# Patient Record
Sex: MALE | Race: BLACK OR AFRICAN AMERICAN | NOT HISPANIC OR LATINO | Employment: FULL TIME | URBAN - METROPOLITAN AREA
[De-identification: names, ages, dates, MRNs, and addresses within clinical notes are randomized per-mention and may not be internally consistent; named-entity substitution may affect disease eponyms.]

---

## 2017-07-03 ENCOUNTER — APPOINTMENT (OUTPATIENT)
Dept: CT IMAGING | Facility: HOSPITAL | Age: 57
End: 2017-07-03

## 2017-07-03 ENCOUNTER — APPOINTMENT (OUTPATIENT)
Dept: ULTRASOUND IMAGING | Facility: HOSPITAL | Age: 57
End: 2017-07-03

## 2017-07-03 ENCOUNTER — APPOINTMENT (OUTPATIENT)
Dept: GENERAL RADIOLOGY | Facility: HOSPITAL | Age: 57
End: 2017-07-03

## 2017-07-03 ENCOUNTER — HOSPITAL ENCOUNTER (INPATIENT)
Facility: HOSPITAL | Age: 57
LOS: 7 days | Discharge: HOME | End: 2017-07-10
Attending: EMERGENCY MEDICINE | Admitting: INTERNAL MEDICINE

## 2017-07-03 DIAGNOSIS — D64.9 ANEMIA, UNSPECIFIED TYPE: ICD-10-CM

## 2017-07-03 DIAGNOSIS — D69.6 THROMBOCYTOPENIA (HCC): ICD-10-CM

## 2017-07-03 DIAGNOSIS — R77.8 ELEVATED TROPONIN: ICD-10-CM

## 2017-07-03 DIAGNOSIS — N17.9 ACUTE RENAL FAILURE, UNSPECIFIED ACUTE RENAL FAILURE TYPE (HCC): ICD-10-CM

## 2017-07-03 DIAGNOSIS — E83.41 HYPERMAGNESEMIA: ICD-10-CM

## 2017-07-03 DIAGNOSIS — R93.89 ABNORMAL FINDING ON CHEST XRAY: ICD-10-CM

## 2017-07-03 DIAGNOSIS — E87.6 HYPOKALEMIA: ICD-10-CM

## 2017-07-03 DIAGNOSIS — I16.9 HYPERTENSIVE CRISIS: Primary | ICD-10-CM

## 2017-07-03 LAB
ALBUMIN SERPL-MCNC: 4 G/DL (ref 3.5–5.2)
ALBUMIN/GLOB SERPL: 1.2 G/DL
ALP SERPL-CCNC: 58 U/L (ref 39–117)
ALT SERPL W P-5'-P-CCNC: 23 U/L (ref 1–41)
AMPHET+METHAMPHET UR QL: NEGATIVE
ANION GAP SERPL CALCULATED.3IONS-SCNC: 19.9 MMOL/L
APTT PPP: 36.7 SECONDS (ref 22.7–35.4)
AST SERPL-CCNC: 41 U/L (ref 1–40)
BACTERIA UR QL AUTO: ABNORMAL /HPF
BARBITURATES UR QL SCN: NEGATIVE
BASOPHILS # BLD AUTO: 0.02 10*3/MM3 (ref 0–0.2)
BASOPHILS NFR BLD AUTO: 0.3 % (ref 0–1.5)
BENZODIAZ UR QL SCN: NEGATIVE
BH CV VAS MEAS BASILIC ANTECUBITAL FOSSA LEFT: 0.2 CM
BH CV VAS MEAS BASILIC ANTECUBITAL FOSSA RIGHT: 0.2 CM
BH CV VAS MEAS BASILIC FOREARM LEFT - DIST: 0.07 CM
BH CV VAS MEAS BASILIC FOREARM LEFT - MID: 0.14 CM
BH CV VAS MEAS BASILIC FOREARM LEFT - PROX: 0.15 CM
BH CV VAS MEAS BASILIC FOREARM RIGHT - DIST: 0.11 CM
BH CV VAS MEAS BASILIC FOREARM RIGHT - MID: 0.1 CM
BH CV VAS MEAS BASILIC FOREARM RIGHT - PROX: 0.25 CM
BH CV VAS MEAS BASILIC UPPER ARM LEFT - DIST: 0.25 CM
BH CV VAS MEAS BASILIC UPPER ARM LEFT - MID: 0.29 CM
BH CV VAS MEAS BASILIC UPPER ARM RIGHT - DIST: 0.28 CM
BH CV VAS MEAS BASILIC UPPER ARM RIGHT - MID: 0.34 CM
BH CV VAS MEAS BASILIC UPPER ARM RIGHT - PROX: 0.34 CM
BH CV VAS MEAS CEPHALIC ANTECUBITAL FOSSA LEFT: 0.22 CM
BH CV VAS MEAS CEPHALIC ANTECUBITAL FOSSA RIGHT: 0.26 CM
BH CV VAS MEAS CEPHALIC FOREARM LEFT - DIST: 0.15 CM
BH CV VAS MEAS CEPHALIC FOREARM LEFT - MID: 0.13 CM
BH CV VAS MEAS CEPHALIC FOREARM LEFT - PROX: 0.15 CM
BH CV VAS MEAS CEPHALIC FOREARM RIGHT - DIST: 0.07 CM
BH CV VAS MEAS CEPHALIC FOREARM RIGHT - MID: 0.11 CM
BH CV VAS MEAS CEPHALIC FOREARM RIGHT - PROX: 0.09 CM
BH CV VAS MEAS CEPHALIC UPPER ARM LEFT - DIST: 0.19 CM
BH CV VAS MEAS CEPHALIC UPPER ARM LEFT - MID: 0.23 CM
BH CV VAS MEAS CEPHALIC UPPER ARM LEFT - PROX: 0.21 CM
BH CV VAS MEAS CEPHALIC UPPER ARM RIGHT - DIST: 0.1 CM
BH CV VAS MEAS CEPHALIC UPPER ARM RIGHT - MID: 0.18 CM
BH CV VAS MEAS CEPHALIC UPPER ARM RIGHT - PROX: 0.14 CM
BH CV VAS MEAS RADIAL UPPER ARM LEFT - DIST: 0.23 CM
BH CV VAS MEAS RADIAL UPPER ARM LEFT - MID: 0.27 CM
BH CV VAS MEAS RADIAL UPPER ARM LEFT - PROX: 0.29 CM
BH CV VAS MEAS RADIAL UPPER ARM RIGHT - DIST: 0.2 CM
BH CV VAS MEAS RADIAL UPPER ARM RIGHT - MID: 0.27 CM
BH CV VAS MEAS RADIAL UPPER ARM RIGHT - PROX: 0.28 CM
BILIRUB CONJ SERPL-MCNC: 0.3 MG/DL (ref 0–0.3)
BILIRUB SERPL-MCNC: 1.9 MG/DL (ref 0.1–1.2)
BILIRUB UR QL STRIP: NEGATIVE
BUN BLD-MCNC: 74 MG/DL (ref 6–20)
BUN/CREAT SERPL: 7.4 (ref 7–25)
CALCIUM SPEC-SCNC: 9.1 MG/DL (ref 8.6–10.5)
CANNABINOIDS SERPL QL: POSITIVE
CHLORIDE SERPL-SCNC: 89 MMOL/L (ref 98–107)
CHLORIDE UR-SCNC: 57 MMOL/L
CLARITY UR: CLEAR
CO2 SERPL-SCNC: 25.1 MMOL/L (ref 22–29)
COCAINE UR QL: NEGATIVE
COLOR UR: YELLOW
CREAT BLD-MCNC: 10.02 MG/DL (ref 0.76–1.27)
CREAT UR-MCNC: 66.3 MG/DL
CRP SERPL-MCNC: 3.34 MG/DL (ref 0–0.5)
DEPRECATED RDW RBC AUTO: 50.3 FL (ref 37–54)
EOSINOPHIL # BLD AUTO: 0.06 10*3/MM3 (ref 0–0.7)
EOSINOPHIL NFR BLD AUTO: 0.8 % (ref 0.3–6.2)
EOSINOPHIL SPEC QL MICRO: 0 % EOS/100 CELLS (ref 0–0)
ERYTHROCYTE [DISTWIDTH] IN BLOOD BY AUTOMATED COUNT: 15 % (ref 11.5–14.5)
ERYTHROCYTE [SEDIMENTATION RATE] IN BLOOD: 85 MM/HR (ref 0–20)
FERRITIN SERPL-MCNC: 529.7 NG/ML (ref 30–400)
FOLATE SERPL-MCNC: 19.46 NG/ML (ref 4.78–24.2)
GFR SERPL CREATININE-BSD FRML MDRD: 7 ML/MIN/1.73
GLOBULIN UR ELPH-MCNC: 3.3 GM/DL
GLUCOSE BLD-MCNC: 165 MG/DL (ref 65–99)
GLUCOSE BLDC GLUCOMTR-MCNC: 157 MG/DL (ref 70–130)
GLUCOSE BLDC GLUCOMTR-MCNC: 195 MG/DL (ref 70–130)
GLUCOSE BLDC GLUCOMTR-MCNC: 202 MG/DL (ref 70–130)
GLUCOSE UR STRIP-MCNC: ABNORMAL MG/DL
HCT VFR BLD AUTO: 28 % (ref 40.4–52.2)
HGB BLD-MCNC: 10.3 G/DL (ref 13.7–17.6)
HGB RETIC QN: 38.3 PG (ref 32.7–38.6)
HGB UR QL STRIP.AUTO: ABNORMAL
HOLD SPECIMEN: NORMAL
HOLD SPECIMEN: NORMAL
HYALINE CASTS UR QL AUTO: ABNORMAL /LPF
IMM GRANULOCYTES # BLD: 0.02 10*3/MM3 (ref 0–0.03)
IMM GRANULOCYTES NFR BLD: 0.3 % (ref 0–0.5)
IMM RETICS NFR: 13.4 % (ref 0.7–13.7)
INR PPP: 1.22 (ref 0.9–1.1)
IRON 24H UR-MRATE: 72 MCG/DL (ref 59–158)
IRON SATN MFR SERPL: 22 % (ref 20–50)
KETONES UR QL STRIP: NEGATIVE
LDH SERPL-CCNC: 1488 U/L (ref 135–225)
LEUKOCYTE ESTERASE UR QL STRIP.AUTO: NEGATIVE
LIPASE SERPL-CCNC: 118 U/L (ref 13–60)
LYMPHOCYTES # BLD AUTO: 0.73 10*3/MM3 (ref 0.9–4.8)
LYMPHOCYTES NFR BLD AUTO: 10.1 % (ref 19.6–45.3)
MAGNESIUM SERPL-MCNC: 3 MG/DL (ref 1.6–2.6)
MCH RBC QN AUTO: 33.8 PG (ref 27–32.7)
MCHC RBC AUTO-ENTMCNC: 36.8 G/DL (ref 32.6–36.4)
MCV RBC AUTO: 91.8 FL (ref 79.8–96.2)
METHADONE UR QL SCN: NEGATIVE
MONOCYTES # BLD AUTO: 0.45 10*3/MM3 (ref 0.2–1.2)
MONOCYTES NFR BLD AUTO: 6.2 % (ref 5–12)
NEUTROPHILS # BLD AUTO: 5.95 10*3/MM3 (ref 1.9–8.1)
NEUTROPHILS NFR BLD AUTO: 82.3 % (ref 42.7–76)
NITRITE UR QL STRIP: NEGATIVE
OPIATES UR QL: NEGATIVE
OXYCODONE UR QL SCN: NEGATIVE
PH UR STRIP.AUTO: 7 [PH] (ref 5–8)
PLAT MORPH BLD: NORMAL
PLATELET # BLD AUTO: 63 10*3/MM3 (ref 140–500)
PLATELET # BLD AUTO: 70 10*3/MM3 (ref 140–500)
PLATELETS.RETICULATED NFR BLD AUTO: 6.5 % (ref 0.9–6.5)
POTASSIUM BLD-SCNC: 2.6 MMOL/L (ref 3.5–5.2)
POTASSIUM BLD-SCNC: 2.9 MMOL/L (ref 3.5–5.2)
PROT SERPL-MCNC: 7.3 G/DL (ref 6–8.5)
PROT UR QL STRIP: ABNORMAL
PROT UR-MCNC: 455 MG/DL
PROT/CREAT UR: 6862.7 MG/G CREA
PROTHROMBIN TIME: 14.9 SECONDS (ref 11.7–14.2)
RBC # BLD AUTO: 3.05 10*6/MM3 (ref 4.6–6)
RBC # UR: ABNORMAL /HPF
REF LAB TEST METHOD: ABNORMAL
RETICS/RBC NFR AUTO: 5.02 % (ref 0.5–1.5)
SCHISTOCYTES BLD QL SMEAR: NORMAL
SODIUM BLD-SCNC: 134 MMOL/L (ref 136–145)
SODIUM UR-SCNC: 78 MMOL/L
SP GR UR STRIP: 1.01 (ref 1–1.03)
SQUAMOUS #/AREA URNS HPF: ABNORMAL /HPF
T3FREE SERPL-MCNC: 2.18 PG/ML (ref 2–4.4)
T4 FREE SERPL-MCNC: 1.26 NG/DL (ref 0.93–1.7)
TIBC SERPL-MCNC: 332 MCG/DL (ref 298–536)
TRANSFERRIN SERPL-MCNC: 223 MG/DL (ref 200–360)
TROPONIN T SERPL-MCNC: 0.22 NG/ML (ref 0–0.03)
TSH SERPL DL<=0.05 MIU/L-ACNC: 1.2 MIU/ML (ref 0.27–4.2)
TSH SERPL DL<=0.05 MIU/L-ACNC: 1.4 MIU/ML (ref 0.27–4.2)
UROBILINOGEN UR QL STRIP: ABNORMAL
VIT B12 BLD-MCNC: 635 PG/ML (ref 211–946)
WBC MORPH BLD: NORMAL
WBC NRBC COR # BLD: 7.23 10*3/MM3 (ref 4.5–10.7)
WBC UR QL AUTO: ABNORMAL /HPF
WHOLE BLOOD HOLD SPECIMEN: NORMAL
WHOLE BLOOD HOLD SPECIMEN: NORMAL

## 2017-07-03 PROCEDURE — 85046 RETICYTE/HGB CONCENTRATE: CPT | Performed by: INTERNAL MEDICINE

## 2017-07-03 PROCEDURE — 83690 ASSAY OF LIPASE: CPT | Performed by: EMERGENCY MEDICINE

## 2017-07-03 PROCEDURE — 76775 US EXAM ABDO BACK WALL LIM: CPT

## 2017-07-03 PROCEDURE — 80307 DRUG TEST PRSMV CHEM ANLYZR: CPT | Performed by: EMERGENCY MEDICINE

## 2017-07-03 PROCEDURE — 83540 ASSAY OF IRON: CPT | Performed by: INTERNAL MEDICINE

## 2017-07-03 PROCEDURE — 86140 C-REACTIVE PROTEIN: CPT | Performed by: INTERNAL MEDICINE

## 2017-07-03 PROCEDURE — 87205 SMEAR GRAM STAIN: CPT | Performed by: INTERNAL MEDICINE

## 2017-07-03 PROCEDURE — 85025 COMPLETE CBC W/AUTO DIFF WBC: CPT | Performed by: EMERGENCY MEDICINE

## 2017-07-03 PROCEDURE — 99284 EMERGENCY DEPT VISIT MOD MDM: CPT

## 2017-07-03 PROCEDURE — 83883 ASSAY NEPHELOMETRY NOT SPEC: CPT | Performed by: INTERNAL MEDICINE

## 2017-07-03 PROCEDURE — 84132 ASSAY OF SERUM POTASSIUM: CPT | Performed by: INTERNAL MEDICINE

## 2017-07-03 PROCEDURE — 85007 BL SMEAR W/DIFF WBC COUNT: CPT | Performed by: EMERGENCY MEDICINE

## 2017-07-03 PROCEDURE — 82436 ASSAY OF URINE CHLORIDE: CPT | Performed by: INTERNAL MEDICINE

## 2017-07-03 PROCEDURE — 85055 RETICULATED PLATELET ASSAY: CPT | Performed by: INTERNAL MEDICINE

## 2017-07-03 PROCEDURE — 84443 ASSAY THYROID STIM HORMONE: CPT | Performed by: EMERGENCY MEDICINE

## 2017-07-03 PROCEDURE — 84439 ASSAY OF FREE THYROXINE: CPT | Performed by: INTERNAL MEDICINE

## 2017-07-03 PROCEDURE — 25010000002 HEPARIN (PORCINE) PER 1000 UNITS: Performed by: INTERNAL MEDICINE

## 2017-07-03 PROCEDURE — 86335 IMMUNFIX E-PHORSIS/URINE/CSF: CPT | Performed by: INTERNAL MEDICINE

## 2017-07-03 PROCEDURE — 84155 ASSAY OF PROTEIN SERUM: CPT | Performed by: INTERNAL MEDICINE

## 2017-07-03 PROCEDURE — 82962 GLUCOSE BLOOD TEST: CPT

## 2017-07-03 PROCEDURE — 93005 ELECTROCARDIOGRAM TRACING: CPT | Performed by: EMERGENCY MEDICINE

## 2017-07-03 PROCEDURE — 84165 PROTEIN E-PHORESIS SERUM: CPT | Performed by: INTERNAL MEDICINE

## 2017-07-03 PROCEDURE — 84481 FREE ASSAY (FT-3): CPT | Performed by: INTERNAL MEDICINE

## 2017-07-03 PROCEDURE — 99254 IP/OBS CNSLTJ NEW/EST MOD 60: CPT | Performed by: INTERNAL MEDICINE

## 2017-07-03 PROCEDURE — 84484 ASSAY OF TROPONIN QUANT: CPT | Performed by: EMERGENCY MEDICINE

## 2017-07-03 PROCEDURE — 83615 LACTATE (LD) (LDH) ENZYME: CPT | Performed by: INTERNAL MEDICINE

## 2017-07-03 PROCEDURE — 84156 ASSAY OF PROTEIN URINE: CPT | Performed by: INTERNAL MEDICINE

## 2017-07-03 PROCEDURE — 93010 ELECTROCARDIOGRAM REPORT: CPT | Performed by: INTERNAL MEDICINE

## 2017-07-03 PROCEDURE — 71020 HC CHEST PA AND LATERAL: CPT

## 2017-07-03 PROCEDURE — 84300 ASSAY OF URINE SODIUM: CPT | Performed by: INTERNAL MEDICINE

## 2017-07-03 PROCEDURE — 81001 URINALYSIS AUTO W/SCOPE: CPT | Performed by: EMERGENCY MEDICINE

## 2017-07-03 PROCEDURE — 86334 IMMUNOFIX E-PHORESIS SERUM: CPT | Performed by: INTERNAL MEDICINE

## 2017-07-03 PROCEDURE — 80053 COMPREHEN METABOLIC PANEL: CPT | Performed by: EMERGENCY MEDICINE

## 2017-07-03 PROCEDURE — 25010000002 ONDANSETRON PER 1 MG: Performed by: INTERNAL MEDICINE

## 2017-07-03 PROCEDURE — 82607 VITAMIN B-12: CPT | Performed by: INTERNAL MEDICINE

## 2017-07-03 PROCEDURE — 82746 ASSAY OF FOLIC ACID SERUM: CPT | Performed by: INTERNAL MEDICINE

## 2017-07-03 PROCEDURE — 84466 ASSAY OF TRANSFERRIN: CPT | Performed by: INTERNAL MEDICINE

## 2017-07-03 PROCEDURE — 82668 ASSAY OF ERYTHROPOIETIN: CPT | Performed by: INTERNAL MEDICINE

## 2017-07-03 PROCEDURE — 83735 ASSAY OF MAGNESIUM: CPT | Performed by: EMERGENCY MEDICINE

## 2017-07-03 PROCEDURE — 85652 RBC SED RATE AUTOMATED: CPT | Performed by: INTERNAL MEDICINE

## 2017-07-03 PROCEDURE — 84443 ASSAY THYROID STIM HORMONE: CPT | Performed by: INTERNAL MEDICINE

## 2017-07-03 PROCEDURE — 82570 ASSAY OF URINE CREATININE: CPT | Performed by: INTERNAL MEDICINE

## 2017-07-03 PROCEDURE — 85610 PROTHROMBIN TIME: CPT | Performed by: EMERGENCY MEDICINE

## 2017-07-03 PROCEDURE — 85730 THROMBOPLASTIN TIME PARTIAL: CPT | Performed by: EMERGENCY MEDICINE

## 2017-07-03 PROCEDURE — 82728 ASSAY OF FERRITIN: CPT | Performed by: INTERNAL MEDICINE

## 2017-07-03 PROCEDURE — 82248 BILIRUBIN DIRECT: CPT | Performed by: INTERNAL MEDICINE

## 2017-07-03 PROCEDURE — 71250 CT THORAX DX C-: CPT

## 2017-07-03 RX ORDER — NEBIVOLOL 10 MG/1
10 TABLET ORAL
Status: DISCONTINUED | OUTPATIENT
Start: 2017-07-04 | End: 2017-07-04

## 2017-07-03 RX ORDER — HYDROCHLOROTHIAZIDE 25 MG/1
25 TABLET ORAL DAILY
COMMUNITY
End: 2017-07-10 | Stop reason: HOSPADM

## 2017-07-03 RX ORDER — TEMAZEPAM 15 MG/1
15 CAPSULE ORAL NIGHTLY PRN
Status: DISCONTINUED | OUTPATIENT
Start: 2017-07-03 | End: 2017-07-10 | Stop reason: HOSPADM

## 2017-07-03 RX ORDER — SODIUM CHLORIDE 0.9 % (FLUSH) 0.9 %
10 SYRINGE (ML) INJECTION AS NEEDED
Status: DISCONTINUED | OUTPATIENT
Start: 2017-07-03 | End: 2017-07-10 | Stop reason: HOSPADM

## 2017-07-03 RX ORDER — PANTOPRAZOLE SODIUM 40 MG/10ML
40 INJECTION, POWDER, LYOPHILIZED, FOR SOLUTION INTRAVENOUS ONCE
Status: COMPLETED | OUTPATIENT
Start: 2017-07-03 | End: 2017-07-03

## 2017-07-03 RX ORDER — HEPARIN SODIUM 5000 [USP'U]/ML
5000 INJECTION, SOLUTION INTRAVENOUS; SUBCUTANEOUS EVERY 8 HOURS SCHEDULED
Status: DISCONTINUED | OUTPATIENT
Start: 2017-07-03 | End: 2017-07-10 | Stop reason: HOSPADM

## 2017-07-03 RX ORDER — ONDANSETRON 2 MG/ML
4 INJECTION INTRAMUSCULAR; INTRAVENOUS ONCE
Status: COMPLETED | OUTPATIENT
Start: 2017-07-03 | End: 2017-07-03

## 2017-07-03 RX ORDER — NEBIVOLOL 10 MG/1
10 TABLET ORAL DAILY
COMMUNITY
End: 2017-07-10 | Stop reason: HOSPADM

## 2017-07-03 RX ORDER — LABETALOL HYDROCHLORIDE 5 MG/ML
10 INJECTION, SOLUTION INTRAVENOUS ONCE
Status: COMPLETED | OUTPATIENT
Start: 2017-07-03 | End: 2017-07-03

## 2017-07-03 RX ORDER — POTASSIUM CHLORIDE 750 MG/1
40 CAPSULE, EXTENDED RELEASE ORAL ONCE
Status: COMPLETED | OUTPATIENT
Start: 2017-07-03 | End: 2017-07-03

## 2017-07-03 RX ORDER — POTASSIUM CHLORIDE 750 MG/1
60 CAPSULE, EXTENDED RELEASE ORAL ONCE
Status: COMPLETED | OUTPATIENT
Start: 2017-07-03 | End: 2017-07-03

## 2017-07-03 RX ORDER — SODIUM CHLORIDE 9 MG/ML
125 INJECTION, SOLUTION INTRAVENOUS CONTINUOUS
Status: DISCONTINUED | OUTPATIENT
Start: 2017-07-03 | End: 2017-07-09

## 2017-07-03 RX ORDER — NEBIVOLOL 10 MG/1
10 TABLET ORAL ONCE
Status: COMPLETED | OUTPATIENT
Start: 2017-07-03 | End: 2017-07-03

## 2017-07-03 RX ADMIN — HEPARIN SODIUM 5000 UNITS: 5000 INJECTION, SOLUTION INTRAVENOUS; SUBCUTANEOUS at 15:34

## 2017-07-03 RX ADMIN — HEPARIN SODIUM 5000 UNITS: 5000 INJECTION, SOLUTION INTRAVENOUS; SUBCUTANEOUS at 22:08

## 2017-07-03 RX ADMIN — POTASSIUM CHLORIDE 60 MEQ: 750 CAPSULE, EXTENDED RELEASE ORAL at 22:09

## 2017-07-03 RX ADMIN — NICARDIPINE HYDROCHLORIDE 5 MG/HR: 25 INJECTION INTRAVENOUS at 19:51

## 2017-07-03 RX ADMIN — NICARDIPINE HYDROCHLORIDE 5 MG/HR: 25 INJECTION INTRAVENOUS at 09:39

## 2017-07-03 RX ADMIN — SODIUM CHLORIDE 125 ML/HR: 9 INJECTION, SOLUTION INTRAVENOUS at 19:47

## 2017-07-03 RX ADMIN — NICARDIPINE HYDROCHLORIDE 5 MG/HR: 25 INJECTION INTRAVENOUS at 12:11

## 2017-07-03 RX ADMIN — LABETALOL HYDROCHLORIDE 10 MG: 5 INJECTION, SOLUTION INTRAVENOUS at 10:19

## 2017-07-03 RX ADMIN — SODIUM CHLORIDE 500 ML: 9 INJECTION, SOLUTION INTRAVENOUS at 08:14

## 2017-07-03 RX ADMIN — ONDANSETRON 4 MG: 2 INJECTION INTRAMUSCULAR; INTRAVENOUS at 12:11

## 2017-07-03 RX ADMIN — SODIUM CHLORIDE 125 ML/HR: 9 INJECTION, SOLUTION INTRAVENOUS at 08:15

## 2017-07-03 RX ADMIN — NICARDIPINE HYDROCHLORIDE 5 MG/HR: 25 INJECTION INTRAVENOUS at 23:17

## 2017-07-03 RX ADMIN — PANTOPRAZOLE SODIUM 40 MG: 40 INJECTION, POWDER, FOR SOLUTION INTRAVENOUS at 08:14

## 2017-07-03 RX ADMIN — POTASSIUM CHLORIDE 40 MEQ: 750 CAPSULE, EXTENDED RELEASE ORAL at 15:34

## 2017-07-03 RX ADMIN — NEBIVOLOL HYDROCHLORIDE 10 MG: 10 TABLET ORAL at 08:39

## 2017-07-03 RX ADMIN — TEMAZEPAM 15 MG: 15 CAPSULE ORAL at 23:04

## 2017-07-03 NOTE — CONSULTS
Referring Provider: Stephen Arauz MD  Reason for Consultation: Evaluation of patient with the severe renal failure    Subjective     Chief complaint   Chief Complaint   Patient presents with   • Nausea     Patient states nauseated, vomited on Friday, dry mouth x 2 weeks   • Vomiting   • Constipation     last normal bowel movement last wednesday       History of present illness:    This is a very pleasant 56-year-old -American gentleman who presented with recurrent nausea and occasional vomiting for the past 5 or 6 days with significant decrease in appetite and weight loss.  He was found to have severe hypertension blood pressure in the 250/130 range.  Also was found to have increased creatinine up to 10.1 he had not had any labs since 2014 and also he stopped taking his antihypertensive agents over the past year.  He denies using nonsteroidal anti-inflammatory drugs.  He had left total knee replacement about 3 years ago and he was taking the Advil and Aleve at that time he was told to stop it because of the impact of his hypertension.  He denies being diabetic, no history of heart or lung diseases, no CVA TIAs or seizure disorder, no peptic ulcer disease, no liver disease.  Denies nephrolithiasis or UTIs, denies any prostate problem.  There is no associated symptoms of dysuria, gross hematuria or other outlet symptoms.  No melena or hematemesis, no easy bruising has been reported by the patient.    Past Medical History:   Diagnosis Date   • Hypertension      History reviewed. No pertinent surgical history.  History reviewed. No pertinent family history.  Very strong family history for hypertension but negative for kidney disease.  Social History   Substance Use Topics   • Smoking status: Never Smoker   • Smokeless tobacco: None   • Alcohol use Yes      Comment: occassionally       (Not in a hospital admission)  Allergies:  Review of patient's allergies indicates no known allergies.    Review of  "Systems  Denies any fever or chills, he has poor appetite, and weight loss.  No acute visual or hearing problems, no epistaxis.  Denies any chest pain or shortness of air, no orthopnea or PND, no hemoptysis or cough.  He has nausea and occasional vomiting for the past few days and very poor appetite, no melena, hematochezia or hematemesis.  Denies any dysuria, gross hematuria or bladder outlet symptoms.  He had the occasional arthralgias of the left knee related to his knee replacement, no lower extremity edema, no headache or syncope or seizure activity.  He is not diabetic, no thyroid disease, denies depression or anxiety or panic attack, has a sleep pattern disturbance where he cannot sleep at night.  Remainder full systems review were essentially negative    Objective     Vital Signs  Temp:  [97.1 °F (36.2 °C)] 97.1 °F (36.2 °C)  Heart Rate:  [96-98] 96  Resp:  [18] 18  BP: (240-254)/(160-178) 240/160    Flowsheet Rows         First Filed Value    Admission Height  70\" (177.8 cm) Documented at 07/03/2017 0734    Admission Weight  150 lb (68 kg) Documented at 07/03/2017 0734                 No intake or output data in the 24 hours ending 07/03/17 1001    Physical Exam:  General Appearance: alert, oriented x 3, no acute distress,   Skin: warm and dry  HEENT: pupils round and reactive to light, oral mucosa dry,   Neck: supple, no JVD, trachea midline  Lungs: CTA, unlabored breathing effort  Heart: RRR, normal S1 and S2, no S3, positive S4, no rub  Abdomen: soft, non-tender, no palpable bladder, present bowel sounds to auscultation  Extremities: no edema, cyanosis or clubbing  Joints: No significant deformities noted, no crepitation over the knees or the ankles.  Lymphatics: No cervical or supraclavicular lymphadenopathy.  Neuro: normal speech and mental status, no asterixis    Results Review:  Results for orders placed or performed during the hospital encounter of 07/03/17   Urinalysis With / Culture If Indicated "   Result Value Ref Range    Color, UA Yellow Yellow, Straw    Appearance, UA Clear Clear    pH, UA 7.0 5.0 - 8.0    Specific Gravity, UA 1.011 1.005 - 1.030    Glucose,  mg/dL (Trace) (A) Negative    Ketones, UA Negative Negative    Bilirubin, UA Negative Negative    Blood, UA Large (3+) (A) Negative    Protein, UA >=300 mg/dL (3+) (A) Negative    Leuk Esterase, UA Negative Negative    Nitrite, UA Negative Negative    Urobilinogen, UA 0.2 E.U./dL 0.2 - 1.0 E.U./dL   Urine Drug Screen   Result Value Ref Range    Amphet/Methamphet, Screen Negative Negative    Barbiturates Screen, Urine Negative Negative    Benzodiazepine Screen, Urine Negative Negative    Cocaine Screen, Urine Negative Negative    Opiate Screen Negative Negative    THC, Screen, Urine Positive (A) Negative    Methadone Screen, Urine Negative Negative    Oxycodone Screen, Urine Negative Negative   Comprehensive Metabolic Panel   Result Value Ref Range    Glucose 165 (H) 65 - 99 mg/dL    BUN 74 (H) 6 - 20 mg/dL    Creatinine 10.02 (H) 0.76 - 1.27 mg/dL    Sodium 134 (L) 136 - 145 mmol/L    Potassium 2.6 (L) 3.5 - 5.2 mmol/L    Chloride 89 (L) 98 - 107 mmol/L    CO2 25.1 22.0 - 29.0 mmol/L    Calcium 9.1 8.6 - 10.5 mg/dL    Total Protein 7.3 6.0 - 8.5 g/dL    Albumin 4.00 3.50 - 5.20 g/dL    ALT (SGPT) 23 1 - 41 U/L    AST (SGOT) 41 (H) 1 - 40 U/L    Alkaline Phosphatase 58 39 - 117 U/L    Total Bilirubin 1.9 (H) 0.1 - 1.2 mg/dL    eGFR  African Amer 7 (L) >60 mL/min/1.73    Globulin 3.3 gm/dL    A/G Ratio 1.2 g/dL    BUN/Creatinine Ratio 7.4 7.0 - 25.0    Anion Gap 19.9 mmol/L   Lipase   Result Value Ref Range    Lipase 118 (H) 13 - 60 U/L   Magnesium   Result Value Ref Range    Magnesium 3.0 (H) 1.6 - 2.6 mg/dL   Troponin   Result Value Ref Range    Troponin T 0.219 (C) 0.000 - 0.030 ng/mL   TSH   Result Value Ref Range    TSH 1.400 0.270 - 4.200 mIU/mL   CBC Auto Differential   Result Value Ref Range    WBC 7.23 4.50 - 10.70 10*3/mm3    RBC 3.05  (L) 4.60 - 6.00 10*6/mm3    Hemoglobin 10.3 (L) 13.7 - 17.6 g/dL    Hematocrit 28.0 (L) 40.4 - 52.2 %    MCV 91.8 79.8 - 96.2 fL    MCH 33.8 (H) 27.0 - 32.7 pg    MCHC 36.8 (H) 32.6 - 36.4 g/dL    RDW 15.0 (H) 11.5 - 14.5 %    RDW-SD 50.3 37.0 - 54.0 fl    Platelets 70 (L) 140 - 500 10*3/mm3    Neutrophil % 82.3 (H) 42.7 - 76.0 %    Lymphocyte % 10.1 (L) 19.6 - 45.3 %    Monocyte % 6.2 5.0 - 12.0 %    Eosinophil % 0.8 0.3 - 6.2 %    Basophil % 0.3 0.0 - 1.5 %    Immature Grans % 0.3 0.0 - 0.5 %    Neutrophils, Absolute 5.95 1.90 - 8.10 10*3/mm3    Lymphocytes, Absolute 0.73 (L) 0.90 - 4.80 10*3/mm3    Monocytes, Absolute 0.45 0.20 - 1.20 10*3/mm3    Eosinophils, Absolute 0.06 0.00 - 0.70 10*3/mm3    Basophils, Absolute 0.02 0.00 - 0.20 10*3/mm3    Immature Grans, Absolute 0.02 0.00 - 0.03 10*3/mm3   Scan Slide   Result Value Ref Range    Schistocytes Slight/1+ None Seen    WBC Morphology Normal Normal    Platelet Morphology Normal Normal   Urinalysis, Microscopic Only   Result Value Ref Range    RBC, UA 21-30 (A) None Seen, 0-2 /HPF    WBC, UA 0-2 None Seen, 0-2 /HPF    Bacteria, UA None Seen None Seen /HPF    Squamous Epithelial Cells, UA 0-2 None Seen, 0-2 /HPF    Hyaline Casts, UA 0-2 None Seen /LPF    Methodology Automated Microscopy      Imaging Results (last 72 hours)     Procedure Component Value Units Date/Time    XR Chest 2 View [[id number]] Collected:  07/03/17 0922     Updated:  07/03/17 0945    Narrative:       2-VIEW CHEST     HISTORY: Weakness and nausea and vomiting.     COMPARISON: Chest CT with IV contrast 08/11/2014.     FINDINGS: There is an abnormal masslike opacity in the right upper lobe  measuring approximately 3.5 x 2.3 cm. This is new compared to previous  CT August 2014. This may represent infiltrate though a lung mass could  also have this appearance. This needs either short interval follow-up  exam or further evaluation with chest CT. Left lung is clear.  Cardiomediastinal silhouette is  within normal limits.       Impression:       Abnormal 3.5 cm masslike opacity right upper lobe. This  could represent a focal pneumonia though a lung mass could also have  this appearance. Findings could be correlated with clinical data and  recommend either short interval follow-up x-ray or further evaluation  with chest CT.     This report was finalized on 7/3/2017 9:42 AM by Dr. Riki Booth MD.                 labetalol 10 mg Intravenous Once       niCARdipine 5-15 mg/hr Last Rate: 7.5 mg/hr (07/03/17 0948)   sodium chloride 125 mL/hr Last Rate: 125 mL/hr (07/03/17 0815)       Assessment/Plan   1.  Severe renal failure could be the end stage renal disease related to severe uncontrolled hypertension  2.  Hypertensive the crisis.  3.  History of hypertension with the noncompliance with medication and has not taken any medication for over a year.  4.  Degenerative joint disease with prior left total knee replacement  5.  Anemia could be associated with chronic kidney disease but other etiologies need to be ruled out.  6.  Thrombocytopenia the etiology is not clear.  5.  Elevated Anemia associated probably with poor oral intake.      Plan:  1.  Agree with the plan for Cardene drip  2.  We'll check urine studies, sodium, chloride and protein to creatinine ratio also check eosinophils  3.  We'll check the iron stores and the immunofixation  4.  Patient will need hematology evaluation  5.  Will check renal ultrasound  6.  The patient probably would need dialysis in the next 24 hours if his renal function is not improved and there is evidence of worsening azotemia.  7. Will give 1 dose of potassium  8.  Surveillance labs    Thank you Dr. Arauz for asking me to see you once the patient consultation    I discussed the patients findings and my recommendations with the patient    David Dejesus MD  07/03/17  10:01 AM

## 2017-07-03 NOTE — PLAN OF CARE
Problem: Patient Care Overview (Adult)  Goal: Plan of Care Review    07/03/17 4875   Outcome Evaluation   Outcome Summary/Follow up Plan Rec'd to CCU from ER today. Cardene gtt @5. -170sys. No c/o pain voiced. Cont to void per urinal. Will monitor labs.

## 2017-07-03 NOTE — ED TRIAGE NOTES
Chief Complaint   Patient presents with   • Nausea     Patient states nauseated, vomited on Friday, dry mouth x 2 weeks   • Vomiting   • Constipation     last normal bowel movement last wednesday

## 2017-07-03 NOTE — ED PROVIDER NOTES
EMERGENCY DEPARTMENT ENCOUNTER    CHIEF COMPLAINT  Chief Complaint: Nausea  History given by: Patient  History limited by: Nothing  Room Number: 09/09  PMD: No Known Provider      HPI:  Pt is a 56 y.o. male who presents complaining of nausea onset 5 days ago. The pt states the nausea is worse after eating food. The pt states he had 3-4 episodes of vomiting 4 days ago that has resolved since. Pt reports decreased appetite, constipation, fatigue, and mild HA - no HA at time of exam. The pt's last normal BM was 5 days ago. The pt had a small BM yesterday after using magnesium citrate. Pt denies CP, SOA, fever, abd pain, dysuria, focal weakness/numbness, problems with speech/vision, and bloody stool. The pt states he has not been taking his BP medications for the past year.    Duration: The pt has had nausea for 5 days  Onset: Gradual  Timing: Constant  Radiation: None  Quality: Nausea  Intensity/Severity: Moderate  Progression: Unchanged  Associated Symptoms: Decreased appetite, constipation, mild HA, fatigue, and vomiting 4 days ago that has resolved  Aggravating Factors: After eating  Alleviating Factors: Nothing  Previous Episodes: None  Treatment before arrival: The pt states he took magnesium citrate yesterday and had a small BM.    PAST MEDICAL HISTORY  Active Ambulatory Problems     Diagnosis Date Noted   • No Active Ambulatory Problems     Resolved Ambulatory Problems     Diagnosis Date Noted   • No Resolved Ambulatory Problems     Past Medical History:   Diagnosis Date   • Hypertension        PAST SURGICAL HISTORY  History reviewed. No pertinent surgical history.    FAMILY HISTORY  History reviewed. No pertinent family history.    SOCIAL HISTORY  Social History     Social History   • Marital status:      Spouse name: N/A   • Number of children: N/A   • Years of education: N/A     Occupational History   • Not on file.     Social History Main Topics   • Smoking status: Never Smoker   • Smokeless tobacco:  Not on file   • Alcohol use Yes      Comment: occassionally   • Drug use: Defer   • Sexual activity: Defer     Other Topics Concern   • Not on file     Social History Narrative   • No narrative on file       ALLERGIES  Review of patient's allergies indicates no known allergies.    REVIEW OF SYSTEMS  Review of Systems   Constitutional: Positive for appetite change (Decreased) and fatigue. Negative for chills and fever.   HENT: Negative for sore throat and trouble swallowing.    Eyes: Negative for visual disturbance.   Respiratory: Negative for cough and shortness of breath.    Cardiovascular: Negative for chest pain and leg swelling.   Gastrointestinal: Positive for constipation, nausea and vomiting (The pt vomited 3-4 times 4 days ago and has not had an episode since.). Negative for abdominal pain and diarrhea.   Endocrine: Negative.    Genitourinary: Negative for decreased urine volume and frequency.   Musculoskeletal: Negative for neck pain.   Skin: Negative for rash.   Allergic/Immunologic: Negative.    Neurological: Positive for headaches (Mild). Negative for weakness and numbness.   Hematological: Negative.    Psychiatric/Behavioral: Negative.    All other systems reviewed and are negative.      PHYSICAL EXAM  ED Triage Vitals   Temp Heart Rate Resp BP SpO2   07/03/17 0736 07/03/17 0734 07/03/17 0734 -- 07/03/17 0734   97.1 °F (36.2 °C) 98 18  100 %      Temp src Heart Rate Source Patient Position BP Location FiO2 (%)   07/03/17 0736 07/03/17 0734 -- -- --   Tympanic Monitor          Physical Exam   Constitutional: He is oriented to person, place, and time. He appears distressed.   HENT:   Head: Normocephalic and atraumatic.   Mouth/Throat: Mucous membranes are dry.   Eyes: EOM are normal.   Neck: Normal range of motion.   Cardiovascular: Normal rate, regular rhythm and normal heart sounds.    No murmur heard.  Pulses:       Posterior tibial pulses are 2+ on the right side, and 2+ on the left side.    Pulmonary/Chest: Effort normal and breath sounds normal. No respiratory distress. He has no wheezes.   Abdominal: Soft. Bowel sounds are normal. There is tenderness in the epigastric area. There is no rebound and no guarding.   Musculoskeletal: Normal range of motion. He exhibits no edema.   Neurological: He is alert and oriented to person, place, and time.   Skin: Skin is warm and dry.   Psychiatric: Affect normal.   Nursing note and vitals reviewed.      LAB RESULTS  Lab Results (last 24 hours)     Procedure Component Value Units Date/Time    CBC & Differential [[id number]] Collected:  07/03/17 0804    Specimen:  Blood Updated:  07/03/17 0916    Narrative:       The following orders were created for panel order CBC & Differential.  Procedure                               Abnormality         Status                     ---------                               -----------         ------                     Scan Slide[[id number]]                                       Final result               CBC Auto Differential[[id number]]        Abnormal            Final result                 Please view results for these tests on the individual orders.    Comprehensive Metabolic Panel [[id number]]  (Abnormal) Collected:  07/03/17 0804    Specimen:  Blood Updated:  07/03/17 0840     Glucose 165 (H) mg/dL      BUN 74 (H) mg/dL      Creatinine 10.02 (H) mg/dL      Sodium 134 (L) mmol/L      Potassium 2.6 (L) mmol/L      Chloride 89 (L) mmol/L      CO2 25.1 mmol/L      Calcium 9.1 mg/dL      Total Protein 7.3 g/dL      Albumin 4.00 g/dL      ALT (SGPT) 23 U/L      AST (SGOT) 41 (H) U/L      Alkaline Phosphatase 58 U/L      Total Bilirubin 1.9 (H) mg/dL      eGFR   Amer 7 (L) mL/min/1.73      Globulin 3.3 gm/dL      A/G Ratio 1.2 g/dL      BUN/Creatinine Ratio 7.4     Anion Gap 19.9 mmol/L     Lipase [[id number]]  (Abnormal) Collected:  07/03/17 0804    Specimen:  Blood Updated:  07/03/17 0840     Lipase 118 (H) U/L     Magnesium  [[id number]]  (Abnormal) Collected:  07/03/17 0804    Specimen:  Blood Updated:  07/03/17 0840     Magnesium 3.0 (H) mg/dL     Troponin [[id number]]  (Abnormal) Collected:  07/03/17 0804    Specimen:  Blood Updated:  07/03/17 0850     Troponin T 0.219 (C) ng/mL     Narrative:       Troponin T Reference Ranges:  Less than 0.03 ng/mL:    Negative for AMI  0.03 to 0.09 ng/mL:      Indeterminant for AMI  Greater than 0.09 ng/mL: Positive for AMI    TSH [[id number]]  (Normal) Collected:  07/03/17 0804    Specimen:  Blood Updated:  07/03/17 0846     TSH 1.400 mIU/mL     CBC Auto Differential [[id number]]  (Abnormal) Collected:  07/03/17 0804    Specimen:  Blood Updated:  07/03/17 0916     WBC 7.23 10*3/mm3      RBC 3.05 (L) 10*6/mm3      Hemoglobin 10.3 (L) g/dL      Hematocrit 28.0 (L) %      MCV 91.8 fL      MCH 33.8 (H) pg      MCHC 36.8 (H) g/dL      RDW 15.0 (H) %      RDW-SD 50.3 fl      Platelets 70 (L) 10*3/mm3      Neutrophil % 82.3 (H) %      Lymphocyte % 10.1 (L) %      Monocyte % 6.2 %      Eosinophil % 0.8 %      Basophil % 0.3 %      Immature Grans % 0.3 %      Neutrophils, Absolute 5.95 10*3/mm3      Lymphocytes, Absolute 0.73 (L) 10*3/mm3      Monocytes, Absolute 0.45 10*3/mm3      Eosinophils, Absolute 0.06 10*3/mm3      Basophils, Absolute 0.02 10*3/mm3      Immature Grans, Absolute 0.02 10*3/mm3     Scan Slide [[id number]] Collected:  07/03/17 0804    Specimen:  Blood Updated:  07/03/17 0916     Schistocytes Slight/1+     WBC Morphology Normal     Platelet Morphology Normal    Urinalysis With / Culture If Indicated [[id number]]  (Abnormal) Collected:  07/03/17 0923    Specimen:  Urine from Urine, Catheter Updated:  07/03/17 0931     Color, UA Yellow     Appearance, UA Clear     pH, UA 7.0     Specific Gravity, UA 1.011     Glucose,  mg/dL (Trace) (A)     Ketones, UA Negative     Bilirubin, UA Negative     Blood, UA Large (3+) (A)     Protein, UA >=300 mg/dL (3+) (A)     Leuk Esterase, UA Negative      Nitrite, UA Negative     Urobilinogen, UA 0.2 E.U./dL    Urine Drug Screen [[id number]]  (Abnormal) Collected:  07/03/17 0923    Specimen:  Urine from Urine, Clean Catch Updated:  07/03/17 0949     Amphet/Methamphet, Screen Negative     Barbiturates Screen, Urine Negative     Benzodiazepine Screen, Urine Negative     Cocaine Screen, Urine Negative     Opiate Screen Negative     THC, Screen, Urine Positive (A)     Methadone Screen, Urine Negative     Oxycodone Screen, Urine Negative    Narrative:       Negative Thresholds For Drugs Screened:     Amphetamines               500 ng/ml   Barbiturates               200 ng/ml   Benzodiazepines            100 ng/ml   Cocaine                    300 ng/ml   Methadone                  300 ng/ml   Opiates                    300 ng/ml   Oxycodone                  100 ng/ml   THC                        50 ng/ml    The Normal Value for all drugs tested is negative. This report includes final unconfirmed screening results to be used for medical treatment purposes only. Unconfirmed results must not be used for non-medical purposes such as employment or legal testing. Clinical consideration should be applied to any drug of abuse test, particulary when unconfirmed results are used.    Urinalysis, Microscopic Only [[id number]]  (Abnormal) Collected:  07/03/17 0923    Specimen:  Urine from Urine, Catheter Updated:  07/03/17 0933     RBC, UA 21-30 (A) /HPF      WBC, UA 0-2 /HPF      Bacteria, UA None Seen /HPF      Squamous Epithelial Cells, UA 0-2 /HPF      Hyaline Casts, UA 0-2 /LPF      Methodology Automated Microscopy          I ordered the above labs and reviewed the results    RADIOLOGY  XR Chest 2 View   Final Result   Abnormal 3.5 cm masslike opacity right upper lobe. This   could represent a focal pneumonia though a lung mass could also have   this appearance. Findings could be correlated with clinical data and   recommend either short interval follow-up x-ray or further  evaluation   with chest CT.       This report was finalized on 7/3/2017 9:42 AM by Dr. Riki Booth MD.               I ordered the above noted radiological studies. Interpreted by radiologist. Reviewed by me in PACS.       PROCEDURES  Critical Care  Performed by: YASH LAGUNAS  Authorized by: YASH LAGUNAS   Total critical care time: 45 minutes  Critical care time was exclusive of separately billable procedures and treating other patients.  Critical care was necessary to treat or prevent imminent or life-threatening deterioration of the following conditions: Hypertensive crisis.  Critical care was time spent personally by me on the following activities: blood draw for specimens, development of treatment plan with patient or surrogate, discussions with consultants, interpretation of cardiac output measurements, evaluation of patient's response to treatment, examination of patient, ordering and review of laboratory studies, ordering and performing treatments and interventions, obtaining history from patient or surrogate, ordering and review of radiographic studies, pulse oximetry, re-evaluation of patient's condition and review of old charts.          EKG           EKG time: 0815  Rhythm/Rate: 90's Normal Sinus  P waves and DC: Biatrial enlargement  QRS, axis: LVH and borderline prolonged QT intervals  ST and T waves: Abnormal ST and T wave findings diffusely with flipped T waves in the lateral leads.      Interpreted Contemporaneously by me, independently viewed  ST and T waves are more pronounced and QT interval is new compared to prior 7-24-14      PROGRESS AND CONSULTS  ED Course     0806  EKG, CXR, and labs ordered for further evaluation. Protonix and Bystolic ordered.    0927  Consult placed to Nephrology and Pulmonology. Cardene ordered.    0939  Received a call from Dr. Dejesus and discussed pt's case. Dr. Dejesus agreed with plan to consult on the pt. He would like a bladder scan ordered for the pt for  further evaluation.    0946  Labetalol ordered.  cardene ordered    0948  Dr. Dejesus is in the ED at the pt's bedside.    0949  Received a call from Dr. Arauz and discussed pt's case. Dr. Arauz agreed with plan to admit the pt.    1000  Received a call from Dr. Dejesus and discussed pt's case. Dr. Dejesus would like the pt's protime-INR and APTT ordered.    1012  The pt had a bladder scan while in the ED that showed 84 cc 7-10 minutes after urination.      MEDICAL DECISION MAKING  Results were reviewed/discussed with the patient and they were also made aware of online access. Pt also made aware that some labs, such as cultures, will not be resulted during ER visit and follow up with PMD is necessary.     MDM  Number of Diagnoses or Management Options  Acute renal failure, unspecified acute renal failure type:   Anemia, unspecified type:   Elevated troponin:   Hypermagnesemia:   Hypertensive crisis:   Hypokalemia:      Amount and/or Complexity of Data Reviewed  Clinical lab tests: ordered and reviewed (BUN - 74  Creatinine - 10.02  Sodium - 134  Potassium - 2.6  Magnesium - 3.0  Troponin - 0.219)  Tests in the radiology section of CPT®: reviewed and ordered (CXR - Abnormal 3.5 cm masslike opacity right upper lobe. This could represent a focal pneumonia though a lung mass could also have this appearance. Recommend either short interval follow-up x-ray or further evaluation with chest CT.)  Tests in the medicine section of CPT®: ordered and reviewed (Refer to the procedure section of the note for EKG results  )  Discussion of test results with the performing providers: yes (Dr. Oleg Arauz)  Decide to obtain previous medical records or to obtain history from someone other than the patient: yes  Discuss the patient with other providers: yes    Critical Care  Total time providing critical care: 30-74 minutes    Patient Progress  Patient progress: stable         DIAGNOSIS  Final diagnoses:   Hypertensive crisis   Acute  renal failure, unspecified acute renal failure type   Hypokalemia   Elevated troponin   Hypermagnesemia   Anemia, unspecified type   Thrombocytopenia   Abnormal finding on chest xray       DISPOSITION  ADMISSION    Discussed treatment plan and reason for admission with pt/family and admitting physician.  Pt/family voiced understanding of the plan for admission for further testing/treatment as needed.         Latest Documented Vital Signs:  As of 10:02 AM  BP- 160/92 HR- 96 Temp- 97.1 °F (36.2 °C) (Tympanic) O2 sat- 100%    --  Documentation assistance provided by kevin Springer for Dr. Cannon.  Information recorded by the scribe was done at my direction and has been verified and validated by me.       Morgan Springer  07/03/17 1014       Marbella Cannon MD  07/05/17 [id number]

## 2017-07-03 NOTE — CONSULTS
Subjective     REASON FOR CONSULTATION:  Anemia in the background of renal failure creatinine of 10  Provide an opinion on any further workup or treatment                             REQUESTING PHYSICIAN:  Dr LARISA DAVIS MD    RECORDS OBTAINED:  Records of the patients history including those obtained from the referring provider were reviewed and summarized in detail.    HISTORY OF PRESENT ILLNESS:  The patient is a 56 y.o. year old male who is here for an opinion about the above issue.    History of Present Illness I have been consulted in this patient was admitted through the emergency room with at least 3 weeks history of persistent nausea and vomiting unable to read anything no have any hematemesis or melena.  He has had some epigastric pain as well.  He was found to have a hypertensive emergency and besides these he was found to have a creatinine of 10.1.  She has had mild degree of anemia no surprising under the circumstances of also mild degree of thrombocytopenia.  The patient has no clinical bleeding.  He has not become diffuse he has not had any fever he has not had any alteration in bowel function including no abdominal pain or diarrhea. He has continue having normal urinary output.  DespiteCreatinine of 10.1 with no fluid accumulation in his lower extremities abdominal chest cavity .  He denies any cough or sputum production or shortness of breath today.  The patient has not been taking any of his blood pressure medication at least for A YEAR.  Past Medical History:   Diagnosis Date   • Hypertension         History reviewed. No pertinent surgical history. LEFT KNEE REPLACEMENT    No current facility-administered medications on file prior to encounter.      No current outpatient prescriptions on file prior to encounter.        ALLERGIES:  No Known Allergies     Social History     Social History   • Marital status:      Spouse name: N/A   • Number of children: N/A   • Years of education: N/A      Social History Main Topics   • Smoking status: Never Smoker   • Smokeless tobacco: None   • Alcohol use Yes      Comment: occassionally   • Drug use: Defer   • Sexual activity: Defer     Other Topics Concern   • None     Social History Narrative   • None        History reviewed. No pertinent family history.     Review of Systems   General: no fever, chills, PROFOUND fatigue, DECREASED weight changes, or lack of appetite.  Eyes: no epiphora, xerophthalmia,conjunctivitis, pain, glaucoma, blurred vision, blindness, secretion, photophobia, proptosis, diplopia.  Ears: no otorrhea, tinnitus, otorrhagia, deafness, pain, vertigo.  Nose: no rhinorrhea, epistaxis, alteration in perception of odors, sinuses pressure.  Mouth: no alteration in gums or teeth,  ulcers, no difficulty with mastication or deglut ion, no odynophagia.  Neck: no masses or pain, no thyroid alterations, no pain in muscles or arteries, no carotid odynia, no crepitation.  Respiratory: no cough, sputum production, dyspnea, trepopnea, pleuritic pain, hemoptysis.  Heart: no syncope, irregularity, palpitations, angina, orthopnea, paroxysmal nocturnal dyspnea.  Vascular Venous: no tenderness,edema, palpable cords, postphlebitic syndrome, skin changes or ulcerations.  Vascular Arterial: no distal ischemia, claudication, gangrene, neuropathic ischemic pain, skin ulcers, paleness or cyanosis.  GI: no dysphagia, odynophagia, no regurgitation, no heartburn,no indigestion,STATED nausea,AND vomiting,no hematemesis ,no melena,no jaundice,no distention, no obstipation,no enterorrhagia,no proctalgia,no anal  lesions, nochanges in bowel habits.  : no frequency, hesitancy, hematuria, discharge, pain.  Musculoskeletal: no muscle or tendon pain or inflammation, joint pain, edema, functional limitation, fasciculations, mass.  Neurologic: no headache, seizures, alterations on Craneal nerves, no motor or senssory deficit, normal coordination, no alteration in memory,  orientation, calculation,writting, verbal or written language.  Skin: no rashes, pruritus or localized lesions.  Psychiatric: SIGNIFICANT anxiety, NO depression, agitation, delusions, proper insight.    Objective     Vitals:    07/03/17 1147 07/03/17 1156 07/03/17 1157 07/03/17 1202   BP: 164/95  176/92    BP Location:       Patient Position:       Pulse: 89 86  84   Resp:       Temp:       TempSrc:       SpO2: 96% 96%  98%   Weight:       Height:         No flowsheet data found.    Physical Exam    GENERAL:  Well-developed, well-nourished  Patient  in no acute distress.   SKIN:  Warm, dry without rashes, purpura or petechiae.  HEENT:  Pupils were equal and reactive to light and accomodation, conjunctivas non injected, no pterigion, normal extraocular movements, normal visual acuity. Bilateral exopthalmus  Mouth mucosa was moist, no exudates in oropharynx, normal gum line, normal roof of the mouth and pillars, normal papillations of the tongue.Ear canals were normal, as well tympanic membranes, normal hearing acuity.No pain in mastoid area or erythema.  NECK:  Supple with good range of motion; no thyromegaly or masses, no JVD or bruits, no cervical adenopathies.No carotid arteries pain, no carotid abnormal pulsation or arterial dance.  LYMPHATICS:  No cervical, supraclavicular, axillary, epitrochlear or inguinal adenopathy.  CHEST:  Normal excursion of both amrit thoraces, normal voice fremitus, no subcutaneous emphysema, normal axillas, no rashes or acanthosis nigricans. Lungs clear to percussion and auscultation, normal breath sounds bilaterally, no wheezing, crackles or ronchi, no stridor, no rubs.  CARDIAC AND VASCULAR: PMI not displaced,no thrills, normal rate and regular rhythm, without murmurs, rubs or S3 or S4 right or left sided gallops. Normal femoral, popliteal, pedis, brachial and carotid pulses.  ABDOMEN:  Soft, nontender with no organomegaly or masses, no ascites, no collateral circulation,no  distention,no Summit sign, no abdominal pain, no inguinal hernias,no umbilical hernias, no abdominal bruits. Normal bowel sounds.  GENITAL: Not  Performed.  EXTREMITIES  AND SPINE:  No clubbing, cyanosis or edema, no deformities or pain surgical scar left kne old,.No kyphosis, scoliosis, deformities or pain in spine, ribs or pelvic bone.  NEUROLOGICAL:  Patient was awake, alert, oriented to time, person and place.    RECENT LABS:  Hematology WBC   Date Value Ref Range Status   07/03/2017 7.23 4.50 - 10.70 10*3/mm3 Final     RBC   Date Value Ref Range Status   07/03/2017 3.05 (L) 4.60 - 6.00 10*6/mm3 Final     Hemoglobin   Date Value Ref Range Status   07/03/2017 10.3 (L) 13.7 - 17.6 g/dL Final     Hematocrit   Date Value Ref Range Status   07/03/2017 28.0 (L) 40.4 - 52.2 % Final     Platelets   Date Value Ref Range Status   07/03/2017 70 (L) 140 - 500 10*3/mm3 Final          Assessment/Plan this patient has been admitted with him for hypertensive emergency found to have a creatinine of 10.1 pound to have anemia with a hemoglobin of 10.3 pounds, platelet count of 70,000.  Differential white count is normal the white count is normal.  Years ago on reviewing an appendectomy, the patient had a normal creatinine and normal blood count today with a normal hemoglobin and normal platelet count.  She has been found to have the chest x-ray something that looks like a lung mass in the right upper lobe.    Review of his peripheral blood examination shows normocytic normochromic red blood cells, no evidence of fragmentation red blood cells in circulation no increase in reticulocyte count normal white blood cell morphology and decreased platelets with no platelet clumps.  This peripheral blood examination per se he rules out the possibility of TTP.    Recommendations    I would proceed with anemia workup that will include a reticulocyte count reticulated hemoglobin FERRITIN TIBC 312 folic acid levels of serum protein  electrophoresis as well EPO LEVEL.  Regarding the low platelets I will report to investigate these with an immature platelet fraction.    Such a severe  hypertension and is not uncommon to see low platelets due to consumption and peripheral destruction of them.  Again I find no evidence of TTP.      Thank you very much for allowing me to participate in the care of this patient.  I will follow him UP WITH YOU.    RHIANNON PATTEN MD

## 2017-07-03 NOTE — H&P
[facility]  Phone: [phone]  Stephen Arauz MD      Subjective   LOS: 0 days     56-year-old male who presents to the ED with nausea and vomiting since past Thursday.  On arrival in the ED patient was found to be severely hypotensive.  He quit taking his antihypertensives one year ago.  He has a history of hypertension for the past 7-10 years.  He denies recent NSAIDs.  However he took some Tylenol Sinus about one week ago.  Unclear if he took any pseudoephedrine containing medications for his nasal congestion which resolved.  He denies any other significant medical history.  He denies any heart disease, lung disease, diabetes, strokes.  He denies the recent smoking history and quit smoking 7-8 years ago.  Used to smoke about a pack every 2 weeks.  He drinks infrequently.  He denies use of illegal drugs.  He currently denies any chest pain.  He had left knee replacement.  He has not taken any pain medicines for this recently.     I have reviewed and edited the Past Medical History, Past Surgical History, Home Medications, Social History and Family History as of 2:13 PM on 07/03/17.    Prescriptions Prior to Admission   Medication Sig Dispense Refill Last Dose   • hydrochlorothiazide (HYDRODIURIL) 25 MG tablet Take 25 mg by mouth Daily.      • Multiple Vitamins-Minerals (MULTIVITAMIN ADULT PO) Take 1 tablet by mouth Daily.      • nebivolol (BYSTOLIC) 10 MG tablet Take 10 mg by mouth Daily.        No Known Allergies    Review of Systems   Constitutional: Positive for appetite change. Negative for chills and fever.   HENT: Positive for congestion (recent; took tylenol sinus 1 week ago). Negative for sore throat.    Respiratory: Negative for cough and shortness of breath.    Cardiovascular: Negative for chest pain and leg swelling.   Gastrointestinal: Positive for nausea and vomiting.   Genitourinary: Negative for difficulty urinating and hematuria.   Musculoskeletal: Negative for arthralgias and  myalgias.   Skin: Negative for pallor and rash.   Neurological: Negative for dizziness and light-headedness.   Psychiatric/Behavioral: Negative for confusion. The patient is not nervous/anxious.        Vital Signs past 24hrs  BP range: BP: (156-254)/() 170/101  Pulse range: Heart Rate:  [] 89  Resp rate range: Resp:  [15-19] 19  Temp range: Temp (24hrs), Av.7 °F (36.5 °C), Min:97.1 °F (36.2 °C), Max:98.2 °F (36.8 °C)    Oxygen range: SpO2:  [94 %-100 %] 96 %;  ;   O2 Device: room air  143 lb 1.3 oz (64.9 kg); Body mass index is 20.53 kg/(m^2).       Adult male no acute distress.  Pupils equal reactive to light.  Oropharynx moist.  Class II Mallampati airway.  No thrush. Nasopharynx without discharge.  Septum midline.  JVP not engorged.  Trachea midline thyroid not enlarged.  Lungs reveal bilateral air entry clear to auscultation.  Percussion note is resonant bilaterally.  Chest expansion equal with no chest wall deformities or tenderness.  Heart examination S1-S2 present rhythm regular.  No murmurs noted.  No edema lower extremities.  Abdomen is soft nontender bowel sounds present no liver spleen enlargement.  No peripheral cyanosis clubbing.  Patient moves all 4 extremities.  Sensory motor intact.  No cervical, axillary, inguinal adenopathy.  No skin rash or pallor.    Results Review:    I have reviewed the laboratory and imaging data from current admission. My annotations are as noted in assessment and plan.    Medication Review:  I have reviewed the current MAR. My annotations are as noted in assessment and plan.    Plan   PCCM Problems  Hypertensive emergency  Acute renal failure  Elevated troponin  RUL mass lesion, ? pneumonia  Anemia  Acute hypokalemia  Positive THC    Plan of Treatment  Currently on a Cardene drip.  He was started back up on Bystolic.  We will continue tomorrow. TSH is normal.    Acute renal failure.  Appreciate renal input.  Patient is nonoliguric.  Potassium was  replaced.    Elevated troponin likely not of significance. No need cardiology input.    Right upper lobe lesion seen on chest x-ray.  Unclear etiology.  We'll obtain a CT chest.    Appreciate hematology help for anemia workup.    Positive THC, patient did not report.    Stephen Arauz MD  07/03/17  2:13 PM    Part of this note may be an electronic transcription/translation of spoken language to printed text using the Dragon Dictation System.

## 2017-07-03 NOTE — PLAN OF CARE
Problem: Renal Failure/Kidney Injury, Acute (Adult)  Goal: Signs and Symptoms of Listed Potential Problems Will be Absent or Manageable (Renal Failure/Kidney Injury, Acute)  Outcome: Ongoing (interventions implemented as appropriate)    Problem: Patient Care Overview (Adult)  Goal: Plan of Care Review  Outcome: Ongoing (interventions implemented as appropriate)  Goal: Adult Individualization and Mutuality  Outcome: Ongoing (interventions implemented as appropriate)  Goal: Discharge Needs Assessment  Outcome: Ongoing (interventions implemented as appropriate)    Problem: Hypertensive Disease/Crisis (Arterial) (Adult)  Goal: Signs and Symptoms of Listed Potential Problems Will be Absent or Manageable (Hypertensive Disease/Crisis)  Outcome: Ongoing (interventions implemented as appropriate)    Problem: Fall Risk (Adult)  Goal: Identify Related Risk Factors and Signs and Symptoms  Outcome: Ongoing (interventions implemented as appropriate)  Goal: Absence of Falls  Outcome: Ongoing (interventions implemented as appropriate)

## 2017-07-04 LAB
ALBUMIN SERPL-MCNC: 2.9 G/DL (ref 3.5–5.2)
ANION GAP SERPL CALCULATED.3IONS-SCNC: 19.6 MMOL/L
BASOPHILS # BLD AUTO: 0 10*3/MM3 (ref 0–0.2)
BASOPHILS NFR BLD AUTO: 0 % (ref 0–1.5)
BUN BLD-MCNC: 70 MG/DL (ref 6–20)
BUN/CREAT SERPL: 7.2 (ref 7–25)
CALCIUM SPEC-SCNC: 7.6 MG/DL (ref 8.6–10.5)
CHLORIDE SERPL-SCNC: 94 MMOL/L (ref 98–107)
CO2 SERPL-SCNC: 18.4 MMOL/L (ref 22–29)
CREAT BLD-MCNC: 9.7 MG/DL (ref 0.76–1.27)
DEPRECATED RDW RBC AUTO: 52.1 FL (ref 37–54)
EOSINOPHIL # BLD AUTO: 0.09 10*3/MM3 (ref 0–0.7)
EOSINOPHIL NFR BLD AUTO: 1.6 % (ref 0.3–6.2)
ERYTHROCYTE [DISTWIDTH] IN BLOOD BY AUTOMATED COUNT: 14.9 % (ref 11.5–14.5)
GFR SERPL CREATININE-BSD FRML MDRD: 7 ML/MIN/1.73
GLUCOSE BLD-MCNC: 133 MG/DL (ref 65–99)
GLUCOSE BLDC GLUCOMTR-MCNC: 152 MG/DL (ref 70–130)
HCT VFR BLD AUTO: 23.1 % (ref 40.4–52.2)
HGB BLD-MCNC: 8.2 G/DL (ref 13.7–17.6)
IMM GRANULOCYTES # BLD: 0.02 10*3/MM3 (ref 0–0.03)
IMM GRANULOCYTES NFR BLD: 0.4 % (ref 0–0.5)
LDH SERPL-CCNC: 875 U/L (ref 135–225)
LYMPHOCYTES # BLD AUTO: 1.1 10*3/MM3 (ref 0.9–4.8)
LYMPHOCYTES NFR BLD AUTO: 19.5 % (ref 19.6–45.3)
MAGNESIUM SERPL-MCNC: 2.6 MG/DL (ref 1.6–2.6)
MCH RBC QN AUTO: 34 PG (ref 27–32.7)
MCHC RBC AUTO-ENTMCNC: 35.5 G/DL (ref 32.6–36.4)
MCV RBC AUTO: 95.9 FL (ref 79.8–96.2)
MONOCYTES # BLD AUTO: 0.27 10*3/MM3 (ref 0.2–1.2)
MONOCYTES NFR BLD AUTO: 4.8 % (ref 5–12)
NEUTROPHILS # BLD AUTO: 4.15 10*3/MM3 (ref 1.9–8.1)
NEUTROPHILS NFR BLD AUTO: 73.7 % (ref 42.7–76)
PHOSPHATE SERPL-MCNC: 4.6 MG/DL (ref 2.5–4.5)
PLATELET # BLD AUTO: 98 10*3/MM3 (ref 140–500)
PMV BLD AUTO: 13 FL (ref 6–12)
POTASSIUM BLD-SCNC: 3.3 MMOL/L (ref 3.5–5.2)
PROCALCITONIN SERPL-MCNC: 2.32 NG/ML (ref 0.1–0.25)
RBC # BLD AUTO: 2.41 10*6/MM3 (ref 4.6–6)
S PNEUM AG SPEC QL LA: NEGATIVE
SODIUM BLD-SCNC: 132 MMOL/L (ref 136–145)
URATE SERPL-MCNC: 7.9 MG/DL (ref 3.4–7)
WBC NRBC COR # BLD: 5.63 10*3/MM3 (ref 4.5–10.7)

## 2017-07-04 PROCEDURE — 87205 SMEAR GRAM STAIN: CPT | Performed by: INTERNAL MEDICINE

## 2017-07-04 PROCEDURE — 87070 CULTURE OTHR SPECIMN AEROBIC: CPT | Performed by: INTERNAL MEDICINE

## 2017-07-04 PROCEDURE — 85025 COMPLETE CBC W/AUTO DIFF WBC: CPT | Performed by: INTERNAL MEDICINE

## 2017-07-04 PROCEDURE — 83735 ASSAY OF MAGNESIUM: CPT | Performed by: INTERNAL MEDICINE

## 2017-07-04 PROCEDURE — 25010000003 CEFTRIAXONE PER 250 MG: Performed by: INTERNAL MEDICINE

## 2017-07-04 PROCEDURE — 83615 LACTATE (LD) (LDH) ENZYME: CPT | Performed by: INTERNAL MEDICINE

## 2017-07-04 PROCEDURE — 99232 SBSQ HOSP IP/OBS MODERATE 35: CPT | Performed by: INTERNAL MEDICINE

## 2017-07-04 PROCEDURE — 82962 GLUCOSE BLOOD TEST: CPT

## 2017-07-04 PROCEDURE — 84145 PROCALCITONIN (PCT): CPT | Performed by: INTERNAL MEDICINE

## 2017-07-04 PROCEDURE — 84550 ASSAY OF BLOOD/URIC ACID: CPT | Performed by: INTERNAL MEDICINE

## 2017-07-04 PROCEDURE — 87040 BLOOD CULTURE FOR BACTERIA: CPT | Performed by: INTERNAL MEDICINE

## 2017-07-04 PROCEDURE — 80069 RENAL FUNCTION PANEL: CPT | Performed by: INTERNAL MEDICINE

## 2017-07-04 PROCEDURE — 87899 AGENT NOS ASSAY W/OPTIC: CPT | Performed by: INTERNAL MEDICINE

## 2017-07-04 PROCEDURE — 25010000002 HEPARIN (PORCINE) PER 1000 UNITS: Performed by: INTERNAL MEDICINE

## 2017-07-04 PROCEDURE — 25010000002 HYDRALAZINE PER 20 MG: Performed by: INTERNAL MEDICINE

## 2017-07-04 RX ORDER — LABETALOL HYDROCHLORIDE 5 MG/ML
20 INJECTION, SOLUTION INTRAVENOUS EVERY 6 HOURS PRN
Status: DISCONTINUED | OUTPATIENT
Start: 2017-07-04 | End: 2017-07-10 | Stop reason: HOSPADM

## 2017-07-04 RX ORDER — METOPROLOL SUCCINATE 100 MG/1
100 TABLET, EXTENDED RELEASE ORAL
Status: DISCONTINUED | OUTPATIENT
Start: 2017-07-04 | End: 2017-07-10 | Stop reason: HOSPADM

## 2017-07-04 RX ORDER — CEFTRIAXONE SODIUM 1 G/50ML
1 INJECTION, SOLUTION INTRAVENOUS EVERY 24 HOURS
Status: DISCONTINUED | OUTPATIENT
Start: 2017-07-04 | End: 2017-07-08

## 2017-07-04 RX ORDER — HYDRALAZINE HYDROCHLORIDE 20 MG/ML
20 INJECTION INTRAMUSCULAR; INTRAVENOUS EVERY 8 HOURS PRN
Status: DISCONTINUED | OUTPATIENT
Start: 2017-07-04 | End: 2017-07-10 | Stop reason: HOSPADM

## 2017-07-04 RX ORDER — POTASSIUM CHLORIDE 1.5 G/1.77G
40 POWDER, FOR SOLUTION ORAL ONCE
Status: COMPLETED | OUTPATIENT
Start: 2017-07-04 | End: 2017-07-04

## 2017-07-04 RX ORDER — MINOXIDIL 2.5 MG/1
5 TABLET ORAL EVERY 12 HOURS SCHEDULED
Status: DISCONTINUED | OUTPATIENT
Start: 2017-07-04 | End: 2017-07-10 | Stop reason: HOSPADM

## 2017-07-04 RX ORDER — AZITHROMYCIN 250 MG/1
500 TABLET, FILM COATED ORAL
Status: COMPLETED | OUTPATIENT
Start: 2017-07-04 | End: 2017-07-08

## 2017-07-04 RX ORDER — AMLODIPINE BESYLATE 10 MG/1
10 TABLET ORAL
Status: DISCONTINUED | OUTPATIENT
Start: 2017-07-05 | End: 2017-07-05

## 2017-07-04 RX ADMIN — MINOXIDIL 5 MG: 2.5 TABLET ORAL at 12:19

## 2017-07-04 RX ADMIN — NEBIVOLOL HYDROCHLORIDE 10 MG: 10 TABLET ORAL at 08:53

## 2017-07-04 RX ADMIN — NICARDIPINE HYDROCHLORIDE 4 MG/HR: 25 INJECTION INTRAVENOUS at 04:57

## 2017-07-04 RX ADMIN — CEFTRIAXONE SODIUM 1 G: 1 INJECTION, SOLUTION INTRAVENOUS at 10:24

## 2017-07-04 RX ADMIN — HYDRALAZINE HYDROCHLORIDE 20 MG: 20 INJECTION INTRAMUSCULAR; INTRAVENOUS at 22:13

## 2017-07-04 RX ADMIN — SODIUM CHLORIDE 125 ML/HR: 9 INJECTION, SOLUTION INTRAVENOUS at 11:40

## 2017-07-04 RX ADMIN — SODIUM CHLORIDE 125 ML/HR: 9 INJECTION, SOLUTION INTRAVENOUS at 02:44

## 2017-07-04 RX ADMIN — MINOXIDIL 5 MG: 2.5 TABLET ORAL at 20:26

## 2017-07-04 RX ADMIN — HEPARIN SODIUM 5000 UNITS: 5000 INJECTION, SOLUTION INTRAVENOUS; SUBCUTANEOUS at 16:13

## 2017-07-04 RX ADMIN — AZITHROMYCIN 500 MG: 250 TABLET, FILM COATED ORAL at 10:24

## 2017-07-04 RX ADMIN — HEPARIN SODIUM 5000 UNITS: 5000 INJECTION, SOLUTION INTRAVENOUS; SUBCUTANEOUS at 06:12

## 2017-07-04 RX ADMIN — METOPROLOL SUCCINATE 100 MG: 100 TABLET, FILM COATED, EXTENDED RELEASE ORAL at 12:19

## 2017-07-04 RX ADMIN — NICARDIPINE HYDROCHLORIDE 4 MG/HR: 25 INJECTION INTRAVENOUS at 11:58

## 2017-07-04 RX ADMIN — HEPARIN SODIUM 5000 UNITS: 5000 INJECTION, SOLUTION INTRAVENOUS; SUBCUTANEOUS at 21:17

## 2017-07-04 RX ADMIN — TEMAZEPAM 15 MG: 15 CAPSULE ORAL at 21:18

## 2017-07-04 RX ADMIN — POTASSIUM CHLORIDE 40 MEQ: 1.5 POWDER, FOR SOLUTION ORAL at 12:19

## 2017-07-04 NOTE — PROGRESS NOTES
"   LOS: 1 day    Patient Care Team:  No Known Provider as PCP - General    Chief Complaint:    Chief Complaint   Patient presents with   • Nausea     Patient states nauseated, vomited on Friday, dry mouth x 2 weeks   • Vomiting   • Constipation     last normal bowel movement last wednesday       Subjective follow-up advanced chronic kidney disease    Interval History:   Since admission the patient was placed on Cardene drip with improvement in his blood pressure, his nausea completely resolved, denies any chest pain or shortness of air, has recurrent cough, no hemoptysis.  Denies any dysuria or gross hematuria.  Was noted to have pneumonia on his CT scan.  His renal ultrasound revealed the evidence of the medical the kidney disease with increased echogenicity of the cortex, had hematology evaluation, as was no evidence of TTP or HUS    Review of Systems:   As noted above    Objective     Vital Signs  Temp:  [97.5 °F (36.4 °C)-98.4 °F (36.9 °C)] 98 °F (36.7 °C)  Heart Rate:  [78-95] 86  Resp:  [15-25] 21  BP: (129-180)/() 153/103    Flowsheet Rows         First Filed Value    Admission Height  70\" (177.8 cm) Documented at 07/03/2017 0734    Admission Weight  150 lb (68 kg) Documented at 07/03/2017 0734             I/O last 3 completed shifts:  In: 4630 [P.O.:840; I.V.:3790]  Out: 400 [Urine:400]    Intake/Output Summary (Last 24 hours) at 07/04/17 1022  Last data filed at 07/04/17 0501   Gross per 24 hour   Intake             4630 ml   Output              400 ml   Net             4230 ml       Physical Exam:  General Appearance: alert, oriented x 3, no acute distress,   Skin: warm and dry  HEENT: Oral mucosa is normal, he has poor dentition   Neck: supple, no JVD, trachea midline  Lungs: Scattered rhonchi, unlabored breathing effort  Heart: RRR, normal S1 and S2, no S3, positive S4, no rub  Abdomen: soft, non-tender, no palpable bladder, present bowel sounds to auscultation  Extremities: no edema, cyanosis or " clubbing  Neuro: normal speech and mental status, no asterixis      Results Review:      Results from last 7 days  Lab Units 07/04/17  0451 07/03/17  1837 07/03/17  0804   SODIUM mmol/L 132*  --  134*   POTASSIUM mmol/L 3.3* 2.9* 2.6*   CHLORIDE mmol/L 94*  --  89*   CO2 mmol/L 18.4*  --  25.1   BUN mg/dL 70*  --  74*   CREATININE mg/dL 9.70*  --  10.02*   CALCIUM mg/dL 7.6*  --  9.1   BILIRUBIN mg/dL  --   --  1.9*   ALK PHOS U/L  --   --  58   ALT (SGPT) U/L  --   --  23   AST (SGOT) U/L  --   --  41*   GLUCOSE mg/dL 133*  --  165*       Estimated Creatinine Clearance: 8.2 mL/min (by C-G formula based on Cr of 9.7).      Results from last 7 days  Lab Units 07/04/17  0451 07/03/17  0804   MAGNESIUM mg/dL 2.6 3.0*   PHOSPHORUS mg/dL 4.6*  --          Results from last 7 days  Lab Units 07/04/17  0451   URIC ACID mg/dL 7.9*         Results from last 7 days  Lab Units 07/04/17  0451 07/03/17  1203 07/03/17  0804   WBC 10*3/mm3 5.63  --  7.23   HEMOGLOBIN g/dL 8.2*  --  10.3*   PLATELETS 10*3/mm3 98* 63* 70*         Results from last 7 days  Lab Units 07/03/17  0804   INR  1.22*         Imaging Results (last 24 hours)     Procedure Component Value Units Date/Time    US Renal Bilateral [[id number]] Collected:  07/03/17 1330     Updated:  07/03/17 1739    Narrative:       EXAMINATION: BILATERAL RENAL SONOGRAPHY     HISTORY: 56-year-old male with a history of impaired renal function     FINDINGS: Sonographic evaluation of the kidneys was performed. No prior  renal sonogram is available for comparison. The right kidney measures  10.1 x 4.8 x 4.9 cm and the left kidney measures 9.7 x 6.0 x 5.0 cm.  There are bilateral renal cysts. Largest cyst in the right kidney  measures on the order of 6.3 cm in greatest dimension and largest cyst  in left kidney measures on the order of 2.2 cm in greatest dimension.  There is mild increased echogenicity  seen throughout the bilateral  renal cortices. No evidence for hydronephrosis or  proximal hydroureter  is appreciated. Neither ureteral jet is seen within the urinary bladder.       Impression:       Bilateral renal cysts with sonographic features of the  kidneys consistent with bilateral medical renal disease.     This report was finalized on 7/3/2017 5:36 PM by Dr. Eric Marcano MD.       CT Chest Without Contrast [[id number]] Collected:  07/03/17 1839     Updated:  07/03/17 1850    Narrative:       CT SCAN OF THE CHEST WITHOUT CONTRAST     CLINICAL HISTORY: Follow-up abnormal chest x-ray showing infiltrate  versus mass in the right upper lobe.     TECHNIQUE: Spiral CT images were obtained through the chest with no IV  contrast and were reconstructed in 3 mm thick slices.     COMPARISON: Chest x-ray dated 07/03/2017 and a previous CT scan of the  chest dated 1114.     FINDINGS: There are patchy areas of airspace infiltrate in the right  upper and lower lobes that are consistent with pneumonia. The infiltrate  is most prominent in the right lung apex where it accounts for the  ill-defined nodular opacity noted on the patient's chest x-ray. There is  a 5 mm diameter noncalcified pulmonary nodule in the lateral aspect of  the right lower lobe that is also most likely inflammatory in etiology.  However, this is new since the preceding CT scan of the chest in 2014.  Minimal CT scan of the chest in 2 -- 3 months after the patient's  pneumonia is treated. There is a small posteriorly layering right  pleural effusion at the right lung base. There are a few mildly enlarged  lymph nodes within the mediastinum that are most likely benign reactive  lymph nodes. These all measure 12 mm or less in short axis. Images  through the upper abdomen partially show a right renal cyst, and are  otherwise unremarkable.       Impression:       Patchy airspace infiltrate in the right lung consistent with  pneumonia. There is a solitary tiny, approximately 5 mm diameter  noncalcified pulmonary nodule in the lateral  aspect of the right lower  lobe that is also most likely inflammatory in etiology, but is new since  the preceding CT chest dated 2014. Small right pleural effusion. Mild  mediastinal lymphadenopathy that is likely reactive. Recommend a  follow-up CT scan of the chest in 2 -- 3 months after appropriate  medical treatment for pneumonia.     This report was finalized on 7/3/2017 6:47 PM by Dr. Cody Rodriguez MD.             azithromycin 500 mg Oral Q24H   ceftriaxone 1 g Intravenous Q24H   heparin (porcine) 5,000 Units Subcutaneous Q8H   nebivolol 10 mg Oral Q24H       niCARdipine 5-15 mg/hr Last Rate: 4 mg/hr (07/04/17 0457)   sodium chloride 125 mL/hr Last Rate: 125 mL/hr (07/04/17 0244)       Medication Review:   Current Facility-Administered Medications   Medication Dose Route Frequency Provider Last Rate Last Dose   • azithromycin (ZITHROMAX) tablet 500 mg  500 mg Oral Q24H Stephen Arauz MD       • cefTRIAXone (ROCEPHIN) IVPB 1 g  1 g Intravenous Q24H Stephen Arauz MD       • heparin (porcine) 5000 UNIT/ML injection 5,000 Units  5,000 Units Subcutaneous Q8H Stephen Arauz MD   5,000 Units at 07/04/17 0612   • nebivolol (BYSTOLIC) tablet 10 mg  10 mg Oral Q24H Stephen Arauz MD   10 mg at 07/04/17 0853   • niCARdipine (CARDENE) 25 mg/250 mL (0.1 mg/mL) 0.9% NS VTB infusion  5-15 mg/hr Intravenous Titrated Marbella Cannon MD 40 mL/hr at 07/04/17 0457 4 mg/hr at 07/04/17 0457   • sodium chloride 0.9 % flush 10 mL  10 mL Intravenous PRN Marbella Cannon MD       • sodium chloride 0.9 % flush 10 mL  10 mL Intravenous PRN Marbella Cannon MD       • sodium chloride 0.9 % infusion  125 mL/hr Intravenous Continuous Marbella Cannon  mL/hr at 07/04/17 0244 125 mL/hr at 07/04/17 0244   • temazepam (RESTORIL) capsule 15 mg  15 mg Oral Nightly PRN Orquidea Sue MD   15 mg at 07/03/17 2304       Assessment/Plan   1.  Severe renal failure could be the end stage renal disease related to severe uncontrolled hypertension, today there is no ene  uremic symptoms.  His creatinine is 9.7.  Hopefully his creatinine may be slightly improved with the better control of blood pressure but the not very hopeful for that based on his findings.  The patient may need to be started on dialysis the next 24-48 hours.  2.  Hypertensive the crisis, with end organ damage acute malignant hypertension.  Currently on Cardene blood pressure is improved we need to not allow the blood pressure to drop below 140/90 for the next 72 hours, until the altered regulation is restored  3.  History of hypertension with the noncompliance with medication and has not taken any medication for over a year.  4.  Degenerative joint disease with prior left total knee replacement  5.  Anemia could be associated with chronic kidney disease   6.  Thrombocytopenia, no evidence of HUS/TTP based on the peripheral smear.  Probably associated with malignant hypertension  7.  Hyponatremia sodium 132 probably related to increased water intake through the drips.  Hypokalemia associated with poor oral intake        Plan:  1.  I will restart minoxidil 5 mg twice a day and start weaning off the Cardene, also will start the patient on metoprolol.  2.  We'll replace potassium  3.  Surveillance labs  4.  We'll decide in the next 24-48 hours about dialysis        David Dejesus MD  07/04/17  10:22 AM

## 2017-07-04 NOTE — PLAN OF CARE
Problem: Fall Risk (Adult)  Goal: Identify Related Risk Factors and Signs and Symptoms  Outcome: Outcome(s) achieved Date Met:  07/03/17

## 2017-07-04 NOTE — PROGRESS NOTES
[facility]  Phone: [phone]  Stephen Arauz MD    Subjective:  LOS: 1    Patient states no more congestion. No fever or chest pain. Did not receive abx recently. Admits to smoking THC.    Objective   Vital Signs past 24hrs  BP range: BP: (129-180)/() 153/103  Pulse range: Heart Rate:  [81-95] 86  Resp rate range: Resp:  [15-25] 21  Temp range: Temp (24hrs), Av.1 °F (36.7 °C), Min:97.5 °F (36.4 °C), Max:98.4 °F (36.9 °C)    O2 Device: room airFlow (L/min):  [2] 2  Oxygen range:SpO2:  [90 %-99 %] 97 %   150 lb 9.2 oz (68.3 kg); Body mass index is 21.61 kg/(m^2).    Intake/Output Summary (Last 24 hours) at 17 1029  Last data filed at 17 0501   Gross per 24 hour   Intake             4630 ml   Output              400 ml   Net             4230 ml       Physical Exam   Cardiovascular: Normal rate and regular rhythm.    No murmur heard.  Pulmonary/Chest: He has no wheezes. He has no rales.   Abdominal: Soft. Bowel sounds are normal. There is no tenderness.   Musculoskeletal: He exhibits no edema.   Neurological: He is alert.   Nursing note and vitals reviewed.    Results Review:    I have reviewed the laboratory and imaging data since the last note by [facility] physician.  My annotations are noted in assessment and plan.    Medication Review:  I have reviewed the current MAR.  My annotations are noted in assessment and plan.      niCARdipine 5-15 mg/hr Last Rate: 4 mg/hr (17 6931)   sodium chloride 125 mL/hr Last Rate: 125 mL/hr (17 0244)     Plan   PCCM Problems  Hypertensive emergency  Acute renal failure  Elevated troponin  Pneumonia, infiltrates  Anemia  Acute hypokalemia  Positive THC    Plan of Treatment  Currently on nicardipine.  Renal will add oral antihypertensives.  No objection to going floors.    Will likely require dialysis per renal.  Continue to watch for now is no urgent indication.    Elevated troponin due to hypertension.  No indications of acute coronary  injury.    CT chest reviewed and shows evidence pulmonary infiltrates which could be consistent with pneumonia.  Cultures were sent and patient started on antibiotics.  He will require follow-up imaging in 8 weeks.    Anemia likely due to renal disease.  Appreciate help of hematology.    Possibly to floors later.    Stephen Arauz MD  07/04/17  10:29 AM    Part of this note may be an electronic transcription/translation of spoken language to printed text using the Dragon Dictation System.

## 2017-07-04 NOTE — CONSULTS
Patient Name: Harry Ortega Account #: [id number]    MRN: [id number] Admission Date: 7/3/2017    History and Physical Exam  Service: Vascular Surgery Date of Evaluation: July 4, 2017        CHIEF COMPLAINT: Acute renal failure   HPI: Harry Ortega is a 56 y.o. male malignant hypertension who stopped taking his antihypertensives about a year ago.  He was admitted with hypertensive emergency and found to be in acute renal failure with thrombocytopenia.  We have been asked to evaluate him for dialysis access.  Currently he is alert, without complaints in the intensive care unit, tolerating a diet well.    PAST MEDICAL HISTORY:   Past Medical History:   Diagnosis Date   • Hypertension       PAST SURGICAL HISTORY:   Past Surgical History:   Procedure Laterality Date   • KNEE SURGERY Left       FAMILY HISTORY:   Family History   Problem Relation Age of Onset   • Hypertension Mother    • Hypertension Father       SOCIAL HISTORY:   Social History   Substance Use Topics   • Smoking status: Never Smoker   • Smokeless tobacco: None   • Alcohol use Yes      Comment: occassionally      MEDICATIONS:   No current facility-administered medications on file prior to encounter.      No current outpatient prescriptions on file prior to encounter.             ALLERGIES: Review of patient's allergies indicates no known allergies.     COMPLETE REVIEW OF SYSTEMS:     Review of Systems   Constitutional: Negative for activity change, chills, fatigue and fever.   HENT: Negative for ear pain, sneezing and sore throat.    Respiratory: Negative for chest tightness and shortness of breath.    Cardiovascular: Negative for chest pain and leg swelling.   Gastrointestinal: Negative for abdominal pain, blood in stool, diarrhea, nausea and vomiting.   Genitourinary: Negative for flank pain and hematuria.   Musculoskeletal: Negative for joint swelling and myalgias.   Skin: Negative for color change, pallor and wound.   Neurological: Negative for  dizziness and seizures.   Hematological: Negative for adenopathy. Does not bruise/bleed easily.   Psychiatric/Behavioral: Negative for confusion and hallucinations.         PHYSICAL EXAM:   Patient Vitals for the past 24 hrs:   BP Temp Temp src Pulse Resp SpO2 Height Weight   07/04/17 1250 152/97 - - 84 - 93 % - -   07/04/17 1235 (!) 166/112 - - 84 - 92 % - -   07/04/17 1215 (!) 163/106 - - 83 - 93 % - -   07/04/17 1205 (!) 163/108 - - 88 - 93 % - -   07/04/17 1150 (!) 164/107 - - 87 - 93 % - -   07/04/17 1135 (!) 160/105 - - 85 - 94 % - -   07/04/17 1122 - 97.9 °F (36.6 °C) - - - - - -   07/04/17 1120 (!) 163/111 - - 87 - 93 % - -   07/04/17 1105 (!) 158/127 - - 84 - 91 % - -   07/04/17 1050 159/100 - - 85 - 94 % - -   07/04/17 1035 (!) 163/107 - - 91 - 95 % - -   07/04/17 1021 166/100 - - 88 - 91 % - -   07/04/17 0932 (!) 153/103 - - 86 - - - -   07/04/17 0917 (!) 156/107 - - 86 - - - -   07/04/17 0903 (!) 160/104 - - 87 - - - -   07/04/17 0848 (!) 162/108 - - 91 - - - -   07/04/17 0832 (!) 146/103 - - 84 - 97 % - -   07/04/17 0818 159/100 - - 88 - 95 % - -   07/04/17 0803 149/97 - - 86 - 93 % - -   07/04/17 0748 (!) 151/101 - - 86 - 93 % - -   07/04/17 0735 - 98 °F (36.7 °C) - - - - - -   07/04/17 0718 (!) 152/104 - - 85 - 94 % - -   07/04/17 0703 152/99 - - 87 - 96 % - -   07/04/17 0647 (!) 156/106 - - 87 - 96 % - -   07/04/17 0631 (!) 157/104 - - 87 - 95 % - -   07/04/17 0618 152/99 - - 87 - 96 % - -   07/04/17 0603 149/99 - - 89 - 93 % - -   07/04/17 0548 138/95 - - 85 - 91 % - -   07/04/17 0533 141/95 - - 85 - 92 % - -   07/04/17 0518 150/98 - - 86 - 91 % - -   07/04/17 0501 139/91 - - 83 - 91 % - 150 lb 9.2 oz (68.3 kg)   07/04/17 0458 148/88 - - 84 21 94 % - -   07/04/17 0432 145/100 - - 86 - 94 % - -   07/04/17 0418 (!) 149/115 - - 86 - 96 % - -   07/04/17 0403 146/96 - - 85 - 95 % - -   07/04/17 0348 149/100 - - 95 - 94 % - -   07/04/17 0332 (!) 152/102 - - 90 - 94 % - -   07/04/17 0317 156/99 - - 87 - 95  "% - -   07/04/17 0302 140/91 - - 86 - 94 % - -   07/04/17 0300 - 98.3 °F (36.8 °C) Oral - - - - -   07/04/17 0247 143/96 - - 87 - 95 % - -   07/04/17 0233 140/94 - - 86 - 95 % - -   07/04/17 0218 140/87 - - 85 - 95 % - -   07/04/17 0203 152/96 - - 86 - 93 % - -   07/04/17 0147 129/79 - - 84 - 93 % - -   07/04/17 0133 133/82 - - 82 - 95 % - -   07/04/17 0117 129/81 - - 85 - 94 % - -   07/04/17 0101 140/90 - - 85 - 95 % - -   07/04/17 0048 147/95 - - 89 - 96 % - -   07/04/17 0032 136/88 - - 87 - 94 % - -   07/04/17 0018 139/85 - - 88 - 95 % - -   07/04/17 0003 142/90 - - 89 - 95 % - -   07/03/17 2348 136/88 - - 87 - 95 % - -   07/03/17 2332 134/85 - - 87 - 96 % - -   07/03/17 2317 147/94 - - 89 - 90 % - -   07/03/17 2302 149/96 - - 92 25 92 % - -   07/03/17 2300 - 98.4 °F (36.9 °C) Oral - - - - -   07/03/17 2248 171/92 - - 90 - 94 % - -   07/03/17 2233 139/98 - - 86 - 91 % - -   07/03/17 2218 140/92 - - 88 - 99 % - -   07/03/17 2147 149/95 - - 87 - 94 % - -   07/03/17 2131 152/95 - - 87 - 94 % - -   07/03/17 2117 149/98 - - 86 - 98 % - -   07/03/17 2103 (!) 145/102 - - 86 - 96 % - -   07/03/17 2048 (!) 154/104 - - 90 - 97 % - -   07/03/17 2033 147/98 - - 87 - 97 % - -   07/03/17 2018 143/95 - - 86 - 96 % - -   07/03/17 2003 143/90 - - 88 24 98 % - -   07/03/17 1948 155/93 - - 90 - 96 % - -   07/03/17 1932 152/98 - - 88 - 99 % - -   07/03/17 1917 162/97 - - 91 - 95 % - -   07/03/17 1903 151/95 - - 88 - 94 % - -   07/03/17 1900 - 97.5 °F (36.4 °C) Oral - - - - -   07/03/17 1533 155/93 - - 91 - 95 % - -   07/03/17 1519 151/90 - - 89 - 94 % - -   07/03/17 1503 153/91 - - 89 - 93 % - -   07/03/17 1447 163/92 - - 89 - 95 % - -   07/03/17 1432 166/97 - - 90 - 95 % - -   07/03/17 1431 - - - - - - 70\" (177.8 cm) 143 lb 1.3 oz (64.9 kg)   07/03/17 1417 (!) 171/103 - - 86 - 96 % - -   07/03/17 1402 (!) 180/118 - - 90 - 97 % - -   07/03/17 1347 (!) 170/112 - - 88 - 96 % - -   07/03/17 1342 (!) 170/101 98.2 °F (36.8 °C) Oral 89 - " 96 % - -   07/03/17 1318 (!) 170/103 - - 88 - 96 % - -   07/03/17 1315 - - - - 19 - - -   07/03/17 1313 - - - 89 - 96 % - -   07/03/17 1308 (!) 164/102 - - - - - - -   07/03/17 1307 - - - 88 - 94 % - -        Physical Exam   Constitutional: He is oriented to person, place, and time. He appears well-developed. No distress.   HENT:   Head: Normocephalic and atraumatic.   Right Ear: External ear normal.   Left Ear: External ear normal.   Eyes: Conjunctivae are normal. Pupils are equal, round, and reactive to light.   Neck: Normal range of motion. No JVD present. No tracheal deviation present.   Cardiovascular: Normal rate, regular rhythm, S1 normal and S2 normal.    Pulses:       Carotid pulses are 2+ on the right side, and 2+ on the left side.       Radial pulses are 2+ on the right side, and 2+ on the left side.        Femoral pulses are 2+ on the right side, and 2+ on the left side.       Popliteal pulses are 2+ on the right side, and 2+ on the left side.        Dorsalis pedis pulses are 2+ on the right side, and 2+ on the left side.        Posterior tibial pulses are 2+ on the right side, and 2+ on the left side.   Pulmonary/Chest: Effort normal. No respiratory distress. He exhibits no tenderness.   Abdominal: Soft. He exhibits no distension. There is no tenderness.   Musculoskeletal: Normal range of motion.   Neurological: He is alert and oriented to person, place, and time. No cranial nerve deficit.   Skin: Skin is warm and dry. He is not diaphoretic.   Psychiatric: He has a normal mood and affect. His behavior is normal.             LABS:      Recent Results (from the past 24 hour(s))   POC Glucose Fingerstick    Collection Time: 07/03/17  1:37 PM   Result Value Ref Range    Glucose 202 (H) 70 - 130 mg/dL   Potassium    Collection Time: 07/03/17  6:37 PM   Result Value Ref Range    Potassium 2.9 (L) 3.5 - 5.2 mmol/L   POC Glucose Fingerstick    Collection Time: 07/03/17  8:18 PM   Result Value Ref Range     Glucose 195 (H) 70 - 130 mg/dL   POC Glucose Fingerstick    Collection Time: 07/03/17 11:01 PM   Result Value Ref Range    Glucose 157 (H) 70 - 130 mg/dL   Uric Acid    Collection Time: 07/04/17  4:51 AM   Result Value Ref Range    Uric Acid 7.9 (H) 3.4 - 7.0 mg/dL   Renal Function Panel    Collection Time: 07/04/17  4:51 AM   Result Value Ref Range    Glucose 133 (H) 65 - 99 mg/dL    BUN 70 (H) 6 - 20 mg/dL    Creatinine 9.70 (H) 0.76 - 1.27 mg/dL    Sodium 132 (L) 136 - 145 mmol/L    Potassium 3.3 (L) 3.5 - 5.2 mmol/L    Chloride 94 (L) 98 - 107 mmol/L    CO2 18.4 (L) 22.0 - 29.0 mmol/L    Calcium 7.6 (L) 8.6 - 10.5 mg/dL    Albumin 2.90 (L) 3.50 - 5.20 g/dL    Phosphorus 4.6 (H) 2.5 - 4.5 mg/dL    Anion Gap 19.6 mmol/L    BUN/Creatinine Ratio 7.2 7.0 - 25.0    eGFR  African Amer 7 (L) >60 mL/min/1.73   Magnesium    Collection Time: 07/04/17  4:51 AM   Result Value Ref Range    Magnesium 2.6 1.6 - 2.6 mg/dL   CBC Auto Differential    Collection Time: 07/04/17  4:51 AM   Result Value Ref Range    WBC 5.63 4.50 - 10.70 10*3/mm3    RBC 2.41 (L) 4.60 - 6.00 10*6/mm3    Hemoglobin 8.2 (L) 13.7 - 17.6 g/dL    Hematocrit 23.1 (L) 40.4 - 52.2 %    MCV 95.9 79.8 - 96.2 fL    MCH 34.0 (H) 27.0 - 32.7 pg    MCHC 35.5 32.6 - 36.4 g/dL    RDW 14.9 (H) 11.5 - 14.5 %    RDW-SD 52.1 37.0 - 54.0 fl    MPV 13.0 (H) 6.0 - 12.0 fL    Platelets 98 (L) 140 - 500 10*3/mm3    Neutrophil % 73.7 42.7 - 76.0 %    Lymphocyte % 19.5 (L) 19.6 - 45.3 %    Monocyte % 4.8 (L) 5.0 - 12.0 %    Eosinophil % 1.6 0.3 - 6.2 %    Basophil % 0.0 0.0 - 1.5 %    Immature Grans % 0.4 0.0 - 0.5 %    Neutrophils, Absolute 4.15 1.90 - 8.10 10*3/mm3    Lymphocytes, Absolute 1.10 0.90 - 4.80 10*3/mm3    Monocytes, Absolute 0.27 0.20 - 1.20 10*3/mm3    Eosinophils, Absolute 0.09 0.00 - 0.70 10*3/mm3    Basophils, Absolute 0.00 0.00 - 0.20 10*3/mm3    Immature Grans, Absolute 0.02 0.00 - 0.03 10*3/mm3   Lactate Dehydrogenase    Collection Time: 07/04/17  4:51 AM    Result Value Ref Range     (H) 135 - 225 U/L   Procalcitonin    Collection Time: 07/04/17  9:04 AM   Result Value Ref Range    Procalcitonin 2.32 (C) 0.10 - 0.25 ng/mL   ]    The following radiologic or non-invasive studies have been reviewed by me: Chest x-ray, CT chest       ASSESSMENT/PLAN: 56 y.o. male with significant hypertension and acute renal failure secondary to noncompliance with his medications.  He was also admitted with thrombocytopenia thought to be secondary to his other medical issues.  He has a right pulmonary nodule that has been evaluated by CT scan and this thought to be secondary to pneumonia.    We have been asked to evaluate him for dialysis access.  He has no history of dialysis access.  He does have IVs in the bilateral antecubitum's.:  To get a vein mapping duplex performed and we'll be able to discuss with him what his options are.  If he needs to dialyze more acutely a tunneled catheter will be placed.  We will schedule any/all these procedures at the prompting of the nephrology service.  Thank you for the consult on this very pleasant patient.      I discussed the plan with the patient and he is agreeable to the plan of care at this point.       Problem Points:  3:  Patient has a new problem, with no additional work-up planned (max of 1)  Total problem points:3    Data Points:  1:  I have reviewed or order clinical lab test  2:  I have personally and independently review of image, tracing, or specimen  2:  I have reviewed and summation of old records and/or discussed the patients care with another health care provider  Total data points:4 or more    Risk Points:  High:  Chronic illness with severe exacerbation or progression    MDM requires 2/3 (Problem points, Data points and Risk)  MDM Prob point Data point Risk   SF 1 1 Minimal   Low 2 2 Low   Mod 3 3 Moderate   High 4 4 High     Code requires 3/3 (MDM, History and Exam)  Code MDM History Exam Time   15161 SF/Low Detailed  Detailed 30   19162 Mod Comprehensive Comprehensive 50   27728 High Comprehensive Comprehensive 70     Detailed history:  4 elements HPI or status of 3 chronic problems; 2-9 system ROS  Comprehensive:  4 elements HPI or status of 3 chronic problems;  10 system ROS    Detailed Exam:  12 findings from any organ system  Comprehensive Exam:  2 findings from each of 9 systems.     Kar Morales MD   07/04/17

## 2017-07-04 NOTE — PROGRESS NOTES
Subjective         History of Present Illness  ANEMIA, THROMBOCYTOPENIA, PNEUMONIA HYPERTENSIVE EMERGENCY, RENAL FAILURE    Past Medical History   Past Medical History:   Diagnosis Date   • Hypertension      Social History     Social History   • Marital status:      Spouse name: N/A   • Number of children: N/A   • Years of education: N/A     Occupational History   • Not on file.     Social History Main Topics   • Smoking status: Never Smoker   • Smokeless tobacco: Not on file   • Alcohol use Yes      Comment: occassionally   • Drug use: Defer   • Sexual activity: Defer     Other Topics Concern   • Not on file     Social History Narrative   • No narrative on file     Family History   Problem Relation Age of Onset   • Hypertension Mother    • Hypertension Father        Review of Systems   General: no fever, chills, fatigue, weight changes, or lack of appetite.  Eyes: no epiphora, xerophthalmia,conjunctivitis, pain, glaucoma, blurred vision, blindness, secretion, photophobia, proptosis, diplopia.  Ears: no otorrhea, tinnitus, otorrhagia, deafness, pain, vertigo.  Nose: no rhinorrhea, epistaxis, alteration in perception of odors, sinuses pressure.  Mouth: no alteration in gums or teeth,  ulcers, no difficulty with mastication or deglut ion, no odynophagia.  Neck: no masses or pain, no thyroid alterations, no pain in muscles or arteries, no carotid odynia, no crepitation.  Respiratory: no cough, sputum production, dyspnea, trepopnea, pleuritic pain, hemoptysis.  Heart: no syncope, irregularity, palpitations, angina, orthopnea, paroxysmal nocturnal dyspnea.  Vascular Venous: no tenderness,edema, palpable cords, postphlebitic syndrome, skin changes or ulcerations.  Vascular Arterial: no distal ischemia, claudication, gangrene, neuropathic ischemic pain, skin ulcers, paleness or cyanosis.  GI: no dysphagia, odynophagia, no regurgitation, no heartburn,no indigestion,PERSISITENT nausea,AND vomiting,no hematemesis ,no  melena,no jaundice,no distention, no obstipation,no enterorrhagia,no proctalgia,no anal  lesions, nochanges in bowel habits.  : no frequency, hesitancy, hematuria, discharge, pain.  Musculoskeletal: no muscle or tendon pain or inflammation, joint pain, edema, functional limitation, fasciculations, mass.  Neurologic: no headache, seizures, alterations on Craneal nerves, no motor or senssory deficit, normal coordination, no alteration in memory, orientation, calculation,writting, verbal or written language.  Skin: no rashes, pruritus or localized lesions.  Psychiatric: no anxiety, depression, agitation, delusions, proper insight.  A comprehensive 14 point review of systems was performed and was negative except as mentioned.    Medications:  The current medication list was reviewed in the EMR    ALLERGIES:  No Known Allergies    Objective      Vitals:    07/04/17 0647 07/04/17 0703 07/04/17 0718 07/04/17 0735   BP: (!) 156/106 152/99 (!) 152/104    BP Location:       Patient Position:       Pulse: 87 87 85    Resp:       Temp:    98 °F (36.7 °C)   TempSrc:       SpO2: 96% 96% 94%    Weight:       Height:         No flowsheet data found.    Physical Exam  UNCHANGED FROM YESTERDAY.  RECENT LABS:  Hematology WBC   Date Value Ref Range Status   07/04/2017 5.63 4.50 - 10.70 10*3/mm3 Final     RBC   Date Value Ref Range Status   07/04/2017 2.41 (L) 4.60 - 6.00 10*6/mm3 Final     Hemoglobin   Date Value Ref Range Status   07/04/2017 8.2 (L) 13.7 - 17.6 g/dL Final     Hematocrit   Date Value Ref Range Status   07/04/2017 23.1 (L) 40.4 - 52.2 % Final     Platelets   Date Value Ref Range Status   07/04/2017 98 (L) 140 - 500 10*3/mm3 Final      CT SCAN OF THE CHEST WITHOUT CONTRAST      CLINICAL HISTORY: Follow-up abnormal chest x-ray showing infiltrate  versus mass in the right upper lobe.      TECHNIQUE: Spiral CT images were obtained through the chest with no IV  contrast and were reconstructed in 3 mm thick  slices.      COMPARISON: Chest x-ray dated 07/03/2017 and a previous CT scan of the  chest dated 1114.      FINDINGS: There are patchy areas of airspace infiltrate in the right  upper and lower lobes that are consistent with pneumonia. The infiltrate  is most prominent in the right lung apex where it accounts for the  ill-defined nodular opacity noted on the patient's chest x-ray. There is  a 5 mm diameter noncalcified pulmonary nodule in the lateral aspect of  the right lower lobe that is also most likely inflammatory in etiology.  However, this is new since the preceding CT scan of the chest in 2014.  Minimal CT scan of the chest in 2 -- 3 months after the patient's  pneumonia is treated. There is a small posteriorly layering right  pleural effusion at the right lung base. There are a few mildly enlarged  lymph nodes within the mediastinum that are most likely benign reactive  lymph nodes. These all measure 12 mm or less in short axis. Images  through the upper abdomen partially show a right renal cyst, and are  otherwise unremarkable.      IMPRESSION:  Patchy airspace infiltrate in the right lung consistent with  pneumonia. There is a solitary tiny, approximately 5 mm diameter  noncalcified pulmonary nodule in the lateral aspect of the right lower  lobe that is also most likely inflammatory in etiology, but is new since  the preceding CT chest dated 2014. Small right pleural effusion. Mild  mediastinal lymphadenopathy that is likely reactive. Recommend a  follow-up CT scan of the chest in 2 -- 3 months after appropriate  medical treatment for pneumonia.      This report was finalized on 7/3/2017 6:47 PM by Dr. Cody Rodriguez MD.    Immature Platelet Fraction   Order: [id number]   Status:  Final result   Visible to patient:  No (Not Released)      Newer results are available. Click to view them now.            Ref Range & Units 1d ago     IPF 0.90 - 6.50 % 6.50   Platelets 140 - 500 10*3/mm3 63 (L)   Resulting  Agency  [facility]      Specimen Collected: 07/03/17 12:03 PM Last Resulted: 07/03/17 12:18                      Lactate Dehydrogenase   Order: [id number]   Status:  Final result   Visible to patient:  No (Not Released)      Ref Range & Units 4:51 AM      - 225 U/L 875 (H)   Resulting [facility]      Specimen Collected: 07/04/17  4:51 AM Last Resulted: 07/04/17  5:54 AM              Bilirubin, Direct   Order: [id number]   Status:  Final result   Visible to patient:  No (Not Released)      Ref Range & Units 1d ago     Bilirubin, Direct 0.0 - 0.3 mg/dL 0.3   Resulting [facility]      Specimen Collected: 07/03/17 10:33 AM Last Resulted: 07/03/17 11:58 AM              EXAMINATION: BILATERAL RENAL SONOGRAPHY      HISTORY: 56-year-old male with a history of impaired renal function      FINDINGS: Sonographic evaluation of the kidneys was performed. No prior  renal sonogram is available for comparison. The right kidney measures  10.1 x 4.8 x 4.9 cm and the left kidney measures 9.7 x 6.0 x 5.0 cm.  There are bilateral renal cysts. Largest cyst in the right kidney  measures on the order of 6.3 cm in greatest dimension and largest cyst  in left kidney measures on the order of 2.2 cm in greatest dimension.  There is mild increased echogenicity seen throughout the bilateral  renal cortices. No evidence for hydronephrosis or proximal hydroureter  is appreciated. Neither ureteral jet is seen within the urinary bladder.      IMPRESSION:  Bilateral renal cysts with sonographic features of the  kidneys consistent with bilateral medical renal disease.      This report was finalized on 7/3/2017 5:36 PM by Dr. Eric Marcano MD.        Assessment/Plan in summary this patient has anemia with a normal ferritin normal Lidoderm normal B12 normal folic acid level anything particularly thin level is pending at this time.  I do believe that this patient has chronic K kidney disease and anemia corresponds to the process  nevertheless an EPO level is pending at this time.  Also we have Serum protein electrophoresis is pending at this time to be sure that there is no monoclonal protein.  The patient has in the toP OF things pneumonia with bilateral pleural effusions and a he's been treated for the same.  His ultrasound of the kidneys shows  large cyst in the right kidney and otherwise sonographic changes that suggest chronic kidney disease.  Again in patientS to have severe hypertension thrombocytopenia is a phenomenon back on a ball associated with a high LDH and sometimes mimics a TTP.  Again I don't believe that this patient has TTP base of the analysis of his peripheral blood DONE YESTERDAY.  I find no need for blood products today..  We will follow him up .                       7/4/2017      CC:

## 2017-07-04 NOTE — PLAN OF CARE
Problem: Patient Care Overview (Adult)  Goal: Plan of Care Review    07/04/17 0625   Outcome Evaluation   Outcome Summary/Follow up Plan REMAINS ON CARDENE DRIP. SBP MAINTAINED UNDER 180 THIS SHIFT. NO C/O PAIN TONIGHT.   Patient Care Overview   Progress progress toward functional goals is gradual

## 2017-07-04 NOTE — PLAN OF CARE
Problem: Patient Care Overview (Adult)  Goal: Plan of Care Review  Outcome: Ongoing (interventions implemented as appropriate)    07/04/17 2300   Outcome Evaluation   Outcome Summary/Follow up Plan Transfered to [address] [place] [address], cardene drip d/c, b/p stable, no c/o at this time will continue to monitor   Patient Care Overview   Progress improving   Coping/Psychosocial Response Interventions   Plan Of Care Reviewed With patient;spouse       Goal: Adult Individualization and Mutuality  Outcome: Ongoing (interventions implemented as appropriate)  Goal: Discharge Needs Assessment  Outcome: Ongoing (interventions implemented as appropriate)    Problem: Hypertensive Disease/Crisis (Arterial) (Adult)  Goal: Signs and Symptoms of Listed Potential Problems Will be Absent or Manageable (Hypertensive Disease/Crisis)  Outcome: Ongoing (interventions implemented as appropriate)    Problem: Fall Risk (Adult)  Goal: Absence of Falls  Outcome: Ongoing (interventions implemented as appropriate)

## 2017-07-05 ENCOUNTER — APPOINTMENT (OUTPATIENT)
Dept: CARDIOLOGY | Facility: HOSPITAL | Age: 57
End: 2017-07-05
Attending: SURGERY

## 2017-07-05 LAB
ALBUMIN SERPL-MCNC: 2.9 G/DL (ref 2.9–4.4)
ALBUMIN SERPL-MCNC: 3.3 G/DL (ref 3.5–5.2)
ALBUMIN/GLOB SERPL: 1 {RATIO} (ref 0.7–1.7)
ALPHA1 GLOB FLD ELPH-MCNC: 0.4 G/DL (ref 0–0.4)
ALPHA2 GLOB SERPL ELPH-MCNC: 0.7 G/DL (ref 0.4–1)
ANION GAP SERPL CALCULATED.3IONS-SCNC: 22 MMOL/L
B-GLOBULIN SERPL ELPH-MCNC: 1.1 G/DL (ref 0.7–1.3)
BASOPHILS # BLD AUTO: 0.01 10*3/MM3 (ref 0–0.2)
BASOPHILS NFR BLD AUTO: 0.1 % (ref 0–1.5)
BUN BLD-MCNC: 72 MG/DL (ref 6–20)
BUN/CREAT SERPL: 7.1 (ref 7–25)
CALCIUM SPEC-SCNC: 7.9 MG/DL (ref 8.6–10.5)
CHLORIDE SERPL-SCNC: 97 MMOL/L (ref 98–107)
CO2 SERPL-SCNC: 14 MMOL/L (ref 22–29)
CREAT BLD-MCNC: 10.14 MG/DL (ref 0.76–1.27)
DEPRECATED RDW RBC AUTO: 55.5 FL (ref 37–54)
EOSINOPHIL # BLD AUTO: 0.01 10*3/MM3 (ref 0–0.7)
EOSINOPHIL NFR BLD AUTO: 0.1 % (ref 0.3–6.2)
ERYTHROCYTE [DISTWIDTH] IN BLOOD BY AUTOMATED COUNT: 16 % (ref 11.5–14.5)
ETHNIC BACKGROUND STATED: 21.7 MIU/ML (ref 2.6–18.5)
GAMMA GLOB SERPL ELPH-MCNC: 1 G/DL (ref 0.4–1.8)
GFR SERPL CREATININE-BSD FRML MDRD: 6 ML/MIN/1.73
GLOBULIN SER CALC-MCNC: 3.2 G/DL (ref 2.2–3.9)
GLUCOSE BLD-MCNC: 123 MG/DL (ref 65–99)
HBV SURFACE AG SERPL QL IA: NORMAL
HBV SURFACE AG SERPL QL IA: NORMAL
HCT VFR BLD AUTO: 27.3 % (ref 40.4–52.2)
HGB BLD-MCNC: 9.7 G/DL (ref 13.7–17.6)
IGA SERPL-MCNC: 150 MG/DL (ref 90–386)
IGA SERPL-MCNC: 153 MG/DL (ref 90–386)
IGG SERPL-MCNC: 678 MG/DL (ref 700–1600)
IGG SERPL-MCNC: 692 MG/DL (ref 700–1600)
IGM SERPL-MCNC: 130 MG/DL (ref 20–172)
IGM SERPL-MCNC: 132 MG/DL (ref 20–172)
IMM GRANULOCYTES # BLD: 0.08 10*3/MM3 (ref 0–0.03)
IMM GRANULOCYTES NFR BLD: 0.8 % (ref 0–0.5)
INTERPRETATION SERPL IEP-IMP: ABNORMAL
INTERPRETATION UR IFE-IMP: NORMAL
KAPPA LC SERPL-MCNC: 122.7 MG/L (ref 3.3–19.4)
KAPPA LC/LAMBDA SER: 1.51 {RATIO} (ref 0.26–1.65)
LAMBDA LC FREE SERPL-MCNC: 81.2 MG/L (ref 5.7–26.3)
LDH SERPL-CCNC: 813 U/L (ref 135–225)
LYMPHOCYTES # BLD AUTO: 0.96 10*3/MM3 (ref 0.9–4.8)
LYMPHOCYTES NFR BLD AUTO: 9.2 % (ref 19.6–45.3)
Lab: ABNORMAL
M-SPIKE: ABNORMAL G/DL
MAGNESIUM SERPL-MCNC: 2.6 MG/DL (ref 1.6–2.6)
MCH RBC QN AUTO: 33.8 PG (ref 27–32.7)
MCHC RBC AUTO-ENTMCNC: 35.5 G/DL (ref 32.6–36.4)
MCV RBC AUTO: 95.1 FL (ref 79.8–96.2)
MONOCYTES # BLD AUTO: 0.36 10*3/MM3 (ref 0.2–1.2)
MONOCYTES NFR BLD AUTO: 3.4 % (ref 5–12)
NEUTROPHILS # BLD AUTO: 9.04 10*3/MM3 (ref 1.9–8.1)
NEUTROPHILS NFR BLD AUTO: 86.4 % (ref 42.7–76)
PHOSPHATE SERPL-MCNC: 4.1 MG/DL (ref 2.5–4.5)
PLATELET # BLD AUTO: 165 10*3/MM3 (ref 140–500)
PMV BLD AUTO: 11.9 FL (ref 6–12)
POTASSIUM BLD-SCNC: 3.6 MMOL/L (ref 3.5–5.2)
PROT PATTERN SERPL IFE-IMP: ABNORMAL
PROT SERPL-MCNC: 6.1 G/DL (ref 6–8.5)
RBC # BLD AUTO: 2.87 10*6/MM3 (ref 4.6–6)
SODIUM BLD-SCNC: 133 MMOL/L (ref 136–145)
URATE SERPL-MCNC: 8.2 MG/DL (ref 3.4–7)
WBC NRBC COR # BLD: 10.46 10*3/MM3 (ref 4.5–10.7)

## 2017-07-05 PROCEDURE — 25010000003 CEFTRIAXONE PER 250 MG: Performed by: INTERNAL MEDICINE

## 2017-07-05 PROCEDURE — 83615 LACTATE (LD) (LDH) ENZYME: CPT | Performed by: INTERNAL MEDICINE

## 2017-07-05 PROCEDURE — 93970 EXTREMITY STUDY: CPT

## 2017-07-05 PROCEDURE — 99231 SBSQ HOSP IP/OBS SF/LOW 25: CPT | Performed by: INTERNAL MEDICINE

## 2017-07-05 PROCEDURE — 87340 HEPATITIS B SURFACE AG IA: CPT | Performed by: INTERNAL MEDICINE

## 2017-07-05 PROCEDURE — 83735 ASSAY OF MAGNESIUM: CPT | Performed by: INTERNAL MEDICINE

## 2017-07-05 PROCEDURE — 85025 COMPLETE CBC W/AUTO DIFF WBC: CPT | Performed by: INTERNAL MEDICINE

## 2017-07-05 PROCEDURE — 80069 RENAL FUNCTION PANEL: CPT | Performed by: INTERNAL MEDICINE

## 2017-07-05 PROCEDURE — 84550 ASSAY OF BLOOD/URIC ACID: CPT | Performed by: INTERNAL MEDICINE

## 2017-07-05 RX ORDER — FAMOTIDINE 20 MG/1
20 TABLET, FILM COATED ORAL 2 TIMES DAILY
Status: DISCONTINUED | OUTPATIENT
Start: 2017-07-06 | End: 2017-07-07 | Stop reason: DRUGHIGH

## 2017-07-05 RX ORDER — MANNITOL 250 MG/ML
25 INJECTION, SOLUTION INTRAVENOUS AS NEEDED
Status: ACTIVE | OUTPATIENT
Start: 2017-07-05 | End: 2017-07-06

## 2017-07-05 RX ORDER — ALUMINA, MAGNESIA, AND SIMETHICONE 2400; 2400; 240 MG/30ML; MG/30ML; MG/30ML
30 SUSPENSION ORAL EVERY 4 HOURS PRN
Status: DISCONTINUED | OUTPATIENT
Start: 2017-07-05 | End: 2017-07-10 | Stop reason: HOSPADM

## 2017-07-05 RX ADMIN — SODIUM CHLORIDE 125 ML/HR: 9 INJECTION, SOLUTION INTRAVENOUS at 15:47

## 2017-07-05 RX ADMIN — MINOXIDIL 5 MG: 2.5 TABLET ORAL at 08:36

## 2017-07-05 RX ADMIN — SODIUM CHLORIDE 125 ML/HR: 9 INJECTION, SOLUTION INTRAVENOUS at 23:08

## 2017-07-05 RX ADMIN — ALUMINUM HYDROXIDE, MAGNESIUM HYDROXIDE, AND DIMETHICONE 30 ML: 400; 400; 40 SUSPENSION ORAL at 23:38

## 2017-07-05 RX ADMIN — CEFTRIAXONE SODIUM 1 G: 1 INJECTION, SOLUTION INTRAVENOUS at 08:49

## 2017-07-05 RX ADMIN — SODIUM CHLORIDE 125 ML/HR: 9 INJECTION, SOLUTION INTRAVENOUS at 02:00

## 2017-07-05 RX ADMIN — TEMAZEPAM 15 MG: 15 CAPSULE ORAL at 23:49

## 2017-07-05 RX ADMIN — METOPROLOL SUCCINATE 100 MG: 100 TABLET, FILM COATED, EXTENDED RELEASE ORAL at 08:37

## 2017-07-05 RX ADMIN — MINOXIDIL 5 MG: 2.5 TABLET ORAL at 21:30

## 2017-07-05 RX ADMIN — AZITHROMYCIN 500 MG: 250 TABLET, FILM COATED ORAL at 08:37

## 2017-07-05 NOTE — PLAN OF CARE
Problem: Renal Failure/Kidney Injury, Acute (Adult)  Goal: Signs and Symptoms of Listed Potential Problems Will be Absent or Manageable (Renal Failure/Kidney Injury, Acute)  Outcome: Ongoing (interventions implemented as appropriate)    Problem: Patient Care Overview (Adult)  Goal: Plan of Care Review  Outcome: Ongoing (interventions implemented as appropriate)  Goal: Adult Individualization and Mutuality  Outcome: Ongoing (interventions implemented as appropriate)  Goal: Discharge Needs Assessment  Outcome: Ongoing (interventions implemented as appropriate)    Problem: Hypertensive Disease/Crisis (Arterial) (Adult)  Goal: Signs and Symptoms of Listed Potential Problems Will be Absent or Manageable (Hypertensive Disease/Crisis)  Outcome: Ongoing (interventions implemented as appropriate)    Problem: Fall Risk (Adult)  Goal: Absence of Falls  Outcome: Ongoing (interventions implemented as appropriate)

## 2017-07-05 NOTE — PROGRESS NOTES
[facility]  Phone: [phone]  Stephen Arauz MD    Subjective:  LOS: 2    Some congestion last night. Better today. No CP or SOA.    Objective   Vital Signs past 24hrs  BP range: BP: (148-185)/() 160/92  Pulse range: Heart Rate:  [92-96] 96  Resp rate range: Resp:  [18-20] 20  Temp range: Temp (24hrs), Av.9 °F (36.6 °C), Min:97.3 °F (36.3 °C), Max:98.4 °F (36.9 °C)    O2 Device: nasal cannulaFlow (L/min):  [2] 2  Oxygen range:SpO2:  [93 %-97 %] 97 %   150 lb 9.2 oz (68.3 kg); Body mass index is 21.61 kg/(m^2).    Intake/Output Summary (Last 24 hours) at 17 1356  Last data filed at 17 0958   Gross per 24 hour   Intake              550 ml   Output              400 ml   Net              150 ml       Physical Exam   Cardiovascular: Normal rate and regular rhythm.    No murmur heard.  Pulmonary/Chest: He has no wheezes. He has no rales.   Abdominal: Soft. Bowel sounds are normal. There is no tenderness.   Musculoskeletal: He exhibits no edema.   Neurological: He is alert.   Nursing note and vitals reviewed.    Results Review:    I have reviewed the laboratory and imaging data since the last note by [facility] physician.  My annotations are noted in assessment and plan.    Medication Review:  I have reviewed the current MAR.  My annotations are noted in assessment and plan.      sodium chloride 125 mL/hr Last Rate: 125 mL/hr (17 0200)     Plan   PCCM Problems  Hypertensive emergency  Acute renal failure  Elevated troponin  Pneumonia, infiltrates  Anemia  Acute hypokalemia  Positive THC    Plan of Treatment  Now on oral bp meds.    HD required. Note plans.    Pneumonia per CT imaging. Finish course of abx then fu imaging in 8 weeks.    Appreciate hematology. Anemia of chronic disease likely. Other tests pending.      Stephen Arauz MD  17  1:56 PM    Part of this note may be an electronic transcription/translation of spoken language to printed text using the Dragon Dictation  System.

## 2017-07-05 NOTE — DISCHARGE INSTR - APPOINTMENTS
Appointment scheduled with ALONZO Valente on July 17, 2017 at 8:00 am.  Arrive 15 minutes early and bring your photo ID and insurance card.      Dialysis chair set up for Tuesdays, Thursdays and Saturdays at 7:30am-  Arrive 30 minutes early for 1st treatment at : (1st treatment scheduled on 7/13/17 at 7:30am)     [facility] ,  [address], [address], [place] [address]       [phone]

## 2017-07-05 NOTE — PAYOR COMM NOTE
"Harry Ortega (56 y.o. Male)           ATTENTION; REF#[id number], CLINICALS FOR YOUR REVIEW, REPLY TO [facility] DEPT, MAMADOU CAMPOS LPN         [phone] OR UR  [phone]       Date of Birth Social Security Number Address Home Phone MRN    1960  [address] [place] [address] [phone] [id number]    Zoroastrianism Marital Status          Amish        Admission Date Admission Type Admitting Provider Attending Provider Department, Room/Bed    7/3/17 Emergency Stephen Arauz MD Jain, Subin, MD [address], 408/1    Discharge Date Discharge Disposition Discharge Destination                      Attending Provider: Stephen Arauz MD     Allergies:  No Known Allergies    Isolation:  None   Infection:  None   Code Status:  FULL    Ht:  70\" (177.8 cm)   Wt:  150 lb 9.2 oz (68.3 kg)    Admission Cmt:  None   Principal Problem:  None                Active Insurance as of 7/3/2017     Primary Coverage     Payor Plan Insurance Group Employer/Plan Group    PROFESSIONAL BENEFIT ADMINISTRATORS PROFESSIONAL BENEFIT   [id number]     Payor Plan Address Payor Plan Phone Number Effective From Effective To    [address] [phone] 1/1/2017     [place] [address]       Subscriber Name Subscriber Birth Date Member ID       HARRY ORTEGA 1960 [id number]                 Emergency Contacts      (Rel.) Home Phone Work Phone Mobile Phone    Raiza Ortega (Spouse) [phone] -- --               History & Physical      Stephen Arauz MD at 7/3/2017  9:48 AM              [facility]  Phone: [phone]  Stephen Arauz MD      Subjective   LOS: 0 days     56-year-old male who presents to the ED with nausea and vomiting since past Thursday.  On arrival in the ED patient was found to be severely hypotensive.  He quit taking his antihypertensives one year ago.  He has a history of hypertension for the past 7-10 years.  He denies recent NSAIDs.  However he took some " Tylenol Sinus about one week ago.  Unclear if he took any pseudoephedrine containing medications for his nasal congestion which resolved.  He denies any other significant medical history.  He denies any heart disease, lung disease, diabetes, strokes.  He denies the recent smoking history and quit smoking 7-8 years ago.  Used to smoke about a pack every 2 weeks.  He drinks infrequently.  He denies use of illegal drugs.  He currently denies any chest pain.  He had left knee replacement.  He has not taken any pain medicines for this recently.     I have reviewed and edited the Past Medical History, Past Surgical History, Home Medications, Social History and Family History as of 2:13 PM on 17.    Prescriptions Prior to Admission   Medication Sig Dispense Refill Last Dose   • hydrochlorothiazide (HYDRODIURIL) 25 MG tablet Take 25 mg by mouth Daily.      • Multiple Vitamins-Minerals (MULTIVITAMIN ADULT PO) Take 1 tablet by mouth Daily.      • nebivolol (BYSTOLIC) 10 MG tablet Take 10 mg by mouth Daily.        No Known Allergies    Review of Systems   Constitutional: Positive for appetite change. Negative for chills and fever.   HENT: Positive for congestion (recent; took tylenol sinus 1 week ago). Negative for sore throat.    Respiratory: Negative for cough and shortness of breath.    Cardiovascular: Negative for chest pain and leg swelling.   Gastrointestinal: Positive for nausea and vomiting.   Genitourinary: Negative for difficulty urinating and hematuria.   Musculoskeletal: Negative for arthralgias and myalgias.   Skin: Negative for pallor and rash.   Neurological: Negative for dizziness and light-headedness.   Psychiatric/Behavioral: Negative for confusion. The patient is not nervous/anxious.        Vital Signs past 24hrs  BP range: BP: (156-254)/() 170/101  Pulse range: Heart Rate:  [] 89  Resp rate range: Resp:  [15-19] 19  Temp range: Temp (24hrs), Av.7 °F (36.5 °C), Min:97.1 °F (36.2 °C),  Max:98.2 °F (36.8 °C)    Oxygen range: SpO2:  [94 %-100 %] 96 %;  ;   O2 Device: room air  143 lb 1.3 oz (64.9 kg); Body mass index is 20.53 kg/(m^2).       Adult male no acute distress.  Pupils equal reactive to light.  Oropharynx moist.  Class II Mallampati airway.  No thrush. Nasopharynx without discharge.  Septum midline.  JVP not engorged.  Trachea midline thyroid not enlarged.  Lungs reveal bilateral air entry clear to auscultation.  Percussion note is resonant bilaterally.  Chest expansion equal with no chest wall deformities or tenderness.  Heart examination S1-S2 present rhythm regular.  No murmurs noted.  No edema lower extremities.  Abdomen is soft nontender bowel sounds present no liver spleen enlargement.  No peripheral cyanosis clubbing.  Patient moves all 4 extremities.  Sensory motor intact.  No cervical, axillary, inguinal adenopathy.  No skin rash or pallor.    Results Review:    I have reviewed the laboratory and imaging data from current admission. My annotations are as noted in assessment and plan.    Medication Review:  I have reviewed the current MAR. My annotations are as noted in assessment and plan.    Plan   PCCM Problems  Hypertensive emergency  Acute renal failure  Elevated troponin  RUL mass lesion, ? pneumonia  Anemia  Acute hypokalemia  Positive THC    Plan of Treatment  Currently on a Cardene drip.  He was started back up on Bystolic.  We will continue tomorrow. TSH is normal.    Acute renal failure.  Appreciate renal input.  Patient is nonoliguric.  Potassium was replaced.    Elevated troponin likely not of significance. No need cardiology input.    Right upper lobe lesion seen on chest x-ray.  Unclear etiology.  We'll obtain a CT chest.    Appreciate hematology help for anemia workup.    Positive THC, patient did not report.    Stephen Arauz MD  07/03/17  2:13 PM    Part of this note may be an electronic transcription/translation of spoken language to printed text using the Dragon  Dictation System.       Electronically signed by Stephen Arauz MD at 7/3/2017  2:29 PM           Emergency Department Notes      Shelley Chan, RN at 7/3/2017  7:35 AM          Chief Complaint   Patient presents with   • Nausea     Patient states nauseated, vomited on Friday, dry mouth x 2 weeks   • Vomiting   • Constipation     last normal bowel movement last wednesday          Electronically signed by Shelley Chan RN at 7/3/2017  7:35 AM      Joann Patel RN at 7/3/2017  7:58 AM          MD made aware of BP. Orders to come     Joann Patel RN  07/03/17 0758       Electronically signed by Joann Patel RN at 7/3/2017  7:58 AM      Joann Patel RN at 7/3/2017 10:42 AM          Pt being taken to u/s     Joann Patel RN  07/03/17 1042       Electronically signed by Joann Patel RN at 7/3/2017 10:42 AM        Vital Signs (last 72 hrs)       07/02 0700  -  07/03 0659 07/03 0700  -  07/04 0659 07/04 0700  -  07/05 0659 07/05 0700  -  07/05 1059   Most Recent    Temp (°F)   97.1 -  98.4    97.3 -  98      98.4     98.4 (36.9)    Heart Rate   78 -  100    82 -  95      96     96    Resp   15 -  25    18 -  20      20     20    BP   129/79 -  (!) 254/170    140/96 -  (!) 185/126      160/92     160/92    SpO2 (%)   90 -  100    (!)88 -  97       97          Lines, Drains & Airways    Active LDAs     Name:   Placement date:   Placement time:   Site:   Days:    Peripheral IV Line - Single Lumen 07/03/17 0801 median cubital vein (antecubital fossa), left 20 gauge  07/03/17    0801      2    Peripheral IV Line - Single Lumen 07/03/17 0939 median cubital vein (antecubital fossa), right 20 gauge  07/03/17    0939      2         Inactive LDAs     None                Hospital Medications (all)       Dose Frequency Start End    azithromycin (ZITHROMAX) tablet 500 mg 500 mg Every 24 Hours Scheduled 7/4/2017 7/9/2017    Sig - Route: Take 2 tablets by mouth Daily. - Oral    cefTRIAXone (ROCEPHIN) IVPB 1  g 1 g Every 24 Hours 7/4/2017     Sig - Route: Infuse 50 mL into a venous catheter Daily. - Intravenous    heparin (porcine) 5000 UNIT/ML injection 5,000 Units 5,000 Units Every 8 Hours Scheduled 7/3/2017     Sig - Route: Inject 1 mL under the skin Every 8 (Eight) Hours. - Subcutaneous    hydrALAZINE (APRESOLINE) injection 20 mg 20 mg Every 8 Hours PRN 7/4/2017     Sig - Route: Infuse 1 mL into a venous catheter Every 8 (Eight) Hours As Needed for High Blood Pressure. - Intravenous    labetalol (NORMODYNE,TRANDATE) injection 10 mg 10 mg Once 7/3/2017 7/3/2017    Sig - Route: Infuse 2 mL into a venous catheter 1 (One) Time. - Intravenous    labetalol (NORMODYNE,TRANDATE) injection 20 mg 20 mg Every 6 Hours PRN 7/4/2017     Sig - Route: Infuse 4 mL into a venous catheter Every 6 (Six) Hours As Needed for High Blood Pressure. - Intravenous    metoprolol succinate XL (TOPROL-XL) 24 hr tablet 100 mg 100 mg Every 24 Hours Scheduled 7/4/2017     Sig - Route: Take 1 tablet by mouth Daily. - Oral    minoxidil (LONITEN) tablet 5 mg 5 mg Every 12 Hours Scheduled 7/4/2017     Sig - Route: Take 2 tablets by mouth Every 12 (Twelve) Hours. - Oral    nebivolol (BYSTOLIC) tablet 10 mg 10 mg Once 7/3/2017 7/3/2017    Sig - Route: Take 1 tablet by mouth 1 (One) Time. - Oral    ondansetron (ZOFRAN) injection 4 mg 4 mg Once 7/3/2017 7/3/2017    Sig - Route: Infuse 2 mL into a venous catheter 1 (One) Time. - Intravenous    pantoprazole (PROTONIX) injection 40 mg 40 mg Once 7/3/2017 7/3/2017    Sig - Route: Infuse 10 mL into a venous catheter 1 (One) Time. - Intravenous    potassium chloride (KLOR-CON) packet 40 mEq 40 mEq Once 7/4/2017 7/4/2017    Sig - Route: Take 40 mEq by mouth 1 (One) Time. - Oral    potassium chloride (MICRO-K) CR capsule 40 mEq 40 mEq Once 7/3/2017 7/3/2017    Sig - Route: Take 4 capsules by mouth 1 (One) Time. - Oral    potassium chloride (MICRO-K) CR capsule 60 mEq 60 mEq Once 7/3/2017 7/3/2017    Sig - Route:  "Take 6 capsules by mouth 1 (One) Time. - Oral    sodium chloride 0.9 % bolus 500 mL 500 mL Once 7/3/2017 7/3/2017    Sig - Route: Infuse 500 mL into a venous catheter 1 (One) Time. - Intravenous    sodium chloride 0.9 % flush 10 mL 10 mL As Needed 7/3/2017     Sig - Route: Infuse 10 mL into a venous catheter As Needed for Line Care. - Intravenous    Cosign for Ordering: Accepted by Marbella Cannon MD on 7/3/2017  9:42 AM    sodium chloride 0.9 % flush 10 mL 10 mL As Needed 7/3/2017     Sig - Route: Infuse 10 mL into a venous catheter As Needed for Line Care. - Intravenous    Linked Group 1:  \"And\" Linked Group Details        sodium chloride 0.9 % infusion 125 mL/hr Continuous 7/3/2017     Sig - Route: Infuse 125 mL/hr into a venous catheter Continuous. - Intravenous    temazepam (RESTORIL) capsule 15 mg 15 mg Nightly PRN 7/3/2017 7/13/2017    Sig - Route: Take 1 capsule by mouth At Night As Needed for Sleep. - Oral    amLODIPine (NORVASC) tablet 10 mg (Discontinued) 10 mg Every 24 Hours Scheduled 7/5/2017 7/5/2017    Sig - Route: Take 1 tablet by mouth Daily. - Oral    nebivolol (BYSTOLIC) tablet 10 mg (Discontinued) 10 mg Every 24 Hours Scheduled 7/4/2017 7/4/2017    Sig - Route: Take 1 tablet by mouth Daily. - Oral    niCARdipine (CARDENE) 25 mg/250 mL (0.1 mg/mL) 0.9% NS VTB infusion (Discontinued) 5-15 mg/hr Titrated 7/3/2017 7/5/2017    Sig - Route: Infuse 5-15 mg/hr into a venous catheter Dose Adjusted By Provider As Needed. - Intravenous             Physician Progress Notes (last 72 hours) (Notes from 7/2/2017 10:59 AM through 7/5/2017 10:59 AM)      Dimas Hung MD at 7/4/2017  8:57 AM  Version 1 of 1           Subjective         History of Present Illness  ANEMIA, THROMBOCYTOPENIA, PNEUMONIA HYPERTENSIVE EMERGENCY, RENAL FAILURE    Past Medical History   Past Medical History:   Diagnosis Date   • Hypertension      Social History     Social History   • Marital status:      Spouse name: N/A   • Number " of children: N/A   • Years of education: N/A     Occupational History   • Not on file.     Social History Main Topics   • Smoking status: Never Smoker   • Smokeless tobacco: Not on file   • Alcohol use Yes      Comment: occassionally   • Drug use: Defer   • Sexual activity: Defer     Other Topics Concern   • Not on file     Social History Narrative   • No narrative on file     Family History   Problem Relation Age of Onset   • Hypertension Mother    • Hypertension Father        Review of Systems   General: no fever, chills, fatigue, weight changes, or lack of appetite.  Eyes: no epiphora, xerophthalmia,conjunctivitis, pain, glaucoma, blurred vision, blindness, secretion, photophobia, proptosis, diplopia.  Ears: no otorrhea, tinnitus, otorrhagia, deafness, pain, vertigo.  Nose: no rhinorrhea, epistaxis, alteration in perception of odors, sinuses pressure.  Mouth: no alteration in gums or teeth,  ulcers, no difficulty with mastication or deglut ion, no odynophagia.  Neck: no masses or pain, no thyroid alterations, no pain in muscles or arteries, no carotid odynia, no crepitation.  Respiratory: no cough, sputum production, dyspnea, trepopnea, pleuritic pain, hemoptysis.  Heart: no syncope, irregularity, palpitations, angina, orthopnea, paroxysmal nocturnal dyspnea.  Vascular Venous: no tenderness,edema, palpable cords, postphlebitic syndrome, skin changes or ulcerations.  Vascular Arterial: no distal ischemia, claudication, gangrene, neuropathic ischemic pain, skin ulcers, paleness or cyanosis.  GI: no dysphagia, odynophagia, no regurgitation, no heartburn,no indigestion,PERSISITENT nausea,AND vomiting,no hematemesis ,no melena,no jaundice,no distention, no obstipation,no enterorrhagia,no proctalgia,no anal  lesions, nochanges in bowel habits.  : no frequency, hesitancy, hematuria, discharge, pain.  Musculoskeletal: no muscle or tendon pain or inflammation, joint pain, edema, functional limitation, fasciculations,  mass.  Neurologic: no headache, seizures, alterations on Craneal nerves, no motor or senssory deficit, normal coordination, no alteration in memory, orientation, calculation,writting, verbal or written language.  Skin: no rashes, pruritus or localized lesions.  Psychiatric: no anxiety, depression, agitation, delusions, proper insight.  A comprehensive 14 point review of systems was performed and was negative except as mentioned.    Medications:  The current medication list was reviewed in the EMR    ALLERGIES:  No Known Allergies    Objective      Vitals:    07/04/17 0647 07/04/17 0703 07/04/17 0718 07/04/17 0735   BP: (!) 156/106 152/99 (!) 152/104    BP Location:       Patient Position:       Pulse: 87 87 85    Resp:       Temp:    98 °F (36.7 °C)   TempSrc:       SpO2: 96% 96% 94%    Weight:       Height:         No flowsheet data found.    Physical Exam  UNCHANGED FROM YESTERDAY.  RECENT LABS:  Hematology WBC   Date Value Ref Range Status   07/04/2017 5.63 4.50 - 10.70 10*3/mm3 Final     RBC   Date Value Ref Range Status   07/04/2017 2.41 (L) 4.60 - 6.00 10*6/mm3 Final     Hemoglobin   Date Value Ref Range Status   07/04/2017 8.2 (L) 13.7 - 17.6 g/dL Final     Hematocrit   Date Value Ref Range Status   07/04/2017 23.1 (L) 40.4 - 52.2 % Final     Platelets   Date Value Ref Range Status   07/04/2017 98 (L) 140 - 500 10*3/mm3 Final      CT SCAN OF THE CHEST WITHOUT CONTRAST      CLINICAL HISTORY: Follow-up abnormal chest x-ray showing infiltrate  versus mass in the right upper lobe.      TECHNIQUE: Spiral CT images were obtained through the chest with no IV  contrast and were reconstructed in 3 mm thick slices.      COMPARISON: Chest x-ray dated 07/03/2017 and a previous CT scan of the  chest dated 1114.      FINDINGS: There are patchy areas of airspace infiltrate in the right  upper and lower lobes that are consistent with pneumonia. The infiltrate  is most prominent in the right lung apex where it accounts for  the  ill-defined nodular opacity noted on the patient's chest x-ray. There is  a 5 mm diameter noncalcified pulmonary nodule in the lateral aspect of  the right lower lobe that is also most likely inflammatory in etiology.  However, this is new since the preceding CT scan of the chest in 2014.  Minimal CT scan of the chest in 2 -- 3 months after the patient's  pneumonia is treated. There is a small posteriorly layering right  pleural effusion at the right lung base. There are a few mildly enlarged  lymph nodes within the mediastinum that are most likely benign reactive  lymph nodes. These all measure 12 mm or less in short axis. Images  through the upper abdomen partially show a right renal cyst, and are  otherwise unremarkable.      IMPRESSION:  Patchy airspace infiltrate in the right lung consistent with  pneumonia. There is a solitary tiny, approximately 5 mm diameter  noncalcified pulmonary nodule in the lateral aspect of the right lower  lobe that is also most likely inflammatory in etiology, but is new since  the preceding CT chest dated 2014. Small right pleural effusion. Mild  mediastinal lymphadenopathy that is likely reactive. Recommend a  follow-up CT scan of the chest in 2 -- 3 months after appropriate  medical treatment for pneumonia.      This report was finalized on 7/3/2017 6:47 PM by Dr. Cody Rodriguez MD.    Immature Platelet Fraction   Order: [id number]   Status:  Final result   Visible to patient:  No (Not Released)      Newer results are available. Click to view them now.            Ref Range & Units 1d ago     IPF 0.90 - 6.50 % 6.50   Platelets 140 - 500 10*3/mm3 63 (L)   Resulting Agency  [facility]      Specimen Collected: 07/03/17 12:03 PM Last Resulted: 07/03/17 12:18                      Lactate Dehydrogenase   Order: [id number]   Status:  Final result   Visible to patient:  No (Not Released)      Ref Range & Units 4:51 AM      - 225 U/L 875 (H)   Resulting Agency   [facility]       Specimen Collected: 07/04/17  4:51 AM Last Resulted: 07/04/17  5:54 AM              Bilirubin, Direct   Order: [id number]   Status:  Final result   Visible to patient:  No (Not Released)      Ref Range & Units 1d ago     Bilirubin, Direct 0.0 - 0.3 mg/dL 0.3   Resulting Agency  [facility]      Specimen Collected: 07/03/17 10:33 AM Last Resulted: 07/03/17 11:58 AM              EXAMINATION: BILATERAL RENAL SONOGRAPHY      HISTORY: 56-year-old male with a history of impaired renal function      FINDINGS: Sonographic evaluation of the kidneys was performed. No prior  renal sonogram is available for comparison. The right kidney measures  10.1 x 4.8 x 4.9 cm and the left kidney measures 9.7 x 6.0 x 5.0 cm.  There are bilateral renal cysts. Largest cyst in the right kidney  measures on the order of 6.3 cm in greatest dimension and largest cyst  in left kidney measures on the order of 2.2 cm in greatest dimension.  There is mild increased echogenicity seen throughout the bilateral  renal cortices. No evidence for hydronephrosis or proximal hydroureter  is appreciated. Neither ureteral jet is seen within the urinary bladder.      IMPRESSION:  Bilateral renal cysts with sonographic features of the  kidneys consistent with bilateral medical renal disease.      This report was finalized on 7/3/2017 5:36 PM by Dr. Eric Marcano MD.        Assessment/Plan in summary this patient has anemia with a normal ferritin normal Lidoderm normal B12 normal folic acid level anything particularly thin level is pending at this time.  I do believe that this patient has chronic K kidney disease and anemia corresponds to the process nevertheless an EPO level is pending at this time.  Also we have Serum protein electrophoresis is pending at this time to be sure that there is no monoclonal protein.  The patient has in the toP OF things pneumonia with bilateral pleural effusions and a he's been treated for the same.  His ultrasound of the kidneys  "shows  large cyst in the right kidney and otherwise sonographic changes that suggest chronic kidney disease.  Again in patientS to have severe hypertension thrombocytopenia is a phenomenon back on a ball associated with a high LDH and sometimes mimics a TTP.  Again I don't believe that this patient has TTP base of the analysis of his peripheral blood DONE YESTERDAY.  I find no need for blood products today..  We will follow him up             Electronically signed by Dimas Hung MD at 7/4/2017  9:07 AM      David Dejesus MD at 7/4/2017 10:22 AM  Version 1 of 1            LOS: 1 day    Patient Care Team:  No Known Provider as PCP - General    Chief Complaint:    Chief Complaint   Patient presents with   • Nausea     Patient states nauseated, vomited on Friday, dry mouth x 2 weeks   • Vomiting   • Constipation     last normal bowel movement last wednesday       Subjective follow-up advanced chronic kidney disease    Interval History:   Since admission the patient was placed on Cardene drip with improvement in his blood pressure, his nausea completely resolved, denies any chest pain or shortness of air, has recurrent cough, no hemoptysis.  Denies any dysuria or gross hematuria.  Was noted to have pneumonia on his CT scan.  His renal ultrasound revealed the evidence of the medical the kidney disease with increased echogenicity of the cortex, had hematology evaluation, as was no evidence of TTP or HUS    Review of Systems:   As noted above    Objective     Vital Signs  Temp:  [97.5 °F (36.4 °C)-98.4 °F (36.9 °C)] 98 °F (36.7 °C)  Heart Rate:  [78-95] 86  Resp:  [15-25] 21  BP: (129-180)/() 153/103    Flowsheet Rows         First Filed Value    Admission Height  70\" (177.8 cm) Documented at 07/03/2017 0734    Admission Weight  150 lb (68 kg) Documented at 07/03/2017 0734             I/O last 3 completed shifts:  In: 4630 [P.O.:840; I.V.:3790]  Out: 400 [Urine:400]    Intake/Output Summary (Last 24 hours) " at 07/04/17 1022  Last data filed at 07/04/17 0501   Gross per 24 hour   Intake             4630 ml   Output              400 ml   Net             4230 ml       Physical Exam:  General Appearance: alert, oriented x 3, no acute distress,   Skin: warm and dry  HEENT: Oral mucosa is normal, he has poor dentition   Neck: supple, no JVD, trachea midline  Lungs: Scattered rhonchi, unlabored breathing effort  Heart: RRR, normal S1 and S2, no S3, positive S4, no rub  Abdomen: soft, non-tender, no palpable bladder, present bowel sounds to auscultation  Extremities: no edema, cyanosis or clubbing  Neuro: normal speech and mental status, no asterixis      Results Review:      Results from last 7 days  Lab Units 07/04/17  0451 07/03/17  1837 07/03/17  0804   SODIUM mmol/L 132*  --  134*   POTASSIUM mmol/L 3.3* 2.9* 2.6*   CHLORIDE mmol/L 94*  --  89*   CO2 mmol/L 18.4*  --  25.1   BUN mg/dL 70*  --  74*   CREATININE mg/dL 9.70*  --  10.02*   CALCIUM mg/dL 7.6*  --  9.1   BILIRUBIN mg/dL  --   --  1.9*   ALK PHOS U/L  --   --  58   ALT (SGPT) U/L  --   --  23   AST (SGOT) U/L  --   --  41*   GLUCOSE mg/dL 133*  --  165*       Estimated Creatinine Clearance: 8.2 mL/min (by C-G formula based on Cr of 9.7).      Results from last 7 days  Lab Units 07/04/17  0451 07/03/17  0804   MAGNESIUM mg/dL 2.6 3.0*   PHOSPHORUS mg/dL 4.6*  --          Results from last 7 days  Lab Units 07/04/17  0451   URIC ACID mg/dL 7.9*         Results from last 7 days  Lab Units 07/04/17  0451 07/03/17  1203 07/03/17  0804   WBC 10*3/mm3 5.63  --  7.23   HEMOGLOBIN g/dL 8.2*  --  10.3*   PLATELETS 10*3/mm3 98* 63* 70*         Results from last 7 days  Lab Units 07/03/17  0804   INR  1.22*         Imaging Results (last 24 hours)     Procedure Component Value Units Date/Time    US Renal Bilateral [[id number]] Collected:  07/03/17 1330     Updated:  07/03/17 1739    Narrative:       EXAMINATION: BILATERAL RENAL SONOGRAPHY     HISTORY: 56-year-old male with a  history of impaired renal function     FINDINGS: Sonographic evaluation of the kidneys was performed. No prior  renal sonogram is available for comparison. The right kidney measures  10.1 x 4.8 x 4.9 cm and the left kidney measures 9.7 x 6.0 x 5.0 cm.  There are bilateral renal cysts. Largest cyst in the right kidney  measures on the order of 6.3 cm in greatest dimension and largest cyst  in left kidney measures on the order of 2.2 cm in greatest dimension.  There is mild increased echogenicity  seen throughout the bilateral  renal cortices. No evidence for hydronephrosis or proximal hydroureter  is appreciated. Neither ureteral jet is seen within the urinary bladder.       Impression:       Bilateral renal cysts with sonographic features of the  kidneys consistent with bilateral medical renal disease.     This report was finalized on 7/3/2017 5:36 PM by Dr. Eric Marcano MD.       CT Chest Without Contrast [[id number]] Collected:  07/03/17 1839     Updated:  07/03/17 1850    Narrative:       CT SCAN OF THE CHEST WITHOUT CONTRAST     CLINICAL HISTORY: Follow-up abnormal chest x-ray showing infiltrate  versus mass in the right upper lobe.     TECHNIQUE: Spiral CT images were obtained through the chest with no IV  contrast and were reconstructed in 3 mm thick slices.     COMPARISON: Chest x-ray dated 07/03/2017 and a previous CT scan of the  chest dated 1114.     FINDINGS: There are patchy areas of airspace infiltrate in the right  upper and lower lobes that are consistent with pneumonia. The infiltrate  is most prominent in the right lung apex where it accounts for the  ill-defined nodular opacity noted on the patient's chest x-ray. There is  a 5 mm diameter noncalcified pulmonary nodule in the lateral aspect of  the right lower lobe that is also most likely inflammatory in etiology.  However, this is new since the preceding CT scan of the chest in 2014.  Minimal CT scan of the chest in 2 -- 3 months after the  patient's  pneumonia is treated. There is a small posteriorly layering right  pleural effusion at the right lung base. There are a few mildly enlarged  lymph nodes within the mediastinum that are most likely benign reactive  lymph nodes. These all measure 12 mm or less in short axis. Images  through the upper abdomen partially show a right renal cyst, and are  otherwise unremarkable.       Impression:       Patchy airspace infiltrate in the right lung consistent with  pneumonia. There is a solitary tiny, approximately 5 mm diameter  noncalcified pulmonary nodule in the lateral aspect of the right lower  lobe that is also most likely inflammatory in etiology, but is new since  the preceding CT chest dated 2014. Small right pleural effusion. Mild  mediastinal lymphadenopathy that is likely reactive. Recommend a  follow-up CT scan of the chest in 2 -- 3 months after appropriate  medical treatment for pneumonia.     This report was finalized on 7/3/2017 6:47 PM by Dr. Cody Rodriguez MD.             azithromycin 500 mg Oral Q24H   ceftriaxone 1 g Intravenous Q24H   heparin (porcine) 5,000 Units Subcutaneous Q8H   nebivolol 10 mg Oral Q24H       niCARdipine 5-15 mg/hr Last Rate: 4 mg/hr (07/04/17 0457)   sodium chloride 125 mL/hr Last Rate: 125 mL/hr (07/04/17 0244)       Medication Review:   Current Facility-Administered Medications   Medication Dose Route Frequency Provider Last Rate Last Dose   • azithromycin (ZITHROMAX) tablet 500 mg  500 mg Oral Q24H Stephen Arauz MD       • cefTRIAXone (ROCEPHIN) IVPB 1 g  1 g Intravenous Q24H Stephen Arauz MD       • heparin (porcine) 5000 UNIT/ML injection 5,000 Units  5,000 Units Subcutaneous Q8H Stephen Arauz MD   5,000 Units at 07/04/17 0612   • nebivolol (BYSTOLIC) tablet 10 mg  10 mg Oral Q24H Stephen Arauz MD   10 mg at 07/04/17 0853   • niCARdipine (CARDENE) 25 mg/250 mL (0.1 mg/mL) 0.9% NS VTB infusion  5-15 mg/hr Intravenous Titrated Marbella Cannon MD 40 mL/hr at 07/04/17 0457 4  mg/hr at 07/04/17 0457   • sodium chloride 0.9 % flush 10 mL  10 mL Intravenous PRN Marbella Cannon MD       • sodium chloride 0.9 % flush 10 mL  10 mL Intravenous PRN Marbella Cannon MD       • sodium chloride 0.9 % infusion  125 mL/hr Intravenous Continuous Marbella Cannon  mL/hr at 07/04/17 0244 125 mL/hr at 07/04/17 0244   • temazepam (RESTORIL) capsule 15 mg  15 mg Oral Nightly PRN Orquidea Sue MD   15 mg at 07/03/17 2304       Assessment/Plan   1.  Severe renal failure could be the end stage renal disease related to severe uncontrolled hypertension, today there is no ene uremic symptoms.  His creatinine is 9.7.  Hopefully his creatinine may be slightly improved with the better control of blood pressure but the not very hopeful for that based on his findings.  The patient may need to be started on dialysis the next 24-48 hours.  2.  Hypertensive the crisis, with end organ damage acute malignant hypertension.  Currently on Cardene blood pressure is improved we need to not allow the blood pressure to drop below 140/90 for the next 72 hours, until the altered regulation is restored  3.  History of hypertension with the noncompliance with medication and has not taken any medication for over a year.  4.  Degenerative joint disease with prior left total knee replacement  5.  Anemia could be associated with chronic kidney disease   6.  Thrombocytopenia, no evidence of HUS/TTP based on the peripheral smear.  Probably associated with malignant hypertension  7.  Hyponatremia sodium 132 probably related to increased water intake through the drips.  Hypokalemia associated with poor oral intake        Plan:  1.  I will restart minoxidil 5 mg twice a day and start weaning off the Cardene, also will start the patient on metoprolol.  2.  We'll replace potassium  3.  Surveillance labs  4.  We'll decide in the next 24-48 hours about dialysis        David Dejesus MD  07/04/17  10:22 AM       Electronically signed by  David Dejesus MD at 2017 10:34 AM      Stephen Arauz MD at 2017 10:29 AM  Version 1 of 1         [facility]  Phone: [phone]  Stephen Arauz MD    Subjective:  LOS: 1    Patient states no more congestion. No fever or chest pain. Did not receive abx recently. Admits to smoking THC.    Objective   Vital Signs past 24hrs  BP range: BP: (129-180)/() 153/103  Pulse range: Heart Rate:  [81-95] 86  Resp rate range: Resp:  [15-25] 21  Temp range: Temp (24hrs), Av.1 °F (36.7 °C), Min:97.5 °F (36.4 °C), Max:98.4 °F (36.9 °C)    O2 Device: room airFlow (L/min):  [2] 2  Oxygen range:SpO2:  [90 %-99 %] 97 %   150 lb 9.2 oz (68.3 kg); Body mass index is 21.61 kg/(m^2).    Intake/Output Summary (Last 24 hours) at 17 1029  Last data filed at 17 0501   Gross per 24 hour   Intake             4630 ml   Output              400 ml   Net             4230 ml       Physical Exam   Cardiovascular: Normal rate and regular rhythm.    No murmur heard.  Pulmonary/Chest: He has no wheezes. He has no rales.   Abdominal: Soft. Bowel sounds are normal. There is no tenderness.   Musculoskeletal: He exhibits no edema.   Neurological: He is alert.   Nursing note and vitals reviewed.    Results Review:    I have reviewed the laboratory and imaging data since the last note by [facility] physician.  My annotations are noted in assessment and plan.    Medication Review:  I have reviewed the current MAR.  My annotations are noted in assessment and plan.      niCARdipine 5-15 mg/hr Last Rate: 4 mg/hr (17 0578)   sodium chloride 125 mL/hr Last Rate: 125 mL/hr (17 2104)     Plan   PCCM Problems  Hypertensive emergency  Acute renal failure  Elevated troponin  Pneumonia, infiltrates  Anemia  Acute hypokalemia  Positive THC    Plan of Treatment  Currently on nicardipine.  Renal will add oral antihypertensives.  No objection to going floors.    Will likely require dialysis per renal.  Continue to watch for  "now is no urgent indication.    Elevated troponin due to hypertension.  No indications of acute coronary injury.    CT chest reviewed and shows evidence pulmonary infiltrates which could be consistent with pneumonia.  Cultures were sent and patient started on antibiotics.  He will require follow-up imaging in 8 weeks.    Anemia likely due to renal disease.  Appreciate help of hematology.    Possibly to floors later.    Stephen Arauz MD  07/04/17  10:29 AM    Part of this note may be an electronic transcription/translation of spoken language to printed text using the Dragon Dictation System.       Electronically signed by Stephen Arauz MD at 7/4/2017 12:10 PM      David Dejesus MD at 7/5/2017  7:11 AM  Version 1 of 1            LOS: 2 days    Patient Care Team:  No Known Provider as PCP - General    Chief Complaint:    Chief Complaint   Patient presents with   • Nausea     Patient states nauseated, vomited on Friday, dry mouth x 2 weeks   • Vomiting   • Constipation     last normal bowel movement last wednesday       Subjective follow-up advanced chronic kidney disease    Interval History:   The patient was transferred out of the ICU, denies any chest pain or shortness of air, his blood pressure is improving.  Denies any nausea or vomiting.  The ambulating without any difficulties, no dysuria or gross hematuria.    Review of Systems:   As noted above    Objective     Vital Signs  Temp:  [97.3 °F (36.3 °C)-98 °F (36.7 °C)] 97.3 °F (36.3 °C)  Heart Rate:  [82-95] 94  Resp:  [18-20] 18  BP: (140-185)/() 164/96    Flowsheet Rows         First Filed Value    Admission Height  70\" (177.8 cm) Documented at 07/03/2017 0734    Admission Weight  150 lb (68 kg) Documented at 07/03/2017 0734             I/O last 3 completed shifts:  In: 2134 [P.O.:480; I.V.:1654]  Out: 900 [Urine:900]    Intake/Output Summary (Last 24 hours) at 07/05/17 0711  Last data filed at 07/05/17 0300   Gross per 24 hour   Intake                0 " ml   Output              650 ml   Net             -650 ml       Physical Exam:  General Appearance: alert, oriented x 3, no acute distress,   Skin: warm and dry  HEENT: Oral mucosa is normal, he has poor dentition   Neck: supple, no JVD, trachea midline  Lungs: Scattered rhonchi, unlabored breathing effort  Heart: RRR, normal S1 and S2, no S3, positive S4, no rub  Abdomen: soft, non-tender, no palpable bladder, present bowel sounds to auscultation  Extremities: no edema, cyanosis or clubbing  Neuro: normal speech and mental status, no asterixis      Results Review:      Results from last 7 days  Lab Units 07/05/17  0400 07/04/17  0451 07/03/17  1837 07/03/17  0804   SODIUM mmol/L 133* 132*  --  134*   POTASSIUM mmol/L 3.6 3.3* 2.9* 2.6*   CHLORIDE mmol/L 97* 94*  --  89*   CO2 mmol/L 14.0* 18.4*  --  25.1   BUN mg/dL 72* 70*  --  74*   CREATININE mg/dL 10.14* 9.70*  --  10.02*   CALCIUM mg/dL 7.9* 7.6*  --  9.1   BILIRUBIN mg/dL  --   --   --  1.9*   ALK PHOS U/L  --   --   --  58   ALT (SGPT) U/L  --   --   --  23   AST (SGOT) U/L  --   --   --  41*   GLUCOSE mg/dL 123* 133*  --  165*       Estimated Creatinine Clearance: 7.9 mL/min (by C-G formula based on Cr of 10.14).      Results from last 7 days  Lab Units 07/05/17 0400 07/04/17 0451 07/03/17  0804   MAGNESIUM mg/dL 2.6 2.6 3.0*   PHOSPHORUS mg/dL 4.1 4.6*  --          Results from last 7 days  Lab Units 07/05/17 0400 07/04/17  0451   URIC ACID mg/dL 8.2* 7.9*         Results from last 7 days  Lab Units 07/05/17  0400 07/04/17  0451 07/03/17  1203 07/03/17  0804   WBC 10*3/mm3 10.46 5.63  --  7.23   HEMOGLOBIN g/dL 9.7* 8.2*  --  10.3*   PLATELETS 10*3/mm3 165 98* 63* 70*         Results from last 7 days  Lab Units 07/03/17  0804   INR  1.22*         Imaging Results (last 24 hours)     ** No results found for the last 24 hours. **          azithromycin 500 mg Oral Q24H   ceftriaxone 1 g Intravenous Q24H   heparin (porcine) 5,000 Units Subcutaneous Q8H    metoprolol succinate  mg Oral Q24H   minoxidil 5 mg Oral Q12H       niCARdipine 5-15 mg/hr Last Rate: Stopped (07/04/17 1355)   sodium chloride 125 mL/hr Last Rate: 125 mL/hr (07/05/17 0200)       Medication Review:   Current Facility-Administered Medications   Medication Dose Route Frequency Provider Last Rate Last Dose   • azithromycin (ZITHROMAX) tablet 500 mg  500 mg Oral Q24H Stephen Arauz MD   500 mg at 07/04/17 1024   • cefTRIAXone (ROCEPHIN) IVPB 1 g  1 g Intravenous Q24H Stephen Arauz MD   1 g at 07/04/17 1024   • heparin (porcine) 5000 UNIT/ML injection 5,000 Units  5,000 Units Subcutaneous Q8H Stephen Arauz MD   Stopped at 07/05/17 0640   • hydrALAZINE (APRESOLINE) injection 20 mg  20 mg Intravenous Q8H PRN Orquidea Sue MD   20 mg at 07/04/17 2213   • labetalol (NORMODYNE,TRANDATE) injection 20 mg  20 mg Intravenous Q6H PRN Orquidea Sue MD       • metoprolol succinate XL (TOPROL-XL) 24 hr tablet 100 mg  100 mg Oral Q24H David Dejesus MD   100 mg at 07/04/17 1219   • minoxidil (LONITEN) tablet 5 mg  5 mg Oral Q12H David Dejesus MD   5 mg at 07/04/17 2026   • niCARdipine (CARDENE) 25 mg/250 mL (0.1 mg/mL) 0.9% NS VTB infusion  5-15 mg/hr Intravenous Titrated Marbella Cannon MD   Stopped at 07/04/17 1355   • sodium chloride 0.9 % flush 10 mL  10 mL Intravenous PRN Marbella Cannon MD       • sodium chloride 0.9 % flush 10 mL  10 mL Intravenous PRN Marbella Cannon MD       • sodium chloride 0.9 % infusion  125 mL/hr Intravenous Continuous Marbella Cannon  mL/hr at 07/05/17 0200 125 mL/hr at 07/05/17 0200   • temazepam (RESTORIL) capsule 15 mg  15 mg Oral Nightly PRN Orquidea Sue MD   15 mg at 07/04/17 6834       Assessment/Plan   1.  Severe renal failure could be the end stage renal disease related to severe uncontrolled hypertension, today there is no ene uremic symptoms.  His creatinine is 10.14.    2.  Hypertensive the crisis, with end organ damage acute malignant hypertension.  Blood  pressure has improved.  Regarding the new medication without any difficulties  3.  History of hypertension with the noncompliance with medication and has not taken any medication for over a year.  4.  Degenerative joint disease with prior left total knee replacement  5.  Anemia could be associated with chronic kidney disease   6.  Thrombocytopenia, resolved, platelet 156,000  7.  Hyponatremia sodium 133 stable  8.  Hypokalemia, better        Plan:  1.  Continue the same antihypertensive agents  2.  Will start of dialysis today.  He should be getting a tunneled dialysis catheter and then arrangement for AV fistula  3.  Surveillance labs    I discussed the plan with the patient and the nursing staff          David Dejesus MD  07/05/17  7:11 AM       Electronically signed by David Dejesus MD at 7/5/2017  7:19 AM           Consult Notes (last 72 hours) (Notes from 7/2/2017 10:59 AM through 7/5/2017 10:59 AM)      David Dejesus MD at 7/3/2017 10:00 AM  Version 1 of 1     Consult Orders:    1. IP Consult to Nephrology [[id number]] ordered by Marbella Cannon MD at 07/03/17 0927                  Referring Provider: Stephen Arauz MD  Reason for Consultation: Evaluation of patient with the severe renal failure    Subjective     Chief complaint   Chief Complaint   Patient presents with   • Nausea     Patient states nauseated, vomited on Friday, dry mouth x 2 weeks   • Vomiting   • Constipation     last normal bowel movement last wednesday       History of present illness:    This is a very pleasant 56-year-old -American gentleman who presented with recurrent nausea and occasional vomiting for the past 5 or 6 days with significant decrease in appetite and weight loss.  He was found to have severe hypertension blood pressure in the 250/130 range.  Also was found to have increased creatinine up to 10.1 he had not had any labs since 2014 and also he stopped taking his antihypertensive agents over the past  year.  He denies using nonsteroidal anti-inflammatory drugs.  He had left total knee replacement about 3 years ago and he was taking the Advil and Aleve at that time he was told to stop it because of the impact of his hypertension.  He denies being diabetic, no history of heart or lung diseases, no CVA TIAs or seizure disorder, no peptic ulcer disease, no liver disease.  Denies nephrolithiasis or UTIs, denies any prostate problem.  There is no associated symptoms of dysuria, gross hematuria or other outlet symptoms.  No melena or hematemesis, no easy bruising has been reported by the patient.    Past Medical History:   Diagnosis Date   • Hypertension      History reviewed. No pertinent surgical history.  History reviewed. No pertinent family history.  Very strong family history for hypertension but negative for kidney disease.  Social History   Substance Use Topics   • Smoking status: Never Smoker   • Smokeless tobacco: None   • Alcohol use Yes      Comment: occassionally       (Not in a hospital admission)  Allergies:  Review of patient's allergies indicates no known allergies.    Review of Systems  Denies any fever or chills, he has poor appetite, and weight loss.  No acute visual or hearing problems, no epistaxis.  Denies any chest pain or shortness of air, no orthopnea or PND, no hemoptysis or cough.  He has nausea and occasional vomiting for the past few days and very poor appetite, no melena, hematochezia or hematemesis.  Denies any dysuria, gross hematuria or bladder outlet symptoms.  He had the occasional arthralgias of the left knee related to his knee replacement, no lower extremity edema, no headache or syncope or seizure activity.  He is not diabetic, no thyroid disease, denies depression or anxiety or panic attack, has a sleep pattern disturbance where he cannot sleep at night.  Remainder full systems review were essentially negative    Objective     Vital Signs  Temp:  [97.1 °F (36.2 °C)] 97.1 °F (36.2  "°C)  Heart Rate:  [96-98] 96  Resp:  [18] 18  BP: (240-254)/(160-178) 240/160    Flowsheet Rows         First Filed Value    Admission Height  70\" (177.8 cm) Documented at 07/03/2017 0734    Admission Weight  150 lb (68 kg) Documented at 07/03/2017 0734                 No intake or output data in the 24 hours ending 07/03/17 1001    Physical Exam:  General Appearance: alert, oriented x 3, no acute distress,   Skin: warm and dry  HEENT: pupils round and reactive to light, oral mucosa dry,   Neck: supple, no JVD, trachea midline  Lungs: CTA, unlabored breathing effort  Heart: RRR, normal S1 and S2, no S3, positive S4, no rub  Abdomen: soft, non-tender, no palpable bladder, present bowel sounds to auscultation  Extremities: no edema, cyanosis or clubbing  Joints: No significant deformities noted, no crepitation over the knees or the ankles.  Lymphatics: No cervical or supraclavicular lymphadenopathy.  Neuro: normal speech and mental status, no asterixis    Results Review:  Results for orders placed or performed during the hospital encounter of 07/03/17   Urinalysis With / Culture If Indicated   Result Value Ref Range    Color, UA Yellow Yellow, Straw    Appearance, UA Clear Clear    pH, UA 7.0 5.0 - 8.0    Specific Gravity, UA 1.011 1.005 - 1.030    Glucose,  mg/dL (Trace) (A) Negative    Ketones, UA Negative Negative    Bilirubin, UA Negative Negative    Blood, UA Large (3+) (A) Negative    Protein, UA >=300 mg/dL (3+) (A) Negative    Leuk Esterase, UA Negative Negative    Nitrite, UA Negative Negative    Urobilinogen, UA 0.2 E.U./dL 0.2 - 1.0 E.U./dL   Urine Drug Screen   Result Value Ref Range    Amphet/Methamphet, Screen Negative Negative    Barbiturates Screen, Urine Negative Negative    Benzodiazepine Screen, Urine Negative Negative    Cocaine Screen, Urine Negative Negative    Opiate Screen Negative Negative    THC, Screen, Urine Positive (A) Negative    Methadone Screen, Urine Negative Negative    " Oxycodone Screen, Urine Negative Negative   Comprehensive Metabolic Panel   Result Value Ref Range    Glucose 165 (H) 65 - 99 mg/dL    BUN 74 (H) 6 - 20 mg/dL    Creatinine 10.02 (H) 0.76 - 1.27 mg/dL    Sodium 134 (L) 136 - 145 mmol/L    Potassium 2.6 (L) 3.5 - 5.2 mmol/L    Chloride 89 (L) 98 - 107 mmol/L    CO2 25.1 22.0 - 29.0 mmol/L    Calcium 9.1 8.6 - 10.5 mg/dL    Total Protein 7.3 6.0 - 8.5 g/dL    Albumin 4.00 3.50 - 5.20 g/dL    ALT (SGPT) 23 1 - 41 U/L    AST (SGOT) 41 (H) 1 - 40 U/L    Alkaline Phosphatase 58 39 - 117 U/L    Total Bilirubin 1.9 (H) 0.1 - 1.2 mg/dL    eGFR  African Amer 7 (L) >60 mL/min/1.73    Globulin 3.3 gm/dL    A/G Ratio 1.2 g/dL    BUN/Creatinine Ratio 7.4 7.0 - 25.0    Anion Gap 19.9 mmol/L   Lipase   Result Value Ref Range    Lipase 118 (H) 13 - 60 U/L   Magnesium   Result Value Ref Range    Magnesium 3.0 (H) 1.6 - 2.6 mg/dL   Troponin   Result Value Ref Range    Troponin T 0.219 (C) 0.000 - 0.030 ng/mL   TSH   Result Value Ref Range    TSH 1.400 0.270 - 4.200 mIU/mL   CBC Auto Differential   Result Value Ref Range    WBC 7.23 4.50 - 10.70 10*3/mm3    RBC 3.05 (L) 4.60 - 6.00 10*6/mm3    Hemoglobin 10.3 (L) 13.7 - 17.6 g/dL    Hematocrit 28.0 (L) 40.4 - 52.2 %    MCV 91.8 79.8 - 96.2 fL    MCH 33.8 (H) 27.0 - 32.7 pg    MCHC 36.8 (H) 32.6 - 36.4 g/dL    RDW 15.0 (H) 11.5 - 14.5 %    RDW-SD 50.3 37.0 - 54.0 fl    Platelets 70 (L) 140 - 500 10*3/mm3    Neutrophil % 82.3 (H) 42.7 - 76.0 %    Lymphocyte % 10.1 (L) 19.6 - 45.3 %    Monocyte % 6.2 5.0 - 12.0 %    Eosinophil % 0.8 0.3 - 6.2 %    Basophil % 0.3 0.0 - 1.5 %    Immature Grans % 0.3 0.0 - 0.5 %    Neutrophils, Absolute 5.95 1.90 - 8.10 10*3/mm3    Lymphocytes, Absolute 0.73 (L) 0.90 - 4.80 10*3/mm3    Monocytes, Absolute 0.45 0.20 - 1.20 10*3/mm3    Eosinophils, Absolute 0.06 0.00 - 0.70 10*3/mm3    Basophils, Absolute 0.02 0.00 - 0.20 10*3/mm3    Immature Grans, Absolute 0.02 0.00 - 0.03 10*3/mm3   Scan Slide   Result  Value Ref Range    Schistocytes Slight/1+ None Seen    WBC Morphology Normal Normal    Platelet Morphology Normal Normal   Urinalysis, Microscopic Only   Result Value Ref Range    RBC, UA 21-30 (A) None Seen, 0-2 /HPF    WBC, UA 0-2 None Seen, 0-2 /HPF    Bacteria, UA None Seen None Seen /HPF    Squamous Epithelial Cells, UA 0-2 None Seen, 0-2 /HPF    Hyaline Casts, UA 0-2 None Seen /LPF    Methodology Automated Microscopy      Imaging Results (last 72 hours)     Procedure Component Value Units Date/Time    XR Chest 2 View [[id number]] Collected:  07/03/17 0922     Updated:  07/03/17 0945    Narrative:       2-VIEW CHEST     HISTORY: Weakness and nausea and vomiting.     COMPARISON: Chest CT with IV contrast 08/11/2014.     FINDINGS: There is an abnormal masslike opacity in the right upper lobe  measuring approximately 3.5 x 2.3 cm. This is new compared to previous  CT August 2014. This may represent infiltrate though a lung mass could  also have this appearance. This needs either short interval follow-up  exam or further evaluation with chest CT. Left lung is clear.  Cardiomediastinal silhouette is within normal limits.       Impression:       Abnormal 3.5 cm masslike opacity right upper lobe. This  could represent a focal pneumonia though a lung mass could also have  this appearance. Findings could be correlated with clinical data and  recommend either short interval follow-up x-ray or further evaluation  with chest CT.     This report was finalized on 7/3/2017 9:42 AM by Dr. Riki Booth MD.                 labetalol 10 mg Intravenous Once       niCARdipine 5-15 mg/hr Last Rate: 7.5 mg/hr (07/03/17 0948)   sodium chloride 125 mL/hr Last Rate: 125 mL/hr (07/03/17 0815)       Assessment/Plan   1.  Severe renal failure could be the end stage renal disease related to severe uncontrolled hypertension  2.  Hypertensive the crisis.  3.  History of hypertension with the noncompliance with medication and has not taken  any medication for over a year.  4.  Degenerative joint disease with prior left total knee replacement  5.  Anemia could be associated with chronic kidney disease but other etiologies need to be ruled out.  6.  Thrombocytopenia the etiology is not clear.  5.  Elevated Anemia associated probably with poor oral intake.      Plan:  1.  Agree with the plan for Cardene drip  2.  We'll check urine studies, sodium, chloride and protein to creatinine ratio also check eosinophils  3.  We'll check the iron stores and the immunofixation  4.  Patient will need hematology evaluation  5.  Will check renal ultrasound  6.  The patient probably would need dialysis in the next 24 hours if his renal function is not improved and there is evidence of worsening azotemia.  7. Will give 1 dose of potassium  8.  Surveillance labs    Thank you Dr. Arauz for asking me to see you once the patient consultation    I discussed the patients findings and my recommendations with the patient    David Davis MD  07/03/17  10:01 AM               Electronically signed by David Davis MD at 7/3/2017 10:18 AM      Dimas Hung MD at 7/3/2017 12:05 PM  Version 1 of 1     Consult Orders:    1. Inpatient Consult to Hematology & Oncology [[id number]] ordered by David Davis MD at 07/03/17 1016                  Subjective     REASON FOR CONSULTATION:  Anemia in the background of renal failure creatinine of 10  Provide an opinion on any further workup or treatment                             REQUESTING PHYSICIAN:  Dr LARISA DAVIS MD    RECORDS OBTAINED:  Records of the patients history including those obtained from the referring provider were reviewed and summarized in detail.    HISTORY OF PRESENT ILLNESS:  The patient is a 56 y.o. year old male who is here for an opinion about the above issue.    History of Present Illness I have been consulted in this patient was admitted through the emergency room with at least 3 weeks history of  persistent nausea and vomiting unable to read anything no have any hematemesis or melena.  He has had some epigastric pain as well.  He was found to have a hypertensive emergency and besides these he was found to have a creatinine of 10.1.  She has had mild degree of anemia no surprising under the circumstances of also mild degree of thrombocytopenia.  The patient has no clinical bleeding.  He has not become diffuse he has not had any fever he has not had any alteration in bowel function including no abdominal pain or diarrhea. He has continue having normal urinary output.  DespiteCreatinine of 10.1 with no fluid accumulation in his lower extremities abdominal chest cavity .  He denies any cough or sputum production or shortness of breath today.  The patient has not been taking any of his blood pressure medication at least for A YEAR.  Past Medical History:   Diagnosis Date   • Hypertension         History reviewed. No pertinent surgical history. LEFT KNEE REPLACEMENT    No current facility-administered medications on file prior to encounter.      No current outpatient prescriptions on file prior to encounter.        ALLERGIES:  No Known Allergies     Social History     Social History   • Marital status:      Spouse name: N/A   • Number of children: N/A   • Years of education: N/A     Social History Main Topics   • Smoking status: Never Smoker   • Smokeless tobacco: None   • Alcohol use Yes      Comment: occassionally   • Drug use: Defer   • Sexual activity: Defer     Other Topics Concern   • None     Social History Narrative   • None        History reviewed. No pertinent family history.     Review of Systems   General: no fever, chills, PROFOUND fatigue, DECREASED weight changes, or lack of appetite.  Eyes: no epiphora, xerophthalmia,conjunctivitis, pain, glaucoma, blurred vision, blindness, secretion, photophobia, proptosis, diplopia.  Ears: no otorrhea, tinnitus, otorrhagia, deafness, pain, vertigo.  Nose:  no rhinorrhea, epistaxis, alteration in perception of odors, sinuses pressure.  Mouth: no alteration in gums or teeth,  ulcers, no difficulty with mastication or deglut ion, no odynophagia.  Neck: no masses or pain, no thyroid alterations, no pain in muscles or arteries, no carotid odynia, no crepitation.  Respiratory: no cough, sputum production, dyspnea, trepopnea, pleuritic pain, hemoptysis.  Heart: no syncope, irregularity, palpitations, angina, orthopnea, paroxysmal nocturnal dyspnea.  Vascular Venous: no tenderness,edema, palpable cords, postphlebitic syndrome, skin changes or ulcerations.  Vascular Arterial: no distal ischemia, claudication, gangrene, neuropathic ischemic pain, skin ulcers, paleness or cyanosis.  GI: no dysphagia, odynophagia, no regurgitation, no heartburn,no indigestion,STATED nausea,AND vomiting,no hematemesis ,no melena,no jaundice,no distention, no obstipation,no enterorrhagia,no proctalgia,no anal  lesions, nochanges in bowel habits.  : no frequency, hesitancy, hematuria, discharge, pain.  Musculoskeletal: no muscle or tendon pain or inflammation, joint pain, edema, functional limitation, fasciculations, mass.  Neurologic: no headache, seizures, alterations on Craneal nerves, no motor or senssory deficit, normal coordination, no alteration in memory, orientation, calculation,writting, verbal or written language.  Skin: no rashes, pruritus or localized lesions.  Psychiatric: SIGNIFICANT anxiety, NO depression, agitation, delusions, proper insight.    Objective     Vitals:    07/03/17 1147 07/03/17 1156 07/03/17 1157 07/03/17 1202   BP: 164/95  176/92    BP Location:       Patient Position:       Pulse: 89 86  84   Resp:       Temp:       TempSrc:       SpO2: 96% 96%  98%   Weight:       Height:         No flowsheet data found.    Physical Exam    GENERAL:  Well-developed, well-nourished  Patient  in no acute distress.   SKIN:  Warm, dry without rashes, purpura or petechiae.  HEENT:   Pupils were equal and reactive to light and accomodation, conjunctivas non injected, no pterigion, normal extraocular movements, normal visual acuity. Bilateral exopthalmus  Mouth mucosa was moist, no exudates in oropharynx, normal gum line, normal roof of the mouth and pillars, normal papillations of the tongue.Ear canals were normal, as well tympanic membranes, normal hearing acuity.No pain in mastoid area or erythema.  NECK:  Supple with good range of motion; no thyromegaly or masses, no JVD or bruits, no cervical adenopathies.No carotid arteries pain, no carotid abnormal pulsation or arterial dance.  LYMPHATICS:  No cervical, supraclavicular, axillary, epitrochlear or inguinal adenopathy.  CHEST:  Normal excursion of both amrit thoraces, normal voice fremitus, no subcutaneous emphysema, normal axillas, no rashes or acanthosis nigricans. Lungs clear to percussion and auscultation, normal breath sounds bilaterally, no wheezing, crackles or ronchi, no stridor, no rubs.  CARDIAC AND VASCULAR: PMI not displaced,no thrills, normal rate and regular rhythm, without murmurs, rubs or S3 or S4 right or left sided gallops. Normal femoral, popliteal, pedis, brachial and carotid pulses.  ABDOMEN:  Soft, nontender with no organomegaly or masses, no ascites, no collateral circulation,no distention,no Nikujn sign, no abdominal pain, no inguinal hernias,no umbilical hernias, no abdominal bruits. Normal bowel sounds.  GENITAL: Not  Performed.  EXTREMITIES  AND SPINE:  No clubbing, cyanosis or edema, no deformities or pain surgical scar left kne old,.No kyphosis, scoliosis, deformities or pain in spine, ribs or pelvic bone.  NEUROLOGICAL:  Patient was awake, alert, oriented to time, person and place.    RECENT LABS:  Hematology WBC   Date Value Ref Range Status   07/03/2017 7.23 4.50 - 10.70 10*3/mm3 Final     RBC   Date Value Ref Range Status   07/03/2017 3.05 (L) 4.60 - 6.00 10*6/mm3 Final     Hemoglobin   Date Value Ref Range  Status   07/03/2017 10.3 (L) 13.7 - 17.6 g/dL Final     Hematocrit   Date Value Ref Range Status   07/03/2017 28.0 (L) 40.4 - 52.2 % Final     Platelets   Date Value Ref Range Status   07/03/2017 70 (L) 140 - 500 10*3/mm3 Final          Assessment/Plan this patient has been admitted with him for hypertensive emergency found to have a creatinine of 10.1 pound to have anemia with a hemoglobin of 10.3 pounds, platelet count of 70,000.  Differential white count is normal the white count is normal.  Years ago on reviewing an appendectomy, the patient had a normal creatinine and normal blood count today with a normal hemoglobin and normal platelet count.  She has been found to have the chest x-ray something that looks like a lung mass in the right upper lobe.    Review of his peripheral blood examination shows normocytic normochromic red blood cells, no evidence of fragmentation red blood cells in circulation no increase in reticulocyte count normal white blood cell morphology and decreased platelets with no platelet clumps.  This peripheral blood examination per se he rules out the possibility of TTP.    Recommendations    I would proceed with anemia workup that will include a reticulocyte count reticulated hemoglobin FERRITIN TIBC 312 folic acid levels of serum protein electrophoresis as well EPO LEVEL.  Regarding the low platelets I will report to investigate these with an immature platelet fraction.    Such a severe  hypertension and is not uncommon to see low platelets due to consumption and peripheral destruction of them.  Again I find no evidence of TTP.      Thank you very much for allowing me to participate in the care of this patient.  I will follow him UP WITH YOU.    DIMAS PATTEN MD         Electronically signed by Dimas Patten MD at 7/3/2017 12:16 PM      Kar Morales MD at 7/4/2017  1:00 PM  Version 1 of 1         Patient Name: Harry Ortega Account #: [id number]    MRN: [id number]  Admission Date: 7/3/2017    History and Physical Exam  Service: Vascular Surgery Date of Evaluation: July 4, 2017        CHIEF COMPLAINT: Acute renal failure   HPI: Harry Ortega is a 56 y.o. male malignant hypertension who stopped taking his antihypertensives about a year ago.  He was admitted with hypertensive emergency and found to be in acute renal failure with thrombocytopenia.  We have been asked to evaluate him for dialysis access.  Currently he is alert, without complaints in the intensive care unit, tolerating a diet well.    PAST MEDICAL HISTORY:   Past Medical History:   Diagnosis Date   • Hypertension       PAST SURGICAL HISTORY:   Past Surgical History:   Procedure Laterality Date   • KNEE SURGERY Left       FAMILY HISTORY:   Family History   Problem Relation Age of Onset   • Hypertension Mother    • Hypertension Father       SOCIAL HISTORY:   Social History   Substance Use Topics   • Smoking status: Never Smoker   • Smokeless tobacco: None   • Alcohol use Yes      Comment: occassionally            ALLERGIES: Review of patient's allergies indicates no known allergies.     COMPLETE REVIEW OF SYSTEMS:     Review of Systems   Constitutional: Negative for activity change, chills, fatigue and fever.   HENT: Negative for ear pain, sneezing and sore throat.    Respiratory: Negative for chest tightness and shortness of breath.    Cardiovascular: Negative for chest pain and leg swelling.   Gastrointestinal: Negative for abdominal pain, blood in stool, diarrhea, nausea and vomiting.   Genitourinary: Negative for flank pain and hematuria.   Musculoskeletal: Negative for joint swelling and myalgias.   Skin: Negative for color change, pallor and wound.   Neurological: Negative for dizziness and seizures.   Hematological: Negative for adenopathy. Does not bruise/bleed easily.   Psychiatric/Behavioral: Negative for confusion and hallucinations.         PHYSICAL EXAM:   Patient Vitals for the past 24 hrs:   BP Temp  Temp src Pulse Resp SpO2 Height Weight   07/04/17 1250 152/97 - - 84 - 93 % - -   07/04/17 1235 (!) 166/112 - - 84 - 92 % - -   07/04/17 1215 (!) 163/106 - - 83 - 93 % - -   07/04/17 1205 (!) 163/108 - - 88 - 93 % - -   07/04/17 1150 (!) 164/107 - - 87 - 93 % - -   07/04/17 1135 (!) 160/105 - - 85 - 94 % - -   07/04/17 1122 - 97.9 °F (36.6 °C) - - - - - -   07/04/17 1120 (!) 163/111 - - 87 - 93 % - -   07/04/17 1105 (!) 158/127 - - 84 - 91 % - -   07/04/17 1050 159/100 - - 85 - 94 % - -   07/04/17 1035 (!) 163/107 - - 91 - 95 % - -   07/04/17 1021 166/100 - - 88 - 91 % - -   07/04/17 0932 (!) 153/103 - - 86 - - - -   07/04/17 0917 (!) 156/107 - - 86 - - - -   07/04/17 0903 (!) 160/104 - - 87 - - - -   07/04/17 0848 (!) 162/108 - - 91 - - - -   07/04/17 0832 (!) 146/103 - - 84 - 97 % - -   07/04/17 0818 159/100 - - 88 - 95 % - -   07/04/17 0803 149/97 - - 86 - 93 % - -   07/04/17 0748 (!) 151/101 - - 86 - 93 % - -   07/04/17 0735 - 98 °F (36.7 °C) - - - - - -   07/04/17 0718 (!) 152/104 - - 85 - 94 % - -   07/04/17 0703 152/99 - - 87 - 96 % - -   07/04/17 0647 (!) 156/106 - - 87 - 96 % - -   07/04/17 0631 (!) 157/104 - - 87 - 95 % - -   07/04/17 0618 152/99 - - 87 - 96 % - -   07/04/17 0603 149/99 - - 89 - 93 % - -   07/04/17 0548 138/95 - - 85 - 91 % - -   07/04/17 0533 141/95 - - 85 - 92 % - -   07/04/17 0518 150/98 - - 86 - 91 % - -   07/04/17 0501 139/91 - - 83 - 91 % - 150 lb 9.2 oz (68.3 kg)   07/04/17 0458 148/88 - - 84 21 94 % - -   07/04/17 0432 145/100 - - 86 - 94 % - -   07/04/17 0418 (!) 149/115 - - 86 - 96 % - -   07/04/17 0403 146/96 - - 85 - 95 % - -   07/04/17 0348 149/100 - - 95 - 94 % - -   07/04/17 0332 (!) 152/102 - - 90 - 94 % - -   07/04/17 0317 156/99 - - 87 - 95 % - -   07/04/17 0302 140/91 - - 86 - 94 % - -   07/04/17 0300 - 98.3 °F (36.8 °C) Oral - - - - -   07/04/17 0247 143/96 - - 87 - 95 % - -   07/04/17 0233 140/94 - - 86 - 95 % - -   07/04/17 0218 140/87 - - 85 - 95 % - -   07/04/17  "0203 152/96 - - 86 - 93 % - -   07/04/17 0147 129/79 - - 84 - 93 % - -   07/04/17 0133 133/82 - - 82 - 95 % - -   07/04/17 0117 129/81 - - 85 - 94 % - -   07/04/17 0101 140/90 - - 85 - 95 % - -   07/04/17 0048 147/95 - - 89 - 96 % - -   07/04/17 0032 136/88 - - 87 - 94 % - -   07/04/17 0018 139/85 - - 88 - 95 % - -   07/04/17 0003 142/90 - - 89 - 95 % - -   07/03/17 2348 136/88 - - 87 - 95 % - -   07/03/17 2332 134/85 - - 87 - 96 % - -   07/03/17 2317 147/94 - - 89 - 90 % - -   07/03/17 2302 149/96 - - 92 25 92 % - -   07/03/17 2300 - 98.4 °F (36.9 °C) Oral - - - - -   07/03/17 2248 171/92 - - 90 - 94 % - -   07/03/17 2233 139/98 - - 86 - 91 % - -   07/03/17 2218 140/92 - - 88 - 99 % - -   07/03/17 2147 149/95 - - 87 - 94 % - -   07/03/17 2131 152/95 - - 87 - 94 % - -   07/03/17 2117 149/98 - - 86 - 98 % - -   07/03/17 2103 (!) 145/102 - - 86 - 96 % - -   07/03/17 2048 (!) 154/104 - - 90 - 97 % - -   07/03/17 2033 147/98 - - 87 - 97 % - -   07/03/17 2018 143/95 - - 86 - 96 % - -   07/03/17 2003 143/90 - - 88 24 98 % - -   07/03/17 1948 155/93 - - 90 - 96 % - -   07/03/17 1932 152/98 - - 88 - 99 % - -   07/03/17 1917 162/97 - - 91 - 95 % - -   07/03/17 1903 151/95 - - 88 - 94 % - -   07/03/17 1900 - 97.5 °F (36.4 °C) Oral - - - - -   07/03/17 1533 155/93 - - 91 - 95 % - -   07/03/17 1519 151/90 - - 89 - 94 % - -   07/03/17 1503 153/91 - - 89 - 93 % - -   07/03/17 1447 163/92 - - 89 - 95 % - -   07/03/17 1432 166/97 - - 90 - 95 % - -   07/03/17 1431 - - - - - - 70\" (177.8 cm) 143 lb 1.3 oz (64.9 kg)   07/03/17 1417 (!) 171/103 - - 86 - 96 % - -   07/03/17 1402 (!) 180/118 - - 90 - 97 % - -   07/03/17 1347 (!) 170/112 - - 88 - 96 % - -   07/03/17 1342 (!) 170/101 98.2 °F (36.8 °C) Oral 89 - 96 % - -   07/03/17 1318 (!) 170/103 - - 88 - 96 % - -   07/03/17 1315 - - - - 19 - - -   07/03/17 1313 - - - 89 - 96 % - -   07/03/17 1308 (!) 164/102 - - - - - - -   07/03/17 1307 - - - 88 - 94 % - -        Physical Exam "   Constitutional: He is oriented to person, place, and time. He appears well-developed. No distress.   HENT:   Head: Normocephalic and atraumatic.   Right Ear: External ear normal.   Left Ear: External ear normal.   Eyes: Conjunctivae are normal. Pupils are equal, round, and reactive to light.   Neck: Normal range of motion. No JVD present. No tracheal deviation present.   Cardiovascular: Normal rate, regular rhythm, S1 normal and S2 normal.    Pulses:       Carotid pulses are 2+ on the right side, and 2+ on the left side.       Radial pulses are 2+ on the right side, and 2+ on the left side.        Femoral pulses are 2+ on the right side, and 2+ on the left side.       Popliteal pulses are 2+ on the right side, and 2+ on the left side.        Dorsalis pedis pulses are 2+ on the right side, and 2+ on the left side.        Posterior tibial pulses are 2+ on the right side, and 2+ on the left side.   Pulmonary/Chest: Effort normal. No respiratory distress. He exhibits no tenderness.   Abdominal: Soft. He exhibits no distension. There is no tenderness.   Musculoskeletal: Normal range of motion.   Neurological: He is alert and oriented to person, place, and time. No cranial nerve deficit.   Skin: Skin is warm and dry. He is not diaphoretic.   Psychiatric: He has a normal mood and affect. His behavior is normal.             LABS:      Recent Results (from the past 24 hour(s))   POC Glucose Fingerstick    Collection Time: 07/03/17  1:37 PM   Result Value Ref Range    Glucose 202 (H) 70 - 130 mg/dL   Potassium    Collection Time: 07/03/17  6:37 PM   Result Value Ref Range    Potassium 2.9 (L) 3.5 - 5.2 mmol/L   POC Glucose Fingerstick    Collection Time: 07/03/17  8:18 PM   Result Value Ref Range    Glucose 195 (H) 70 - 130 mg/dL   POC Glucose Fingerstick    Collection Time: 07/03/17 11:01 PM   Result Value Ref Range    Glucose 157 (H) 70 - 130 mg/dL   Uric Acid    Collection Time: 07/04/17  4:51 AM   Result Value Ref Range     Uric Acid 7.9 (H) 3.4 - 7.0 mg/dL   Renal Function Panel    Collection Time: 07/04/17  4:51 AM   Result Value Ref Range    Glucose 133 (H) 65 - 99 mg/dL    BUN 70 (H) 6 - 20 mg/dL    Creatinine 9.70 (H) 0.76 - 1.27 mg/dL    Sodium 132 (L) 136 - 145 mmol/L    Potassium 3.3 (L) 3.5 - 5.2 mmol/L    Chloride 94 (L) 98 - 107 mmol/L    CO2 18.4 (L) 22.0 - 29.0 mmol/L    Calcium 7.6 (L) 8.6 - 10.5 mg/dL    Albumin 2.90 (L) 3.50 - 5.20 g/dL    Phosphorus 4.6 (H) 2.5 - 4.5 mg/dL    Anion Gap 19.6 mmol/L    BUN/Creatinine Ratio 7.2 7.0 - 25.0    eGFR  African Amer 7 (L) >60 mL/min/1.73   Magnesium    Collection Time: 07/04/17  4:51 AM   Result Value Ref Range    Magnesium 2.6 1.6 - 2.6 mg/dL   CBC Auto Differential    Collection Time: 07/04/17  4:51 AM   Result Value Ref Range    WBC 5.63 4.50 - 10.70 10*3/mm3    RBC 2.41 (L) 4.60 - 6.00 10*6/mm3    Hemoglobin 8.2 (L) 13.7 - 17.6 g/dL    Hematocrit 23.1 (L) 40.4 - 52.2 %    MCV 95.9 79.8 - 96.2 fL    MCH 34.0 (H) 27.0 - 32.7 pg    MCHC 35.5 32.6 - 36.4 g/dL    RDW 14.9 (H) 11.5 - 14.5 %    RDW-SD 52.1 37.0 - 54.0 fl    MPV 13.0 (H) 6.0 - 12.0 fL    Platelets 98 (L) 140 - 500 10*3/mm3    Neutrophil % 73.7 42.7 - 76.0 %    Lymphocyte % 19.5 (L) 19.6 - 45.3 %    Monocyte % 4.8 (L) 5.0 - 12.0 %    Eosinophil % 1.6 0.3 - 6.2 %    Basophil % 0.0 0.0 - 1.5 %    Immature Grans % 0.4 0.0 - 0.5 %    Neutrophils, Absolute 4.15 1.90 - 8.10 10*3/mm3    Lymphocytes, Absolute 1.10 0.90 - 4.80 10*3/mm3    Monocytes, Absolute 0.27 0.20 - 1.20 10*3/mm3    Eosinophils, Absolute 0.09 0.00 - 0.70 10*3/mm3    Basophils, Absolute 0.00 0.00 - 0.20 10*3/mm3    Immature Grans, Absolute 0.02 0.00 - 0.03 10*3/mm3   Lactate Dehydrogenase    Collection Time: 07/04/17  4:51 AM   Result Value Ref Range     (H) 135 - 225 U/L   Procalcitonin    Collection Time: 07/04/17  9:04 AM   Result Value Ref Range    Procalcitonin 2.32 (C) 0.10 - 0.25 ng/mL   ]    The following radiologic or non-invasive  studies have been reviewed by me: Chest x-ray, CT chest       ASSESSMENT/PLAN: 56 y.o. male with significant hypertension and acute renal failure secondary to noncompliance with his medications.  He was also admitted with thrombocytopenia thought to be secondary to his other medical issues.  He has a right pulmonary nodule that has been evaluated by CT scan and this thought to be secondary to pneumonia.    We have been asked to evaluate him for dialysis access.  He has no history of dialysis access.  He does have IVs in the bilateral antecubitum's.:  To get a vein mapping duplex performed and we'll be able to discuss with him what his options are.  If he needs to dialyze more acutely a tunneled catheter will be placed.  We will schedule any/all these procedures at the prompting of the nephrology service.  Thank you for the consult on this very pleasant patient.      I discussed the plan with the patient and he is agreeable to the plan of care at this point.       Problem Points:  3:  Patient has a new problem, with no additional work-up planned (max of 1)  Total problem points:3    Data Points:  1:  I have reviewed or order clinical lab test  2:  I have personally and independently review of image, tracing, or specimen  2:  I have reviewed and summation of old records and/or discussed the patients care with another health care provider  Total data points:4 or more    Risk Points:  High:  Chronic illness with severe exacerbation or progression    MDM requires 2/3 (Problem points, Data points and Risk)  MDM Prob point Data point Risk   SF 1 1 Minimal   Low 2 2 Low   Mod 3 3 Moderate   High 4 4 High     Code requires 3/3 (MDM, History and Exam)  Code MDM History Exam Time   24344 SF/Low Detailed Detailed 30   80925 Mod Comprehensive Comprehensive 50   94814 High Comprehensive Comprehensive 70     Detailed history:  4 elements HPI or status of 3 chronic problems; 2-9 system ROS  Comprehensive:  4 elements HPI or status  of 3 chronic problems;  10 system ROS    Detailed Exam:  12 findings from any organ system  Comprehensive Exam:  2 findings from each of 9 systems.     Kar Morales MD   07/04/17        Electronically signed by Kar Morales MD at 7/4/2017  1:03 PM

## 2017-07-05 NOTE — PROGRESS NOTES
Name: Harry Ortega ADMIT: 7/3/2017   : 1960  PCP: No Known Provider    MRN: [id number] LOS: 2 days   AGE/SEX: 56 y.o. male  ROOM: 408/1     Active Hospital Problems (** Indicates Principal Problem)    Diagnosis Date Noted   • Hypertensive crisis [I16.9] 2017      Resolved Hospital Problems    Diagnosis Date Noted Date Resolved   No resolved problems to display.     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   HENT: Negative for ear pain, sneezing and sore throat.    Respiratory: Negative for chest tightness and shortness of breath.    Cardiovascular: Negative for chest pain and leg swelling.   Gastrointestinal: Negative for abdominal pain, blood in stool, diarrhea, nausea and vomiting.   Genitourinary: Negative for flank pain and hematuria.   Musculoskeletal: Negative for joint swelling and myalgias.   Skin: Negative for color change, pallor and wound.   Neurological: Negative for dizziness and seizures.   Hematological: Negative for adenopathy. Does not bruise/bleed easily.   Psychiatric/Behavioral: Negative for confusion and hallucinations.       Objective     Vital Signs  Temp:  [97.3 °F (36.3 °C)-98.4 °F (36.9 °C)] 98.4 °F (36.9 °C)  Heart Rate:  [92-96] 96  Resp:  [18-20] 20  BP: (155-185)/() 160/92    Physical Exam   Constitutional: He is oriented to person, place, and time. He appears well-developed. No distress.   HENT:   Head: Normocephalic and atraumatic.   Cardiovascular: Normal rate and regular rhythm.    Pulmonary/Chest: Effort normal. No respiratory distress.   Abdominal: Soft. There is no tenderness.   Musculoskeletal: Normal range of motion. He exhibits no tenderness.   Neurological: He is alert and oriented to person, place, and time.   Skin: Skin is warm and dry.   Psychiatric: He has a normal mood and affect. His behavior is normal.      No swelling bilateral arms, palpable radial pulses    Results Review:       I reviewed the patient's new clinical results.    Results from last 7 days  Lab Units 07/05/17  0400 07/04/17  0451 07/03/17  1203 07/03/17  0804   WBC 10*3/mm3 10.46 5.63  --  7.23   HEMOGLOBIN g/dL 9.7* 8.2*  --  10.3*   PLATELETS 10*3/mm3 165 98* 63* 70*     Results from last 7 days  Lab Units 07/05/17  0400 07/04/17  0451 07/03/17  1837 07/03/17  0804   SODIUM mmol/L 133* 132*  --  134*   POTASSIUM mmol/L 3.6 3.3* 2.9* 2.6*   CHLORIDE mmol/L 97* 94*  --  89*   CO2 mmol/L 14.0* 18.4*  --  25.1   BUN mg/dL 72* 70*  --  74*   CREATININE mg/dL 10.14* 9.70*  --  10.02*   GLUCOSE mg/dL 123* 133*  --  165*   Estimated Creatinine Clearance: 7.9 mL/min (by C-G formula based on Cr of 10.14).  Results from last 7 days  Lab Units 07/05/17  0400 07/04/17  0451 07/03/17  0804   CALCIUM mg/dL 7.9* 7.6* 9.1   ALBUMIN g/dL 3.30* 2.90* 4.00   MAGNESIUM mg/dL 2.6 2.6 3.0*   PHOSPHORUS mg/dL 4.1 4.6*  --        Assessment/Plan           Active Hospital Problems (** Indicates Principal Problem)    Diagnosis Date Noted   • Hypertensive crisis [I16.9] 07/03/2017      Resolved Hospital Problems    Diagnosis Date Noted Date Resolved   No resolved problems to display.        Assessment & Plan  56 y.o. male with probably ESRD.  Will plan tunneled catheter and arterio-venous fistula vs arterio-venous graft tomorrow.  His left basilic is marginal on vein mapping. He is right hand dominant.  I'll inspect the vein tomorrow and plan Artegraft if it isn't usable.   DC the left arm IVs        Kar Morales MD  07/05/17   4:45 PM  Office Number [phone]

## 2017-07-05 NOTE — PROGRESS NOTES
Subjective         History of Present Illness  ANEMIA, THROMBOCYTOPENIA, PNEUMONIA HYPERTENSIVE EMERGENCY, RENAL FAILURE    Past Medical History   Past Medical History:   Diagnosis Date   • Hypertension      Social History     Social History   • Marital status:      Spouse name: N/A   • Number of children: N/A   • Years of education: N/A     Occupational History   • Not on file.     Social History Main Topics   • Smoking status: Never Smoker   • Smokeless tobacco: Not on file   • Alcohol use Yes      Comment: occassionally   • Drug use: Defer   • Sexual activity: Defer     Other Topics Concern   • Not on file     Social History Narrative   • No narrative on file     Family History   Problem Relation Age of Onset   • Hypertension Mother    • Hypertension Father        Review of Systems   General: no fever, chills, fatigue, weight changes, or lack of appetite.  Eyes: no epiphora, xerophthalmia,conjunctivitis, pain, glaucoma, blurred vision, blindness, secretion, photophobia, proptosis, diplopia.  Ears: no otorrhea, tinnitus, otorrhagia, deafness, pain, vertigo.  Nose: no rhinorrhea, epistaxis, alteration in perception of odors, sinuses pressure.  Mouth: no alteration in gums or teeth,  ulcers, no difficulty with mastication or deglut ion, no odynophagia.  Neck: no masses or pain, no thyroid alterations, no pain in muscles or arteries, no carotid odynia, no crepitation.  Respiratory: no cough, sputum production, dyspnea, trepopnea, pleuritic pain, hemoptysis.  Heart: no syncope, irregularity, palpitations, angina, orthopnea, paroxysmal nocturnal dyspnea.  Vascular Venous: no tenderness,edema, palpable cords, postphlebitic syndrome, skin changes or ulcerations.  Vascular Arterial: no distal ischemia, claudication, gangrene, neuropathic ischemic pain, skin ulcers, paleness or cyanosis.  GI: no dysphagia, odynophagia, no regurgitation, no heartburn,no indigestion,PERSISITENT nausea,AND vomiting,no hematemesis ,no  melena,no jaundice,no distention, no obstipation,no enterorrhagia,no proctalgia,no anal  lesions, nochanges in bowel habits.  : no frequency, hesitancy, hematuria, discharge, pain.  Musculoskeletal: no muscle or tendon pain or inflammation, joint pain, edema, functional limitation, fasciculations, mass.  Neurologic: no headache, seizures, alterations on Craneal nerves, no motor or senssory deficit, normal coordination, no alteration in memory, orientation, calculation,writting, verbal or written language.  Skin: no rashes, pruritus or localized lesions.  Psychiatric: no anxiety, depression, agitation, delusions, proper insight.  A comprehensive 14 point review of systems was performed and was negative except as mentioned.    Medications:  The current medication list was reviewed in the EMR    ALLERGIES:  No Known Allergies    Objective      Vitals:    07/04/17 2213 07/04/17 2357 07/05/17 0346 07/05/17 0829   BP: (!) 166/103 (!) 179/105 164/96 160/92   BP Location:  Right arm Left arm Left arm   Patient Position:  Lying Lying Lying   Pulse:  95 94 96   Resp:  20 18 20   Temp:    98.4 °F (36.9 °C)   TempSrc:    Oral   SpO2:  93% 97%    Weight:       Height:         No flowsheet data found.    Physical Exam  UNCHANGED FROM YESTERDAY.  RECENT LABS:  Hematology WBC   Date Value Ref Range Status   07/05/2017 10.46 4.50 - 10.70 10*3/mm3 Final     RBC   Date Value Ref Range Status   07/05/2017 2.87 (L) 4.60 - 6.00 10*6/mm3 Final     Hemoglobin   Date Value Ref Range Status   07/05/2017 9.7 (L) 13.7 - 17.6 g/dL Final     Hematocrit   Date Value Ref Range Status   07/05/2017 27.3 (L) 40.4 - 52.2 % Final     Platelets   Date Value Ref Range Status   07/05/2017 165 140 - 500 10*3/mm3 Final        Assessment/Plan in summary this patient has anemia with a normal ferritin normal Lidoderm normal B12 normal folic acid level anything particularly thin level is pending at this time.  I do believe that this patient has chronic K  kidney disease and anemia corresponds to the process nevertheless an EPO level is pending at this time.  Also we have Serum protein electrophoresis is pending at this time to be sure that there is no monoclonal protein.  The patient has in the toP OF things pneumonia with bilateral pleural effusions and a he's been treated for the same.  His ultrasound of the kidneys shows  large cyst in the right kidney and otherwise sonographic changes that suggest chronic kidney disease.  Again in patientS to have severe hypertension thrombocytopenia is a phenomenon back on a ball associated with a high LDH and sometimes mimics a TTP.  Again I don't believe that this patient has TTP base of the analysis of his peripheral blood DONE YESTERDAY.  I find no need for blood products today..  We will follow him up .     Stable Hb, improved platelet count, ldh remains elevated. Creatinine up, spep still pending as well EPO , nothing to add for now until other labs reports available                  7/5/2017      CC:

## 2017-07-05 NOTE — PLAN OF CARE
Problem: Patient Care Overview (Adult)  Goal: Plan of Care Review  Outcome: Ongoing (interventions implemented as appropriate)    07/05/17 1500   Outcome Evaluation   Outcome Summary/Follow up Plan bp 160/92, antihypertensives administered, pt awaiting dialysis, will continue to monitor   Patient Care Overview   Progress improving   Coping/Psychosocial Response Interventions   Plan Of Care Reviewed With patient;spouse       Goal: Adult Individualization and Mutuality  Outcome: Ongoing (interventions implemented as appropriate)  Goal: Discharge Needs Assessment  Outcome: Ongoing (interventions implemented as appropriate)    Problem: Hypertensive Disease/Crisis (Arterial) (Adult)  Goal: Signs and Symptoms of Listed Potential Problems Will be Absent or Manageable (Hypertensive Disease/Crisis)  Outcome: Ongoing (interventions implemented as appropriate)    Problem: Fall Risk (Adult)  Goal: Absence of Falls  Outcome: Ongoing (interventions implemented as appropriate)

## 2017-07-05 NOTE — PROGRESS NOTES
"   LOS: 2 days    Patient Care Team:  No Known Provider as PCP - General    Chief Complaint:    Chief Complaint   Patient presents with   • Nausea     Patient states nauseated, vomited on Friday, dry mouth x 2 weeks   • Vomiting   • Constipation     last normal bowel movement last wednesday       Subjective follow-up advanced chronic kidney disease    Interval History:   The patient was transferred out of the ICU, denies any chest pain or shortness of air, his blood pressure is improving.  Denies any nausea or vomiting.  The ambulating without any difficulties, no dysuria or gross hematuria.    Review of Systems:   As noted above    Objective     Vital Signs  Temp:  [97.3 °F (36.3 °C)-98 °F (36.7 °C)] 97.3 °F (36.3 °C)  Heart Rate:  [82-95] 94  Resp:  [18-20] 18  BP: (140-185)/() 164/96    Flowsheet Rows         First Filed Value    Admission Height  70\" (177.8 cm) Documented at 07/03/2017 0734    Admission Weight  150 lb (68 kg) Documented at 07/03/2017 0734             I/O last 3 completed shifts:  In: 2134 [P.O.:480; I.V.:1654]  Out: 900 [Urine:900]    Intake/Output Summary (Last 24 hours) at 07/05/17 0711  Last data filed at 07/05/17 0300   Gross per 24 hour   Intake                0 ml   Output              650 ml   Net             -650 ml       Physical Exam:  General Appearance: alert, oriented x 3, no acute distress,   Skin: warm and dry  HEENT: Oral mucosa is normal, he has poor dentition   Neck: supple, no JVD, trachea midline  Lungs: Scattered rhonchi, unlabored breathing effort  Heart: RRR, normal S1 and S2, no S3, positive S4, no rub  Abdomen: soft, non-tender, no palpable bladder, present bowel sounds to auscultation  Extremities: no edema, cyanosis or clubbing  Neuro: normal speech and mental status, no asterixis      Results Review:      Results from last 7 days  Lab Units 07/05/17  0400 07/04/17  0451 07/03/17  1837 07/03/17  0804   SODIUM mmol/L 133* 132*  --  134*   POTASSIUM mmol/L 3.6 3.3* " 2.9* 2.6*   CHLORIDE mmol/L 97* 94*  --  89*   CO2 mmol/L 14.0* 18.4*  --  25.1   BUN mg/dL 72* 70*  --  74*   CREATININE mg/dL 10.14* 9.70*  --  10.02*   CALCIUM mg/dL 7.9* 7.6*  --  9.1   BILIRUBIN mg/dL  --   --   --  1.9*   ALK PHOS U/L  --   --   --  58   ALT (SGPT) U/L  --   --   --  23   AST (SGOT) U/L  --   --   --  41*   GLUCOSE mg/dL 123* 133*  --  165*       Estimated Creatinine Clearance: 7.9 mL/min (by C-G formula based on Cr of 10.14).      Results from last 7 days  Lab Units 07/05/17  0400 07/04/17  0451 07/03/17  0804   MAGNESIUM mg/dL 2.6 2.6 3.0*   PHOSPHORUS mg/dL 4.1 4.6*  --          Results from last 7 days  Lab Units 07/05/17  0400 07/04/17  0451   URIC ACID mg/dL 8.2* 7.9*         Results from last 7 days  Lab Units 07/05/17  0400 07/04/17  0451 07/03/17  1203 07/03/17  0804   WBC 10*3/mm3 10.46 5.63  --  7.23   HEMOGLOBIN g/dL 9.7* 8.2*  --  10.3*   PLATELETS 10*3/mm3 165 98* 63* 70*         Results from last 7 days  Lab Units 07/03/17  0804   INR  1.22*         Imaging Results (last 24 hours)     ** No results found for the last 24 hours. **          azithromycin 500 mg Oral Q24H   ceftriaxone 1 g Intravenous Q24H   heparin (porcine) 5,000 Units Subcutaneous Q8H   metoprolol succinate  mg Oral Q24H   minoxidil 5 mg Oral Q12H       niCARdipine 5-15 mg/hr Last Rate: Stopped (07/04/17 1355)   sodium chloride 125 mL/hr Last Rate: 125 mL/hr (07/05/17 0200)       Medication Review:   Current Facility-Administered Medications   Medication Dose Route Frequency Provider Last Rate Last Dose   • azithromycin (ZITHROMAX) tablet 500 mg  500 mg Oral Q24H Stephen MD Davonte   500 mg at 07/04/17 1024   • cefTRIAXone (ROCEPHIN) IVPB 1 g  1 g Intravenous Q24H Stephen Arauz MD   1 g at 07/04/17 1024   • heparin (porcine) 5000 UNIT/ML injection 5,000 Units  5,000 Units Subcutaneous Q8H Stephen MD Davonte   Stopped at 07/05/17 0640   • hydrALAZINE (APRESOLINE) injection 20 mg  20 mg Intravenous Q8H PRN Orquidea STUART  MD Vikram   20 mg at 07/04/17 2213   • labetalol (NORMODYNE,TRANDATE) injection 20 mg  20 mg Intravenous Q6H PRN Orquidea Sue MD       • metoprolol succinate XL (TOPROL-XL) 24 hr tablet 100 mg  100 mg Oral Q24H David Dejesus MD   100 mg at 07/04/17 1219   • minoxidil (LONITEN) tablet 5 mg  5 mg Oral Q12H David Dejesus MD   5 mg at 07/04/17 2026   • niCARdipine (CARDENE) 25 mg/250 mL (0.1 mg/mL) 0.9% NS VTB infusion  5-15 mg/hr Intravenous Titrated Marbella Cannon MD   Stopped at 07/04/17 1355   • sodium chloride 0.9 % flush 10 mL  10 mL Intravenous PRN Marbella Cannon MD       • sodium chloride 0.9 % flush 10 mL  10 mL Intravenous PRN Marbella Cannon MD       • sodium chloride 0.9 % infusion  125 mL/hr Intravenous Continuous Marbella Cannon  mL/hr at 07/05/17 0200 125 mL/hr at 07/05/17 0200   • temazepam (RESTORIL) capsule 15 mg  15 mg Oral Nightly PRN Orquidea Sue MD   15 mg at 07/04/17 2118       Assessment/Plan   1.  Severe renal failure could be the end stage renal disease related to severe uncontrolled hypertension, today there is no ene uremic symptoms.  His creatinine is 10.14.    2.  Hypertensive the crisis, with end organ damage acute malignant hypertension.  Blood pressure has improved.  Regarding the new medication without any difficulties  3.  History of hypertension with the noncompliance with medication and has not taken any medication for over a year.  4.  Degenerative joint disease with prior left total knee replacement  5.  Anemia could be associated with chronic kidney disease   6.  Thrombocytopenia, resolved, platelet 156,000  7.  Hyponatremia sodium 133 stable  8.  Hypokalemia, better        Plan:  1.  Continue the same antihypertensive agents  2.  Will start of dialysis today.  He should be getting a tunneled dialysis catheter and then arrangement for AV fistula  3.  Surveillance labs    I discussed the plan with the patient and the nursing staff          David Dejesus  MD  07/05/17  7:11 AM

## 2017-07-05 NOTE — PROGRESS NOTES
Discharge Planning Assessment  [facility]     Patient Name: Harry Ortega  MRN: [id number]  Today's Date: 7/5/2017    Admit Date: 7/3/2017          Discharge Needs Assessment       07/05/17 1328    Living Environment    Lives With spouse    Living Arrangements house   one story    Home Accessibility no concerns    Stair Railings at Home none    Type of Financial/Environmental Concern none    Transportation Available car;family or friend will provide    Living Environment    Provides Primary Care For no one    Quality Of Family Relationships supportive    Able to Return to Prior Living Arrangements yes    Discharge Needs Assessment    Concerns To Be Addressed no discharge needs identified;denies needs/concerns at this time    Readmission Within The Last 30 Days no previous admission in last 30 days    Anticipated Changes Related to Illness none    Equipment Currently Used at Home none    Equipment Needed After Discharge none    Discharge Disposition home or self-care            Discharge Plan       07/05/17 1330    Case Management/Social Work Plan    Plan Home with wife and family support - will likely need outpatient dialysis set up    Patient/Family In Agreement With Plan yes    Additional Comments Introduced self and explained role of CCP and facesheet verified with patient, who is alert and oriented x 4, and wife, Raiza, with patient's permission, at bedside.  Patient states he lives with his wife and children in a one story house and is independent with all ADLs and uses no HH or DME and denies need for home health or DME at discharge.  Patient states he uses [facility] on [facility] and [place], [place] and denies any issues with affording or obtaining medications.  Patient denies having a PCP and is agreeable for CCP set up a PCP appoinmtnet with [facility]- appoinment set up with ALONZO Valente on July 17,2017 at 8am.  Patient states he will likely need outpatient dialysis and informed  that CCP will assist with setting this up when MD informs to set it up and patient and wife state that his family will provide transportation to dialysis and discharge.  Patient states he plans to return home family support at discharge.  CCP will continue to follow and assist as needed.....Anjelica CarrilloRN,CCP         Discharge Placement     No information found                Demographic Summary       07/05/17 1325    Referral Information    Admission Type inpatient    Arrived From home or self-care    Contact Information    Permission Granted to Share Information With family/designee   Raiza Ortega(wife) [phone]    Primary Care Physician Information    Name Has not current PCP.   CCP making appointment with [facility] through appointment liaison.....Anjelica Carrillo RN,CCP             Functional Status       07/05/17 1326    Functional Status Current    Ambulation 0-->independent    Transferring 0-->independent    Toileting 0-->independent    Bathing 0-->independent    Dressing 0-->independent    Eating 0-->independent    Communication 0-->understands/communicates without difficulty    Change in Functional Status Since Onset of Current Illness/Injury no    Functional Status Prior    Ambulation 0-->independent    Transferring 0-->independent    Toileting 0-->independent    Bathing 0-->independent    Dressing 0-->independent    Eating 0-->independent    Communication 0-->understands/communicates without difficulty    IADL    Medications independent    Meal Preparation independent    Housekeeping independent    Laundry independent    Shopping independent    Oral Care independent    Activity Tolerance    Current Activity Limitations none    Usual Activity Tolerance good    Current Activity Tolerance good    Cognitive/Perceptual/Developmental    Current Mental Status/Cognitive Functioning no deficits noted    Recent Changes in Mental Status/Cognitive Functioning no changes    Developmental Stage (Eriksson's Stages of  Development) Stage 7 (35-65 years/Middle Adulthood) Generativity vs. Stagnation            Psychosocial     None            Abuse/Neglect     None            Legal     None            Substance Abuse     None            Patient Forms     None          Anjelica Carrillo RN

## 2017-07-06 ENCOUNTER — ANESTHESIA EVENT (OUTPATIENT)
Dept: PERIOP | Facility: HOSPITAL | Age: 57
End: 2017-07-06

## 2017-07-06 ENCOUNTER — ANESTHESIA (OUTPATIENT)
Dept: PERIOP | Facility: HOSPITAL | Age: 57
End: 2017-07-06

## 2017-07-06 ENCOUNTER — APPOINTMENT (OUTPATIENT)
Dept: GENERAL RADIOLOGY | Facility: HOSPITAL | Age: 57
End: 2017-07-06

## 2017-07-06 LAB
ALBUMIN SERPL-MCNC: 3.1 G/DL (ref 3.5–5.2)
ANION GAP SERPL CALCULATED.3IONS-SCNC: 21.8 MMOL/L
ANION GAP SERPL CALCULATED.3IONS-SCNC: 21.8 MMOL/L
BACTERIA SPEC RESP CULT: NORMAL
BASOPHILS # BLD AUTO: 0.01 10*3/MM3 (ref 0–0.2)
BASOPHILS NFR BLD AUTO: 0.1 % (ref 0–1.5)
BUN BLD-MCNC: 74 MG/DL (ref 6–20)
BUN BLD-MCNC: 74 MG/DL (ref 6–20)
BUN/CREAT SERPL: 7.1 (ref 7–25)
BUN/CREAT SERPL: 7.1 (ref 7–25)
CALCIUM SPEC-SCNC: 7.5 MG/DL (ref 8.6–10.5)
CALCIUM SPEC-SCNC: 7.5 MG/DL (ref 8.6–10.5)
CHLORIDE SERPL-SCNC: 97 MMOL/L (ref 98–107)
CHLORIDE SERPL-SCNC: 97 MMOL/L (ref 98–107)
CO2 SERPL-SCNC: 13.2 MMOL/L (ref 22–29)
CO2 SERPL-SCNC: 13.2 MMOL/L (ref 22–29)
CREAT BLD-MCNC: 10.42 MG/DL (ref 0.76–1.27)
CREAT BLD-MCNC: 10.42 MG/DL (ref 0.76–1.27)
DEPRECATED RDW RBC AUTO: 56.2 FL (ref 37–54)
EOSINOPHIL # BLD AUTO: 0.03 10*3/MM3 (ref 0–0.7)
EOSINOPHIL NFR BLD AUTO: 0.4 % (ref 0.3–6.2)
ERYTHROCYTE [DISTWIDTH] IN BLOOD BY AUTOMATED COUNT: 15.6 % (ref 11.5–14.5)
GFR SERPL CREATININE-BSD FRML MDRD: 6 ML/MIN/1.73
GFR SERPL CREATININE-BSD FRML MDRD: 6 ML/MIN/1.73
GLUCOSE BLD-MCNC: 128 MG/DL (ref 65–99)
GLUCOSE BLD-MCNC: 128 MG/DL (ref 65–99)
GRAM STN SPEC: NORMAL
HCT VFR BLD AUTO: 24.4 % (ref 40.4–52.2)
HGB BLD-MCNC: 8.5 G/DL (ref 13.7–17.6)
IMM GRANULOCYTES # BLD: 0.05 10*3/MM3 (ref 0–0.03)
IMM GRANULOCYTES NFR BLD: 0.7 % (ref 0–0.5)
LDH SERPL-CCNC: 659 U/L (ref 135–225)
LYMPHOCYTES # BLD AUTO: 0.76 10*3/MM3 (ref 0.9–4.8)
LYMPHOCYTES NFR BLD AUTO: 10.1 % (ref 19.6–45.3)
MAGNESIUM SERPL-MCNC: 2.7 MG/DL (ref 1.6–2.6)
MCH RBC QN AUTO: 34.1 PG (ref 27–32.7)
MCHC RBC AUTO-ENTMCNC: 34.8 G/DL (ref 32.6–36.4)
MCV RBC AUTO: 98 FL (ref 79.8–96.2)
MONOCYTES # BLD AUTO: 0.45 10*3/MM3 (ref 0.2–1.2)
MONOCYTES NFR BLD AUTO: 6 % (ref 5–12)
NEUTROPHILS # BLD AUTO: 6.19 10*3/MM3 (ref 1.9–8.1)
NEUTROPHILS NFR BLD AUTO: 82.7 % (ref 42.7–76)
PHOSPHATE SERPL-MCNC: 4.7 MG/DL (ref 2.5–4.5)
PLATELET # BLD AUTO: 154 10*3/MM3 (ref 140–500)
PMV BLD AUTO: 11.9 FL (ref 6–12)
POTASSIUM BLD-SCNC: 3.4 MMOL/L (ref 3.5–5.2)
POTASSIUM BLD-SCNC: 3.4 MMOL/L (ref 3.5–5.2)
RBC # BLD AUTO: 2.49 10*6/MM3 (ref 4.6–6)
SODIUM BLD-SCNC: 132 MMOL/L (ref 136–145)
SODIUM BLD-SCNC: 132 MMOL/L (ref 136–145)
URATE SERPL-MCNC: 8.5 MG/DL (ref 3.4–7)
WBC NRBC COR # BLD: 7.49 10*3/MM3 (ref 4.5–10.7)

## 2017-07-06 PROCEDURE — 25010000002 HEPARIN (PORCINE) PER 1000 UNITS: Performed by: SURGERY

## 2017-07-06 PROCEDURE — 5A1D60Z PERFORMANCE OF URINARY FILTRATION, MULTIPLE: ICD-10-PCS | Performed by: SURGERY

## 2017-07-06 PROCEDURE — 83615 LACTATE (LD) (LDH) ENZYME: CPT | Performed by: INTERNAL MEDICINE

## 2017-07-06 PROCEDURE — 25010000002 PHENYLEPHRINE PER 1 ML: Performed by: NURSE ANESTHETIST, CERTIFIED REGISTERED

## 2017-07-06 PROCEDURE — 80048 BASIC METABOLIC PNL TOTAL CA: CPT | Performed by: SURGERY

## 2017-07-06 PROCEDURE — 25010000002 HEPARIN (PORCINE) PER 1000 UNITS: Performed by: NURSE ANESTHETIST, CERTIFIED REGISTERED

## 2017-07-06 PROCEDURE — 25010000002 FENTANYL CITRATE (PF) 100 MCG/2ML SOLUTION: Performed by: NURSE ANESTHETIST, CERTIFIED REGISTERED

## 2017-07-06 PROCEDURE — 05HM33Z INSERTION OF INFUSION DEVICE INTO RIGHT INTERNAL JUGULAR VEIN, PERCUTANEOUS APPROACH: ICD-10-PCS | Performed by: SURGERY

## 2017-07-06 PROCEDURE — 25010000002 FENTANYL CITRATE (PF) 100 MCG/2ML SOLUTION: Performed by: ANESTHESIOLOGY

## 2017-07-06 PROCEDURE — C1750 CATH, HEMODIALYSIS,LONG-TERM: HCPCS | Performed by: SURGERY

## 2017-07-06 PROCEDURE — 03180ZD BYPASS LEFT BRACHIAL ARTERY TO UPPER ARM VEIN, OPEN APPROACH: ICD-10-PCS | Performed by: SURGERY

## 2017-07-06 PROCEDURE — 77001 FLUOROGUIDE FOR VEIN DEVICE: CPT

## 2017-07-06 PROCEDURE — 83735 ASSAY OF MAGNESIUM: CPT | Performed by: INTERNAL MEDICINE

## 2017-07-06 PROCEDURE — B543ZZA ULTRASONOGRAPHY OF RIGHT JUGULAR VEINS, GUIDANCE: ICD-10-PCS | Performed by: SURGERY

## 2017-07-06 PROCEDURE — 25010000002 ONDANSETRON PER 1 MG: Performed by: ANESTHESIOLOGY

## 2017-07-06 PROCEDURE — 25010000002 MIDAZOLAM PER 1 MG: Performed by: ANESTHESIOLOGY

## 2017-07-06 PROCEDURE — 36415 COLL VENOUS BLD VENIPUNCTURE: CPT | Performed by: SURGERY

## 2017-07-06 PROCEDURE — 80069 RENAL FUNCTION PANEL: CPT | Performed by: INTERNAL MEDICINE

## 2017-07-06 PROCEDURE — 84550 ASSAY OF BLOOD/URIC ACID: CPT | Performed by: INTERNAL MEDICINE

## 2017-07-06 PROCEDURE — 85025 COMPLETE CBC W/AUTO DIFF WBC: CPT | Performed by: INTERNAL MEDICINE

## 2017-07-06 PROCEDURE — 25010000002 PROPOFOL 10 MG/ML EMULSION: Performed by: ANESTHESIOLOGY

## 2017-07-06 PROCEDURE — 25010000003 CEFTRIAXONE PER 250 MG: Performed by: INTERNAL MEDICINE

## 2017-07-06 PROCEDURE — 25010000002 HEPARIN (PORCINE) PER 1000 UNITS: Performed by: INTERNAL MEDICINE

## 2017-07-06 PROCEDURE — 25010000003 CEFAZOLIN IN DEXTROSE 2-4 GM/100ML-% SOLUTION: Performed by: SURGERY

## 2017-07-06 RX ORDER — HEPARIN SODIUM 1000 [USP'U]/ML
INJECTION, SOLUTION INTRAVENOUS; SUBCUTANEOUS AS NEEDED
Status: DISCONTINUED | OUTPATIENT
Start: 2017-07-06 | End: 2017-07-06 | Stop reason: SURG

## 2017-07-06 RX ORDER — ONDANSETRON 2 MG/ML
4 INJECTION INTRAMUSCULAR; INTRAVENOUS ONCE
Status: COMPLETED | OUTPATIENT
Start: 2017-07-06 | End: 2017-07-06

## 2017-07-06 RX ORDER — MIDAZOLAM HYDROCHLORIDE 1 MG/ML
2 INJECTION INTRAMUSCULAR; INTRAVENOUS
Status: DISCONTINUED | OUTPATIENT
Start: 2017-07-06 | End: 2017-07-06 | Stop reason: HOSPADM

## 2017-07-06 RX ORDER — HYDRALAZINE HYDROCHLORIDE 20 MG/ML
5 INJECTION INTRAMUSCULAR; INTRAVENOUS
Status: DISCONTINUED | OUTPATIENT
Start: 2017-07-06 | End: 2017-07-06 | Stop reason: HOSPADM

## 2017-07-06 RX ORDER — MAGNESIUM HYDROXIDE 1200 MG/15ML
LIQUID ORAL AS NEEDED
Status: DISCONTINUED | OUTPATIENT
Start: 2017-07-06 | End: 2017-07-06 | Stop reason: HOSPADM

## 2017-07-06 RX ORDER — FENTANYL CITRATE 50 UG/ML
50 INJECTION, SOLUTION INTRAMUSCULAR; INTRAVENOUS
Status: DISCONTINUED | OUTPATIENT
Start: 2017-07-06 | End: 2017-07-06 | Stop reason: HOSPADM

## 2017-07-06 RX ORDER — MIDAZOLAM HYDROCHLORIDE 1 MG/ML
1 INJECTION INTRAMUSCULAR; INTRAVENOUS
Status: DISCONTINUED | OUTPATIENT
Start: 2017-07-06 | End: 2017-07-06 | Stop reason: HOSPADM

## 2017-07-06 RX ORDER — LABETALOL HYDROCHLORIDE 5 MG/ML
5 INJECTION, SOLUTION INTRAVENOUS
Status: DISCONTINUED | OUTPATIENT
Start: 2017-07-06 | End: 2017-07-06 | Stop reason: HOSPADM

## 2017-07-06 RX ORDER — SODIUM CHLORIDE 0.9 % (FLUSH) 0.9 %
1-10 SYRINGE (ML) INJECTION AS NEEDED
Status: DISCONTINUED | OUTPATIENT
Start: 2017-07-06 | End: 2017-07-06 | Stop reason: HOSPADM

## 2017-07-06 RX ORDER — ONDANSETRON 2 MG/ML
4 INJECTION INTRAMUSCULAR; INTRAVENOUS ONCE AS NEEDED
Status: DISCONTINUED | OUTPATIENT
Start: 2017-07-06 | End: 2017-07-06 | Stop reason: HOSPADM

## 2017-07-06 RX ORDER — LABETALOL HYDROCHLORIDE 5 MG/ML
INJECTION, SOLUTION INTRAVENOUS AS NEEDED
Status: DISCONTINUED | OUTPATIENT
Start: 2017-07-06 | End: 2017-07-06 | Stop reason: SURG

## 2017-07-06 RX ORDER — DIPHENHYDRAMINE HYDROCHLORIDE 50 MG/ML
12.5 INJECTION INTRAMUSCULAR; INTRAVENOUS
Status: DISCONTINUED | OUTPATIENT
Start: 2017-07-06 | End: 2017-07-06 | Stop reason: HOSPADM

## 2017-07-06 RX ORDER — HEPARIN SODIUM 1000 [USP'U]/ML
INJECTION, SOLUTION INTRAVENOUS; SUBCUTANEOUS AS NEEDED
Status: DISCONTINUED | OUTPATIENT
Start: 2017-07-06 | End: 2017-07-06 | Stop reason: HOSPADM

## 2017-07-06 RX ORDER — NALOXONE HCL 0.4 MG/ML
0.2 VIAL (ML) INJECTION AS NEEDED
Status: DISCONTINUED | OUTPATIENT
Start: 2017-07-06 | End: 2017-07-06 | Stop reason: HOSPADM

## 2017-07-06 RX ORDER — CEFAZOLIN SODIUM 2 G/100ML
2 INJECTION, SOLUTION INTRAVENOUS ONCE
Status: COMPLETED | OUTPATIENT
Start: 2017-07-06 | End: 2017-07-06

## 2017-07-06 RX ORDER — SODIUM CHLORIDE, SODIUM LACTATE, POTASSIUM CHLORIDE, CALCIUM CHLORIDE 600; 310; 30; 20 MG/100ML; MG/100ML; MG/100ML; MG/100ML
9 INJECTION, SOLUTION INTRAVENOUS CONTINUOUS
Status: DISCONTINUED | OUTPATIENT
Start: 2017-07-06 | End: 2017-07-06

## 2017-07-06 RX ORDER — HYDROCODONE BITARTRATE AND ACETAMINOPHEN 5; 325 MG/1; MG/1
1 TABLET ORAL EVERY 4 HOURS PRN
Status: DISCONTINUED | OUTPATIENT
Start: 2017-07-06 | End: 2017-07-10 | Stop reason: HOSPADM

## 2017-07-06 RX ORDER — FENTANYL CITRATE 50 UG/ML
INJECTION, SOLUTION INTRAMUSCULAR; INTRAVENOUS AS NEEDED
Status: DISCONTINUED | OUTPATIENT
Start: 2017-07-06 | End: 2017-07-06 | Stop reason: SURG

## 2017-07-06 RX ORDER — FLUMAZENIL 0.1 MG/ML
0.2 INJECTION INTRAVENOUS AS NEEDED
Status: DISCONTINUED | OUTPATIENT
Start: 2017-07-06 | End: 2017-07-06 | Stop reason: HOSPADM

## 2017-07-06 RX ORDER — EPHEDRINE SULFATE 50 MG/ML
5 INJECTION, SOLUTION INTRAVENOUS ONCE AS NEEDED
Status: DISCONTINUED | OUTPATIENT
Start: 2017-07-06 | End: 2017-07-06 | Stop reason: HOSPADM

## 2017-07-06 RX ORDER — PROPOFOL 10 MG/ML
VIAL (ML) INTRAVENOUS CONTINUOUS PRN
Status: DISCONTINUED | OUTPATIENT
Start: 2017-07-06 | End: 2017-07-06 | Stop reason: SURG

## 2017-07-06 RX ORDER — SODIUM CHLORIDE 9 MG/ML
9 INJECTION, SOLUTION INTRAVENOUS CONTINUOUS
Status: DISCONTINUED | OUTPATIENT
Start: 2017-07-06 | End: 2017-07-10 | Stop reason: HOSPADM

## 2017-07-06 RX ORDER — GLYCOPYRROLATE 0.2 MG/ML
INJECTION INTRAMUSCULAR; INTRAVENOUS AS NEEDED
Status: DISCONTINUED | OUTPATIENT
Start: 2017-07-06 | End: 2017-07-06 | Stop reason: SURG

## 2017-07-06 RX ORDER — FAMOTIDINE 10 MG/ML
20 INJECTION, SOLUTION INTRAVENOUS ONCE
Status: COMPLETED | OUTPATIENT
Start: 2017-07-06 | End: 2017-07-06

## 2017-07-06 RX ORDER — MIDAZOLAM HYDROCHLORIDE 1 MG/ML
INJECTION INTRAMUSCULAR; INTRAVENOUS AS NEEDED
Status: DISCONTINUED | OUTPATIENT
Start: 2017-07-06 | End: 2017-07-06 | Stop reason: SURG

## 2017-07-06 RX ORDER — PROPOFOL 10 MG/ML
VIAL (ML) INTRAVENOUS AS NEEDED
Status: DISCONTINUED | OUTPATIENT
Start: 2017-07-06 | End: 2017-07-06 | Stop reason: SURG

## 2017-07-06 RX ADMIN — AZITHROMYCIN 500 MG: 250 TABLET, FILM COATED ORAL at 08:11

## 2017-07-06 RX ADMIN — PROPOFOL 150 MG: 10 INJECTION, EMULSION INTRAVENOUS at 12:08

## 2017-07-06 RX ADMIN — FENTANYL CITRATE 50 MCG: 50 INJECTION INTRAMUSCULAR; INTRAVENOUS at 09:57

## 2017-07-06 RX ADMIN — EPHEDRINE SULFATE 10 MG: 50 INJECTION INTRAMUSCULAR; INTRAVENOUS; SUBCUTANEOUS at 12:45

## 2017-07-06 RX ADMIN — FAMOTIDINE 20 MG: 10 INJECTION INTRAVENOUS at 09:57

## 2017-07-06 RX ADMIN — ONDANSETRON 4 MG: 2 INJECTION INTRAMUSCULAR; INTRAVENOUS at 10:35

## 2017-07-06 RX ADMIN — PHENYLEPHRINE HYDROCHLORIDE 100 MCG: 10 INJECTION INTRAVENOUS at 12:54

## 2017-07-06 RX ADMIN — MIDAZOLAM 2 MG: 1 INJECTION INTRAMUSCULAR; INTRAVENOUS at 09:57

## 2017-07-06 RX ADMIN — PROPOFOL 50 MCG/KG/MIN: 10 INJECTION, EMULSION INTRAVENOUS at 11:39

## 2017-07-06 RX ADMIN — METOPROLOL SUCCINATE 100 MG: 100 TABLET, FILM COATED, EXTENDED RELEASE ORAL at 08:11

## 2017-07-06 RX ADMIN — CEFTRIAXONE SODIUM 1 G: 1 INJECTION, SOLUTION INTRAVENOUS at 09:45

## 2017-07-06 RX ADMIN — MIDAZOLAM 2 MG: 1 INJECTION INTRAMUSCULAR; INTRAVENOUS at 10:44

## 2017-07-06 RX ADMIN — FAMOTIDINE 20 MG: 20 TABLET, FILM COATED ORAL at 20:24

## 2017-07-06 RX ADMIN — GLYCOPYRROLATE 0.2 MCG: 0.2 INJECTION INTRAMUSCULAR; INTRAVENOUS at 12:41

## 2017-07-06 RX ADMIN — HEPARIN SODIUM 5000 UNITS: 5000 INJECTION, SOLUTION INTRAVENOUS; SUBCUTANEOUS at 22:42

## 2017-07-06 RX ADMIN — HEPARIN SODIUM 5000 UNITS: 1000 INJECTION, SOLUTION INTRAVENOUS; SUBCUTANEOUS at 12:25

## 2017-07-06 RX ADMIN — FENTANYL CITRATE 50 MCG: 50 INJECTION INTRAMUSCULAR; INTRAVENOUS at 11:55

## 2017-07-06 RX ADMIN — FAMOTIDINE 20 MG: 20 TABLET, FILM COATED ORAL at 08:11

## 2017-07-06 RX ADMIN — FENTANYL CITRATE 50 MCG: 50 INJECTION INTRAMUSCULAR; INTRAVENOUS at 11:56

## 2017-07-06 RX ADMIN — HYDROCODONE BITARTRATE AND ACETAMINOPHEN 1 TABLET: 5; 325 TABLET ORAL at 22:44

## 2017-07-06 RX ADMIN — CEFAZOLIN SODIUM 2 G: 2 INJECTION, SOLUTION INTRAVENOUS at 11:28

## 2017-07-06 RX ADMIN — MINOXIDIL 5 MG: 2.5 TABLET ORAL at 08:11

## 2017-07-06 RX ADMIN — MIDAZOLAM HYDROCHLORIDE 1 MG: 1 INJECTION, SOLUTION INTRAMUSCULAR; INTRAVENOUS at 11:34

## 2017-07-06 RX ADMIN — EPHEDRINE SULFATE 10 MG: 50 INJECTION INTRAMUSCULAR; INTRAVENOUS; SUBCUTANEOUS at 12:38

## 2017-07-06 RX ADMIN — PROPOFOL 40 MG: 10 INJECTION, EMULSION INTRAVENOUS at 11:39

## 2017-07-06 RX ADMIN — MINOXIDIL 5 MG: 2.5 TABLET ORAL at 20:24

## 2017-07-06 RX ADMIN — LABETALOL HYDROCHLORIDE 10 MG: 5 INJECTION, SOLUTION INTRAVENOUS at 11:57

## 2017-07-06 RX ADMIN — MIDAZOLAM HYDROCHLORIDE 1 MG: 1 INJECTION, SOLUTION INTRAMUSCULAR; INTRAVENOUS at 11:50

## 2017-07-06 RX ADMIN — SODIUM CHLORIDE: 9 INJECTION, SOLUTION INTRAVENOUS at 11:25

## 2017-07-06 NOTE — PROGRESS NOTES
I discussed with the patient that his vein mapping is only marginal for a left basilic vein and there is a good chance he will need a prosthetic graft.  He understands and is willing to do this if necessary.

## 2017-07-06 NOTE — PROGRESS NOTES
[facility]  Phone: [phone]  Stephen Arauz MD    Subjective:  LOS: 3    No further congestion or cough. S/p fistula and tunneled catheter.    Objective   Vital Signs past 24hrs  BP range: BP: (104-193)/() 157/86  Pulse range: Heart Rate:  [] 93  Resp rate range: Resp:  [16-20] 16  Temp range: Temp (24hrs), Av.1 °F (36.7 °C), Min:97.6 °F (36.4 °C), Max:99.2 °F (37.3 °C)    O2 Device: nasal cannulaFlow (L/min):  [1-6] 4  Oxygen range:SpO2:  [90 %-99 %] 96 %   169 lb 8.5 oz (76.9 kg); Body mass index is 24.33 kg/(m^2).    Intake/Output Summary (Last 24 hours) at 17 1829  Last data filed at 17 1800   Gross per 24 hour   Intake              400 ml   Output              200 ml   Net              200 ml       Physical Exam   Cardiovascular: Normal rate and regular rhythm.    No murmur heard.  Pulmonary/Chest: He has no wheezes. He has no rales.   Tunneled catheter in right chest   Abdominal: Soft. Bowel sounds are normal. There is no tenderness.   Musculoskeletal: He exhibits no edema.   Fresh fistula in left UE   Neurological: He is alert.   Nursing note and vitals reviewed.    Results Review:    I have reviewed the laboratory and imaging data since the last note by [facility] physician.  My annotations are noted in assessment and plan.    Medication Review:  I have reviewed the current MAR.  My annotations are noted in assessment and plan.      sodium chloride 125 mL/hr Last Rate: 125 mL/hr (17)   sodium chloride 9 mL/hr      Plan   PCCM Problems  Hypertensive emergency  Acute renal failure  Elevated troponin  Pneumonia, infiltrates  Anemia  Acute hypokalemia  Positive THC    Plan of Treatment  Now on oral bp meds.    HD planned today.    Pneumonia per CT imaging. Finish course of abx then fu imaging in 8 weeks.    Appreciate hematology. Anemia of chronic disease likely. Other tests pending.      Stephen Arauz MD  17  6:29 PM    Part of this note may be an electronic  transcription/translation of spoken language to printed text using the Dragon Dictation System.

## 2017-07-06 NOTE — ANESTHESIA PROCEDURE NOTES
Peripheral Block    Patient location during procedure: pre-op  Start time: 7/6/2017 9:56 AM  Stop time: 7/6/2017 10:02 AM  Reason for block: at surgeon's request and post-op pain management  Performed by  Anesthesiologist: HUMAIRA BERRY  Preanesthetic Checklist  Completed: patient identified, site marked, surgical consent, pre-op evaluation, timeout performed, IV checked, risks and benefits discussed and monitors and equipment checked  Peripheral Block Prep:  Sterile barriers:cap, gloves and mask  Prep: ChloraPrep  Patient monitoring: blood pressure monitoring, continuous pulse oximetry and EKG  Peripheral Procedure  Sedation:yes  Guidance:ultrasound guided  Images:still images obtained    Laterality:left  Block Type:interscalene  Injection Technique:single-shot  Needle Type:echogenic  Needle Gauge:22 G  ULTRASOUND INTERPRETATION. Using ultrasound guidance a 22 G gauge needle was placed in close proximity to the nerve, at which point, under ultrasound guidance anesthetic was injected in the area of the nerve and spread of the anesthesia was seen on ultrasound in close proximity thereto.  There were no abnormalities seen on ultrasound; a digital image was taken; and the patient tolerated the procedure with no complications.   Medications  Local Injected:ropivacaine 0.5% Local Amount Injected:20mL  Post Assessment  Injection Assessment: negative aspiration for heme, no paresthesia on injection and incremental injection  Patient Tolerance:comfortable throughout block  Complications:no

## 2017-07-06 NOTE — ANESTHESIA PREPROCEDURE EVALUATION
Anesthesia Evaluation     Patient summary reviewed and Nursing notes reviewed   no history of anesthetic complications:  NPO Solid Status: > 6 hours  NPO Liquid Status: > 6 hours     Airway   Mallampati: II  TM distance: >3 FB  Neck ROM: full  no difficulty expected  Dental    (+) poor dentition    Pulmonary - negative pulmonary ROS and normal exam    breath sounds clear to auscultation  (-) rhonchi, decreased breath sounds, wheezes, rales, stridor  Cardiovascular - normal exam    ECG reviewed  Patient on routine beta blocker and Beta blocker given within 24 hours of surgery  Rhythm: regular  Rate: normal    (+) hypertension poorly controlled,   (-) murmur, weak pulses, friction rub, systolic click, carotid bruits, JVD, peripheral edema      Neuro/Psych- negative ROS  GI/Hepatic/Renal/Endo    (+)  renal disease ESRD and dialysis,     Musculoskeletal (-) negative ROS    Abdominal  - normal exam    Abdomen: soft.   Substance History - negative use     OB/GYN negative ob/gyn ROS         Other - negative ROS                                   Anesthesia Plan    ASA 3     regional   (ISB  )  intravenous induction   Anesthetic plan and risks discussed with patient.

## 2017-07-06 NOTE — ANESTHESIA PROCEDURE NOTES
Airway  Urgency: elective    Airway not difficult    General Information and Staff    Patient location during procedure: OR  Anesthesiologist: GARY BALDWIN  CRNA: JOSE E GALE    Indications and Patient Condition  Indications for airway management: airway protection    Preoxygenated: yes  MILS maintained throughout      Final Airway Details  Final airway type: supraglottic airway      Successful airway: classic  Size 5    Number of attempts at approach: 1    Additional Comments  Pt preoxygenated, sivi, LMA placed with adequate seal and TV

## 2017-07-06 NOTE — PROGRESS NOTES
"   LOS: 3 days    Patient Care Team:  No Known Provider as PCP - General    Chief Complaint:    Chief Complaint   Patient presents with   • Nausea     Patient states nauseated, vomited on Friday, dry mouth x 2 weeks   • Vomiting   • Constipation     last normal bowel movement last wednesday       Subjective follow-up advanced chronic kidney disease    Interval History:   Patient seen in the PACU, he is the still very lethargic he had an AV fistula created in the left upper arm and a tunnel dialysis catheter inserted in the right IJ with exit site below the right clavicle.  The chart was reviewed and events over the past 24 hours: Noted.    Review of Systems:   Not obtainable    Objective     Vital Signs  Temp:  [97.6 °F (36.4 °C)-99.2 °F (37.3 °C)] 97.6 °F (36.4 °C)  Heart Rate:  [] 80  Resp:  [16-20] 16  BP: (104-193)/() 108/70    Flowsheet Rows         First Filed Value    Admission Height  70\" (177.8 cm) Documented at 07/03/2017 0734    Admission Weight  150 lb (68 kg) Documented at 07/03/2017 0734          I/O this shift:  In: 400 [I.V.:400]  Out: -   I/O last 3 completed shifts:  In: 1549 [P.O.:500; I.V.:999; IV Piggyback:50]  Out: 600 [Urine:600]    Intake/Output Summary (Last 24 hours) at 07/06/17 1357  Last data filed at 07/06/17 1309   Gross per 24 hour   Intake             1399 ml   Output              200 ml   Net             1199 ml       Physical Exam:  General Appearance: He is very lethargic postoperatively, no acute distress,   Skin: warm and dry   Neck: supple, no JVD, trachea midline, tunnel dialysis catheter in the right IJ with exit site below the right clavicle  Lungs: Scattered rhonchi, unlabored breathing effort  Heart: RRR, normal S1 and S2, no S3, positive S4, no rub  Abdomen: soft, no palpable bladder, present bowel sounds to auscultation  Extremities: no edema, cyanosis or clubbing, functional AV fistula in the left upper arm      Results Review:      Results from last 7 " days  Lab Units 07/06/17 0313 07/05/17  0400 07/04/17  0451  07/03/17  0804   SODIUM mmol/L 132*  132* 133* 132*  --  134*   POTASSIUM mmol/L 3.4*  3.4* 3.6 3.3*  < > 2.6*   CHLORIDE mmol/L 97*  97* 97* 94*  --  89*   CO2 mmol/L 13.2*  13.2* 14.0* 18.4*  --  25.1   BUN mg/dL 74*  74* 72* 70*  --  74*   CREATININE mg/dL 10.42*  10.42* 10.14* 9.70*  --  10.02*   CALCIUM mg/dL 7.5*  7.5* 7.9* 7.6*  --  9.1   BILIRUBIN mg/dL  --   --   --   --  1.9*   ALK PHOS U/L  --   --   --   --  58   ALT (SGPT) U/L  --   --   --   --  23   AST (SGOT) U/L  --   --   --   --  41*   GLUCOSE mg/dL 128*  128* 123* 133*  --  165*   < > = values in this interval not displayed.    Estimated Creatinine Clearance: 8.3 mL/min (by C-G formula based on Cr of 10.42).      Results from last 7 days  Lab Units 07/06/17 0313 07/05/17 0400 07/04/17  0451   MAGNESIUM mg/dL 2.7* 2.6 2.6   PHOSPHORUS mg/dL 4.7* 4.1 4.6*         Results from last 7 days  Lab Units 07/06/17 0313 07/05/17  0400 07/04/17  0451   URIC ACID mg/dL 8.5* 8.2* 7.9*         Results from last 7 days  Lab Units 07/06/17 0313 07/05/17  0400 07/04/17  0451 07/03/17  1203 07/03/17  0804   WBC 10*3/mm3 7.49 10.46 5.63  --  7.23   HEMOGLOBIN g/dL 8.5* 9.7* 8.2*  --  10.3*   PLATELETS 10*3/mm3 154 165 98* 63* 70*         Results from last 7 days  Lab Units 07/03/17  0804   INR  1.22*         Imaging Results (last 24 hours)     Procedure Component Value Units Date/Time    IR PICC W Fluoro Surgery Only [[id number]] Updated:  07/06/17 1207          [MAR Hold] azithromycin 500 mg Oral Q24H   [MAR Hold] ceftriaxone 1 g Intravenous Q24H   famotidine 20 mg Oral BID   [MAR Hold] heparin (porcine) 5,000 Units Subcutaneous Q8H   metoprolol succinate  mg Oral Q24H   minoxidil 5 mg Oral Q12H       sodium chloride 125 mL/hr Last Rate: 125 mL/hr (07/05/17 2308)   sodium chloride 9 mL/hr        Medication Review:   Current Facility-Administered Medications   Medication Dose Route  Frequency Provider Last Rate Last Dose   • [MAR Hold] aluminum-magnesium hydroxide-simethicone (MAALOX MAX) 400-400-40 MG/5ML suspension 30 mL  30 mL Oral Q4H PRN Orquidea Sue MD   30 mL at 07/05/17 2338   • [MAR Hold] azithromycin (ZITHROMAX) tablet 500 mg  500 mg Oral Q24H Stephen Arauz MD   500 mg at 07/06/17 0811   • [MAR Hold] cefTRIAXone (ROCEPHIN) IVPB 1 g  1 g Intravenous Q24H Stephen Arauz MD   1 g at 07/06/17 0945   • diphenhydrAMINE (BENADRYL) injection 12.5 mg  12.5 mg Intravenous Q15 Min PRN Belgica Ballard CRNA       • ePHEDrine injection 5 mg  5 mg Intravenous Once PRN Belgica Ballard CRNA       • famotidine (PEPCID) tablet 20 mg  20 mg Oral BID Orquidea Sue MD   20 mg at 07/06/17 0811   • fentaNYL citrate (PF) (SUBLIMAZE) injection 50 mcg  50 mcg Intravenous Q5 Min PRN Belgica Ballard CRNA       • flumazenil (ROMAZICON) injection 0.2 mg  0.2 mg Intravenous PRN Belgica Ballard CRNA       • [MAR Hold] heparin (porcine) 5000 UNIT/ML injection 5,000 Units  5,000 Units Subcutaneous Q8H Stephen Arauz MD   Stopped at 07/05/17 0640   • hydrALAZINE (APRESOLINE) injection 20 mg  20 mg Intravenous Q8H PRN Orquidea Sue MD   20 mg at 07/04/17 2213   • hydrALAZINE (APRESOLINE) injection 5 mg  5 mg Intravenous Q10 Min PRN Belgica Ballard CRNA       • labetalol (NORMODYNE,TRANDATE) injection 20 mg  20 mg Intravenous Q6H PRN Orquidea Sue MD       • labetalol (NORMODYNE,TRANDATE) injection 5 mg  5 mg Intravenous Q5 Min PRN Belgica Ballard CRNA       • [MAR Hold] mannitol 25% (RENAL) injection  25 g Intravenous PRN David Dejesus MD       • metoprolol succinate XL (TOPROL-XL) 24 hr tablet 100 mg  100 mg Oral Q24H David Dejesus MD   100 mg at 07/06/17 0811   • minoxidil (LONITEN) tablet 5 mg  5 mg Oral Q12H David Dejesus MD   5 mg at 07/06/17 0811   • naloxone (NARCAN) injection 0.2 mg  0.2 mg Intravenous PRN Belgica Ballard CRNA       • ondansetron (ZOFRAN) injection 4  mg  4 mg Intravenous Once PRN Belgica Ballard CRNA       • [MAR Hold] sodium chloride 0.9 % flush 10 mL  10 mL Intravenous PRN Marbella Cannon MD       • [MAR Hold] sodium chloride 0.9 % flush 10 mL  10 mL Intravenous PRN Marbella Cannon MD       • sodium chloride 0.9 % infusion  125 mL/hr Intravenous Continuous Marbella Cannon  mL/hr at 07/05/17 2308     • sodium chloride 0.9 % infusion  9 mL/hr Intravenous Continuous Cameron Mei MD       • [MAR Hold] temazepam (RESTORIL) capsule 15 mg  15 mg Oral Nightly PRN Orquidea Sue MD   15 mg at 07/05/17 9958       Assessment/Plan   1.  Severe renal failure could be the end stage renal disease related to severe uncontrolled hypertension, today there is no ene uremic symptoms.  His creatinine is 10.42.    2.  Hypertensive the crisis, with end organ damage acute malignant hypertension.  Blood pressure has improved.  Regarding the new medication without any difficulties  3.  History of hypertension with the noncompliance with medication and has not taken any medication for over a year.  4.  Degenerative joint disease with prior left total knee replacement  5.  Anemia could be associated with chronic kidney disease, globin today is 8.5   6.  Thrombocytopenia, resolved, platelet 154,000  7.  Hyponatremia sodium 132 stable  8.  Hypokalemia, potassium 3.4        Plan:  1.  Plan for dialysis today  2.  Surveillance labs    I discussed the plan with Dr Morales.          David Dejesus MD  07/06/17  1:57 PM

## 2017-07-06 NOTE — PLAN OF CARE
Problem: Renal Failure/Kidney Injury, Acute (Adult)  Goal: Signs and Symptoms of Listed Potential Problems Will be Absent or Manageable (Renal Failure/Kidney Injury, Acute)  Outcome: Ongoing (interventions implemented as appropriate)    Problem: Patient Care Overview (Adult)  Goal: Plan of Care Review  Outcome: Ongoing (interventions implemented as appropriate)    07/06/17 3867   Outcome Evaluation   Outcome Summary/Follow up Plan Patient has been off the unit most of the day. Had IJ and fistula placed today. Went directly to dialysis after surgery. Awaiting return. Will monitor on arrival.   Patient Care Overview   Progress improving   Coping/Psychosocial Response Interventions   Plan Of Care Reviewed With patient       Goal: Adult Individualization and Mutuality  Outcome: Ongoing (interventions implemented as appropriate)  Goal: Discharge Needs Assessment  Outcome: Ongoing (interventions implemented as appropriate)    Problem: Hypertensive Disease/Crisis (Arterial) (Adult)  Goal: Signs and Symptoms of Listed Potential Problems Will be Absent or Manageable (Hypertensive Disease/Crisis)  Outcome: Ongoing (interventions implemented as appropriate)    Problem: Fall Risk (Adult)  Goal: Absence of Falls  Outcome: Ongoing (interventions implemented as appropriate)

## 2017-07-06 NOTE — ANESTHESIA POSTPROCEDURE EVALUATION
Patient: Harry Ortega    Procedure Summary     Date Anesthesia Start Anesthesia Stop Room / Location    07/06/17 1127 1327  [facility] /  [facility]       Procedure Diagnosis Surgeon Provider    ARTERIOVENOUS FISTULA LEFT ARM AND PALINDROME PLACEMENT (N/A ) No diagnosis on file. MD Shyam Bolton MD          Anesthesia Type: regional  Last vitals  /89 (07/06/17 1425)    Temp      Pulse 83 (07/06/17 1425)   Resp 16 (07/06/17 1410)    SpO2 98 % (07/06/17 1425)      Post Anesthesia Care and Evaluation    Patient location during evaluation: PACU  Patient participation: complete - patient participated  Level of consciousness: awake and alert  Pain management: adequate  Airway patency: patent  Anesthetic complications: No anesthetic complications    Cardiovascular status: acceptable  Respiratory status: acceptable  Hydration status: acceptable

## 2017-07-06 NOTE — OP NOTE
07/06/17   Kar Morales MD      Preoperative Diagnosis: End Stage Renal Disease    Postoperative Diagnosis: same as above    Procedure Performed: left brachiocephalic fistula creation #2- U/S guided acces, right IJ vein. #3- Right IJ 23 cm tunneled dialysis catheter placement    Surgeon: Kar Morales MD    Assistant: Kami HERRERA    Anesthesia: Local/MAC    Estimated Blood Loss: minimal    Specimen: none    Complications: none   Dispo: to PACU    Indication for procedure: 56 y.o. male with End Stage Renal Disease.  The patient has vein mapping suggesting that a left brachiocephalic fistula will be feasible.  The risks, benefits, and alternatives to the procedure were discussed.     Description of procedure: The patient was taken to the operating room and placed in the supine position.  The right neck and chest were prepped and draped in the usual sterile fashion.  A timeout was observed.  Preoperative antibiotics had been given.  Using an ultrasound the Right Internal Jugular  vein was accessed and the guidewire was passed centrally.  This was guided using fluoroscopy into the IVC.   Next the catheter track was planned on the right chest using fluoroscopic guidance.  Under local anesthesia a nick was made with an 11 blade scalpel in the skin and the tunneling device was used to track the catheter to the incision.    Sequential dilators were used to dilate the subcutaneous tract from the access site using fluoroscopic guidance.  The peel-away sheath was placed.  The dilator and the wire were removed and the catheter was placed down through the peel-away sheath and then the sheath was removed.  The catheter was pulled back until there was no kink.  The tip was in the atrio-caval junction.  All lumens flushed and kayden back dark blood easily.  They were flushed with heparinized saline and then locked with high-dose heparin and the caps were placed.  The catheter was stitched twice to the  patient using 3-0 Vicryl suture. The stab incision was closed using a 3-0 Vicryl.  Dermabond was used and a sterile dressing was placed.       The left arm was extended on an arm board and then prepped and draped in the usual sterile fashion.  Preoperative antibiotics were given.  A timeout was done.      A horizontal incision was made and the cephalic vein was dissected out for several centimeters.  Next the fascia was opened and the brachial artery was dissected out for several centimeters.  The vein was marked for orientation, divided, and flushed with heparinized saline. The patient was systemically heparinized.   After the heparin had been allowed to circulate for 5 minutes clamps were applied distally first and then proximally to the brachial artery.  It was opened longitudinally with an 11 blade and then  scissors and then an anastomosis was created using 6-0 running Prolene between the artery and the vein.  Appropriate backbleeding and flushing maneuvers were done.      Flow was then restored.  The fistula had an immediate mild thrill. Two sidebranches were visualized with ultrasound and ligated via separate small incisions near the antecubitum and in the mid arm.     Doppler examination of the fistula revealed continuous diastolic flow and the radial and ulnar signals were audible at the wrist with the fistula both open and compressed.     The field was irrigated with sterile saline.  After hemostasis was achieved, it was closed in the subcutaneous layers using 3-0 Vicryl and then in the skin subcuticular layer using 4-0 Vicryl.  The incision was clean and dry and covered in Dermabond.  The patient tolerated procedure well, counts are correct.        Kar Morales MD  07/06/17

## 2017-07-07 ENCOUNTER — APPOINTMENT (OUTPATIENT)
Dept: CARDIOLOGY | Facility: HOSPITAL | Age: 57
End: 2017-07-07
Attending: INTERNAL MEDICINE

## 2017-07-07 LAB
ALBUMIN SERPL-MCNC: 3 G/DL (ref 3.5–5.2)
ANION GAP SERPL CALCULATED.3IONS-SCNC: 19.6 MMOL/L
BASOPHILS # BLD AUTO: 0.01 10*3/MM3 (ref 0–0.2)
BASOPHILS NFR BLD AUTO: 0.1 % (ref 0–1.5)
BH CV LOWER VASCULAR LEFT COMMON FEMORAL AUGMENT: NORMAL
BH CV LOWER VASCULAR LEFT COMMON FEMORAL COMPETENT: NORMAL
BH CV LOWER VASCULAR LEFT COMMON FEMORAL COMPRESS: NORMAL
BH CV LOWER VASCULAR LEFT COMMON FEMORAL PHASIC: NORMAL
BH CV LOWER VASCULAR LEFT COMMON FEMORAL SPONT: NORMAL
BH CV LOWER VASCULAR LEFT DISTAL FEMORAL COMPRESS: NORMAL
BH CV LOWER VASCULAR LEFT GASTRONEMIUS COMPRESS: NORMAL
BH CV LOWER VASCULAR LEFT GREATER SAPH AK COMPRESS: NORMAL
BH CV LOWER VASCULAR LEFT GREATER SAPH BK COMPRESS: NORMAL
BH CV LOWER VASCULAR LEFT MID FEMORAL AUGMENT: NORMAL
BH CV LOWER VASCULAR LEFT MID FEMORAL COMPETENT: NORMAL
BH CV LOWER VASCULAR LEFT MID FEMORAL COMPRESS: NORMAL
BH CV LOWER VASCULAR LEFT MID FEMORAL PHASIC: NORMAL
BH CV LOWER VASCULAR LEFT MID FEMORAL SPONT: NORMAL
BH CV LOWER VASCULAR LEFT PERONEAL COMPRESS: NORMAL
BH CV LOWER VASCULAR LEFT POPLITEAL AUGMENT: NORMAL
BH CV LOWER VASCULAR LEFT POPLITEAL COMPETENT: NORMAL
BH CV LOWER VASCULAR LEFT POPLITEAL COMPRESS: NORMAL
BH CV LOWER VASCULAR LEFT POPLITEAL PHASIC: NORMAL
BH CV LOWER VASCULAR LEFT POPLITEAL SPONT: NORMAL
BH CV LOWER VASCULAR LEFT POSTERIOR TIBIAL COMPRESS: NORMAL
BH CV LOWER VASCULAR LEFT PROXIMAL FEMORAL COMPRESS: NORMAL
BH CV LOWER VASCULAR LEFT SAPHENOFEMORAL JUNCTION AUGMENT: NORMAL
BH CV LOWER VASCULAR LEFT SAPHENOFEMORAL JUNCTION COMPETENT: NORMAL
BH CV LOWER VASCULAR LEFT SAPHENOFEMORAL JUNCTION COMPRESS: NORMAL
BH CV LOWER VASCULAR LEFT SAPHENOFEMORAL JUNCTION PHASIC: NORMAL
BH CV LOWER VASCULAR LEFT SAPHENOFEMORAL JUNCTION SPONT: NORMAL
BH CV LOWER VASCULAR RIGHT COMMON FEMORAL AUGMENT: NORMAL
BH CV LOWER VASCULAR RIGHT COMMON FEMORAL COMPETENT: NORMAL
BH CV LOWER VASCULAR RIGHT COMMON FEMORAL COMPRESS: NORMAL
BH CV LOWER VASCULAR RIGHT COMMON FEMORAL PHASIC: NORMAL
BH CV LOWER VASCULAR RIGHT COMMON FEMORAL SPONT: NORMAL
BH CV LOWER VASCULAR RIGHT DISTAL FEMORAL COMPRESS: NORMAL
BH CV LOWER VASCULAR RIGHT GASTRONEMIUS COMPRESS: NORMAL
BH CV LOWER VASCULAR RIGHT GREATER SAPH AK COMPRESS: NORMAL
BH CV LOWER VASCULAR RIGHT GREATER SAPH BK COMPRESS: NORMAL
BH CV LOWER VASCULAR RIGHT MID FEMORAL AUGMENT: NORMAL
BH CV LOWER VASCULAR RIGHT MID FEMORAL COMPETENT: NORMAL
BH CV LOWER VASCULAR RIGHT MID FEMORAL COMPRESS: NORMAL
BH CV LOWER VASCULAR RIGHT MID FEMORAL PHASIC: NORMAL
BH CV LOWER VASCULAR RIGHT MID FEMORAL SPONT: NORMAL
BH CV LOWER VASCULAR RIGHT PERONEAL COMPRESS: NORMAL
BH CV LOWER VASCULAR RIGHT POPLITEAL AUGMENT: NORMAL
BH CV LOWER VASCULAR RIGHT POPLITEAL COMPETENT: NORMAL
BH CV LOWER VASCULAR RIGHT POPLITEAL COMPRESS: NORMAL
BH CV LOWER VASCULAR RIGHT POPLITEAL PHASIC: NORMAL
BH CV LOWER VASCULAR RIGHT POPLITEAL SPONT: NORMAL
BH CV LOWER VASCULAR RIGHT POSTERIOR TIBIAL COMPRESS: NORMAL
BH CV LOWER VASCULAR RIGHT PROXIMAL FEMORAL COMPRESS: NORMAL
BH CV LOWER VASCULAR RIGHT SAPHENOFEMORAL JUNCTION AUGMENT: NORMAL
BH CV LOWER VASCULAR RIGHT SAPHENOFEMORAL JUNCTION COMPETENT: NORMAL
BH CV LOWER VASCULAR RIGHT SAPHENOFEMORAL JUNCTION COMPRESS: NORMAL
BH CV LOWER VASCULAR RIGHT SAPHENOFEMORAL JUNCTION PHASIC: NORMAL
BH CV LOWER VASCULAR RIGHT SAPHENOFEMORAL JUNCTION SPONT: NORMAL
BUN BLD-MCNC: 49 MG/DL (ref 6–20)
BUN/CREAT SERPL: 6.5 (ref 7–25)
CALCIUM SPEC-SCNC: 8 MG/DL (ref 8.6–10.5)
CHLORIDE SERPL-SCNC: 99 MMOL/L (ref 98–107)
CO2 SERPL-SCNC: 19.4 MMOL/L (ref 22–29)
CREAT BLD-MCNC: 7.53 MG/DL (ref 0.76–1.27)
DEPRECATED RDW RBC AUTO: 55.8 FL (ref 37–54)
EOSINOPHIL # BLD AUTO: 0.06 10*3/MM3 (ref 0–0.7)
EOSINOPHIL NFR BLD AUTO: 0.9 % (ref 0.3–6.2)
ERYTHROCYTE [DISTWIDTH] IN BLOOD BY AUTOMATED COUNT: 15.5 % (ref 11.5–14.5)
GFR SERPL CREATININE-BSD FRML MDRD: 9 ML/MIN/1.73
GLUCOSE BLD-MCNC: 153 MG/DL (ref 65–99)
HCT VFR BLD AUTO: 23.2 % (ref 40.4–52.2)
HGB BLD-MCNC: 7.9 G/DL (ref 13.7–17.6)
IMM GRANULOCYTES # BLD: 0.03 10*3/MM3 (ref 0–0.03)
IMM GRANULOCYTES NFR BLD: 0.4 % (ref 0–0.5)
LDH SERPL-CCNC: 585 U/L (ref 135–225)
LYMPHOCYTES # BLD AUTO: 0.65 10*3/MM3 (ref 0.9–4.8)
LYMPHOCYTES NFR BLD AUTO: 9.3 % (ref 19.6–45.3)
MAGNESIUM SERPL-MCNC: 2.4 MG/DL (ref 1.6–2.6)
MCH RBC QN AUTO: 33.5 PG (ref 27–32.7)
MCHC RBC AUTO-ENTMCNC: 34.1 G/DL (ref 32.6–36.4)
MCV RBC AUTO: 98.3 FL (ref 79.8–96.2)
MONOCYTES # BLD AUTO: 0.5 10*3/MM3 (ref 0.2–1.2)
MONOCYTES NFR BLD AUTO: 7.2 % (ref 5–12)
NEUTROPHILS # BLD AUTO: 5.73 10*3/MM3 (ref 1.9–8.1)
NEUTROPHILS NFR BLD AUTO: 82.1 % (ref 42.7–76)
PHOSPHATE SERPL-MCNC: 5 MG/DL (ref 2.5–4.5)
PLATELET # BLD AUTO: 188 10*3/MM3 (ref 140–500)
PMV BLD AUTO: 11 FL (ref 6–12)
POTASSIUM BLD-SCNC: 3.6 MMOL/L (ref 3.5–5.2)
RBC # BLD AUTO: 2.36 10*6/MM3 (ref 4.6–6)
SODIUM BLD-SCNC: 138 MMOL/L (ref 136–145)
WBC NRBC COR # BLD: 6.98 10*3/MM3 (ref 4.5–10.7)

## 2017-07-07 PROCEDURE — 63510000001 EPOETIN ALFA PER 1000 UNITS: Performed by: INTERNAL MEDICINE

## 2017-07-07 PROCEDURE — 93970 EXTREMITY STUDY: CPT

## 2017-07-07 PROCEDURE — 25010000003 CEFTRIAXONE PER 250 MG: Performed by: INTERNAL MEDICINE

## 2017-07-07 PROCEDURE — 83735 ASSAY OF MAGNESIUM: CPT | Performed by: INTERNAL MEDICINE

## 2017-07-07 PROCEDURE — 85025 COMPLETE CBC W/AUTO DIFF WBC: CPT | Performed by: INTERNAL MEDICINE

## 2017-07-07 PROCEDURE — 99231 SBSQ HOSP IP/OBS SF/LOW 25: CPT | Performed by: INTERNAL MEDICINE

## 2017-07-07 PROCEDURE — 83615 LACTATE (LD) (LDH) ENZYME: CPT | Performed by: INTERNAL MEDICINE

## 2017-07-07 PROCEDURE — 25010000002 HEPARIN (PORCINE) PER 1000 UNITS: Performed by: INTERNAL MEDICINE

## 2017-07-07 PROCEDURE — 80069 RENAL FUNCTION PANEL: CPT | Performed by: INTERNAL MEDICINE

## 2017-07-07 RX ORDER — FAMOTIDINE 20 MG/1
20 TABLET, FILM COATED ORAL DAILY
Status: DISCONTINUED | OUTPATIENT
Start: 2017-07-07 | End: 2017-07-10 | Stop reason: HOSPADM

## 2017-07-07 RX ORDER — HEPARIN SODIUM 1000 [USP'U]/ML
3800 INJECTION, SOLUTION INTRAVENOUS; SUBCUTANEOUS AS NEEDED
Status: DISCONTINUED | OUTPATIENT
Start: 2017-07-07 | End: 2017-07-10 | Stop reason: HOSPADM

## 2017-07-07 RX ORDER — MANNITOL 250 MG/ML
12.5 INJECTION, SOLUTION INTRAVENOUS AS NEEDED
Status: ACTIVE | OUTPATIENT
Start: 2017-07-07 | End: 2017-07-08

## 2017-07-07 RX ADMIN — CEFTRIAXONE SODIUM 1 G: 1 INJECTION, SOLUTION INTRAVENOUS at 10:34

## 2017-07-07 RX ADMIN — METOPROLOL SUCCINATE 100 MG: 100 TABLET, FILM COATED, EXTENDED RELEASE ORAL at 10:34

## 2017-07-07 RX ADMIN — MINOXIDIL 5 MG: 2.5 TABLET ORAL at 21:43

## 2017-07-07 RX ADMIN — MINOXIDIL 5 MG: 2.5 TABLET ORAL at 10:35

## 2017-07-07 RX ADMIN — HEPARIN SODIUM 3800 UNITS: 1000 INJECTION, SOLUTION INTRAVENOUS; SUBCUTANEOUS at 18:39

## 2017-07-07 RX ADMIN — HEPARIN SODIUM 5000 UNITS: 5000 INJECTION, SOLUTION INTRAVENOUS; SUBCUTANEOUS at 06:49

## 2017-07-07 RX ADMIN — HEPARIN SODIUM 5000 UNITS: 5000 INJECTION, SOLUTION INTRAVENOUS; SUBCUTANEOUS at 21:42

## 2017-07-07 RX ADMIN — AZITHROMYCIN 500 MG: 250 TABLET, FILM COATED ORAL at 10:35

## 2017-07-07 RX ADMIN — ERYTHROPOIETIN 10000 UNITS: 10000 INJECTION, SOLUTION INTRAVENOUS; SUBCUTANEOUS at 18:37

## 2017-07-07 RX ADMIN — FAMOTIDINE 20 MG: 20 TABLET, FILM COATED ORAL at 10:35

## 2017-07-07 NOTE — PROGRESS NOTES
Continued Stay Note  [facility]     Patient Name: Harry Ortega  MRN: [id number]  Today's Date: 7/7/2017    Admit Date: 7/3/2017          Discharge Plan       07/07/17 1510    Case Management/Social Work Plan    Plan Home with wife and family support-   Awaiting [facility] outpatient dialysis schedule confirmation    Patient/Family In Agreement With Plan yes    Additional Comments Spoke with Dr. Dejesus and he asks that CCP begin [facility] outpatient dialysis chair set up.  Spoke with patient in dialysis and he states he prefers dialysis center closest to home, which [facility] be [facility] and he doesn't care about which days, but does prefer mornings if possible.  [facility] [place] dialysis papers faxed [phone] with receipt confirmation- await confirmation of chair schedule.. CCP will continue to follow and assist  as needed....Anjelica Carrillo RN,CCP                   Discharge Codes     None            Anjelica Carrillo RN

## 2017-07-07 NOTE — PAYOR COMM NOTE
"Harry Ortega (56 y.o. Male)             ATTENTION; MED COST MANAGEMENT - CASE REF#[id number] , CLINICALS FOR YOUR REVIEW, REPLY         TO [facility] DEPT, [facility]  OR UR  [phone]       Date of Birth Social Security Number Address Home Phone MRN    1960  [address] [place] [address] [phone] [id number]    [employer] Marital Status          Church        Admission Date Admission Type Admitting Provider Attending Provider Department, Room/Bed    7/3/17 Emergency Stephen Arauz MD Jain, Subin, MD [address], 408/1    Discharge Date Discharge Disposition Discharge Destination                      Attending Provider: Stephen Arauz MD     Allergies:  No Known Allergies    Isolation:  None   Infection:  None   Code Status:  FULL    Ht:  70\" (177.8 cm)   Wt:  168 lb (76.2 kg)    Admission Cmt:  None   Principal Problem:  None                Active Insurance as of 7/3/2017     Primary Coverage     Payor Plan Insurance Group Employer/Plan Group    PROFESSIONAL BENEFIT ADMINISTRATORS PROFESSIONAL BENEFIT   [id number]     Payor Plan Address Payor Plan Phone Number Effective From Effective To    [address] [phone] 1/1/2017     [place] [address]       Subscriber Name Subscriber Birth Date Member ID       HARRY ORTEGA 1960 [id number]                 Emergency Contacts      (Rel.) Home Phone Work Phone Mobile Phone    Raiza Ortega (Spouse) [phone] -- --            Lines, Drains & Airways    Active LDAs     Name:   Placement date:   Placement time:   Site:   Days:    Tunneled Central Line - Double Lumen 07/06/17 subclavian vein, right  07/06/17          1    Peripheral IV Line - Single Lumen 07/03/17 0939 median cubital vein (antecubital fossa), right 20 gauge  07/03/17    0939      4    Hemodialysis AV Access Device  07/06/17 07/06/17          1                Hospital Medications (active)       Dose Frequency Start " End    aluminum-magnesium hydroxide-simethicone (MAALOX MAX) 400-400-40 MG/5ML suspension 30 mL 30 mL Every 4 Hours PRN 7/5/2017     Sig - Route: Take 30 mL by mouth Every 4 (Four) Hours As Needed for Indigestion or Heartburn. - Oral    azithromycin (ZITHROMAX) tablet 500 mg 500 mg Every 24 Hours Scheduled 7/4/2017 7/9/2017    Sig - Route: Take 2 tablets by mouth Daily. - Oral    cefTRIAXone (ROCEPHIN) IVPB 1 g 1 g Every 24 Hours 7/4/2017     Sig - Route: Infuse 50 mL into a venous catheter Daily. - Intravenous    famotidine (PEPCID) tablet 20 mg 20 mg Daily 7/7/2017     Sig - Route: Take 1 tablet by mouth Daily. - Oral    heparin (porcine) 5000 UNIT/ML injection 5,000 Units 5,000 Units Every 8 Hours Scheduled 7/3/2017     Sig - Route: Inject 1 mL under the skin Every 8 (Eight) Hours. - Subcutaneous    hydrALAZINE (APRESOLINE) injection 20 mg 20 mg Every 8 Hours PRN 7/4/2017     Sig - Route: Infuse 1 mL into a venous catheter Every 8 (Eight) Hours As Needed for High Blood Pressure. - Intravenous    HYDROcodone-acetaminophen (NORCO) 5-325 MG per tablet 1 tablet 1 tablet Every 4 Hours PRN 7/6/2017 7/16/2017    Sig - Route: Take 1 tablet by mouth Every 4 (Four) Hours As Needed for Moderate Pain (4-6). - Oral    HYDROmorphone (DILAUDID) injection 1 mg 1 mg Every 4 Hours PRN 7/6/2017 7/16/2017    Sig - Route: Infuse 1 mg into a venous catheter Every 4 (Four) Hours As Needed for Severe Pain (7-10). - Intravenous    labetalol (NORMODYNE,TRANDATE) injection 20 mg 20 mg Every 6 Hours PRN 7/4/2017     Sig - Route: Infuse 4 mL into a venous catheter Every 6 (Six) Hours As Needed for High Blood Pressure. - Intravenous    mannitol 25% (RENAL) injection 25 g As Needed 7/5/2017 7/6/2017    Sig - Route: Infuse 100 mL into a venous catheter As Needed (to maintain SBP > 100 mmHG). - Intravenous    metoprolol succinate XL (TOPROL-XL) 24 hr tablet 100 mg 100 mg Every 24 Hours Scheduled 7/4/2017     Sig - Route: Take 1 tablet by mouth  "Daily. - Oral    minoxidil (LONITEN) tablet 5 mg 5 mg Every 12 Hours Scheduled 7/4/2017     Sig - Route: Take 2 tablets by mouth Every 12 (Twelve) Hours. - Oral    sodium chloride 0.9 % flush 10 mL 10 mL As Needed 7/3/2017     Sig - Route: Infuse 10 mL into a venous catheter As Needed for Line Care. - Intravenous    Cosign for Ordering: Accepted by Marbella Cannon MD on 7/3/2017  9:42 AM    sodium chloride 0.9 % flush 10 mL 10 mL As Needed 7/3/2017     Sig - Route: Infuse 10 mL into a venous catheter As Needed for Line Care. - Intravenous    Linked Group 1:  \"And\" Linked Group Details        sodium chloride 0.9 % infusion 125 mL/hr Continuous 7/3/2017     Sig - Route: Infuse 125 mL/hr into a venous catheter Continuous. - Intravenous    sodium chloride 0.9 % infusion 9 mL/hr Continuous 7/6/2017     Sig - Route: Infuse 9 mL/hr into a venous catheter Continuous. - Intravenous    temazepam (RESTORIL) capsule 15 mg 15 mg Nightly PRN 7/3/2017 7/13/2017    Sig - Route: Take 1 capsule by mouth At Night As Needed for Sleep. - Oral    bupivacaine PF 30 mL, lidocaine 1 % 30 mL mixture (Discontinued)  As Needed 7/6/2017 7/6/2017    Sig: As Needed.    Reason for Discontinue: Patient Discharge    diphenhydrAMINE (BENADRYL) injection 12.5 mg (Discontinued) 12.5 mg Every 15 Minutes PRN 7/6/2017 7/6/2017    Sig - Route: Infuse 0.25 mL into a venous catheter Every 15 (Fifteen) Minutes As Needed for Itching (May repeat x 1). - Intravenous    Reason for Discontinue: Patient Transfer    ePHEDrine injection 5 mg (Discontinued) 5 mg Once As Needed 7/6/2017 7/6/2017    Sig - Route: Infuse 0.1 mL into a venous catheter 1 (One) Time As Needed (symptomatic hypotension - Notify attending anesthesiologist if this needs to be given). - Intravenous    Reason for Discontinue: Patient Transfer    ePHEDrine injection (Discontinued)  As Needed 7/6/2017 7/6/2017    Sig: As Needed.    Reason for Discontinue: Anesthesia Stop    famotidine (PEPCID) tablet " 20 mg (Discontinued) 20 mg 2 Times Daily 7/6/2017 7/7/2017    Sig - Route: Take 1 tablet by mouth 2 (Two) Times a Day. - Oral    Reason for Discontinue: Dose adjustment    fentaNYL citrate (PF) (SUBLIMAZE) injection 50 mcg (Discontinued) 50 mcg Every 15 Minutes PRN 7/6/2017 7/6/2017    Sig - Route: Infuse 1 mL into a venous catheter Every 15 (Fifteen) Minutes As Needed for Severe Pain (7-10). - Intravenous    Reason for Discontinue: Patient Transfer    fentaNYL citrate (PF) (SUBLIMAZE) injection 50 mcg (Discontinued) 50 mcg Every 5 Minutes PRN 7/6/2017 7/6/2017    Sig - Route: Infuse 1 mL into a venous catheter Every 5 (Five) Minutes As Needed for Moderate Pain (4-6) or Severe Pain (7-10). - Intravenous    Reason for Discontinue: Patient Transfer    fentaNYL citrate (PF) (SUBLIMAZE) injection (Discontinued)  As Needed 7/6/2017 7/6/2017    Sig - Route: Infuse  into a venous catheter As Needed for Severe Pain (7-10). - Intravenous    Reason for Discontinue: Anesthesia Stop    flumazenil (ROMAZICON) injection 0.2 mg (Discontinued) 0.2 mg As Needed 7/6/2017 7/6/2017    Sig - Route: Infuse 2 mL into a venous catheter As Needed (for benzodiazepine induced unresponsiveness or sedation). - Intravenous    Reason for Discontinue: Patient Transfer    glycopyrrolate (ROBINUL) injection (Discontinued)  As Needed 7/6/2017 7/6/2017    Sig - Route: Infuse  into a venous catheter As Needed for excess secretions. - Intravenous    Reason for Discontinue: Anesthesia Stop    heparin (porcine) injection (Discontinued)  As Needed 7/6/2017 7/6/2017    Sig: As Needed.    Reason for Discontinue: Anesthesia Stop    heparin (porcine) injection (Discontinued)  As Needed 7/6/2017 7/6/2017    Sig: As Needed.    Reason for Discontinue: Patient Discharge    hydrALAZINE (APRESOLINE) injection 5 mg (Discontinued) 5 mg Every 10 Minutes PRN 7/6/2017 7/6/2017    Sig - Route: Infuse 0.25 mL into a venous catheter Every 10 (Ten) Minutes As Needed for High  "Blood Pressure (for systolic blood pressure greater than 180 mmHg or diastolic blood pressure greater than 105 mmHg). - Intravenous    Reason for Discontinue: Patient Transfer    labetalol (NORMODYNE,TRANDATE) injection 5 mg (Discontinued) 5 mg Every 5 Minutes PRN 7/6/2017 7/6/2017    Sig - Route: Infuse 1 mL into a venous catheter Every 5 (Five) Minutes As Needed for High Blood Pressure (for systolic blood pressure greater than 180 mmHg or diastolic blood pressure greater than 105 mmHg). - Intravenous    Reason for Discontinue: Patient Transfer    labetalol (NORMODYNE,TRANDATE) injection (Discontinued)  As Needed 7/6/2017 7/6/2017    Sig: As Needed for High Blood Pressure.    Reason for Discontinue: Anesthesia Stop    midazolam (VERSED) injection 1 mg (Discontinued) 1 mg Every 5 Minutes PRN 7/6/2017 7/6/2017    Sig - Route: Infuse 1 mL into a venous catheter Every 5 (Five) Minutes As Needed for Anxiety (Max 4mg midazolam TOTAL). - Intravenous    Reason for Discontinue: Patient Transfer    Linked Group 2:  \"Or\" Linked Group Details        midazolam (VERSED) injection 2 mg (Discontinued) 2 mg Every 5 Minutes PRN 7/6/2017 7/6/2017    Sig - Route: Infuse 2 mL into a venous catheter Every 5 (Five) Minutes As Needed for Anxiety (Max 4mg midazolam TOTAL). - Intravenous    Reason for Discontinue: Patient Transfer    Linked Group 2:  \"Or\" Linked Group Details        midazolam (VERSED) injection (Discontinued)  As Needed 7/6/2017 7/6/2017    Sig - Route: Infuse  into a venous catheter As Needed. - Intravenous    Reason for Discontinue: Anesthesia Stop    naloxone (NARCAN) injection 0.2 mg (Discontinued) 0.2 mg As Needed 7/6/2017 7/6/2017    Sig - Route: Infuse 0.5 mL into a venous catheter As Needed for Opioid Reversal or Respiratory Depression (unresponsiveness, decrease oxygen saturation). - Intravenous    Reason for Discontinue: Patient Transfer    ondansetron (ZOFRAN) injection 4 mg (Discontinued) 4 mg Once As Needed " 7/6/2017 7/6/2017    Sig - Route: Infuse 2 mL into a venous catheter 1 (One) Time As Needed for Nausea or Vomiting. - Intravenous    Reason for Discontinue: Patient Transfer    phenylephrine (ANGÉLICA-SYNEPHRINE) injection (Discontinued)  As Needed 7/6/2017 7/6/2017    Sig: As Needed.    Reason for Discontinue: Anesthesia Stop    Propofol (DIPRIVAN) injection (Discontinued)  As Needed 7/6/2017 7/6/2017    Sig - Route: Infuse  into a venous catheter As Needed. - Intravenous    Reason for Discontinue: Anesthesia Stop    Propofol (DIPRIVAN) injection (Discontinued)  Continuous PRN 7/6/2017 7/6/2017    Sig: Continuous As Needed.    Reason for Discontinue: Anesthesia Stop    sodium chloride (NS) irrigation solution (Discontinued)  As Needed 7/6/2017 7/6/2017    Sig: As Needed.    Reason for Discontinue: Patient Discharge    sodium chloride 0.9 % flush 1-10 mL (Discontinued) 1-10 mL As Needed 7/6/2017 7/6/2017    Sig - Route: Infuse 1-10 mL into a venous catheter As Needed for Line Care. - Intravenous    Reason for Discontinue: Patient Transfer    sodium chloride 250 mL with heparin (porcine) 2,500 Units mixture (Discontinued)  As Needed 7/6/2017 7/6/2017    Sig: As Needed.    Reason for Discontinue: Patient Discharge          Orders (last 24 hrs)     Start     Ordered    07/08/17 0430  Renal Function Panel  Morning Draw      07/07/17 1110    07/08/17 0430  CBC & Differential  Morning Draw      07/07/17 1110    07/07/17 1109  Hemodialysis Inpatient  Once     Comments:  Heparin 2000 units IV with dialysis  Please primed the tunnel dialysis catheter with the proper dose of saline and heparin    07/07/17 1110    07/07/17 0915  famotidine (PEPCID) tablet 20 mg  Daily      07/07/17 0835    07/07/17 0430  Renal Function Panel  Morning Draw      07/06/17 1401    07/07/17 0430  CBC & Differential  Morning Draw      07/06/17 1401    07/07/17 0430  Magnesium  Morning Draw      07/06/17 1401    07/07/17 0430  CBC Auto Differential   PROCEDURE ONCE      07/06/17 2230 07/06/17 2251  Duplex Venous Lower Extremity - Bilateral CAR  Once      07/06/17 2250 07/06/17 1848  Advance Diet As Tolerated  Until Discontinued      07/06/17 1847 07/06/17 1847  HYDROcodone-acetaminophen (NORCO) 5-325 MG per tablet 1 tablet  Every 4 Hours PRN      07/06/17 1847 07/06/17 1847  HYDROmorphone (DILAUDID) injection 1 mg  Every 4 Hours PRN      07/06/17 1847 07/06/17 1321  Vital signs every 5 minutes for 15 minutes, every 15 minutes thereafter.  Once      07/06/17 1320    07/06/17 1321  Continue OR fluids  Until Discontinued,   Status:  Canceled      07/06/17 1320    07/06/17 1321  Call Anesthesiologist for additional IV Fluid bolus for Hypotension/Tachycardia  Until Discontinued,   Status:  Canceled      07/06/17 1320    07/06/17 1321  Notify Anesthesia of Any Acute Changes in Patient Condition  Until Discontinued,   Status:  Canceled      07/06/17 1320    07/06/17 1321  Notify Anesthesia for Unrelieved Pain  Until Discontinued,   Status:  Canceled      07/06/17 1320    07/06/17 1321  Oxygen Therapy- Nasal Cannula; 2 LPM; Titrate for SPO2: equal to or greater than, per policy, 90%  Continuous      07/06/17 1320    07/06/17 1321  Pulse Oximetry, Continuous  Continuous      07/06/17 1320    07/06/17 1321  Once DC criteria to floor met, follow surgeon's orders.  Until Discontinued,   Status:  Canceled      07/06/17 1320    07/06/17 1321  Discharge patient from PACU when discharge criteria is met.  Until Discontinued,   Status:  Canceled      07/06/17 1320    07/06/17 1320  POC Glucose Fingerstick  As Needed,   Status:  Canceled     Comments:  On all diabetic patients...Notify Anesthesia if blood sugar is less than 80 mg/dL or greater than 220 mg/dL    07/06/17 1320    07/06/17 1320  fentaNYL citrate (PF) (SUBLIMAZE) injection 50 mcg  Every 5 Minutes PRN,   Status:  Discontinued      07/06/17 1320    07/06/17 1320  labetalol (NORMODYNE,TRANDATE) injection 5  mg  Every 5 Minutes PRN,   Status:  Discontinued      07/06/17 1320    07/06/17 1320  hydrALAZINE (APRESOLINE) injection 5 mg  Every 10 Minutes PRN,   Status:  Discontinued      07/06/17 1320    07/06/17 1320  ePHEDrine injection 5 mg  Once As Needed,   Status:  Discontinued      07/06/17 1320    07/06/17 1320  naloxone (NARCAN) injection 0.2 mg  As Needed,   Status:  Discontinued      07/06/17 1320    07/06/17 1320  flumazenil (ROMAZICON) injection 0.2 mg  As Needed,   Status:  Discontinued      07/06/17 1320    07/06/17 1320  ondansetron (ZOFRAN) injection 4 mg  Once As Needed,   Status:  Discontinued      07/06/17 1320    07/06/17 1320  diphenhydrAMINE (BENADRYL) injection 12.5 mg  Every 15 Minutes PRN,   Status:  Discontinued      07/06/17 1320    07/06/17 1241  heparin (porcine) injection  As Needed,   Status:  Discontinued      07/06/17 1242    07/06/17 1150  bupivacaine PF 30 mL, lidocaine 1 % 30 mL mixture  As Needed,   Status:  Discontinued      07/06/17 1242    07/06/17 1150  sodium chloride 250 mL with heparin (porcine) 2,500 Units mixture  As Needed,   Status:  Discontinued      07/06/17 1243    07/06/17 1150  sodium chloride (NS) irrigation solution  As Needed,   Status:  Discontinued      07/06/17 1320    07/06/17 1030  sodium chloride 0.9 % infusion  Continuous      07/06/17 0952    07/06/17 0947  fentaNYL citrate (PF) (SUBLIMAZE) injection 50 mcg  Every 15 Minutes PRN,   Status:  Discontinued      07/06/17 0947    07/06/17 0947  sodium chloride 0.9 % flush 1-10 mL  As Needed,   Status:  Discontinued      07/06/17 0947    07/06/17 0947  midazolam (VERSED) injection 1 mg  Every 5 Minutes PRN,   Status:  Discontinued      07/06/17 0947    07/06/17 0947  midazolam (VERSED) injection 2 mg  Every 5 Minutes PRN,   Status:  Discontinued      07/06/17 0947    07/06/17 0947  Vital Signs - Per Anesthesia Protocol  As Needed,   Status:  Canceled      07/06/17 0947    07/06/17 0900  famotidine (PEPCID) tablet 20  mg  2 Times Daily,   Status:  Discontinued      07/05/17 2323 07/05/17 2323  aluminum-magnesium hydroxide-simethicone (MAALOX MAX) 400-400-40 MG/5ML suspension 30 mL  Every 4 Hours PRN      07/05/17 2323    07/05/17 1143  mannitol 25% (RENAL) injection  As Needed      07/05/17 1143    07/04/17 2134  hydrALAZINE (APRESOLINE) injection 20 mg  Every 8 Hours PRN      07/04/17 2135    07/04/17 2132  labetalol (NORMODYNE,TRANDATE) injection 20 mg  Every 6 Hours PRN      07/04/17 2135    07/04/17 1115  minoxidil (LONITEN) tablet 5 mg  Every 12 Hours Scheduled      07/04/17 1033    07/04/17 1115  metoprolol succinate XL (TOPROL-XL) 24 hr tablet 100 mg  Every 24 Hours Scheduled      07/04/17 1033    07/04/17 0930  cefTRIAXone (ROCEPHIN) IVPB 1 g  Every 24 Hours      07/04/17 0852    07/04/17 0930  azithromycin (ZITHROMAX) tablet 500 mg  Every 24 Hours Scheduled      07/04/17 0852    07/03/17 1935  temazepam (RESTORIL) capsule 15 mg  Nightly PRN      07/03/17 1935    07/03/17 1445  heparin (porcine) 5000 UNIT/ML injection 5,000 Units  Every 8 Hours Scheduled      07/03/17 1407    07/03/17 1125  Lactate Dehydrogenase  Daily      07/03/17 1124    07/03/17 0808  sodium chloride 0.9 % infusion  Continuous      07/03/17 0806    07/03/17 0805  sodium chloride 0.9 % flush 10 mL  As Needed      07/03/17 0806    07/03/17 0803  sodium chloride 0.9 % flush 10 mL  As Needed      07/03/17 0803    --  nebivolol (BYSTOLIC) 10 MG tablet  Daily      07/03/17 1326    --  hydrochlorothiazide (HYDRODIURIL) 25 MG tablet  Daily      07/03/17 1326    --  Multiple Vitamins-Minerals (MULTIVITAMIN ADULT PO)  Daily      07/03/17 1326    Signed and Held  epoetin susan (EPOGEN,PROCRIT) injection 10,000 Units  Once per day on Mon Wed Fri      Signed and Held    Signed and Held  sodium chloride 0.9 % bolus 1,000 mL  As Needed      Signed and Held    Signed and Held  mannitol 25% (RENAL) injection  As Needed      Signed and Held           Op Note  Date of  Service: 7/6/2017 1:10 PM  Kar Morales MD   Vascular Surgery      []Hide copied text  []Hover for attribution information  07/06/17   Kar Morales MD        Preoperative Diagnosis: End Stage Renal Disease     Postoperative Diagnosis: same as above     Procedure Performed: left brachiocephalic fistula creation #2- U/S guided acces, right IJ vein. #3- Right IJ 23 cm tunneled dialysis catheter placement     Surgeon: Kar Morales MD     Assistant: Kami HERRERA     Anesthesia: Local/MAC     Estimated Blood Loss: minimal     Specimen: none     Complications: none   Dispo: to PACU     Indication for procedure: 56 y.o. male with End Stage Renal Disease. The patient has vein mapping suggesting that a left brachiocephalic fistula will be feasible. The risks, benefits, and alternatives to the procedure were discussed.      Description of procedure: The patient was taken to the operating room and placed in the supine position.  The right neck and chest were prepped and draped in the usual sterile fashion. A timeout was observed. Preoperative antibiotics had been given. Using an ultrasound the Right Internal Jugular vein was accessed and the guidewire was passed centrally. This was guided using fluoroscopy into the IVC.   Next the catheter track was planned on the right chest using fluoroscopic guidance. Under local anesthesia a nick was made with an 11 blade scalpel in the skin and the tunneling device was used to track the catheter to the incision.     Sequential dilators were used to dilate the subcutaneous tract from the access site using fluoroscopic guidance. The peel-away sheath was placed. The dilator and the wire were removed and the catheter was placed down through the peel-away sheath and then the sheath was removed. The catheter was pulled back until there was no kink. The tip was in the atrio-caval junction. All lumens flushed and kayden back dark blood easily. They were  flushed with heparinized saline and then locked with high-dose heparin and the caps were placed. The catheter was stitched twice to the patient using 3-0 Vicryl suture. The stab incision was closed using a 3-0 Vicryl. Dermabond was used and a sterile dressing was placed.        The left arm was extended on an arm board and then prepped and draped in the usual sterile fashion. Preoperative antibiotics were given. A timeout was done.      A horizontal incision was made and the cephalic vein was dissected out for several centimeters. Next the fascia was opened and the brachial artery was dissected out for several centimeters. The vein was marked for orientation, divided, and flushed with heparinized saline. The patient was systemically heparinized. After the heparin had been allowed to circulate for 5 minutes clamps were applied distally first and then proximally to the brachial artery. It was opened longitudinally with an 11 blade and then scissors and then an anastomosis was created using 6-0 running Prolene between the artery and the vein. Appropriate backbleeding and flushing maneuvers were done.      Flow was then restored. The fistula had an immediate mild thrill. Two sidebranches were visualized with ultrasound and ligated via separate small incisions near the antecubitum and in the mid arm.      Doppler examination of the fistula revealed continuous diastolic flow and the radial and ulnar signals were audible at the wrist with the fistula both open and compressed.      The field was irrigated with sterile saline. After hemostasis was achieved, it was closed in the subcutaneous layers using 3-0 Vicryl and then in the skin subcuticular layer using 4-0 Vicryl. The incision was clean and dry and covered in Dermabond. The patient tolerated procedure well, counts are correct.         Kar Morales MD  07/06/17                  Kar Morales MD at 7/7/2017  9:21 AM  Version 1 of 1         Tunneled  "catheter insertion site without complication. Left brachiocephalic arterio-venous fistula has a nice thrill. Incisions are clean, dry, and intact. Will see back in 2 weeks in the office for an arterio-venous fistula scan due to small cephalic vein near the antecubitum at the time of creation.      Electronically signed by Kar Morales MD at 7/7/2017  9:23 AM      David Dejesus MD at 7/7/2017 11:04 AM  Version 1 of 1            LOS: 4 days    No care team member to display    Chief Complaint:    Chief Complaint   Patient presents with   • Nausea     Patient states nauseated, vomited on Friday, dry mouth x 2 weeks   • Vomiting   • Constipation     last normal bowel movement last wednesday       Subjective follow-up advanced chronic kidney disease    Interval History:   The patient is feeling much better today since he had dialysis yesterday he denies any chest pain or shortness of air, no orthopnea or PND, no nausea or vomiting.  Complaining of some swelling of his left knee.  Had no melena or hematochezia or hematemesis.    Review of Systems:   As noted above    Objective     Vital Signs  Temp:  [97.6 °F (36.4 °C)-98.9 °F (37.2 °C)] 98.1 °F (36.7 °C)  Heart Rate:  [79-96] 96  Resp:  [16-18] 18  BP: (104-157)/(60-94) 153/94    Flowsheet Rows         First Filed Value    Admission Height  70\" (177.8 cm) Documented at 07/03/2017 0734    Admission Weight  150 lb (68 kg) Documented at 07/03/2017 0734          I/O this shift:  In: 240 [P.O.:240]  Out: -   I/O last 3 completed shifts:  In: 400 [I.V.:400]  Out: 200 [Urine:200]    Intake/Output Summary (Last 24 hours) at 07/07/17 1104  Last data filed at 07/07/17 0920   Gross per 24 hour   Intake              640 ml   Output                0 ml   Net              640 ml       Physical Exam:  General Appearance: Awake, alert and oriented ×3 , no acute distress,   Skin: warm and dry   Neck: supple, no JVD, trachea midline, tunnel dialysis catheter in the right " IJ with exit site below the right clavicle  Lungs: Scattered rhonchi, unlabored breathing effort  Heart: RRR, normal S1 and S2, no S3, positive S4, no rub  Abdomen: soft, no palpable bladder, present bowel sounds to auscultation  Extremities: no edema, cyanosis or clubbing, functional AV fistula in the left upper arm  Neuro: Normal speech and mental status      Results Review:      Results from last 7 days  Lab Units 07/07/17  0325 07/06/17 0313 07/05/17  0400  07/03/17  0804   SODIUM mmol/L 138 132*  132* 133*  < > 134*   POTASSIUM mmol/L 3.6 3.4*  3.4* 3.6  < > 2.6*   CHLORIDE mmol/L 99 97*  97* 97*  < > 89*   CO2 mmol/L 19.4* 13.2*  13.2* 14.0*  < > 25.1   BUN mg/dL 49* 74*  74* 72*  < > 74*   CREATININE mg/dL 7.53* 10.42*  10.42* 10.14*  < > 10.02*   CALCIUM mg/dL 8.0* 7.5*  7.5* 7.9*  < > 9.1   BILIRUBIN mg/dL  --   --   --   --  1.9*   ALK PHOS U/L  --   --   --   --  58   ALT (SGPT) U/L  --   --   --   --  23   AST (SGOT) U/L  --   --   --   --  41*   GLUCOSE mg/dL 153* 128*  128* 123*  < > 165*   < > = values in this interval not displayed.        Results from last 7 days  Lab Units 07/07/17  0325 07/06/17 0313 07/05/17  0400   MAGNESIUM mg/dL 2.4 2.7* 2.6   PHOSPHORUS mg/dL 5.0* 4.7* 4.1         Results from last 7 days  Lab Units 07/06/17 0313 07/05/17  0400 07/04/17  0451   URIC ACID mg/dL 8.5* 8.2* 7.9*         Results from last 7 days  Lab Units 07/07/17  0325 07/06/17  0313 07/05/17  0400 07/04/17  0451 07/03/17  1203 07/03/17  0804   WBC 10*3/mm3 6.98 7.49 10.46 5.63  --  7.23   HEMOGLOBIN g/dL 7.9* 8.5* 9.7* 8.2*  --  10.3*   PLATELETS 10*3/mm3 188 154 165 98* 63* 70*         Results from last 7 days  Lab Units 07/03/17  0804   INR  1.22*         Imaging Results (last 24 hours)     Procedure Component Value Units Date/Time    IR PICC W Fluoro Surgery Only [[id number]] Resulted:  07/07/17 0805     Updated:  07/07/17 0805          azithromycin 500 mg Oral Q24H   ceftriaxone 1 g Intravenous  Q24H   famotidine 20 mg Oral Daily   heparin (porcine) 5,000 Units Subcutaneous Q8H   metoprolol succinate  mg Oral Q24H   minoxidil 5 mg Oral Q12H       sodium chloride 125 mL/hr Last Rate: 125 mL/hr (07/05/17 2308)   sodium chloride 9 mL/hr        Medication Review:   Current Facility-Administered Medications   Medication Dose Route Frequency Provider Last Rate Last Dose   • aluminum-magnesium hydroxide-simethicone (MAALOX MAX) 400-400-40 MG/5ML suspension 30 mL  30 mL Oral Q4H PRN Orquidea Sue MD   30 mL at 07/05/17 2338   • azithromycin (ZITHROMAX) tablet 500 mg  500 mg Oral Q24H Stephen Arauz MD   500 mg at 07/07/17 1035   • cefTRIAXone (ROCEPHIN) IVPB 1 g  1 g Intravenous Q24H Stephen Arauz MD   1 g at 07/07/17 1034   • famotidine (PEPCID) tablet 20 mg  20 mg Oral Daily Stephen Arauz MD   20 mg at 07/07/17 1035   • heparin (porcine) 5000 UNIT/ML injection 5,000 Units  5,000 Units Subcutaneous Q8H Stephen Arauz MD   5,000 Units at 07/07/17 0649   • hydrALAZINE (APRESOLINE) injection 20 mg  20 mg Intravenous Q8H PRN Orquidea Sue MD   20 mg at 07/04/17 2213   • HYDROcodone-acetaminophen (NORCO) 5-325 MG per tablet 1 tablet  1 tablet Oral Q4H PRN Kar Morales MD   1 tablet at 07/06/17 2244   • HYDROmorphone (DILAUDID) injection 1 mg  1 mg Intravenous Q4H PRN Kar Morales MD       • labetalol (NORMODYNE,TRANDATE) injection 20 mg  20 mg Intravenous Q6H PRN Orquidea Sue MD       • metoprolol succinate XL (TOPROL-XL) 24 hr tablet 100 mg  100 mg Oral Q24H David Dejesus MD   100 mg at 07/07/17 1034   • minoxidil (LONITEN) tablet 5 mg  5 mg Oral Q12H David Dejesus MD   5 mg at 07/07/17 1035   • sodium chloride 0.9 % flush 10 mL  10 mL Intravenous PRN Marbella Cannon MD       • sodium chloride 0.9 % flush 10 mL  10 mL Intravenous PRN Marbella Cannon MD       • sodium chloride 0.9 % infusion  125 mL/hr Intravenous Continuous Marbella Cannon  mL/hr at 07/05/17 9598     • sodium chloride  0.9 % infusion  9 mL/hr Intravenous Continuous Cameron Mei MD       • temazepam (RESTORIL) capsule 15 mg  15 mg Oral Nightly PRN Orquidea Sue MD   15 mg at 07/05/17 8884       Assessment/Plan   1.  Severe renal failure could be the end stage renal disease related to severe uncontrolled hypertension, Had dialysis yesterday and did very well.    2.  Hypertensive the crisis, with end organ damage acute malignant hypertension.  Blood pressure has improved.  Regarding the new medication without any difficulties  3.  History of hypertension with the noncompliance with medication and has not taken any medication for over a year.  4.  Degenerative joint disease with prior left total knee replacement  5.  Anemia could be associated with chronic kidney disease, globin today is 7.9   6.  Thrombocytopenia, resolved.  7.  Hyponatremia , resolved sodium is 138  8.  Hypokalemia, proved, potassium 3.6        Plan:  1.  Dialysis today, I will try to avoid the transfusion, we will give Procrit with dialysis   2.  Surveillance labs  3.  Will not work on arranging outpatient dialysis.    I discussed the plan with the patient          David Dejesus MD  07/07/17  11:04 AM       Electronically signed by David Dejesus MD at 7/7/2017 11:10 AM      Dimas Hung MD at 7/7/2017 12:42 PM  Version 1 of 1           Subjective         History of Present Illness  ANEMIA in crd, THROMBOCYTOPENIA resolved, PNEUMONIA HYPERTENSIVE EMERGENCY, RENAL FAILURE    Past Medical History   Past Medical History:   Diagnosis Date   • Hypertension      Social History     Social History   • Marital status:      Spouse name: N/A   • Number of children: N/A   • Years of education: N/A     Occupational History   • Not on file.     Social History Main Topics   • Smoking status: Never Smoker   • Smokeless tobacco: Not on file   • Alcohol use Yes      Comment: occassionally   • Drug use: Defer   • Sexual activity: Defer     Other Topics Concern    • Not on file     Social History Narrative     Family History   Problem Relation Age of Onset   • Hypertension Mother    • Hypertension Father        Review of Systems   General: no fever, chills, fatigue, weight changes, or lack of appetite.  Eyes: no epiphora, xerophthalmia,conjunctivitis, pain, glaucoma, blurred vision, blindness, secretion, photophobia, proptosis, diplopia.  Ears: no otorrhea, tinnitus, otorrhagia, deafness, pain, vertigo.  Nose: no rhinorrhea, epistaxis, alteration in perception of odors, sinuses pressure.  Mouth: no alteration in gums or teeth,  ulcers, no difficulty with mastication or deglut ion, no odynophagia.  Neck: no masses or pain, no thyroid alterations, no pain in muscles or arteries, no carotid odynia, no crepitation.  Respiratory: no cough, sputum production, dyspnea, trepopnea, pleuritic pain, hemoptysis.  Heart: no syncope, irregularity, palpitations, angina, orthopnea, paroxysmal nocturnal dyspnea.  Vascular Venous: no tenderness,edema, palpable cords, postphlebitic syndrome, skin changes or ulcerations.  Vascular Arterial: no distal ischemia, claudication, gangrene, neuropathic ischemic pain, skin ulcers, paleness or cyanosis.  GI: no dysphagia, odynophagia, no regurgitation, no heartburn,no indigestion,PERSISITENT nausea,AND vomiting,no hematemesis ,no melena,no jaundice,no distention, no obstipation,no enterorrhagia,no proctalgia,no anal  lesions, nochanges in bowel habits.  : no frequency, hesitancy, hematuria, discharge, pain.  Musculoskeletal: no muscle or tendon pain or inflammation, joint pain, edema, functional limitation, fasciculations, mass.  Neurologic: no headache, seizures, alterations on Craneal nerves, no motor or senssory deficit, normal coordination, no alteration in memory, orientation, calculation,writting, verbal or written language.  Skin: no rashes, pruritus or localized lesions.  Psychiatric: no anxiety, depression, agitation, delusions, proper  insight.  A comprehensive 14 point review of systems was performed and was negative except as mentioned.    Medications:  The current medication list was reviewed in the EMR    ALLERGIES:  No Known Allergies    Objective      Vitals:    07/06/17 2016 07/07/17 0005 07/07/17 0555 07/07/17 0900   BP: 144/90 133/81  153/94   BP Location: Right arm Right arm  Right arm   Patient Position: Lying Lying  Lying   Pulse: 96 92  96   Resp: 18 18  18   Temp: 98.9 °F (37.2 °C) 98.5 °F (36.9 °C)  98.1 °F (36.7 °C)   TempSrc: Oral Oral  Oral   SpO2: 98% 92%  92%   Weight:   168 lb (76.2 kg)    Height:         No flowsheet data found.    Physical Exam  UNCHANGED FROM YESTERDAY.  RECENT LABS:Erythropoietin   Order: [id number]   Status:  Final result   Visible to patient:  No (Not Released)      Ref Range & Units 4d ago     Erythropoietin 2.6 - 18.5 mIU/mL 21.7 (H)   Resulting Agency  [facility]   Narrative   Performed at:  [facility]  [id number]  : Armaan Perkins PhD, Phone:  [phone]      Specimen Collected: 07/03/17 11:55 AM Last Resulted: 07/05/17  1:08 PM                Hematology WBC   Date Value Ref Range Status   07/07/2017 6.98 4.50 - 10.70 10*3/mm3 Final     RBC   Date Value Ref Range Status   07/07/2017 2.36 (L) 4.60 - 6.00 10*6/mm3 Final     Hemoglobin   Date Value Ref Range Status   07/07/2017 7.9 (L) 13.7 - 17.6 g/dL Final     Hematocrit   Date Value Ref Range Status   07/07/2017 23.2 (L) 40.4 - 52.2 % Final     Platelets   Date Value Ref Range Status   07/07/2017 188 140 - 500 10*3/mm3 Final      BEBA,PE and FLC, Serum   Order: [id number]   Status:  Final result   Visible to patient:  No (Not Released)      Newer results are available. Click to view them now.            Ref Range & Units 4d ago     IgG 700 - 1600 mg/dL 692 (L)   IgA 90 - 386 mg/dL 153   IgM 20 - 172 mg/dL 130   Total Protein 6.0 - 8.5 g/dL 6.1   Albumin 2.9 - 4.4 g/dL 2.9   Alpha-1-Globulin 0.0 - 0.4  g/dL 0.4   Alpha-2-Globulin 0.4 - 1.0 g/dL 0.7   Beta Globulin 0.7 - 1.3 g/dL 1.1   Gamma Globulin 0.4 - 1.8 g/dL 1.0   M-Seamus Not Observed g/dL Not Observed   Globulin 2.2 - 3.9 g/dL 3.2   A/G Ratio 0.7 - 1.7 1.0   Immunofixation Reflex, Serum  Comment   Comments: No monoclonality detected.   Please note  Comment   Comments: Protein electrophoresis scan will follow via computer, mail, or    delivery.   Free Light Chain, Kappa 3.3 - 19.4 mg/L 122.7 (H)   Comments:               **Please note reference interval change**   Free Lambda Light Chains 5.7 - 26.3 mg/L 81.2 (H)   Comments:               **Please note reference interval change**   Kappa/Lambda Ratio 0.26 - 1.65 1.51             Assessment/Plan in summary this patient has anemia with a normal ferritin normal Lidoderm normal B12 normal folic acid level anything particularly thin level is pending at this time.  I do believe that this patient has chronic K kidney disease and anemia corresponds to the process nevertheless an EPO level is pending at this time.  Also we have Serum protein electrophoresis is pending at this time to be sure that there is no monoclonal protein.  The patient has in the toP OF things pneumonia with bilateral pleural effusions and a he's been treated for the same.  His ultrasound of the kidneys shows  large cyst in the right kidney and otherwise sonographic changes that suggest chronic kidney disease.  Again in patientS to have severe hypertension thrombocytopenia is a phenomenon back on a ball associated with a high LDH and sometimes mimics a TTP.  Again I don't believe that this patient has TTP base of the analysis of his peripheral blood DONE YESTERDAY.  I find no need for blood products today..  We will follow him up .     Stable Hb, improved platelet count, ldh remains elevated.  I have placed the laboratory parameters obtained recently in day and a progress note today including the serum protein electrophoresis that  disclosed is no evidence of monoclonal protein.  They erythropoietin level is low and consistent with chronic kidney disease.  The patient has recovered a platelet count completely.  I have nothing else to add to the care of this patient at this point and he will remain him care will remains related to nephrology and primary team.  I will sign off in his case.    7/7/2017     Electronically signed by Dimas Hung MD at 7/7/2017 12:44 PM

## 2017-07-07 NOTE — PROGRESS NOTES
Continued Stay Note  [facility]     Patient Name: Harry Ortega  MRN: [id number]  Today's Date: 7/7/2017    Admit Date: 7/3/2017          Discharge Plan       07/07/17 1235    Case Management/Social Work Plan    Plan Home with wife and family support- will likely need outpatient dialysis set up.    Patient/Family In Agreement With Plan yes    Additional Comments Spoke with wife, Raiza at bedside and she states the plan is for patient to return home with her and has lots of family support and denies need for HH or DME at discharge.  Wife states that family will provide transportation to OP dialysis if needed at discharge.   CCP will continue to follow and assist as needed......Anjelica Carrillo RN,CCP               Discharge Codes     None            Anjelica Carrillo RN

## 2017-07-07 NOTE — PLAN OF CARE
Problem: Renal Failure/Kidney Injury, Acute (Adult)  Intervention: Prevent/Manage Infection    07/07/17 1315 07/07/17 1425   Safety Interventions   Infection Prevention --  personal protective equipment utilized;rest/sleep promoted   Hygiene Care Assistance   Oral Care teeth brushed --    Pain/Comfort/Sleep Interventions   Sleep/Rest Enhancement --  awakenings minimized;consistent schedule promoted;family presence promoted;regular sleep/rest pattern promoted;relaxation techniques promoted           Problem: Patient Care Overview (Adult)  Goal: Plan of Care Review  Outcome: Ongoing (interventions implemented as appropriate)    07/06/17 1656 07/07/17 1425 07/07/17 1833   Outcome Evaluation   Outcome Summary/Follow up Plan --  --  Pt. in hemodialsysis; BP has remained stable while on floor as has other vitals; pt. complaining of a little discomfort in right side from tunnel cath placement, improved w/repositioning.; Doppler left leg neg for clots; fistula left arm with good thrill & bruit.   Patient Care Overview   Progress improving --  --    Coping/Psychosocial Response Interventions   Plan Of Care Reviewed With --  patient --        Goal: Adult Individualization and Mutuality  Outcome: Outcome(s) achieved Date Met:  07/07/17  Goal: Discharge Needs Assessment  Outcome: Ongoing (interventions implemented as appropriate)    Problem: Hypertensive Disease/Crisis (Arterial) (Adult)  Goal: Signs and Symptoms of Listed Potential Problems Will be Absent or Manageable (Hypertensive Disease/Crisis)  Outcome: Ongoing (interventions implemented as appropriate)    Problem: Fall Risk (Adult)  Goal: Absence of Falls  Outcome: Ongoing (interventions implemented as appropriate)

## 2017-07-07 NOTE — PROGRESS NOTES
Subjective         History of Present Illness  ANEMIA in crd, THROMBOCYTOPENIA resolved, PNEUMONIA HYPERTENSIVE EMERGENCY, RENAL FAILURE    Past Medical History   Past Medical History:   Diagnosis Date   • Hypertension      Social History     Social History   • Marital status:      Spouse name: N/A   • Number of children: N/A   • Years of education: N/A     Occupational History   • Not on file.     Social History Main Topics   • Smoking status: Never Smoker   • Smokeless tobacco: Not on file   • Alcohol use Yes      Comment: occassionally   • Drug use: Defer   • Sexual activity: Defer     Other Topics Concern   • Not on file     Social History Narrative     Family History   Problem Relation Age of Onset   • Hypertension Mother    • Hypertension Father        Review of Systems   General: no fever, chills, fatigue, weight changes, or lack of appetite.  Eyes: no epiphora, xerophthalmia,conjunctivitis, pain, glaucoma, blurred vision, blindness, secretion, photophobia, proptosis, diplopia.  Ears: no otorrhea, tinnitus, otorrhagia, deafness, pain, vertigo.  Nose: no rhinorrhea, epistaxis, alteration in perception of odors, sinuses pressure.  Mouth: no alteration in gums or teeth,  ulcers, no difficulty with mastication or deglut ion, no odynophagia.  Neck: no masses or pain, no thyroid alterations, no pain in muscles or arteries, no carotid odynia, no crepitation.  Respiratory: no cough, sputum production, dyspnea, trepopnea, pleuritic pain, hemoptysis.  Heart: no syncope, irregularity, palpitations, angina, orthopnea, paroxysmal nocturnal dyspnea.  Vascular Venous: no tenderness,edema, palpable cords, postphlebitic syndrome, skin changes or ulcerations.  Vascular Arterial: no distal ischemia, claudication, gangrene, neuropathic ischemic pain, skin ulcers, paleness or cyanosis.  GI: no dysphagia, odynophagia, no regurgitation, no heartburn,no indigestion,PERSISITENT nausea,AND vomiting,no hematemesis ,no  melena,no jaundice,no distention, no obstipation,no enterorrhagia,no proctalgia,no anal  lesions, nochanges in bowel habits.  : no frequency, hesitancy, hematuria, discharge, pain.  Musculoskeletal: no muscle or tendon pain or inflammation, joint pain, edema, functional limitation, fasciculations, mass.  Neurologic: no headache, seizures, alterations on Craneal nerves, no motor or senssory deficit, normal coordination, no alteration in memory, orientation, calculation,writting, verbal or written language.  Skin: no rashes, pruritus or localized lesions.  Psychiatric: no anxiety, depression, agitation, delusions, proper insight.  A comprehensive 14 point review of systems was performed and was negative except as mentioned.    Medications:  The current medication list was reviewed in the EMR    ALLERGIES:  No Known Allergies    Objective      Vitals:    07/06/17 2016 07/07/17 0005 07/07/17 0555 07/07/17 0900   BP: 144/90 133/81  153/94   BP Location: Right arm Right arm  Right arm   Patient Position: Lying Lying  Lying   Pulse: 96 92  96   Resp: 18 18  18   Temp: 98.9 °F (37.2 °C) 98.5 °F (36.9 °C)  98.1 °F (36.7 °C)   TempSrc: Oral Oral  Oral   SpO2: 98% 92%  92%   Weight:   168 lb (76.2 kg)    Height:         No flowsheet data found.    Physical Exam  UNCHANGED FROM YESTERDAY.  RECENT LABS:Erythropoietin   Order: [id number]   Status:  Final result   Visible to patient:  No (Not Released)      Ref Range & Units 4d ago     Erythropoietin 2.6 - 18.5 mIU/mL 21.7 (H)   Resulting Agency  [facility]   Narrative   Performed at:  [address] [facility]  [id number]  : Armaan Perkins PhD, Phone:  [phone]      Specimen Collected: 07/03/17 11:55 AM Last Resulted: 07/05/17  1:08 PM                Hematology WBC   Date Value Ref Range Status   07/07/2017 6.98 4.50 - 10.70 10*3/mm3 Final     RBC   Date Value Ref Range Status   07/07/2017 2.36 (L) 4.60 - 6.00 10*6/mm3 Final     Hemoglobin    Date Value Ref Range Status   07/07/2017 7.9 (L) 13.7 - 17.6 g/dL Final     Hematocrit   Date Value Ref Range Status   07/07/2017 23.2 (L) 40.4 - 52.2 % Final     Platelets   Date Value Ref Range Status   07/07/2017 188 140 - 500 10*3/mm3 Final      BEBA,PE and FLC, Serum   Order: [id number]   Status:  Final result   Visible to patient:  No (Not Released)      Newer results are available. Click to view them now.            Ref Range & Units 4d ago     IgG 700 - 1600 mg/dL 692 (L)   IgA 90 - 386 mg/dL 153   IgM 20 - 172 mg/dL 130   Total Protein 6.0 - 8.5 g/dL 6.1   Albumin 2.9 - 4.4 g/dL 2.9   Alpha-1-Globulin 0.0 - 0.4 g/dL 0.4   Alpha-2-Globulin 0.4 - 1.0 g/dL 0.7   Beta Globulin 0.7 - 1.3 g/dL 1.1   Gamma Globulin 0.4 - 1.8 g/dL 1.0   M-Seamus Not Observed g/dL Not Observed   Globulin 2.2 - 3.9 g/dL 3.2   A/G Ratio 0.7 - 1.7 1.0   Immunofixation Reflex, Serum  Comment   Comments: No monoclonality detected.   Please note  Comment   Comments: Protein electrophoresis scan will follow via computer, mail, or    delivery.   Free Light Chain, Kappa 3.3 - 19.4 mg/L 122.7 (H)   Comments:               **Please note reference interval change**   Free Lambda Light Chains 5.7 - 26.3 mg/L 81.2 (H)   Comments:               **Please note reference interval change**   Kappa/Lambda Ratio 0.26 - 1.65 1.51             Assessment/Plan in summary this patient has anemia with a normal ferritin normal Lidoderm normal B12 normal folic acid level anything particularly thin level is pending at this time.  I do believe that this patient has chronic K kidney disease and anemia corresponds to the process nevertheless an EPO level is pending at this time.  Also we have Serum protein electrophoresis is pending at this time to be sure that there is no monoclonal protein.  The patient has in the toP OF things pneumonia with bilateral pleural effusions and a he's been treated for the same.  His ultrasound of the kidneys shows  large cyst  in the right kidney and otherwise sonographic changes that suggest chronic kidney disease.  Again in patientS to have severe hypertension thrombocytopenia is a phenomenon back on a ball associated with a high LDH and sometimes mimics a TTP.  Again I don't believe that this patient has TTP base of the analysis of his peripheral blood DONE YESTERDAY.  I find no need for blood products today..  We will follow him up .     Stable Hb, improved platelet count, ldh remains elevated.  I have placed the laboratory parameters obtained recently in day and a progress note today including the serum protein electrophoresis that disclosed is no evidence of monoclonal protein.  They erythropoietin level is low and consistent with chronic kidney disease.  The patient has recovered a platelet count completely.  I have nothing else to add to the care of this patient at this point and he will remain him care will remains related to nephrology and primary team.  I will sign off in his case.                  7/7/2017      CC:

## 2017-07-07 NOTE — PLAN OF CARE
Problem: Renal Failure/Kidney Injury, Acute (Adult)  Goal: Signs and Symptoms of Listed Potential Problems Will be Absent or Manageable (Renal Failure/Kidney Injury, Acute)  Outcome: Ongoing (interventions implemented as appropriate)    07/06/17 1656   Renal Failure/Kidney Injury, Acute   Problems Assessed (Acute Renal Failure/Kidney Injury) hypertension   Problems Present (Acute Renal Failure/Kidney Injury) hypertension         Problem: Patient Care Overview (Adult)  Goal: Plan of Care Review  Outcome: Ongoing (interventions implemented as appropriate)  Goal: Adult Individualization and Mutuality  Outcome: Ongoing (interventions implemented as appropriate)  Goal: Discharge Needs Assessment  Outcome: Ongoing (interventions implemented as appropriate)    Problem: Hypertensive Disease/Crisis (Arterial) (Adult)  Goal: Signs and Symptoms of Listed Potential Problems Will be Absent or Manageable (Hypertensive Disease/Crisis)  Outcome: Ongoing (interventions implemented as appropriate)    Problem: Fall Risk (Adult)  Goal: Absence of Falls  Outcome: Ongoing (interventions implemented as appropriate)

## 2017-07-07 NOTE — PROGRESS NOTES
Tunneled catheter insertion site without complication. Left brachiocephalic arterio-venous fistula has a nice thrill. Incisions are clean, dry, and intact. Will see back in 2 weeks in the office for an arterio-venous fistula scan due to small cephalic vein near the antecubitum at the time of creation.

## 2017-07-07 NOTE — PROGRESS NOTES
"   LOS: 4 days    No care team member to display    Chief Complaint:    Chief Complaint   Patient presents with   • Nausea     Patient states nauseated, vomited on Friday, dry mouth x 2 weeks   • Vomiting   • Constipation     last normal bowel movement last wednesday       Subjective follow-up advanced chronic kidney disease    Interval History:   The patient is feeling much better today since he had dialysis yesterday he denies any chest pain or shortness of air, no orthopnea or PND, no nausea or vomiting.  Complaining of some swelling of his left knee.  Had no melena or hematochezia or hematemesis.    Review of Systems:   As noted above    Objective     Vital Signs  Temp:  [97.6 °F (36.4 °C)-98.9 °F (37.2 °C)] 98.1 °F (36.7 °C)  Heart Rate:  [79-96] 96  Resp:  [16-18] 18  BP: (104-157)/(60-94) 153/94    Flowsheet Rows         First Filed Value    Admission Height  70\" (177.8 cm) Documented at 07/03/2017 0734    Admission Weight  150 lb (68 kg) Documented at 07/03/2017 0734          I/O this shift:  In: 240 [P.O.:240]  Out: -   I/O last 3 completed shifts:  In: 400 [I.V.:400]  Out: 200 [Urine:200]    Intake/Output Summary (Last 24 hours) at 07/07/17 1104  Last data filed at 07/07/17 0920   Gross per 24 hour   Intake              640 ml   Output                0 ml   Net              640 ml       Physical Exam:  General Appearance: Awake, alert and oriented ×3, no acute distress,   Skin: warm and dry   Neck: supple, no JVD, trachea midline, tunnel dialysis catheter in the right IJ with exit site below the right clavicle  Lungs: Scattered rhonchi, unlabored breathing effort  Heart: RRR, normal S1 and S2, no S3, positive S4, no rub  Abdomen: soft, no palpable bladder, present bowel sounds to auscultation  Extremities: no edema, cyanosis or clubbing, functional AV fistula in the left upper arm  Neuro: Normal speech and mental status      Results Review:      Results from last 7 days  Lab Units 07/07/17  0325 " 07/06/17  0313 07/05/17  0400  07/03/17  0804   SODIUM mmol/L 138 132*  132* 133*  < > 134*   POTASSIUM mmol/L 3.6 3.4*  3.4* 3.6  < > 2.6*   CHLORIDE mmol/L 99 97*  97* 97*  < > 89*   CO2 mmol/L 19.4* 13.2*  13.2* 14.0*  < > 25.1   BUN mg/dL 49* 74*  74* 72*  < > 74*   CREATININE mg/dL 7.53* 10.42*  10.42* 10.14*  < > 10.02*   CALCIUM mg/dL 8.0* 7.5*  7.5* 7.9*  < > 9.1   BILIRUBIN mg/dL  --   --   --   --  1.9*   ALK PHOS U/L  --   --   --   --  58   ALT (SGPT) U/L  --   --   --   --  23   AST (SGOT) U/L  --   --   --   --  41*   GLUCOSE mg/dL 153* 128*  128* 123*  < > 165*   < > = values in this interval not displayed.        Results from last 7 days  Lab Units 07/07/17  0325 07/06/17  0313 07/05/17  0400   MAGNESIUM mg/dL 2.4 2.7* 2.6   PHOSPHORUS mg/dL 5.0* 4.7* 4.1         Results from last 7 days  Lab Units 07/06/17  0313 07/05/17  0400 07/04/17  0451   URIC ACID mg/dL 8.5* 8.2* 7.9*         Results from last 7 days  Lab Units 07/07/17  0325 07/06/17  0313 07/05/17  0400 07/04/17  0451 07/03/17  1203 07/03/17  0804   WBC 10*3/mm3 6.98 7.49 10.46 5.63  --  7.23   HEMOGLOBIN g/dL 7.9* 8.5* 9.7* 8.2*  --  10.3*   PLATELETS 10*3/mm3 188 154 165 98* 63* 70*         Results from last 7 days  Lab Units 07/03/17  0804   INR  1.22*         Imaging Results (last 24 hours)     Procedure Component Value Units Date/Time    IR PICC W Fluoro Surgery Only [[id number]] Resulted:  07/07/17 0805     Updated:  07/07/17 0805          azithromycin 500 mg Oral Q24H   ceftriaxone 1 g Intravenous Q24H   famotidine 20 mg Oral Daily   heparin (porcine) 5,000 Units Subcutaneous Q8H   metoprolol succinate  mg Oral Q24H   minoxidil 5 mg Oral Q12H       sodium chloride 125 mL/hr Last Rate: 125 mL/hr (07/05/17 2308)   sodium chloride 9 mL/hr        Medication Review:   Current Facility-Administered Medications   Medication Dose Route Frequency Provider Last Rate Last Dose   • aluminum-magnesium hydroxide-simethicone (MAALOX  MAX) 400-400-40 MG/5ML suspension 30 mL  30 mL Oral Q4H PRN Orquidea Sue MD   30 mL at 07/05/17 2338   • azithromycin (ZITHROMAX) tablet 500 mg  500 mg Oral Q24H Stephen Arauz MD   500 mg at 07/07/17 1035   • cefTRIAXone (ROCEPHIN) IVPB 1 g  1 g Intravenous Q24H Stephen Arauz MD   1 g at 07/07/17 1034   • famotidine (PEPCID) tablet 20 mg  20 mg Oral Daily Stephen Arauz MD   20 mg at 07/07/17 1035   • heparin (porcine) 5000 UNIT/ML injection 5,000 Units  5,000 Units Subcutaneous Q8H Stephen Arauz MD   5,000 Units at 07/07/17 0649   • hydrALAZINE (APRESOLINE) injection 20 mg  20 mg Intravenous Q8H PRN Orquidea Sue MD   20 mg at 07/04/17 2213   • HYDROcodone-acetaminophen (NORCO) 5-325 MG per tablet 1 tablet  1 tablet Oral Q4H PRN Kar Morales MD   1 tablet at 07/06/17 2244   • HYDROmorphone (DILAUDID) injection 1 mg  1 mg Intravenous Q4H PRN Kar Morales MD       • labetalol (NORMODYNE,TRANDATE) injection 20 mg  20 mg Intravenous Q6H PRN Orquidea Sue MD       • metoprolol succinate XL (TOPROL-XL) 24 hr tablet 100 mg  100 mg Oral Q24H David Dejesus MD   100 mg at 07/07/17 1034   • minoxidil (LONITEN) tablet 5 mg  5 mg Oral Q12H David Dejesus MD   5 mg at 07/07/17 1035   • sodium chloride 0.9 % flush 10 mL  10 mL Intravenous PRN Marbella Cannon MD       • sodium chloride 0.9 % flush 10 mL  10 mL Intravenous PRN Marbella Cannon MD       • sodium chloride 0.9 % infusion  125 mL/hr Intravenous Continuous Marbella Cannon  mL/hr at 07/05/17 2308     • sodium chloride 0.9 % infusion  9 mL/hr Intravenous Continuous Cameron Mei MD       • temazepam (RESTORIL) capsule 15 mg  15 mg Oral Nightly PRN Orquidea Sue MD   15 mg at 07/05/17 0317       Assessment/Plan   1.  Severe renal failure could be the end stage renal disease related to severe uncontrolled hypertension, Had dialysis yesterday and did very well.    2.  Hypertensive the crisis, with end organ damage acute malignant hypertension.  Blood  pressure has improved.  Regarding the new medication without any difficulties  3.  History of hypertension with the noncompliance with medication and has not taken any medication for over a year.  4.  Degenerative joint disease with prior left total knee replacement  5.  Anemia could be associated with chronic kidney disease, globin today is 7.9   6.  Thrombocytopenia, resolved.  7.  Hyponatremia , resolved sodium is 138  8.  Hypokalemia, proved, potassium 3.6        Plan:  1.  Dialysis today, I will try to avoid the transfusion, we will give Procrit with dialysis   2.  Surveillance labs  3.  Will not work on arranging outpatient dialysis.    I discussed the plan with the patient          David Dejesus MD  07/07/17  11:04 AM

## 2017-07-08 ENCOUNTER — APPOINTMENT (OUTPATIENT)
Dept: GENERAL RADIOLOGY | Facility: HOSPITAL | Age: 57
End: 2017-07-08

## 2017-07-08 LAB
ALBUMIN SERPL-MCNC: 3 G/DL (ref 3.5–5.2)
ANION GAP SERPL CALCULATED.3IONS-SCNC: 16.7 MMOL/L
BASOPHILS # BLD AUTO: 0.01 10*3/MM3 (ref 0–0.2)
BASOPHILS NFR BLD AUTO: 0.1 % (ref 0–1.5)
BUN BLD-MCNC: 30 MG/DL (ref 6–20)
BUN/CREAT SERPL: 5.7 (ref 7–25)
CALCIUM SPEC-SCNC: 7.7 MG/DL (ref 8.6–10.5)
CHLORIDE SERPL-SCNC: 97 MMOL/L (ref 98–107)
CO2 SERPL-SCNC: 23.3 MMOL/L (ref 22–29)
CREAT BLD-MCNC: 5.22 MG/DL (ref 0.76–1.27)
DEPRECATED RDW RBC AUTO: 55.8 FL (ref 37–54)
EOSINOPHIL # BLD AUTO: 0.1 10*3/MM3 (ref 0–0.7)
EOSINOPHIL NFR BLD AUTO: 1.5 % (ref 0.3–6.2)
ERYTHROCYTE [DISTWIDTH] IN BLOOD BY AUTOMATED COUNT: 15.3 % (ref 11.5–14.5)
GFR SERPL CREATININE-BSD FRML MDRD: 14 ML/MIN/1.73
GLUCOSE BLD-MCNC: 133 MG/DL (ref 65–99)
HCT VFR BLD AUTO: 22.2 % (ref 40.4–52.2)
HGB BLD-MCNC: 7.5 G/DL (ref 13.7–17.6)
IMM GRANULOCYTES # BLD: 0.02 10*3/MM3 (ref 0–0.03)
IMM GRANULOCYTES NFR BLD: 0.3 % (ref 0–0.5)
LDH SERPL-CCNC: 498 U/L (ref 135–225)
LYMPHOCYTES # BLD AUTO: 0.66 10*3/MM3 (ref 0.9–4.8)
LYMPHOCYTES NFR BLD AUTO: 9.8 % (ref 19.6–45.3)
MCH RBC QN AUTO: 33.5 PG (ref 27–32.7)
MCHC RBC AUTO-ENTMCNC: 33.8 G/DL (ref 32.6–36.4)
MCV RBC AUTO: 99.1 FL (ref 79.8–96.2)
MONOCYTES # BLD AUTO: 0.49 10*3/MM3 (ref 0.2–1.2)
MONOCYTES NFR BLD AUTO: 7.3 % (ref 5–12)
NEUTROPHILS # BLD AUTO: 5.47 10*3/MM3 (ref 1.9–8.1)
NEUTROPHILS NFR BLD AUTO: 81 % (ref 42.7–76)
PHOSPHATE SERPL-MCNC: 3.3 MG/DL (ref 2.5–4.5)
PLATELET # BLD AUTO: 191 10*3/MM3 (ref 140–500)
PMV BLD AUTO: 11.1 FL (ref 6–12)
POTASSIUM BLD-SCNC: 3.2 MMOL/L (ref 3.5–5.2)
RBC # BLD AUTO: 2.24 10*6/MM3 (ref 4.6–6)
SODIUM BLD-SCNC: 137 MMOL/L (ref 136–145)
WBC NRBC COR # BLD: 6.75 10*3/MM3 (ref 4.5–10.7)

## 2017-07-08 PROCEDURE — 25010000003 CEFTRIAXONE PER 250 MG: Performed by: INTERNAL MEDICINE

## 2017-07-08 PROCEDURE — 25010000002 HEPARIN (PORCINE) PER 1000 UNITS: Performed by: INTERNAL MEDICINE

## 2017-07-08 PROCEDURE — 71020 HC CHEST PA AND LATERAL: CPT

## 2017-07-08 PROCEDURE — 80069 RENAL FUNCTION PANEL: CPT | Performed by: INTERNAL MEDICINE

## 2017-07-08 PROCEDURE — 83615 LACTATE (LD) (LDH) ENZYME: CPT | Performed by: INTERNAL MEDICINE

## 2017-07-08 PROCEDURE — 85025 COMPLETE CBC W/AUTO DIFF WBC: CPT | Performed by: INTERNAL MEDICINE

## 2017-07-08 RX ORDER — CEFDINIR 300 MG/1
300 CAPSULE ORAL
Status: DISCONTINUED | OUTPATIENT
Start: 2017-07-08 | End: 2017-07-10 | Stop reason: HOSPADM

## 2017-07-08 RX ORDER — CHOLECALCIFEROL (VITAMIN D3) 125 MCG
5 CAPSULE ORAL NIGHTLY PRN
Status: DISCONTINUED | OUTPATIENT
Start: 2017-07-08 | End: 2017-07-10 | Stop reason: HOSPADM

## 2017-07-08 RX ADMIN — CEFTRIAXONE SODIUM 1 G: 1 INJECTION, SOLUTION INTRAVENOUS at 08:35

## 2017-07-08 RX ADMIN — HEPARIN SODIUM 5000 UNITS: 5000 INJECTION, SOLUTION INTRAVENOUS; SUBCUTANEOUS at 17:10

## 2017-07-08 RX ADMIN — MINOXIDIL 5 MG: 2.5 TABLET ORAL at 08:35

## 2017-07-08 RX ADMIN — MINOXIDIL 5 MG: 2.5 TABLET ORAL at 21:50

## 2017-07-08 RX ADMIN — AZITHROMYCIN 500 MG: 250 TABLET, FILM COATED ORAL at 08:35

## 2017-07-08 RX ADMIN — HEPARIN SODIUM 5000 UNITS: 5000 INJECTION, SOLUTION INTRAVENOUS; SUBCUTANEOUS at 06:00

## 2017-07-08 RX ADMIN — Medication 5 MG: at 22:23

## 2017-07-08 RX ADMIN — CEFDINIR 300 MG: 300 CAPSULE ORAL at 12:51

## 2017-07-08 RX ADMIN — FAMOTIDINE 20 MG: 20 TABLET, FILM COATED ORAL at 08:35

## 2017-07-08 RX ADMIN — METOPROLOL SUCCINATE 100 MG: 100 TABLET, FILM COATED, EXTENDED RELEASE ORAL at 08:35

## 2017-07-08 RX ADMIN — HEPARIN SODIUM 5000 UNITS: 5000 INJECTION, SOLUTION INTRAVENOUS; SUBCUTANEOUS at 21:51

## 2017-07-08 NOTE — PLAN OF CARE
Problem: Renal Failure/Kidney Injury, Acute (Adult)  Goal: Signs and Symptoms of Listed Potential Problems Will be Absent or Manageable (Renal Failure/Kidney Injury, Acute)  Outcome: Ongoing (interventions implemented as appropriate)    Problem: Patient Care Overview (Adult)  Goal: Plan of Care Review  Outcome: Ongoing (interventions implemented as appropriate)    Problem: Hypertensive Disease/Crisis (Arterial) (Adult)  Goal: Signs and Symptoms of Listed Potential Problems Will be Absent or Manageable (Hypertensive Disease/Crisis)  Outcome: Ongoing (interventions implemented as appropriate)    Problem: Fall Risk (Adult)  Goal: Absence of Falls  Outcome: Ongoing (interventions implemented as appropriate)

## 2017-07-08 NOTE — PROGRESS NOTES
[facility]  Phone: [phone]  Stephen Arauz MD    Subjective:  LOS: 4    Patient underwent dialysis yesterday and tolerated well.  He denies complaints today.    Objective   Vital Signs past 24hrs  BP range: BP: (133-172)/(81-99) 171/93  Pulse range: Heart Rate:  [92-98] 94  Resp rate range: Resp:  [16-18] 18  Temp range: Temp (24hrs), Av.2 °F (36.8 °C), Min:97.4 °F (36.3 °C), Max:98.9 °F (37.2 °C)    O2 Device: room airFlow (L/min):  [4] 4  Oxygen range:SpO2:  [92 %-98 %] 98 %   168 lb (76.2 kg); Body mass index is 24.11 kg/(m^2).    Intake/Output Summary (Last 24 hours) at 17  Last data filed at 17 1845   Gross per 24 hour   Intake              240 ml   Output             1000 ml   Net             -760 ml       Physical Exam   Cardiovascular: Normal rate and regular rhythm.    No murmur heard.  Pulmonary/Chest: He has no wheezes.   Tunneled catheter in right chest  Posteriorly coarse breath sounds in the right lung.  Bibasilar bronchial breath sounds are also heard.   Abdominal: Soft. Bowel sounds are normal. There is no tenderness.   Musculoskeletal: He exhibits no edema.   Fresh fistula in left UE   Neurological: He is alert.   Nursing note and vitals reviewed.    Results Review:    I have reviewed the laboratory and imaging data since the last note by [facility] physician.  My annotations are noted in assessment and plan.    Medication Review:  I have reviewed the current MAR.  My annotations are noted in assessment and plan.      sodium chloride 125 mL/hr Last Rate: 125 mL/hr (17 2308)   sodium chloride 9 mL/hr      Plan   PCCM Problems  Hypertensive emergency  Acute renal failure  Elevated troponin  Pneumonia, infiltrates  Anemia  Acute hypokalemia  Positive THC    Plan of Treatment  Now on oral bp meds. HD Per renal    Pneumonia per CT imaging. Finish course of abx then fu imaging in 8 weeks.  Lung sounds are more abnormal today.  Check chest x-ray in  morning.    Appreciate hematology. Anemia of chronic disease likely. Other tests pending.      Stephen Arauz MD  07/07/17  8:13 PM    Part of this note may be an electronic transcription/translation of spoken language to printed text using the Dragon Dictation System.

## 2017-07-08 NOTE — PROGRESS NOTES
"   LOS: 5 days    No care team member to display    Chief Complaint:    Chief Complaint   Patient presents with   • Nausea     Patient states nauseated, vomited on Friday, dry mouth x 2 weeks   • Vomiting   • Constipation     last normal bowel movement last wednesday       Subjective follow-up advanced chronic kidney disease    Interval History:   The patient is feeling much better today. No SOA or CP. Waiting for discharge.    Review of Systems:   As noted above    Objective     Vital Signs  Temp:  [97.4 °F (36.3 °C)-99.2 °F (37.3 °C)] 98.2 °F (36.8 °C)  Heart Rate:  [93-99] 93  Resp:  [16-20] 18  BP: (142-172)/(65-99) 145/85    Flowsheet Rows         First Filed Value    Admission Height  70\" (177.8 cm) Documented at 07/03/2017 0734    Admission Weight  150 lb (68 kg) Documented at 07/03/2017 0734          I/O this shift:  In: 460 [P.O.:460]  Out: -   I/O last 3 completed shifts:  In: 240 [P.O.:240]  Out: 1000 [Other:1000]    Intake/Output Summary (Last 24 hours) at 07/08/17 1458  Last data filed at 07/08/17 1325   Gross per 24 hour   Intake              460 ml   Output             1000 ml   Net             -540 ml       Physical Exam:  General Appearance: Awake, alert and oriented ×3, no acute distress,   Skin: warm and dry   Neck: supple, no JVD, trachea midline, tunnel dialysis catheter in the right IJ with exit site below the right clavicle  Lungs: Scattered rhonchi, unlabored breathing effort  Heart: RRR, normal S1 and S2, no S3, positive S4, no rub  Abdomen: soft, no palpable bladder, present bowel sounds to auscultation  Extremities: no edema, cyanosis or clubbing, functional AV fistula in the left upper arm  Neuro: Normal speech and mental status      Results Review:      Results from last 7 days  Lab Units 07/08/17  0345 07/07/17  0325 07/06/17  0313  07/03/17  0804   SODIUM mmol/L 137 138 132*  132*  < > 134*   POTASSIUM mmol/L 3.2* 3.6 3.4*  3.4*  < > 2.6*   CHLORIDE mmol/L 97* 99 97*  97*  < > 89* "   CO2 mmol/L 23.3 19.4* 13.2*  13.2*  < > 25.1   BUN mg/dL 30* 49* 74*  74*  < > 74*   CREATININE mg/dL 5.22* 7.53* 10.42*  10.42*  < > 10.02*   CALCIUM mg/dL 7.7* 8.0* 7.5*  7.5*  < > 9.1   BILIRUBIN mg/dL  --   --   --   --  1.9*   ALK PHOS U/L  --   --   --   --  58   ALT (SGPT) U/L  --   --   --   --  23   AST (SGOT) U/L  --   --   --   --  41*   GLUCOSE mg/dL 133* 153* 128*  128*  < > 165*   < > = values in this interval not displayed.        Results from last 7 days  Lab Units 07/08/17  0345 07/07/17  0325 07/06/17  0313 07/05/17  0400   MAGNESIUM mg/dL  --  2.4 2.7* 2.6   PHOSPHORUS mg/dL 3.3 5.0* 4.7* 4.1         Results from last 7 days  Lab Units 07/06/17  0313 07/05/17  0400 07/04/17  0451   URIC ACID mg/dL 8.5* 8.2* 7.9*         Results from last 7 days  Lab Units 07/08/17  0345 07/07/17  0325 07/06/17  0313 07/05/17  0400 07/04/17  0451   WBC 10*3/mm3 6.75 6.98 7.49 10.46 5.63   HEMOGLOBIN g/dL 7.5* 7.9* 8.5* 9.7* 8.2*   PLATELETS 10*3/mm3 191 188 154 165 98*         Results from last 7 days  Lab Units 07/03/17  0804   INR  1.22*         Imaging Results (last 24 hours)     Procedure Component Value Units Date/Time    XR Chest 2 View [[id number]] Collected:  07/08/17 0943     Updated:  07/08/17 0949    Narrative:       XR CHEST 2 VW-     HISTORY: Male who is 56 years-old,  chronic dyspnea     TECHNIQUE: Frontal and lateral views of the chest     COMPARISON: 07/03/2017     FINDINGS: Right-sided dual-lumen catheter extends to the superior vena  cava. Heart size is borderline. Previous opacity at the right apical  region is less conspicuous. Small likely atelectasis/infiltrate at the  bases. Minimal to mild left more than right pleural effusions,  representing interval worsening. No pneumothorax. No acute osseous  process.       Impression:       Right apical opacity is slightly less dense. Small  atelectasis/infiltrate at the bases. Minimal to mild left moderate  pleural effusions. Continued follow-up  recommended.     This report was finalized on 7/8/2017 9:46 AM by Dr. Daniel Martinez MD.             cefdinir 300 mg Oral Q24H   epoetin susan 10,000 Units Subcutaneous Once per day on Mon Wed Fri   famotidine 20 mg Oral Daily   heparin (porcine) 5,000 Units Subcutaneous Q8H   metoprolol succinate  mg Oral Q24H   minoxidil 5 mg Oral Q12H       sodium chloride 125 mL/hr Last Rate: 125 mL/hr (07/05/17 2308)   sodium chloride 9 mL/hr        Medication Review:   Current Facility-Administered Medications   Medication Dose Route Frequency Provider Last Rate Last Dose   • aluminum-magnesium hydroxide-simethicone (MAALOX MAX) 400-400-40 MG/5ML suspension 30 mL  30 mL Oral Q4H PRN Orquidea Sue MD   30 mL at 07/05/17 2338   • cefdinir (OMNICEF) capsule 300 mg  300 mg Oral Q24H Allen Rogers MD   300 mg at 07/08/17 1251   • epoetin susan (EPOGEN,PROCRIT) injection 10,000 Units  10,000 Units Subcutaneous Once per day on Mon Wed Fri David Dejesus MD   10,000 Units at 07/07/17 1837   • famotidine (PEPCID) tablet 20 mg  20 mg Oral Daily Stephen Arauz MD   20 mg at 07/08/17 0835   • heparin (porcine) 5000 UNIT/ML injection 5,000 Units  5,000 Units Subcutaneous Q8H Stephen Arauz MD   5,000 Units at 07/08/17 0600   • heparin (porcine) injection 3,800 Units  3,800 Units Intracatheter PRN David Dejesus MD   3,800 Units at 07/07/17 1839   • hydrALAZINE (APRESOLINE) injection 20 mg  20 mg Intravenous Q8H PRN Orquidea Sue MD   20 mg at 07/04/17 2213   • HYDROcodone-acetaminophen (NORCO) 5-325 MG per tablet 1 tablet  1 tablet Oral Q4H PRN Kar Morales MD   1 tablet at 07/06/17 2244   • HYDROmorphone (DILAUDID) injection 1 mg  1 mg Intravenous Q4H PRN Kar Morales MD       • labetalol (NORMODYNE,TRANDATE) injection 20 mg  20 mg Intravenous Q6H PRN Orquidea Sue MD       • mannitol 25% (RENAL) injection  12.5 g Intravenous PRN David Dejesus MD       • metoprolol succinate XL  (TOPROL-XL) 24 hr tablet 100 mg  100 mg Oral Q24H David Dejesus MD   100 mg at 07/08/17 0835   • minoxidil (LONITEN) tablet 5 mg  5 mg Oral Q12H David Dejesus MD   5 mg at 07/08/17 0835   • sodium chloride 0.9 % bolus 1,000 mL  1,000 mL Intravenous PRN David Dejesus MD       • sodium chloride 0.9 % flush 10 mL  10 mL Intravenous PRN Marbella Cannon MD       • sodium chloride 0.9 % flush 10 mL  10 mL Intravenous PRN Marbella Cannon MD       • sodium chloride 0.9 % infusion  125 mL/hr Intravenous Continuous Marbella Cannon  mL/hr at 07/05/17 2308     • sodium chloride 0.9 % infusion  9 mL/hr Intravenous Continuous Cameron Mei MD       • temazepam (RESTORIL) capsule 15 mg  15 mg Oral Nightly PRN Orquidea Sue MD   15 mg at 07/05/17 8069       Assessment/Plan   1.  Severe renal failure could be the end stage renal disease related to severe uncontrolled hypertension,   2.  Hypertensive the crisis, with end organ damage acute malignant hypertension.    3.  History of hypertension with the noncompliance with medication and has not taken any medication for over a year.  4.  Degenerative joint disease with prior left total knee replacement  5.  Anemia could be associated with chronic kidney disease.  6.  Thrombocytopenia, resolved.  7.  Hyponatremia , resolved sodium is 138  8.  Hypokalemia, proved, potassium 3.2        Plan:    1.  Dialysis on Monday  2.  Surveillance labs  3. BP is better controlled. Will continue to monitor.      Robbie Otto MD  07/08/17  2:58 PM

## 2017-07-08 NOTE — PROGRESS NOTES
[facility] INPATIENT PROGRESS NOTE         [address]    7/8/2017      PATIENT IDENTIFICATION:   Name:  Harry Ortega      MRN:  [id number]     56 y.o.  male             LOS 5    Reason for visit: Follow-up pneumonia      SUBJECTIVE:    Less short of breath.  Still with mild cough.  Requests nutrition consult for education    Objective   OBJECTIVE:    Vital Sign Min/Max for last 24 hours  Temp  Min: 97.4 °F (36.3 °C)  Max: 99.2 °F (37.3 °C)   BP  Min: 142/80  Max: 172/99   Pulse  Min: 94  Max: 99   Resp  Min: 16  Max: 20   SpO2  Min: 96 %  Max: 98 %   No Data Recorded   No Data Recorded                         Body mass index is 24.11 kg/(m^2).    Intake/Output Summary (Last 24 hours) at 07/08/17 1104  Last data filed at 07/08/17 0840   Gross per 24 hour   Intake              240 ml   Output             1000 ml   Net             -760 ml         Exam:  GEN:  No distress, appears stated age  EYES:   PERRL, anicteric sclera  ENT:    External ears/nose normal, OP clear  NECK:  No adenopathy, midline trachea  LUNGS: Normal chest on inspection, palpation and Coarse breath sounds bases right greater than left auscultation  CV:  Normal S1S2, without murmur  ABD:  Non tender, non distended, no hepatosplenomegaly, +BS  EXT:  No edema, cyanosis or clubbing    Scheduled meds:    ceftriaxone 1 g Intravenous Q24H   epoetin susan 10,000 Units Subcutaneous Once per day on Mon Wed Fri   famotidine 20 mg Oral Daily   heparin (porcine) 5,000 Units Subcutaneous Q8H   metoprolol succinate  mg Oral Q24H   minoxidil 5 mg Oral Q12H     IV meds:                        sodium chloride 125 mL/hr Last Rate: 125 mL/hr (07/05/17 2308)   sodium chloride 9 mL/hr      Data Review:    Results from last 7 days  Lab Units 07/08/17  0345 07/07/17  0325 07/06/17  0313 07/05/17  0400 07/04/17  0451   SODIUM mmol/L 137 138 132*  132* 133* 132*   POTASSIUM mmol/L 3.2* 3.6 3.4*  3.4* 3.6 3.3*   CHLORIDE mmol/L 97* 99 97*  97*  97* 94*   CO2 mmol/L 23.3 19.4* 13.2*  13.2* 14.0* 18.4*   BUN mg/dL 30* 49* 74*  74* 72* 70*   CREATININE mg/dL 5.22* 7.53* 10.42*  10.42* 10.14* 9.70*   GLUCOSE mg/dL 133* 153* 128*  128* 123* 133*   CALCIUM mg/dL 7.7* 8.0* 7.5*  7.5* 7.9* 7.6*         Estimated Creatinine Clearance: 17 mL/min (by C-G formula based on Cr of 5.22).    Results from last 7 days  Lab Units 07/08/17  0345 07/07/17  0325 07/06/17  0313 07/05/17  0400 07/04/17  0451   WBC 10*3/mm3 6.75 6.98 7.49 10.46 5.63   HEMOGLOBIN g/dL 7.5* 7.9* 8.5* 9.7* 8.2*   PLATELETS 10*3/mm3 191 188 154 165 98*       Results from last 7 days  Lab Units 07/03/17  0804   INR  1.22*       Results from last 7 days  Lab Units 07/03/17  0804   ALT (SGPT) U/L 23   AST (SGOT) U/L 41*           Results from last 7 days  Lab Units 07/04/17  0904   PROCALCITONIN ng/mL 2.32*         Microbiology reviewed      Chest x-ray viewed shows right apical opacity and small infiltrates in the bases.    Assessment   ASSESSMENT:   Hypertensive emergency  Acute renal failure  Elevated troponin  Pneumonia, infiltrates  Anemia  Acute hypokalemia  Positive THC      PLAN:  Continue antibiotics for pneumonia.   Change to po.  Encourage pulmonary toilet.  Patient is requesting nutrition consult for education for acceptable foods with kidney injury    Allen Rogers MD  Pulmonary and Critical Care Medicine  [facility], [facility]  7/8/2017    11:04 AM

## 2017-07-08 NOTE — PLAN OF CARE
Problem: Renal Failure/Kidney Injury, Acute (Adult)  Goal: Signs and Symptoms of Listed Potential Problems Will be Absent or Manageable (Renal Failure/Kidney Injury, Acute)  Outcome: Ongoing (interventions implemented as appropriate)    Problem: Patient Care Overview (Adult)  Goal: Plan of Care Review  Outcome: Ongoing (interventions implemented as appropriate)    07/08/17 1541   Outcome Evaluation   Outcome Summary/Follow up Plan Patient is doing well, dialysis ordered for Monday 7/10. No complaints of pain. Ambulated around nursing station with family. Fistula in arm is good with thrill & bruit. Will continue to monitor.   Patient Care Overview   Progress improving   Coping/Psychosocial Response Interventions   Plan Of Care Reviewed With patient;spouse       Goal: Discharge Needs Assessment  Outcome: Ongoing (interventions implemented as appropriate)    Problem: Hypertensive Disease/Crisis (Arterial) (Adult)  Goal: Signs and Symptoms of Listed Potential Problems Will be Absent or Manageable (Hypertensive Disease/Crisis)  Outcome: Ongoing (interventions implemented as appropriate)    Problem: Fall Risk (Adult)  Goal: Absence of Falls  Outcome: Ongoing (interventions implemented as appropriate)    Problem: Perioperative Period (Adult)  Goal: Signs and Symptoms of Listed Potential Problems Will be Absent or Manageable (Perioperative Period)  Outcome: Ongoing (interventions implemented as appropriate)

## 2017-07-09 LAB
ANION GAP SERPL CALCULATED.3IONS-SCNC: 16.8 MMOL/L
BACTERIA SPEC AEROBE CULT: NORMAL
BUN BLD-MCNC: 41 MG/DL (ref 6–20)
BUN/CREAT SERPL: 5.5 (ref 7–25)
CALCIUM SPEC-SCNC: 8 MG/DL (ref 8.6–10.5)
CHLORIDE SERPL-SCNC: 99 MMOL/L (ref 98–107)
CO2 SERPL-SCNC: 22.2 MMOL/L (ref 22–29)
CREAT BLD-MCNC: 7.5 MG/DL (ref 0.76–1.27)
DEPRECATED RDW RBC AUTO: 51.4 FL (ref 37–54)
ERYTHROCYTE [DISTWIDTH] IN BLOOD BY AUTOMATED COUNT: 14.6 % (ref 11.5–14.5)
GFR SERPL CREATININE-BSD FRML MDRD: 9 ML/MIN/1.73
GLUCOSE BLD-MCNC: 124 MG/DL (ref 65–99)
HCT VFR BLD AUTO: 22.1 % (ref 40.4–52.2)
HGB BLD-MCNC: 7.9 G/DL (ref 13.7–17.6)
LDH SERPL-CCNC: 450 U/L (ref 135–225)
MCH RBC QN AUTO: 34.2 PG (ref 27–32.7)
MCHC RBC AUTO-ENTMCNC: 35.7 G/DL (ref 32.6–36.4)
MCV RBC AUTO: 95.7 FL (ref 79.8–96.2)
PLATELET # BLD AUTO: 213 10*3/MM3 (ref 140–500)
PMV BLD AUTO: 10.5 FL (ref 6–12)
POTASSIUM BLD-SCNC: 3.4 MMOL/L (ref 3.5–5.2)
RBC # BLD AUTO: 2.31 10*6/MM3 (ref 4.6–6)
SODIUM BLD-SCNC: 138 MMOL/L (ref 136–145)
WBC NRBC COR # BLD: 5.95 10*3/MM3 (ref 4.5–10.7)

## 2017-07-09 PROCEDURE — 83615 LACTATE (LD) (LDH) ENZYME: CPT | Performed by: INTERNAL MEDICINE

## 2017-07-09 PROCEDURE — 25010000002 HEPARIN (PORCINE) PER 1000 UNITS: Performed by: INTERNAL MEDICINE

## 2017-07-09 PROCEDURE — 85027 COMPLETE CBC AUTOMATED: CPT | Performed by: INTERNAL MEDICINE

## 2017-07-09 PROCEDURE — 80048 BASIC METABOLIC PNL TOTAL CA: CPT | Performed by: INTERNAL MEDICINE

## 2017-07-09 RX ORDER — HEPARIN SODIUM 1000 [USP'U]/ML
1000 INJECTION, SOLUTION INTRAVENOUS; SUBCUTANEOUS AS NEEDED
Status: CANCELLED | OUTPATIENT
Start: 2017-07-09

## 2017-07-09 RX ADMIN — HYDROCODONE BITARTRATE AND ACETAMINOPHEN 1 TABLET: 5; 325 TABLET ORAL at 10:10

## 2017-07-09 RX ADMIN — METOPROLOL SUCCINATE 100 MG: 100 TABLET, FILM COATED, EXTENDED RELEASE ORAL at 09:51

## 2017-07-09 RX ADMIN — FAMOTIDINE 20 MG: 20 TABLET, FILM COATED ORAL at 09:51

## 2017-07-09 RX ADMIN — HEPARIN SODIUM 5000 UNITS: 5000 INJECTION, SOLUTION INTRAVENOUS; SUBCUTANEOUS at 22:05

## 2017-07-09 RX ADMIN — HEPARIN SODIUM 5000 UNITS: 5000 INJECTION, SOLUTION INTRAVENOUS; SUBCUTANEOUS at 06:38

## 2017-07-09 RX ADMIN — CEFDINIR 300 MG: 300 CAPSULE ORAL at 09:51

## 2017-07-09 RX ADMIN — HEPARIN SODIUM 5000 UNITS: 5000 INJECTION, SOLUTION INTRAVENOUS; SUBCUTANEOUS at 14:18

## 2017-07-09 RX ADMIN — MINOXIDIL 5 MG: 2.5 TABLET ORAL at 20:03

## 2017-07-09 RX ADMIN — MINOXIDIL 5 MG: 2.5 TABLET ORAL at 09:52

## 2017-07-09 RX ADMIN — TEMAZEPAM 15 MG: 15 CAPSULE ORAL at 22:05

## 2017-07-09 NOTE — PLAN OF CARE
Problem: Renal Failure/Kidney Injury, Acute (Adult)  Goal: Signs and Symptoms of Listed Potential Problems Will be Absent or Manageable (Renal Failure/Kidney Injury, Acute)  Outcome: Ongoing (interventions implemented as appropriate)    07/08/17 1541 07/09/17 1817   Renal Failure/Kidney Injury, Acute   Problems Assessed (Acute Renal Failure/Kidney Injury) all --    Problems Present (Acute Renal Failure/Kidney Injury) --  hematologic complications;hypertension;fluid imbalance         Problem: Patient Care Overview (Adult)  Goal: Plan of Care Review  Outcome: Ongoing (interventions implemented as appropriate)  Goal: Discharge Needs Assessment  Outcome: Ongoing (interventions implemented as appropriate)    Problem: Hypertensive Disease/Crisis (Arterial) (Adult)  Goal: Signs and Symptoms of Listed Potential Problems Will be Absent or Manageable (Hypertensive Disease/Crisis)  Outcome: Ongoing (interventions implemented as appropriate)    Problem: Fall Risk (Adult)  Goal: Absence of Falls  Outcome: Ongoing (interventions implemented as appropriate)    Problem: Perioperative Period (Adult)  Goal: Signs and Symptoms of Listed Potential Problems Will be Absent or Manageable (Perioperative Period)  Outcome: Ongoing (interventions implemented as appropriate)

## 2017-07-09 NOTE — PROGRESS NOTES
[facility] INPATIENT PROGRESS NOTE         [address]    7/9/2017      PATIENT IDENTIFICATION:   Name:  Harry Ortega      MRN:  [id number]     56 y.o.  male             LOS 6    Reason for visit: Follow-up pneumonia      SUBJECTIVE:    Less short of breath.  Still with mild cough.      Objective   OBJECTIVE:    Vital Sign Min/Max for last 24 hours  Temp  Min: 97.9 °F (36.6 °C)  Max: 99 °F (37.2 °C)   BP  Min: 136/74  Max: 168/98   Pulse  Min: 91  Max: 95   Resp  Min: 18  Max: 18   SpO2  Min: 96 %  Max: 99 %   No Data Recorded   No Data Recorded                         Body mass index is 24.11 kg/(m^2).    Intake/Output Summary (Last 24 hours) at 07/09/17 1056  Last data filed at 07/09/17 0723   Gross per 24 hour   Intake              220 ml   Output              100 ml   Net              120 ml         Exam:  GEN:  No distress, appears stated age  EYES:   PERRL, anicteric sclera  ENT:    External ears/nose normal, OP clear  NECK:  No adenopathy, midline trachea  LUNGS: Normal chest on inspection, palpation and Coarse breath sounds bases right greater than left auscultation  CV:  Normal S1S2, without murmur  ABD:  Non tender, non distended, no hepatosplenomegaly, +BS  EXT:  No edema, cyanosis or clubbing    Scheduled meds:      cefdinir 300 mg Oral Q24H   epoetin susan 10,000 Units Subcutaneous Once per day on Mon Wed Fri   famotidine 20 mg Oral Daily   heparin (porcine) 5,000 Units Subcutaneous Q8H   metoprolol succinate  mg Oral Q24H   minoxidil 5 mg Oral Q12H     IV meds:                          sodium chloride 125 mL/hr Last Rate: 125 mL/hr (07/05/17 2308)   sodium chloride 9 mL/hr      Data Review:    Results from last 7 days  Lab Units 07/09/17  0327 07/08/17  0345 07/07/17  0325 07/06/17  0313 07/05/17  0400   SODIUM mmol/L 138 137 138 132*  132* 133*   POTASSIUM mmol/L 3.4* 3.2* 3.6 3.4*  3.4* 3.6   CHLORIDE mmol/L 99 97* 99 97*  97* 97*   CO2 mmol/L 22.2 23.3 19.4* 13.2*   13.2* 14.0*   BUN mg/dL 41* 30* 49* 74*  74* 72*   CREATININE mg/dL 7.50* 5.22* 7.53* 10.42*  10.42* 10.14*   GLUCOSE mg/dL 124* 133* 153* 128*  128* 123*   CALCIUM mg/dL 8.0* 7.7* 8.0* 7.5*  7.5* 7.9*         Estimated Creatinine Clearance: 11.9 mL/min (by C-G formula based on Cr of 7.5).    Results from last 7 days  Lab Units 07/09/17  0327 07/08/17  0345 07/07/17  0325 07/06/17  0313 07/05/17  0400   WBC 10*3/mm3 5.95 6.75 6.98 7.49 10.46   HEMOGLOBIN g/dL 7.9* 7.5* 7.9* 8.5* 9.7*   PLATELETS 10*3/mm3 213 191 188 154 165       Results from last 7 days  Lab Units 07/03/17  0804   INR  1.22*       Results from last 7 days  Lab Units 07/03/17  0804   ALT (SGPT) U/L 23   AST (SGOT) U/L 41*           Results from last 7 days  Lab Units 07/04/17  0904   PROCALCITONIN ng/mL 2.32*         Microbiology reviewed      Chest x-ray viewed shows right apical opacity and small infiltrates in the bases.    Assessment   ASSESSMENT:   Hypertensive emergency  Acute renal failure  Elevated troponin  Pneumonia, infiltrates  Anemia  Acute hypokalemia  Positive THC      PLAN:  Continue antibiotics for pneumonia.   Changed to po.  Encourage pulmonary toilet.  Patient is requesting nutrition consult for education for acceptable foods with kidney injury.  They will see tomorrow.  Dialysis tomorrow.  Home soon when okay with renal.    Allen Rogers MD  Pulmonary and Critical Care Medicine  [facility], [facility]  7/9/2017    10:56 AM

## 2017-07-10 VITALS
TEMPERATURE: 98 F | HEART RATE: 94 BPM | BODY MASS INDEX: 22.47 KG/M2 | SYSTOLIC BLOOD PRESSURE: 170 MMHG | DIASTOLIC BLOOD PRESSURE: 78 MMHG | OXYGEN SATURATION: 98 % | WEIGHT: 156.97 LBS | HEIGHT: 70 IN | RESPIRATION RATE: 18 BRPM

## 2017-07-10 LAB — LDH SERPL-CCNC: 437 U/L (ref 135–225)

## 2017-07-10 PROCEDURE — 25010000002 HEPARIN (PORCINE) PER 1000 UNITS: Performed by: INTERNAL MEDICINE

## 2017-07-10 PROCEDURE — 83615 LACTATE (LD) (LDH) ENZYME: CPT | Performed by: INTERNAL MEDICINE

## 2017-07-10 RX ORDER — METOPROLOL SUCCINATE 100 MG/1
100 TABLET, EXTENDED RELEASE ORAL
Qty: 30 TABLET | Refills: 1 | Status: SHIPPED | OUTPATIENT
Start: 2017-07-10 | End: 2017-08-17 | Stop reason: ALTCHOICE

## 2017-07-10 RX ORDER — CEFDINIR 300 MG/1
300 CAPSULE ORAL
Qty: 4 CAPSULE | Refills: 0 | Status: SHIPPED | OUTPATIENT
Start: 2017-07-10 | End: 2017-08-17

## 2017-07-10 RX ORDER — MINOXIDIL 2.5 MG/1
5 TABLET ORAL EVERY 12 HOURS SCHEDULED
Qty: 60 TABLET | Refills: 1 | Status: SHIPPED | OUTPATIENT
Start: 2017-07-10 | End: 2017-08-17 | Stop reason: SDUPTHER

## 2017-07-10 RX ADMIN — MINOXIDIL 5 MG: 2.5 TABLET ORAL at 12:49

## 2017-07-10 RX ADMIN — FAMOTIDINE 20 MG: 20 TABLET, FILM COATED ORAL at 12:49

## 2017-07-10 RX ADMIN — METOPROLOL SUCCINATE 100 MG: 100 TABLET, FILM COATED, EXTENDED RELEASE ORAL at 12:49

## 2017-07-10 RX ADMIN — CEFDINIR 300 MG: 300 CAPSULE ORAL at 12:49

## 2017-07-10 RX ADMIN — HEPARIN SODIUM 5000 UNITS: 5000 INJECTION, SOLUTION INTRAVENOUS; SUBCUTANEOUS at 05:46

## 2017-07-10 NOTE — PROGRESS NOTES
Continued Stay Note  [facility]     Patient Name: Harry Ortega  MRN: [id number]  Today's Date: 7/10/2017    Admit Date: 7/3/2017          Discharge Plan       07/10/17 1332    Case Management/Social Work Plan    Plan Home with family support and outpatient dialysis scheduled for [place] at 7[place]20am [place] [facility]    Final Note    Final Note Discharged   7/10/17 to home                  Discharge Codes       07/10/17 1332    Discharge Codes    Discharge Codes 01  Discharge to home        Expected Discharge Date and Time     Expected Discharge Date Expected Discharge Time    Jul 10, 2017             Anjelica Carrillo RN

## 2017-07-10 NOTE — PLAN OF CARE
Problem: Renal Failure/Kidney Injury, Acute (Adult)  Goal: Signs and Symptoms of Listed Potential Problems Will be Absent or Manageable (Renal Failure/Kidney Injury, Acute)  Outcome: Ongoing (interventions implemented as appropriate)    Problem: Patient Care Overview (Adult)  Goal: Plan of Care Review  Outcome: Ongoing (interventions implemented as appropriate)  Goal: Adult Individualization and Mutuality  Outcome: Ongoing (interventions implemented as appropriate)  Goal: Discharge Needs Assessment  Outcome: Ongoing (interventions implemented as appropriate)    Problem: Hypertensive Disease/Crisis (Arterial) (Adult)  Goal: Signs and Symptoms of Listed Potential Problems Will be Absent or Manageable (Hypertensive Disease/Crisis)  Outcome: Ongoing (interventions implemented as appropriate)    Problem: Fall Risk (Adult)  Goal: Absence of Falls  Outcome: Ongoing (interventions implemented as appropriate)    Problem: Perioperative Period (Adult)  Goal: Signs and Symptoms of Listed Potential Problems Will be Absent or Manageable (Perioperative Period)  Outcome: Ongoing (interventions implemented as appropriate)

## 2017-07-10 NOTE — DISCHARGE SUMMARY
PHYSICIAN DISCHARGE SUMMARY                                                                        [facility]    Date of admit: 7/3/2017  Date of Discharge:  7/10/2017    PCP: No primary care provider on file.    Discharge Diagnosis:   Active Problems:    Hypertensive crisis  Hypertensive emergency  Acute renal failure  Elevated troponin  Pneumonia, infiltrates  Anemia  Acute hypokalemia    Secondary Diagnoses:  Past Medical History:   Diagnosis Date   • Hypertension        Procedures Performed  Procedure(s):  ARTERIOVENOUS FISTULA LEFT ARM AND PALINDROME PLACEMENT             --  Consults:   IP CONSULT TO PULMONOLOGY  IP CONSULT TO NEPHROLOGY  IP CONSULT TO HEMATOLOGY AND ONCOLOGY  IP CONSULT TO VASCULAR SURGERY  IP CONSULT TO NUTRITION SERVICES    Significant Discharge Diagnostics   Procedures Performed:  Procedure(s):  ARTERIOVENOUS FISTULA LEFT ARM AND PALINDROME PLACEMENT       Pertinent Lab Results:    Results from last 7 days  Lab Units 07/09/17  0327 07/08/17  0345 07/07/17  0325 07/06/17  0313 07/05/17  0400 07/04/17  0451 07/03/17  1837   SODIUM mmol/L 138 137 138 132*  132* 133* 132*  --    POTASSIUM mmol/L 3.4* 3.2* 3.6 3.4*  3.4* 3.6 3.3* 2.9*   CHLORIDE mmol/L 99 97* 99 97*  97* 97* 94*  --    CO2 mmol/L 22.2 23.3 19.4* 13.2*  13.2* 14.0* 18.4*  --    BUN mg/dL 41* 30* 49* 74*  74* 72* 70*  --    CREATININE mg/dL 7.50* 5.22* 7.53* 10.42*  10.42* 10.14* 9.70*  --    GLUCOSE mg/dL 124* 133* 153* 128*  128* 123* 133*  --    CALCIUM mg/dL 8.0* 7.7* 8.0* 7.5*  7.5* 7.9* 7.6*  --            Results from last 7 days  Lab Units 07/09/17  0327 07/08/17  0345 07/07/17  0325 07/06/17  0313 07/05/17  0400 07/04/17  0451  07/03/17  1203   WBC 10*3/mm3 5.95 6.75 6.98 7.49 10.46 5.63  --   --    HEMOGLOBIN g/dL 7.9* 7.5* 7.9* 8.5* 9.7* 8.2*  --   --    HEMATOCRIT % 22.1* 22.2* 23.2* 24.4* 27.3* 23.1*  --   --    PLATELETS  10*3/mm3 213 191 188 154 165 98*  --  63*   MCV fL 95.7 99.1* 98.3* 98.0* 95.1 95.9  < >  --    MCH pg 34.2* 33.5* 33.5* 34.1* 33.8* 34.0*  < >  --    MCHC g/dL 35.7 33.8 34.1 34.8 35.5 35.5  < >  --    RDW % 14.6* 15.3* 15.5* 15.6* 16.0* 14.9*  < >  --    RDW-SD fl 51.4 55.8* 55.8* 56.2* 55.5* 52.1  < >  --    MPV fL 10.5 11.1 11.0 11.9 11.9 13.0*  < >  --    NEUTROPHIL % %  --  81.0* 82.1* 82.7* 86.4* 73.7  < >  --    LYMPHOCYTE % %  --  9.8* 9.3* 10.1* 9.2* 19.5*  < >  --    MONOCYTES % %  --  7.3 7.2 6.0 3.4* 4.8*  < >  --    EOSINOPHIL % %  --  1.5 0.9 0.4 0.1* 1.6  < >  --    BASOPHIL % %  --  0.1 0.1 0.1 0.1 0.0  < >  --    IMM GRAN % %  --  0.3 0.4 0.7* 0.8* 0.4  < >  --    NEUTROS ABS 10*3/mm3  --  5.47 5.73 6.19 9.04* 4.15  < >  --    LYMPHS ABS 10*3/mm3  --  0.66* 0.65* 0.76* 0.96 1.10  < >  --    MONOS ABS 10*3/mm3  --  0.49 0.50 0.45 0.36 0.27  < >  --    EOS ABS 10*3/mm3  --  0.10 0.06 0.03 0.01 0.09  < >  --    BASOS ABS 10*3/mm3  --  0.01 0.01 0.01 0.01 0.00  < >  --    IMMATURE GRANS (ABS) 10*3/mm3  --  0.02 0.03 0.05* 0.08* 0.02  < >  --    < > = values in this interval not displayed.        Results from last 7 days  Lab Units 07/07/17  0325 07/06/17  0313 07/05/17  0400 07/04/17  0451   MAGNESIUM mg/dL 2.4 2.7* 2.6 2.6               Results from last 7 days  Lab Units 07/03/17  1235   TSH mIU/mL 1.200           Results from last 7 days  Lab Units 07/04/17  0904   PROCALCITONIN ng/mL 2.32*       Results from last 7 days  Lab Units 07/03/17  1203   SED RATE mm/hr 85*           Results from last 7 days  Lab Units 07/04/17  1226 07/04/17  0904   BLOODCX   --  No growth at 5 days   RESPCX  Heavy growth (4+) Normal Respiratory Maisha  --    GRAM STAIN RESULT  Few (2+) WBCs seen  Few (2+) Epithelial cells per low power field  Mixed bacterial morphotypes seen on Gram Stain  --                Results from last 7 days  Lab Units 07/06/17  0313  07/03/17  1032   EOSINOPHIL SMEAR % EOS/100 Cells  --   --  0    URIC ACID mg/dL 8.5*  < >  --    < > = values in this interval not displayed.    Imaging Results:  Imaging Results (all)     Procedure Component Value Units Date/Time    XR Chest 2 View [[id number]] Collected:  07/03/17 0922     Updated:  07/03/17 0945    Narrative:       2-VIEW CHEST     HISTORY: Weakness and nausea and vomiting.     COMPARISON: Chest CT with IV contrast 08/11/2014.     FINDINGS: There is an abnormal masslike opacity in the right upper lobe  measuring approximately 3.5 x 2.3 cm. This is new compared to previous  CT August 2014. This may represent infiltrate though a lung mass could  also have this appearance. This needs either short interval follow-up  exam or further evaluation with chest CT. Left lung is clear.  Cardiomediastinal silhouette is within normal limits.       Impression:       Abnormal 3.5 cm masslike opacity right upper lobe. This  could represent a focal pneumonia though a lung mass could also have  this appearance. Findings could be correlated with clinical data and  recommend either short interval follow-up x-ray or further evaluation  with chest CT.     This report was finalized on 7/3/2017 9:42 AM by Dr. Riki Booth MD.       US Renal Bilateral [[id number]] Collected:  07/03/17 1330     Updated:  07/03/17 1739    Narrative:       EXAMINATION: BILATERAL RENAL SONOGRAPHY     HISTORY: 56-year-old male with a history of impaired renal function     FINDINGS: Sonographic evaluation of the kidneys was performed. No prior  renal sonogram is available for comparison. The right kidney measures  10.1 x 4.8 x 4.9 cm and the left kidney measures 9.7 x 6.0 x 5.0 cm.  There are bilateral renal cysts. Largest cyst in the right kidney  measures on the order of 6.3 cm in greatest dimension and largest cyst  in left kidney measures on the order of 2.2 cm in greatest dimension.  There is mild increased echogenicity  seen throughout the bilateral  renal cortices. No evidence for hydronephrosis  or proximal hydroureter  is appreciated. Neither ureteral jet is seen within the urinary bladder.       Impression:       Bilateral renal cysts with sonographic features of the  kidneys consistent with bilateral medical renal disease.     This report was finalized on 7/3/2017 5:36 PM by Dr. Eric Marcano MD.       CT Chest Without Contrast [[id number]] Collected:  07/03/17 1839     Updated:  07/03/17 1850    Narrative:       CT SCAN OF THE CHEST WITHOUT CONTRAST     CLINICAL HISTORY: Follow-up abnormal chest x-ray showing infiltrate  versus mass in the right upper lobe.     TECHNIQUE: Spiral CT images were obtained through the chest with no IV  contrast and were reconstructed in 3 mm thick slices.     COMPARISON: Chest x-ray dated 07/03/2017 and a previous CT scan of the  chest dated 1114.     FINDINGS: There are patchy areas of airspace infiltrate in the right  upper and lower lobes that are consistent with pneumonia. The infiltrate  is most prominent in the right lung apex where it accounts for the  ill-defined nodular opacity noted on the patient's chest x-ray. There is  a 5 mm diameter noncalcified pulmonary nodule in the lateral aspect of  the right lower lobe that is also most likely inflammatory in etiology.  However, this is new since the preceding CT scan of the chest in 2014.  Minimal CT scan of the chest in 2 -- 3 months after the patient's  pneumonia is treated. There is a small posteriorly layering right  pleural effusion at the right lung base. There are a few mildly enlarged  lymph nodes within the mediastinum that are most likely benign reactive  lymph nodes. These all measure 12 mm or less in short axis. Images  through the upper abdomen partially show a right renal cyst, and are  otherwise unremarkable.       Impression:       Patchy airspace infiltrate in the right lung consistent with  pneumonia. There is a solitary tiny, approximately 5 mm diameter  noncalcified pulmonary nodule in the lateral  aspect of the right lower  lobe that is also most likely inflammatory in etiology, but is new since  the preceding CT chest dated 2014. Small right pleural effusion. Mild  mediastinal lymphadenopathy that is likely reactive. Recommend a  follow-up CT scan of the chest in 2 -- 3 months after appropriate  medical treatment for pneumonia.     This report was finalized on 7/3/2017 6:47 PM by Dr. Cody Rodriguez MD.       IR PICC W Fluoro Surgery Only [[id number]] Resulted:  07/07/17 0805     Updated:  07/07/17 0805    XR Chest 2 View [[id number]] Collected:  07/08/17 0943     Updated:  07/08/17 0949    Narrative:       XR CHEST 2 VW-     HISTORY: Male who is 56 years-old,  chronic dyspnea     TECHNIQUE: Frontal and lateral views of the chest     COMPARISON: 07/03/2017     FINDINGS: Right-sided dual-lumen catheter extends to the superior vena  cava. Heart size is borderline. Previous opacity at the right apical  region is less conspicuous. Small likely atelectasis/infiltrate at the  bases. Minimal to mild left more than right pleural effusions,  representing interval worsening. No pneumothorax. No acute osseous  process.       Impression:       Right apical opacity is slightly less dense. Small  atelectasis/infiltrate at the bases. Minimal to mild left moderate  pleural effusions. Continued follow-up recommended.     This report was finalized on 7/8/2017 9:46 AM by Dr. Daniel Martinez MD.           --    Consults:       Hospital Course  Patient is a 56 y.o. male presented with per h&p      56-year-old male who presents to the ED with nausea and vomiting since past Thursday. On arrival in the ED patient was found to be severely hypotensive. He quit taking his antihypertensives one year ago. He has a history of hypertension for the past 7-10 years. He denies recent NSAIDs. However he took some Tylenol Sinus about one week ago. Unclear if he took any pseudoephedrine containing medications for his nasal congestion which  resolved. He denies any other significant medical history. He denies any heart disease, lung disease, diabetes, strokes. He denies the recent smoking history and quit smoking 7-8 years ago. Used to smoke about a pack every 2 weeks. He drinks infrequently. He denies use of illegal drugs. He currently denies any chest pain. He had left knee replacement. He has not taken any pain medicines for this recently.    Admitted with  Hypertensive emergency  Acute renal failure  Elevated troponin  RUL mass lesion, ? pneumonia  Anemia  Acute hypokalemia  Positive THC    Patient was admitted to the intensive care unit for hypertensive emergency on Cardene drip.  Had acute renal failure.  Nephrology followed while here.  He is started on dialysis continue with follow-up with nephrology as outpatient.  Vascular surgery was consult did for vascular access for chronic dialysis need.  He had  thrombocytopenia and hematology saw the patient while here and felt that this was consumptive and this is since resolved and he signed off.  Had evidence of nodularity on imaging and CT showed this was associated with pneumonia.  Started on antibiotics and this will be continued for several more days as outpatient.  Plan for follow-up with repeat imaging in several weeks to assure resolution.  Repeat chest x-ray on the eighth shows opacity is less dense but it has not resolved.  Plan for repeat CT scan in 6-8 weeks as outpatient.  Getting dialysis again this morning and if okay with nephrology will discharge home later today with follow-up.  Mild elevation of troponin noted felt to be associated with renal disease.  Not acute MI.      Condition on Discharge:  Stable.      Vital Signs  Temp:  [97.9 °F (36.6 °C)-98.6 °F (37 °C)] 97.9 °F (36.6 °C)  Heart Rate:  [90-96] 91  Resp:  [14-20] 20  BP: (138-160)/(82-92) 160/90    Physical Exam:  General Appearance: no acute distress, appears comfortable  Heart: regular rate and rhythm  Lungs: clear to  auscultation bilaterally, respirations unlabored  Abdomen: soft, non-tender, non-distended, no guarding/rebound, nl BS  Extremeties: no edema, no cyanosis  Neurology: speech normal, mental status normal, moving all four extremeties  Mental Status: alert, oriented    Discharge Disposition  Home or Self Care    Discharge Medications   Harry Ortega   Home Medication Instructions OMAR:[id number]    Printed on:07/10/17 1020   Medication Information                      cefdinir (OMNICEF) 300 MG capsule  Take 1 capsule by mouth Daily. Indications: Pneumonia             metoprolol succinate XL (TOPROL-XL) 100 MG 24 hr tablet  Take 1 tablet by mouth Daily.             minoxidil (LONITEN) 2.5 MG tablet  Take 2 tablets by mouth Every 12 (Twelve) Hours.             Multiple Vitamins-Minerals (MULTIVITAMIN ADULT PO)  Take 1 tablet by mouth Daily.                 Discharge Diet: regular diet    Activity at Discharge:     Follow-up Information     Follow up with No Known Provider .    Contact information:    [facility] [id number] [phone]          Follow up with Robbie Otto MD Follow up in 2 week(s).    Specialty:  Nephrology    Contact information:    [address] [facility] [address]  [facility] [address]  [phone]          Follow up with Allen Rogers MD Follow up in 6 week(s).    Specialty:  Pulmonary Disease    Why:  AFTER REPEAT CT CHEST FOR F/U PULMONARY NODULAR INFILTRATE    Contact information:    [address] [facility] [address]  [facility] [address]  [phone]          See primary care provider, No primary care provider on file., in 1-2 weeks        Test Results Pending at Discharge       Allen Rogers MD  [facility], [facility]  Pulmonary and Critical Care Medicine  07/10/17  10:20 AM    Time: greater than 30 minutes.        Total time spent discharging patient including evaluation,post hospitalization follow up,  medication and post hospitalization instructions and  education total time exceeds 30 minutes.

## 2017-07-10 NOTE — PLAN OF CARE
Problem: Renal Failure/Kidney Injury, Acute (Adult)  Goal: Signs and Symptoms of Listed Potential Problems Will be Absent or Manageable (Renal Failure/Kidney Injury, Acute)  Outcome: Ongoing (interventions implemented as appropriate)    Problem: Patient Care Overview (Adult)  Goal: Plan of Care Review  Outcome: Ongoing (interventions implemented as appropriate)    07/10/17 0123   Outcome Evaluation   Outcome Summary/Follow up Plan Pt. VS WNL. No c/o pain. Pt. BP much improved on PO meds now. Pt. to recieve HD in a.m. Pt. resting comfortably, will continue to monitor closely.   Patient Care Overview   Progress improving   Coping/Psychosocial Response Interventions   Plan Of Care Reviewed With patient       Goal: Adult Individualization and Mutuality  Outcome: Ongoing (interventions implemented as appropriate)  Goal: Discharge Needs Assessment  Outcome: Ongoing (interventions implemented as appropriate)    Problem: Hypertensive Disease/Crisis (Arterial) (Adult)  Goal: Signs and Symptoms of Listed Potential Problems Will be Absent or Manageable (Hypertensive Disease/Crisis)  Outcome: Ongoing (interventions implemented as appropriate)    Problem: Fall Risk (Adult)  Goal: Absence of Falls  Outcome: Ongoing (interventions implemented as appropriate)

## 2017-07-10 NOTE — PROGRESS NOTES
Continued Stay Note  [facility]     Patient Name: Harry Oretga  MRN: [id number]  Today's Date: 7/10/2017    Admit Date: 7/3/2017          Discharge Plan       07/10/17 1200    Case Management/Social Work Plan    Plan Home with family support and outpatient dialysis scheduled for TTHS at 7:20am at [facility]    Patient/Family In Agreement With Plan yes    Additional Comments Spoke with Gladis at [facility] at [phone] and she states dialysis outpatient chair has been confirmed at [facility] at 7:20 am on T,TH,Sat and the first treatment is on 7/13/17 .  Informed JAMES Wiggins of outpatient dialysis chair confirmation and start date will be 7/13/17 at 7:20am.  Informed wife, Sarina as patient is in dialysis of  outpatient dialysis chair time of TTHS at 7:20am and letter confirmation provided to her.  WIfe states the plan is for patient to return home with her and denies need for HH or DME at discharge and states she will provide transportation to dialysis when patient can't drive......Anjelica Carrillo RN,CCP               Discharge Codes     None        Expected Discharge Date and Time     Expected Discharge Date Expected Discharge Time    Jul 10, 2017             Anjelica Carrillo RN

## 2017-07-17 ENCOUNTER — OFFICE VISIT (OUTPATIENT)
Dept: INTERNAL MEDICINE | Age: 57
End: 2017-07-17

## 2017-07-17 VITALS
WEIGHT: 157 LBS | DIASTOLIC BLOOD PRESSURE: 98 MMHG | SYSTOLIC BLOOD PRESSURE: 152 MMHG | HEART RATE: 83 BPM | BODY MASS INDEX: 22.48 KG/M2 | HEIGHT: 70 IN | TEMPERATURE: 98.3 F | OXYGEN SATURATION: 97 %

## 2017-07-17 DIAGNOSIS — J18.9 PNEUMONIA OF RIGHT UPPER LOBE DUE TO INFECTIOUS ORGANISM: ICD-10-CM

## 2017-07-17 DIAGNOSIS — Z12.11 COLON CANCER SCREENING: Primary | ICD-10-CM

## 2017-07-17 DIAGNOSIS — Z99.2 CKD (CHRONIC KIDNEY DISEASE) REQUIRING CHRONIC DIALYSIS (HCC): ICD-10-CM

## 2017-07-17 DIAGNOSIS — R91.1 SOLITARY PULMONARY NODULE ON LUNG CT: ICD-10-CM

## 2017-07-17 DIAGNOSIS — Z83.3 FAMILY HISTORY OF DIABETES MELLITUS: ICD-10-CM

## 2017-07-17 DIAGNOSIS — S46.812A TRAPEZIUS STRAIN, LEFT, INITIAL ENCOUNTER: ICD-10-CM

## 2017-07-17 DIAGNOSIS — Z00.00 PREVENTATIVE HEALTH CARE: ICD-10-CM

## 2017-07-17 DIAGNOSIS — Z00.00 ROUTINE ADULT HEALTH MAINTENANCE: ICD-10-CM

## 2017-07-17 DIAGNOSIS — Z76.89 ENCOUNTER TO ESTABLISH CARE: ICD-10-CM

## 2017-07-17 DIAGNOSIS — N18.6 CKD (CHRONIC KIDNEY DISEASE) REQUIRING CHRONIC DIALYSIS (HCC): ICD-10-CM

## 2017-07-17 DIAGNOSIS — G47.01 INSOMNIA DUE TO MEDICAL CONDITION: ICD-10-CM

## 2017-07-17 DIAGNOSIS — I10 ESSENTIAL HYPERTENSION: ICD-10-CM

## 2017-07-17 PROCEDURE — 99204 OFFICE O/P NEW MOD 45 MIN: CPT | Performed by: NURSE PRACTITIONER

## 2017-07-17 RX ORDER — TRAZODONE HYDROCHLORIDE 50 MG/1
50 TABLET ORAL NIGHTLY
Qty: 30 TABLET | Refills: 0 | OUTPATIENT
Start: 2017-07-17 | End: 2017-09-22

## 2017-07-17 RX ORDER — LIDOCAINE 50 MG/G
1 PATCH TOPICAL DAILY PRN
Qty: 30 PATCH | Refills: 0 | Status: SHIPPED | OUTPATIENT
Start: 2017-07-17 | End: 2017-08-17

## 2017-07-17 NOTE — PROGRESS NOTES
"Harry Ortega / 56 y.o. / male  07/17/2017    VITALS:    /98  Pulse 83  Temp 98.3 °F (36.8 °C)  Ht 70\" (177.8 cm)  Wt 157 lb (71.2 kg)  SpO2 97%  BMI 22.53 kg/m2  BP Readings from Last 3 Encounters:   07/17/17 152/98   07/10/17 170/78     Wt Readings from Last 3 Encounters:   07/17/17 157 lb (71.2 kg)   07/10/17 156 lb 15.5 oz (71.2 kg)      Body mass index is 22.53 kg/(m^2).    CC: Main reason(s) for today's visit: Establish Care (new pt to establish care)      HPI:    Patient is a 56 y.o. male who is here to establish care.     Hypertension: Patient here for follow-up of elevated blood pressure. He is not exercising and is not adherent to low salt diet.  Blood pressure is not well controlled at home. Cardiac symptoms none. Patient denies none.  Cardiovascular risk factors: advanced age (older than 55 for men, 65 for women), hypertension, male gender and sedentary lifestyle. Use of agents associated with hypertension: none. History of target organ damage: chronic kidney disease.    Was admitted to [facility] on 7/3/17 for hypertensive crisis.  During this admission, he was also found to be in end-stage kidney disease and started on dialysis.  He was also found to have right upper and lower lobe pneumonia as well as 5 millimeter pulmonary nodule noted on CT scan, which will be followed up by pulmonary and follow-up CT.  He was discharged 7/10/17.  Patient is currently receiving dialysis days a week (Tues, Th, Sat).     He also has complaints of recent new onset of sleep difficulties.  He does not have any issues initiating sleep, but he does have an issue maintaining sleep (reports that he usually wakes up 1-2 hours after falling asleep and has difficulty falling back asleep).  He states that he was on a medication in the hospital for sleep that worked well, although he cannot recall the name of it.  Does not have TV or light on in the room while asleep, does not drink caffeinated beverages before " bedtime.  He does report that he has recently had some left shoulder pain that sometimes awakens him from sleep or makes it difficult for him to stay asleep.    Nephrologist- Dr. Otto, indicated to follow up 2 weeks post discharge. He has not scheduled appointment at this time.     Pulmonary- Dr. Allen Rogers, follow up in 6 weeks and follow-up CT.  Patient still needs to schedule this appointment at this time.    Patient Care Team:  ALONZO Henry as PCP - General (Internal Medicine)  Robbie Otto MD as Consulting Physician (Nephrology)  Allen Rogers MD as Consulting Physician (Pulmonary Disease)  ____________________________________________________________________    ASSESSMENT & PLAN:    1. Essential hypertension  Blood pressure today 152/98.  Patient is currently on minoxidil 5 mg twice daily as well as metoprolol  mg once daily for blood pressure.  He is asymptomatic.  As patient is now a CKD patient on dialysis, I will defer management of his hypertension to his nephrologist.    - CBC & Differential  - Comprehensive Metabolic Panel  - Hemoglobin A1c  - Lipid Panel With / Chol / HDL Ratio  - T4, Free  - TSH    2. Pneumonia of right upper lobe due to infectious organism  Patient recently completed a course of by mouth Omnicef for right lobe pneumonia.  He is supposed to follow up with pulmonology for repeat CT 2-3 months after treatment of pneumonia for further follow-up on incidental pulmonary nodule.  Denies any cough, orthopnea, shortness of breath, fever today.  Discussed with patient today that he needs to call ASAP to schedule follow-up appointment with Dr. Rogers.     - CBC & Differential    3. CKD (chronic kidney disease) requiring chronic dialysis  On dialysis 3 days a week.  Nephrologist is Dr. Janina Ospina, instructed patient to call ASAP today to schedule follow-up appointment.    4. Encounter to establish care    - Ambulatory Referral For Screening Colonoscopy    5.  Preventative health care    - Ambulatory Referral For Screening Colonoscopy  - CBC & Differential  - Comprehensive Metabolic Panel  - Hemoglobin A1c  - Lipid Panel With / Chol / HDL Ratio  - PSA  - T4, Free  - TSH  - Vitamin D 25 Hydroxy  - Urinalysis With / Microscopic If Indicated    6. Routine adult health maintenance    - Ambulatory Referral For Screening Colonoscopy    7. Insomnia due to medical condition  Will trial patient on trazodone 50 mg daily at bedtime PRN for issues with sleep maintenance.    - traZODone (DESYREL) 50 MG tablet; Take 1 tablet by mouth Every Night.  Dispense: 30 tablet; Refill: 0    8. Trapezius strain, left, initial encounter    - lidocaine (LIDODERM) 5 %; Place 1 patch on the skin Daily As Needed for Mild Pain (1-3). Remove & Discard patch within 12 hours or as directed by MD  Dispense: 30 patch; Refill: 0    9. Solitary pulmonary nodule on lung CT  To be followed by pulmonary for repeat CT in 2-3 months status post hospital discharge.     10. Family history of diabetes mellitus  Hemoglobin A1c screening    11. Colon cancer screening    - Ambulatory Referral For Screening Colonoscopy          Return in about 1 month (around 8/17/2017) for Next scheduled follow up.    Future Appointments  Date Time Provider Department Center   7/24/2017 9:20 AM [facility] None   8/17/2017 7:40 AM ALONZO Henry [facility] None   8/22/2017 2:00 PM Carl Zaman MD [facility] None       ____________________________________________________________________    REVIEW OF SYSTEMS    Review of Systems   Constitutional: Negative for activity change, appetite change, chills and fever.   HENT: Negative for tinnitus.    Respiratory: Negative for chest tightness and shortness of breath.    Cardiovascular: Negative for chest pain, palpitations and leg swelling.   Gastrointestinal: Negative for blood in stool, constipation, nausea and vomiting.   Endocrine: Negative for polydipsia,  polyphagia and polyuria.   Genitourinary: Negative for frequency.   Musculoskeletal: Negative for arthralgias.   Neurological: Negative for dizziness, tremors, seizures, syncope, weakness, light-headedness and headaches.   Psychiatric/Behavioral: Positive for sleep disturbance (able to fall asleep, unable to stay asleep).     Other: As noted per HPI      PHYSICAL EXAMINATION    Physical Exam   Constitutional: He is oriented to person, place, and time. Vital signs are normal. He appears well-developed and well-nourished. He is cooperative. He does not appear ill. No distress.   Cardiovascular: Normal rate, regular rhythm, S1 normal and S2 normal.  Exam reveals gallop and S4.    No murmur heard.  Pulses:       Dorsalis pedis pulses are 2+ on the right side, and 2+ on the left side.        Posterior tibial pulses are 2+ on the right side, and 2+ on the left side.   Bilateral 1+ pitting lower extremity edema   Pulmonary/Chest: Effort normal and breath sounds normal. He has no decreased breath sounds. He has no wheezes. He has no rhonchi. He has no rales.   Neurological: He is alert and oriented to person, place, and time.   Skin: Skin is warm, dry and intact.        Psychiatric: He has a normal mood and affect. His speech is normal and behavior is normal. Judgment and thought content normal. Cognition and memory are normal.   Nursing note and vitals reviewed.      REVIEWED DATA:    Labs:   Lab Results   Component Value Date     07/09/2017    K 3.4 (L) 07/09/2017    AST 41 (H) 07/03/2017    ALT 23 07/03/2017    BUN 41 (H) 07/09/2017    CREATININE 7.50 (H) 07/09/2017    CREATININE 5.22 (H) 07/08/2017    CREATININE 7.53 (H) 07/07/2017    EGFRIFAFRI 9 (L) 07/09/2017       No results found for: GLU, HGBA1C, MICROALBUR    No results found for: LDL, HDL, TRIG, CHOLHDLRATIO    Lab Results   Component Value Date    TSH 1.200 07/03/2017    FREET4 1.26 07/03/2017          Lab Results   Component Value Date    WBC 5.95  07/09/2017    HGB 7.9 (L) 07/09/2017    HGB 7.5 (L) 07/08/2017    HGB 7.9 (L) 07/07/2017     07/09/2017         Imaging:        Medical Tests:        Summary of old records / correspondence / consultant report:        Request outside records:        ______________________________________________________________________    No Known Allergies    Current Outpatient Prescriptions   Medication Sig Dispense Refill   • metoprolol succinate XL (TOPROL-XL) 100 MG 24 hr tablet Take 1 tablet by mouth Daily. 30 tablet 1   • minoxidil (LONITEN) 2.5 MG tablet Take 2 tablets by mouth Every 12 (Twelve) Hours. 60 tablet 1   • Multiple Vitamins-Minerals (MULTIVITAMIN ADULT PO) Take 1 tablet by mouth Daily.     • cefdinir (OMNICEF) 300 MG capsule Take 1 capsule by mouth Daily. Indications: Pneumonia 4 capsule 0   • lidocaine (LIDODERM) 5 % Place 1 patch on the skin Daily As Needed for Mild Pain (1-3). Remove & Discard patch within 12 hours or as directed by MD 30 patch 0   • traZODone (DESYREL) 50 MG tablet Take 1 tablet by mouth Every Night. 30 tablet 0     No current facility-administered medications for this visit.        PFSH:     The following portions of the patient's history were reviewed and updated as appropriate: Allergies / Current Medications / Past Medical History / Surgical History / Social History / Family History    Patient Active Problem List   Diagnosis   • Hypertensive crisis   • Hypertension   • CKD (chronic kidney disease) requiring chronic dialysis   • Pneumonia       Past Medical History:   Diagnosis Date   • CKD (chronic kidney disease) requiring chronic dialysis    • Hypertension        Past Surgical History:   Procedure Laterality Date   • ARTERIOVENOUS FISTULA/SHUNT SURGERY W/ HEMODIALYSIS CATHETER INSERTION N/A 7/6/2017    Procedure: ARTERIOVENOUS FISTULA LEFT ARM AND PALINDROME PLACEMENT;  Surgeon: Kar Morales MD;  Location: [facility] OR;  Service:    • JOINT REPLACEMENT Left     KNEE    • KNEE SURGERY Left        Social History     Social History   • Marital status:      Spouse name: N/A   • Number of children: 5   • Years of education: N/A     Occupational History   • [employer]-       Social History Main Topics   • Smoking status: Former Smoker     Packs/day: 0.25   • Smokeless tobacco: Never Used   • Alcohol use No      Comment: stopped drinking beer 2-3 weeks ago   • Drug use: No   • Sexual activity: Yes     Partners: Female     Other Topics Concern   • None     Social History Narrative   • None       Family History   Problem Relation Age of Onset   • Hypertension Mother    • Diabetes Mother    • Hypertension Father    • Diabetes Son          **Dragon Disclaimer:   Much of this encounter note is an electronic transcription/translation of spoken language to printed text. The electronic translation of spoken language may permit erroneous, or at times, nonsensical words or phrases to be inadvertently transcribed. Although I have reviewed the note for such errors, some may still exist.

## 2017-07-20 ENCOUNTER — TELEPHONE (OUTPATIENT)
Dept: INTERNAL MEDICINE | Age: 57
End: 2017-07-20

## 2017-07-20 NOTE — TELEPHONE ENCOUNTER
Called pt's wife. Pt's wife was advised to have pt take Zyrtec 5mg 1 tab daily. Pt's wife was advised to have pt use Flonase for drainage and congestion. Pt's wife was also advised to have pt come in for visit if pt worsens in next 7-10 days.   Pt's wife demonstrated understanding, and was agreeable to plan.    KD

## 2017-07-20 NOTE — TELEPHONE ENCOUNTER
Pt's wife called stating pt is scheduled for dialysis tomorrow and is experiencing sinus symptoms since yesterday.    Pt's wife states pt has non-productive cough, drainage, and congestion.     Pt is concerned about taking anything for his symptoms d/t dialysis Tx tomorrow.    Is there anything OTC he can take for sinus symptoms, that will not interfere with Tx?    KD

## 2017-07-20 NOTE — TELEPHONE ENCOUNTER
Please let patient know that he can take Zyrtec 5mg once daily (which is a 1/2 tab of 10mg dosage) and Flonase nasal spray to help with drainage and congestion. If his symptoms worsen in the next 7-10 days, he can come in to see me for a visit.

## 2017-07-24 LAB
25(OH)D3+25(OH)D2 SERPL-MCNC: 16.9 NG/ML (ref 30–100)
ALBUMIN SERPL-MCNC: 3.7 G/DL (ref 3.5–5.2)
ALBUMIN/GLOB SERPL: 1.5 G/DL
ALP SERPL-CCNC: 140 U/L (ref 39–117)
ALT SERPL-CCNC: 82 U/L (ref 1–41)
APPEARANCE UR: CLEAR
AST SERPL-CCNC: 46 U/L (ref 1–40)
BACTERIA #/AREA URNS HPF: ABNORMAL /HPF
BASOPHILS # BLD AUTO: 0.02 10*3/MM3 (ref 0–0.2)
BASOPHILS NFR BLD AUTO: 0.5 % (ref 0–1.5)
BILIRUB SERPL-MCNC: 0.4 MG/DL (ref 0.1–1.2)
BILIRUB UR QL STRIP: NEGATIVE
BUN SERPL-MCNC: 42 MG/DL (ref 6–20)
BUN/CREAT SERPL: 4.8 (ref 7–25)
CALCIUM SERPL-MCNC: 8.4 MG/DL (ref 8.6–10.5)
CASTS URNS MICRO: ABNORMAL
CHLORIDE SERPL-SCNC: 99 MMOL/L (ref 98–107)
CHOLEST SERPL-MCNC: 133 MG/DL (ref 0–200)
CHOLEST/HDLC SERPL: 2.89 {RATIO}
CO2 SERPL-SCNC: 23.1 MMOL/L (ref 22–29)
COLOR UR: YELLOW
CREAT SERPL-MCNC: 8.82 MG/DL (ref 0.76–1.27)
EOSINOPHIL # BLD AUTO: 0.11 10*3/MM3 (ref 0–0.7)
EOSINOPHIL NFR BLD AUTO: 2.8 % (ref 0.3–6.2)
EPI CELLS #/AREA URNS HPF: ABNORMAL /HPF
ERYTHROCYTE [DISTWIDTH] IN BLOOD BY AUTOMATED COUNT: 14.5 % (ref 11.5–14.5)
GLOBULIN SER CALC-MCNC: 2.4 GM/DL
GLUCOSE SERPL-MCNC: 105 MG/DL (ref 65–99)
GLUCOSE UR QL: NEGATIVE
HBA1C MFR BLD: 5.03 % (ref 4.8–5.6)
HCT VFR BLD AUTO: 24.9 % (ref 40.4–52.2)
HDLC SERPL-MCNC: 46 MG/DL (ref 40–60)
HGB BLD-MCNC: 7.9 G/DL (ref 13.7–17.6)
HGB UR QL STRIP: ABNORMAL
IMM GRANULOCYTES # BLD: 0 10*3/MM3 (ref 0–0.03)
IMM GRANULOCYTES NFR BLD: 0 % (ref 0–0.5)
KETONES UR QL STRIP: NEGATIVE
LDLC SERPL CALC-MCNC: 70 MG/DL (ref 0–100)
LEUKOCYTE ESTERASE UR QL STRIP: NEGATIVE
LYMPHOCYTES # BLD AUTO: 0.79 10*3/MM3 (ref 0.9–4.8)
LYMPHOCYTES NFR BLD AUTO: 19.8 % (ref 19.6–45.3)
MCH RBC QN AUTO: 32.4 PG (ref 27–32.7)
MCHC RBC AUTO-ENTMCNC: 31.7 G/DL (ref 32.6–36.4)
MCV RBC AUTO: 102 FL (ref 79.8–96.2)
MONOCYTES # BLD AUTO: 0.39 10*3/MM3 (ref 0.2–1.2)
MONOCYTES NFR BLD AUTO: 9.8 % (ref 5–12)
NEUTROPHILS # BLD AUTO: 2.69 10*3/MM3 (ref 1.9–8.1)
NEUTROPHILS NFR BLD AUTO: 67.1 % (ref 42.7–76)
NITRITE UR QL STRIP: NEGATIVE
PH UR STRIP: 6 [PH] (ref 5–8)
PLATELET # BLD AUTO: 141 10*3/MM3 (ref 140–500)
POTASSIUM SERPL-SCNC: 4.5 MMOL/L (ref 3.5–5.2)
PROT SERPL-MCNC: 6.1 G/DL (ref 6–8.5)
PROT UR QL STRIP: ABNORMAL
PSA SERPL-MCNC: 0.57 NG/ML (ref 0–4)
RBC # BLD AUTO: 2.44 10*6/MM3 (ref 4.6–6)
RBC #/AREA URNS HPF: ABNORMAL /HPF
SODIUM SERPL-SCNC: 140 MMOL/L (ref 136–145)
SP GR UR: 1.02 (ref 1–1.03)
T4 FREE SERPL-MCNC: 1.53 NG/DL (ref 0.93–1.7)
TRIGL SERPL-MCNC: 86 MG/DL (ref 0–150)
TSH SERPL DL<=0.005 MIU/L-ACNC: 1.71 MIU/ML (ref 0.27–4.2)
UROBILINOGEN UR STRIP-MCNC: ABNORMAL MG/DL
VLDLC SERPL CALC-MCNC: 17.2 MG/DL (ref 5–40)
WBC # BLD AUTO: 4 10*3/MM3 (ref 4.5–10.7)
WBC #/AREA URNS HPF: ABNORMAL /HPF

## 2017-07-27 ENCOUNTER — TELEPHONE (OUTPATIENT)
Dept: INTERNAL MEDICINE | Age: 57
End: 2017-07-27

## 2017-07-27 NOTE — TELEPHONE ENCOUNTER
Called pt regarding lab results. Pt was informed of all normal levels. Pt was also informed of possible anemia d/t low hemoglobin. Pt stated he is aware of this and his nephrologist is also aware.   Pt was advised to keep upcoming appt with nephrologist, and have them draw repeat labs if possible. Pt stated labs are expected at next appt with nephrologist.     CHANDANA

## 2017-08-17 ENCOUNTER — OFFICE VISIT (OUTPATIENT)
Dept: INTERNAL MEDICINE | Age: 57
End: 2017-08-17

## 2017-08-17 VITALS
HEART RATE: 92 BPM | TEMPERATURE: 99.3 F | HEIGHT: 70 IN | SYSTOLIC BLOOD PRESSURE: 144 MMHG | OXYGEN SATURATION: 100 % | WEIGHT: 157.2 LBS | DIASTOLIC BLOOD PRESSURE: 72 MMHG | BODY MASS INDEX: 22.5 KG/M2

## 2017-08-17 DIAGNOSIS — I10 ESSENTIAL HYPERTENSION: Primary | ICD-10-CM

## 2017-08-17 DIAGNOSIS — Z99.2 CKD (CHRONIC KIDNEY DISEASE) REQUIRING CHRONIC DIALYSIS (HCC): ICD-10-CM

## 2017-08-17 DIAGNOSIS — R91.1 PULMONARY NODULE: ICD-10-CM

## 2017-08-17 DIAGNOSIS — N18.6 CKD (CHRONIC KIDNEY DISEASE) REQUIRING CHRONIC DIALYSIS (HCC): ICD-10-CM

## 2017-08-17 PROBLEM — D63.1 ANEMIA IN CKD (CHRONIC KIDNEY DISEASE): Status: ACTIVE | Noted: 2017-08-17

## 2017-08-17 PROBLEM — N18.9 ANEMIA IN CKD (CHRONIC KIDNEY DISEASE): Status: ACTIVE | Noted: 2017-08-17

## 2017-08-17 PROCEDURE — 99214 OFFICE O/P EST MOD 30 MIN: CPT | Performed by: NURSE PRACTITIONER

## 2017-08-17 RX ORDER — MINOXIDIL 2.5 MG/1
5 TABLET ORAL 2 TIMES DAILY
Qty: 60 TABLET | Refills: 1 | COMMUNITY
Start: 2017-08-17 | End: 2022-03-15 | Stop reason: ALTCHOICE

## 2017-08-17 NOTE — PROGRESS NOTES
Subjective   Harry Ortega is a 56 y.o. male.     Hypertension   This is a chronic problem. The current episode started more than 1 month ago. The problem has been rapidly improving since onset. The problem is controlled. Pertinent negatives include no anxiety, blurred vision, chest pain, headaches, malaise/fatigue, neck pain, orthopnea, palpitations, peripheral edema, PND, shortness of breath or sweats. There are no associated agents to hypertension. Risk factors for coronary artery disease include male gender. Past treatments include direct vasodilators. The current treatment provides moderate improvement. Hypertensive end-organ damage includes kidney disease (patient currently on dialysis). Identifiable causes of hypertension include chronic renal disease.    Patient reports he saw Dr. Gonsales's nurse practitioner approximately 2 weeks ago.  At that time they made modifications to his blood pressure medicines, discontinuing his Toprol-XL and reducing his minoxidil from 5 mg twice a day to 2.5 mg daily.  They are also correcting his anemia, he reports he has CBC to be rechecked in 2 weeks.  He reports that nephrologist feels that he is going to be on lifelong dialysis, he also reports that he is on the donor transplant list.    He also saw Dr. Rogers yesterday in office for pulmonary follow-up.  Reports he had PFTs done.  He does have follow-up CT scheduled on December 12, 2017 for follow-up of pulmonary nodule noted on CT while he was hospitalized.    Overall, he feels improved.  He reports he is going to be able to go back to work next week as he has changed his dialysis schedule 2 evenings to allow to go back to work full time.  Feels good, no chest pain, shortness of breath or any other acute concerns at this time.  Left trapezius pain that he had a last visit has since resolved, he has not required use of lidocaine patches.     The following portions of the patient's history were reviewed and updated as  appropriate: allergies, current medications, past family history, past medical history, past social history, past surgical history and problem list.    Review of Systems   Constitutional: Negative for fatigue, fever and malaise/fatigue.   Eyes: Negative for blurred vision.   Respiratory: Negative for shortness of breath.    Cardiovascular: Negative for chest pain, palpitations, orthopnea and PND.   Musculoskeletal: Negative for neck pain.   Neurological: Negative for headaches.       Objective   Physical Exam   Constitutional: He is oriented to person, place, and time. Vital signs are normal. He appears well-developed and well-nourished. He is cooperative. He does not appear ill. No distress.   Cardiovascular: Normal rate, regular rhythm, S1 normal, S2 normal and normal heart sounds.    No murmur heard.  Trace bilateral lower extremity nonpitting edema   Pulmonary/Chest: Effort normal and breath sounds normal. He has no decreased breath sounds. He has no wheezes. He has no rhonchi. He has no rales.   Neurological: He is alert and oriented to person, place, and time.   Skin: Skin is warm, dry and intact.   Psychiatric: He has a normal mood and affect. His speech is normal and behavior is normal. Judgment and thought content normal. Cognition and memory are normal.   Nursing note and vitals reviewed.      Assessment/Plan   Problems Addressed this Visit        Cardiovascular and Mediastinum    Hypertension - Primary    Relevant Medications    minoxidil (LONITEN) 2.5 MG tablet       Respiratory    Pulmonary nodule       Genitourinary    CKD (chronic kidney disease) requiring chronic dialysis        1. Essential hypertension  Hypertension currently managed by nephrology.  Patient has been reduced on his medications to minoxidil 2.5 mg daily.  Blood pressure currently stable on this regimen.  - minoxidil (LONITEN) 2.5 MG tablet; Take 1 tablet by mouth Daily.  Dispense: 60 tablet; Refill: 1    2. CKD (chronic kidney  disease) requiring chronic dialysis  Managed by Dr. Rodriguez.  Patient on dialysis 3 times weekly.     3. Pulmonary nodule  Patient reports office visit with Dr. Saba yesterday.  Pulmonary function tests completed.  He has follow-up CT scan ordered for 12/12/17.

## 2017-08-19 ENCOUNTER — HOSPITAL ENCOUNTER (EMERGENCY)
Facility: HOSPITAL | Age: 57
Discharge: HOME | End: 2017-08-19
Attending: EMERGENCY MEDICINE | Admitting: EMERGENCY MEDICINE

## 2017-08-19 VITALS
BODY MASS INDEX: 22.9 KG/M2 | HEART RATE: 98 BPM | DIASTOLIC BLOOD PRESSURE: 90 MMHG | HEIGHT: 70 IN | TEMPERATURE: 97.5 F | RESPIRATION RATE: 18 BRPM | SYSTOLIC BLOOD PRESSURE: 164 MMHG | OXYGEN SATURATION: 100 % | WEIGHT: 160 LBS

## 2017-08-19 DIAGNOSIS — N18.9 ANEMIA IN CKD (CHRONIC KIDNEY DISEASE): Primary | ICD-10-CM

## 2017-08-19 DIAGNOSIS — Z99.2 CKD (CHRONIC KIDNEY DISEASE) REQUIRING CHRONIC DIALYSIS (HCC): ICD-10-CM

## 2017-08-19 DIAGNOSIS — D63.1 ANEMIA IN CKD (CHRONIC KIDNEY DISEASE): Primary | ICD-10-CM

## 2017-08-19 DIAGNOSIS — N18.6 CKD (CHRONIC KIDNEY DISEASE) REQUIRING CHRONIC DIALYSIS (HCC): ICD-10-CM

## 2017-08-19 LAB
ABO GROUP BLD: NORMAL
ALBUMIN SERPL-MCNC: 3.7 G/DL (ref 3.5–5.2)
ALBUMIN/GLOB SERPL: 1.3 G/DL
ALP SERPL-CCNC: 111 U/L (ref 39–117)
ALT SERPL W P-5'-P-CCNC: 22 U/L (ref 1–41)
ANION GAP SERPL CALCULATED.3IONS-SCNC: 12.9 MMOL/L
AST SERPL-CCNC: 17 U/L (ref 1–40)
BASOPHILS # BLD AUTO: 0.01 10*3/MM3 (ref 0–0.2)
BASOPHILS NFR BLD AUTO: 0.3 % (ref 0–1.5)
BILIRUB SERPL-MCNC: 0.4 MG/DL (ref 0.1–1.2)
BLD GP AB SCN SERPL QL: NEGATIVE
BUN BLD-MCNC: 11 MG/DL (ref 6–20)
BUN/CREAT SERPL: 3.1 (ref 7–25)
CALCIUM SPEC-SCNC: 8.6 MG/DL (ref 8.6–10.5)
CHLORIDE SERPL-SCNC: 101 MMOL/L (ref 98–107)
CO2 SERPL-SCNC: 25.1 MMOL/L (ref 22–29)
CREAT BLD-MCNC: 3.55 MG/DL (ref 0.76–1.27)
DEPRECATED RDW RBC AUTO: 48.4 FL (ref 37–54)
EOSINOPHIL # BLD AUTO: 0.15 10*3/MM3 (ref 0–0.7)
EOSINOPHIL NFR BLD AUTO: 3.9 % (ref 0.3–6.2)
ERYTHROCYTE [DISTWIDTH] IN BLOOD BY AUTOMATED COUNT: 13.9 % (ref 11.5–14.5)
GFR SERPL CREATININE-BSD FRML MDRD: 22 ML/MIN/1.73
GLOBULIN UR ELPH-MCNC: 2.9 GM/DL
GLUCOSE BLD-MCNC: 131 MG/DL (ref 65–99)
HCT VFR BLD AUTO: 22.2 % (ref 40.4–52.2)
HGB BLD-MCNC: 7.3 G/DL (ref 13.7–17.6)
IMM GRANULOCYTES # BLD: 0 10*3/MM3 (ref 0–0.03)
IMM GRANULOCYTES NFR BLD: 0 % (ref 0–0.5)
INR PPP: 1.35 (ref 0.9–1.1)
LYMPHOCYTES # BLD AUTO: 0.65 10*3/MM3 (ref 0.9–4.8)
LYMPHOCYTES NFR BLD AUTO: 16.8 % (ref 19.6–45.3)
MCH RBC QN AUTO: 31.7 PG (ref 27–32.7)
MCHC RBC AUTO-ENTMCNC: 32.9 G/DL (ref 32.6–36.4)
MCV RBC AUTO: 96.5 FL (ref 79.8–96.2)
MONOCYTES # BLD AUTO: 0.36 10*3/MM3 (ref 0.2–1.2)
MONOCYTES NFR BLD AUTO: 9.3 % (ref 5–12)
NEUTROPHILS # BLD AUTO: 2.71 10*3/MM3 (ref 1.9–8.1)
NEUTROPHILS NFR BLD AUTO: 69.7 % (ref 42.7–76)
PLATELET # BLD AUTO: 160 10*3/MM3 (ref 140–500)
PMV BLD AUTO: 9.7 FL (ref 6–12)
POTASSIUM BLD-SCNC: 3.8 MMOL/L (ref 3.5–5.2)
PROT SERPL-MCNC: 6.6 G/DL (ref 6–8.5)
PROTHROMBIN TIME: 16.2 SECONDS (ref 11.7–14.2)
RBC # BLD AUTO: 2.3 10*6/MM3 (ref 4.6–6)
RH BLD: POSITIVE
SODIUM BLD-SCNC: 139 MMOL/L (ref 136–145)
WBC NRBC COR # BLD: 3.88 10*3/MM3 (ref 4.5–10.7)

## 2017-08-19 PROCEDURE — 80053 COMPREHEN METABOLIC PANEL: CPT | Performed by: EMERGENCY MEDICINE

## 2017-08-19 PROCEDURE — 86850 RBC ANTIBODY SCREEN: CPT | Performed by: EMERGENCY MEDICINE

## 2017-08-19 PROCEDURE — 85025 COMPLETE CBC W/AUTO DIFF WBC: CPT | Performed by: EMERGENCY MEDICINE

## 2017-08-19 PROCEDURE — 86901 BLOOD TYPING SEROLOGIC RH(D): CPT | Performed by: EMERGENCY MEDICINE

## 2017-08-19 PROCEDURE — 86900 BLOOD TYPING SEROLOGIC ABO: CPT | Performed by: EMERGENCY MEDICINE

## 2017-08-19 PROCEDURE — 85610 PROTHROMBIN TIME: CPT | Performed by: EMERGENCY MEDICINE

## 2017-08-19 PROCEDURE — 99284 EMERGENCY DEPT VISIT MOD MDM: CPT

## 2017-08-19 RX ORDER — SODIUM CHLORIDE 0.9 % (FLUSH) 0.9 %
10 SYRINGE (ML) INJECTION AS NEEDED
Status: DISCONTINUED | OUTPATIENT
Start: 2017-08-19 | End: 2017-08-19 | Stop reason: HOSPADM

## 2017-08-19 NOTE — ED PROVIDER NOTES
" EMERGENCY DEPARTMENT ENCOUNTER    CHIEF COMPLAINT  Chief Complaint: Abnormal lab  History given by: Pt  History limited by: Nothing  Room Number: 23/23  PMD: ALONZO Henry  Nephrologist: Dr. Otto    HPI:  Pt is a 56 y.o. male with a hx of chronic anemia who presents complaining a hemoglobin of 6.9 after receiving dialysis one day ago. He reports his hemoglobin decreased secondary to starting dialysis 7 weeks ago. He reports his phosphorus has been elevated and his iron has been low since starting his dialysis. They have been treating his iron deficiency with iron infusions during dialysis.  Pt denies SOA, CP, neck pain, nausea, vomiting, or any other symptoms at this time.    Duration:  One day  Onset: gradual  Timing: constant  Location: n/a  Radiation: none  Quality: \"6.9\"  Intensity/Severity: moderate  Progression: unchanged  Associated Symptoms: none  Aggravating Factors: none  Alleviating Factors: none  Previous Episodes: Pt has a hx of anemia.  Treatment before arrival: Pt received no treatment PTA.    PAST MEDICAL HISTORY  Active Ambulatory Problems     Diagnosis Date Noted   • Hypertensive crisis 07/03/2017   • Hypertension 07/17/2017   • CKD (chronic kidney disease) requiring chronic dialysis 07/17/2017   • Pneumonia 07/17/2017   • Pulmonary nodule 08/17/2017   • Anemia in CKD (chronic kidney disease) 08/17/2017     Resolved Ambulatory Problems     Diagnosis Date Noted   • No Resolved Ambulatory Problems     Past Medical History:   Diagnosis Date   • CKD (chronic kidney disease) requiring chronic dialysis    • Hypertension        PAST SURGICAL HISTORY  Past Surgical History:   Procedure Laterality Date   • ARTERIOVENOUS FISTULA/SHUNT SURGERY W/ HEMODIALYSIS CATHETER INSERTION N/A 7/6/2017    Procedure: ARTERIOVENOUS FISTULA LEFT ARM AND PALINDROME PLACEMENT;  Surgeon: Kar Morales MD;  Location: [facility];  Service:    • JOINT REPLACEMENT Left     KNEE   • KNEE SURGERY Left  "       FAMILY HISTORY  Family History   Problem Relation Age of Onset   • Hypertension Mother    • Diabetes Mother    • Hypertension Father    • Diabetes Son        SOCIAL HISTORY  Social History     Social History   • Marital status:      Spouse name: N/A   • Number of children: 5   • Years of education: N/A     Occupational History   • [employer]      Social History Main Topics   • Smoking status: Former Smoker     Packs/day: 0.25   • Smokeless tobacco: Never Used   • Alcohol use No   • Drug use: No   • Sexual activity: Yes     Partners: Female     Other Topics Concern   • Not on file     Social History Narrative       ALLERGIES  Review of patient's allergies indicates no known allergies.    REVIEW OF SYSTEMS  Review of Systems   Constitutional: Negative for activity change, appetite change and fever.        Pt presents with a hemoglobin of 6.9.   HENT: Negative for congestion and sore throat.    Eyes: Negative.    Respiratory: Negative for cough and shortness of breath.    Cardiovascular: Negative for chest pain and leg swelling.   Gastrointestinal: Negative for abdominal pain, diarrhea and vomiting.   Endocrine: Negative.    Genitourinary: Negative for decreased urine volume and dysuria.   Musculoskeletal: Negative for neck pain.   Skin: Negative for rash and wound.   Allergic/Immunologic: Negative.    Neurological: Negative for weakness, numbness and headaches.   Hematological: Negative.    Psychiatric/Behavioral: Negative.    All other systems reviewed and are negative.      PHYSICAL EXAM  ED Triage Vitals   Temp Heart Rate Resp BP SpO2   08/19/17 0914 08/19/17 0914 08/19/17 0915 08/19/17 0914 08/19/17 0914   97.5 °F (36.4 °C) 95 16 180/99 100 %      Temp src Heart Rate Source Patient Position BP Location FiO2 (%)   08/19/17 0915 08/19/17 0915 -- -- --   Oral Monitor          Physical Exam   Constitutional: He is oriented to person, place, and time. He appears distressed (moderate).   HENT:    Head: Normocephalic and atraumatic.   Eyes: EOM are normal. Pupils are equal, round, and reactive to light.   Pt's conjunctiva is pale.   Neck: Normal range of motion. Neck supple.   Cardiovascular: Normal rate, regular rhythm and normal heart sounds.  Exam reveals no gallop and no friction rub.    No murmur heard.  Pulmonary/Chest: Effort normal and breath sounds normal. No respiratory distress. He has no wheezes. He has no rales.   Abdominal: Soft. There is no tenderness. There is no rebound and no guarding.   Musculoskeletal: Normal range of motion. He exhibits edema (1+ pedal).   Neurological: He is alert and oriented to person, place, and time. He has normal sensation and normal strength.   Skin: Skin is warm and dry. There is pallor.   Psychiatric: Mood and affect normal.   Nursing note and vitals reviewed.      LAB RESULTS  Lab Results (last 24 hours)     Procedure Component Value Units Date/Time    CBC & Differential [[id number]] Collected:  08/19/17 0934    Specimen:  Blood Updated:  08/19/17 0950    Narrative:       The following orders were created for panel order CBC & Differential.  Procedure                               Abnormality         Status                     ---------                               -----------         ------                     CBC Auto Differential[[id number]]        Abnormal            Final result                 Please view results for these tests on the individual orders.    Comprehensive Metabolic Panel [[id number]]  (Abnormal) Collected:  08/19/17 0934    Specimen:  Blood Updated:  08/19/17 1006     Glucose 131 (H) mg/dL      BUN 11 mg/dL      Creatinine 3.55 (H) mg/dL      Sodium 139 mmol/L      Potassium 3.8 mmol/L      Chloride 101 mmol/L      CO2 25.1 mmol/L      Calcium 8.6 mg/dL      Total Protein 6.6 g/dL      Albumin 3.70 g/dL      ALT (SGPT) 22 U/L      AST (SGOT) 17 U/L      Alkaline Phosphatase 111 U/L      Total Bilirubin 0.4 mg/dL      eGFR   Amer 22  (L) mL/min/1.73      Globulin 2.9 gm/dL      A/G Ratio 1.3 g/dL      BUN/Creatinine Ratio 3.1 (L)     Anion Gap 12.9 mmol/L     Protime-INR [[id number]]  (Abnormal) Collected:  08/19/17 0934    Specimen:  Blood Updated:  08/19/17 1000     Protime 16.2 (H) Seconds      INR 1.35 (H)    CBC Auto Differential [[id number]]  (Abnormal) Collected:  08/19/17 0934    Specimen:  Blood Updated:  08/19/17 0950     WBC 3.88 (L) 10*3/mm3      RBC 2.30 (L) 10*6/mm3      Hemoglobin 7.3 (L) g/dL      Hematocrit 22.2 (L) %      MCV 96.5 (H) fL      MCH 31.7 pg      MCHC 32.9 g/dL      RDW 13.9 %      RDW-SD 48.4 fl      MPV 9.7 fL      Platelets 160 10*3/mm3      Neutrophil % 69.7 %      Lymphocyte % 16.8 (L) %      Monocyte % 9.3 %      Eosinophil % 3.9 %      Basophil % 0.3 %      Immature Grans % 0.0 %      Neutrophils, Absolute 2.71 10*3/mm3      Lymphocytes, Absolute 0.65 (L) 10*3/mm3      Monocytes, Absolute 0.36 10*3/mm3      Eosinophils, Absolute 0.15 10*3/mm3      Basophils, Absolute 0.01 10*3/mm3      Immature Grans, Absolute 0.00 10*3/mm3           I ordered the above labs and reviewed the results    RADIOLOGY  No orders to display        I ordered the above noted radiological studies. Interpreted by radiologist. Reviewed by me in PACS.       PROCEDURES  Procedures      PROGRESS AND CONSULTS  ED Course     0918 Ordered CBC, Type & Screen, CMP, and Protime-INR for further evaluation    1021 Placed call to Dr. Otto (nephrologist)    1044 BP- 164/90 HR- 98 Temp- 97.5 °F (36.4 °C) O2 sat- 100%. Rechecked the patient who is in NAD and is resting comfortably. Informed pt of pertinent lab results which showed hemoglobin at 7.3. Informed pt of plan to contact his nephrologist.    1049 Discussed pt with nephrology who will follow up and call for the pt's dialysis to be as scheduled on Monday. Pt should return if he experiences black or bloody bowel movements or experiences SOA.      MEDICAL DECISION MAKING  Results were  reviewed/discussed with the patient and they were also made aware of online access. Pt also made aware that some labs, such as cultures, will not be resulted during ER visit and follow up with PMD is necessary.     MDM  Number of Diagnoses or Management Options     Amount and/or Complexity of Data Reviewed  Clinical lab tests: ordered and reviewed (Creatinine - 3.55, INR - 1.35, WBC - 3.88, Hemoglobin - 7.3)  Decide to obtain previous medical records or to obtain history from someone other than the patient: yes  Review and summarize past medical records: yes (Pt's hemoglobin was 7.3 on 7/24/2017.)  Discuss the patient with other providers: yes (Nephrology)  Independent visualization of images, tracings, or specimens: yes    Patient Progress  Patient progress: stable         DIAGNOSIS  Final diagnoses:   Anemia in CKD (chronic kidney disease)   CKD (chronic kidney disease) requiring chronic dialysis       DISPOSITION  DISCHARGE    Patient discharged in stable condition.    Reviewed implications of results, diagnosis, meds, responsibility to follow up, warning signs and symptoms of possible worsening, potential complications and reasons to return to ER, including new or worsening symptoms.    Patient/Family voiced understanding of above instructions.    Discussed plan for discharge, as there is no emergent indication for admission.  Pt/family is agreeable and understands need for follow up and repeat testing.  Pt is aware that discharge does not mean that nothing is wrong but it indicates no emergency is present that requires admission and they must continue care with follow-up as given below or physician of their choice.     FOLLOW-UP  Marichuy Cotto, APRN  [address]  [facility] [address]  [facility] [address]  [phone]      As needed    Robbie Otto MD  [address] [facility]  [facility] [address]  [facility] [address]  [phone]               Medication List      Notice     No changes were made to your prescriptions during  this visit.            Latest Documented Vital Signs:  As of 5:04 PM  BP- 164/90 HR- 98 Temp- 97.5 °F (36.4 °C) O2 sat- 100%    --  Documentation assistance provided by kevin Thomas for Dr. Carodzo.  Information recorded by the scribe was done at my direction and has been verified and validated by me.         Kristian Thomas  08/19/17 3204       Eladio Cardozo MD  08/19/17 7874

## 2017-08-19 NOTE — DISCHARGE INSTRUCTIONS
Go to dialysis as scheduled on Monday.  Please return to the ED if you develop increasing weakness, fatigue, shortness of breath or if you have black or bloody bowel movements.

## 2017-08-28 ENCOUNTER — TELEPHONE (OUTPATIENT)
Dept: SOCIAL WORK | Facility: HOSPITAL | Age: 57
End: 2017-08-28

## 2017-08-28 NOTE — TELEPHONE ENCOUNTER
"ER F/U phone call:  Pt states that he is doing \"fine\". Has had dialysis and the nephrologist determined that he did not require a blood transfusion. No other questions or concerns voiced at this time. Lorraine Romo RN    "

## 2017-09-01 ENCOUNTER — TRANSCRIBE ORDERS (OUTPATIENT)
Dept: ADMINISTRATIVE | Facility: HOSPITAL | Age: 57
End: 2017-09-01

## 2017-09-01 DIAGNOSIS — R91.1 PULMONARY NODULE: Primary | ICD-10-CM

## 2017-09-08 ENCOUNTER — TELEPHONE (OUTPATIENT)
Dept: INTERNAL MEDICINE | Age: 57
End: 2017-09-08

## 2017-09-08 NOTE — TELEPHONE ENCOUNTER
Called pt back and instructed him to go to ER as soon as possible d/t symptoms described. Pt demonstrated understanding and was agreeable.    KD

## 2017-09-08 NOTE — TELEPHONE ENCOUNTER
Pt called stating he is having the same stomach issues of nausea/ queasy and vomited a few times yesterday, as he did prior to being seen at ER recently. Pt stated his stomach is bloated and hard. Pt asking to be seen by Marichuy.  Pt's # [phone]  Thanks SP

## 2017-09-09 ENCOUNTER — APPOINTMENT (OUTPATIENT)
Dept: CT IMAGING | Facility: HOSPITAL | Age: 57
End: 2017-09-09

## 2017-09-09 ENCOUNTER — HOSPITAL ENCOUNTER (EMERGENCY)
Facility: HOSPITAL | Age: 57
Discharge: HOME | End: 2017-09-09
Attending: EMERGENCY MEDICINE | Admitting: EMERGENCY MEDICINE

## 2017-09-09 VITALS
SYSTOLIC BLOOD PRESSURE: 160 MMHG | DIASTOLIC BLOOD PRESSURE: 95 MMHG | HEART RATE: 64 BPM | TEMPERATURE: 98.4 F | HEIGHT: 69 IN | BODY MASS INDEX: 23.7 KG/M2 | OXYGEN SATURATION: 99 % | RESPIRATION RATE: 16 BRPM | WEIGHT: 160 LBS

## 2017-09-09 DIAGNOSIS — R10.9 ABDOMINAL DISCOMFORT: Primary | ICD-10-CM

## 2017-09-09 DIAGNOSIS — N18.5 CRF (CHRONIC RENAL FAILURE), STAGE 5 (HCC): ICD-10-CM

## 2017-09-09 DIAGNOSIS — R11.0 NAUSEA: ICD-10-CM

## 2017-09-09 LAB
ALBUMIN SERPL-MCNC: 3.8 G/DL (ref 3.5–5.2)
ALBUMIN/GLOB SERPL: 1.2 G/DL
ALP SERPL-CCNC: 132 U/L (ref 39–117)
ALT SERPL W P-5'-P-CCNC: 61 U/L (ref 1–41)
ANION GAP SERPL CALCULATED.3IONS-SCNC: 12.1 MMOL/L
AST SERPL-CCNC: 42 U/L (ref 1–40)
BASOPHILS # BLD AUTO: 0.02 10*3/MM3 (ref 0–0.2)
BASOPHILS NFR BLD AUTO: 0.5 % (ref 0–1.5)
BILIRUB SERPL-MCNC: 0.6 MG/DL (ref 0.1–1.2)
BUN BLD-MCNC: 22 MG/DL (ref 6–20)
BUN/CREAT SERPL: 5.5 (ref 7–25)
CALCIUM SPEC-SCNC: 9 MG/DL (ref 8.6–10.5)
CHLORIDE SERPL-SCNC: 100 MMOL/L (ref 98–107)
CO2 SERPL-SCNC: 29.9 MMOL/L (ref 22–29)
CREAT BLD-MCNC: 3.98 MG/DL (ref 0.76–1.27)
DEPRECATED RDW RBC AUTO: 51.4 FL (ref 37–54)
EOSINOPHIL # BLD AUTO: 0.09 10*3/MM3 (ref 0–0.7)
EOSINOPHIL NFR BLD AUTO: 2.1 % (ref 0.3–6.2)
ERYTHROCYTE [DISTWIDTH] IN BLOOD BY AUTOMATED COUNT: 15.5 % (ref 11.5–14.5)
GFR SERPL CREATININE-BSD FRML MDRD: 19 ML/MIN/1.73
GLOBULIN UR ELPH-MCNC: 3.1 GM/DL
GLUCOSE BLD-MCNC: 119 MG/DL (ref 65–99)
HCT VFR BLD AUTO: 28.3 % (ref 40.4–52.2)
HGB BLD-MCNC: 9.5 G/DL (ref 13.7–17.6)
IMM GRANULOCYTES # BLD: 0 10*3/MM3 (ref 0–0.03)
IMM GRANULOCYTES NFR BLD: 0 % (ref 0–0.5)
LIPASE SERPL-CCNC: 36 U/L (ref 13–60)
LYMPHOCYTES # BLD AUTO: 0.72 10*3/MM3 (ref 0.9–4.8)
LYMPHOCYTES NFR BLD AUTO: 16.4 % (ref 19.6–45.3)
MAGNESIUM SERPL-MCNC: 2.2 MG/DL (ref 1.6–2.6)
MCH RBC QN AUTO: 31.3 PG (ref 27–32.7)
MCHC RBC AUTO-ENTMCNC: 33.6 G/DL (ref 32.6–36.4)
MCV RBC AUTO: 93.1 FL (ref 79.8–96.2)
MONOCYTES # BLD AUTO: 0.4 10*3/MM3 (ref 0.2–1.2)
MONOCYTES NFR BLD AUTO: 9.1 % (ref 5–12)
NEUTROPHILS # BLD AUTO: 3.15 10*3/MM3 (ref 1.9–8.1)
NEUTROPHILS NFR BLD AUTO: 71.9 % (ref 42.7–76)
PHOSPHATE SERPL-MCNC: 2.5 MG/DL (ref 2.5–4.5)
PLATELET # BLD AUTO: 204 10*3/MM3 (ref 140–500)
PMV BLD AUTO: 9.6 FL (ref 6–12)
POTASSIUM BLD-SCNC: 3.3 MMOL/L (ref 3.5–5.2)
PROT SERPL-MCNC: 6.9 G/DL (ref 6–8.5)
RBC # BLD AUTO: 3.04 10*6/MM3 (ref 4.6–6)
SODIUM BLD-SCNC: 142 MMOL/L (ref 136–145)
WBC NRBC COR # BLD: 4.38 10*3/MM3 (ref 4.5–10.7)

## 2017-09-09 PROCEDURE — 85025 COMPLETE CBC W/AUTO DIFF WBC: CPT | Performed by: EMERGENCY MEDICINE

## 2017-09-09 PROCEDURE — 80053 COMPREHEN METABOLIC PANEL: CPT | Performed by: EMERGENCY MEDICINE

## 2017-09-09 PROCEDURE — 74176 CT ABD & PELVIS W/O CONTRAST: CPT

## 2017-09-09 PROCEDURE — 83735 ASSAY OF MAGNESIUM: CPT | Performed by: EMERGENCY MEDICINE

## 2017-09-09 PROCEDURE — 84100 ASSAY OF PHOSPHORUS: CPT | Performed by: EMERGENCY MEDICINE

## 2017-09-09 PROCEDURE — 83690 ASSAY OF LIPASE: CPT | Performed by: EMERGENCY MEDICINE

## 2017-09-09 PROCEDURE — 99284 EMERGENCY DEPT VISIT MOD MDM: CPT

## 2017-09-09 RX ORDER — METOPROLOL SUCCINATE 100 MG/1
100 TABLET, EXTENDED RELEASE ORAL 2 TIMES DAILY
COMMUNITY
End: 2021-06-03 | Stop reason: SDUPTHER

## 2017-09-09 RX ORDER — SODIUM CHLORIDE 0.9 % (FLUSH) 0.9 %
10 SYRINGE (ML) INJECTION AS NEEDED
Status: DISCONTINUED | OUTPATIENT
Start: 2017-09-09 | End: 2017-09-09 | Stop reason: HOSPADM

## 2017-09-09 RX ORDER — ONDANSETRON 4 MG/1
4 TABLET, FILM COATED ORAL EVERY 6 HOURS
Qty: 12 TABLET | Refills: 0 | Status: SHIPPED | OUTPATIENT
Start: 2017-09-09 | End: 2018-04-12 | Stop reason: ALTCHOICE

## 2017-09-09 NOTE — ED PROVIDER NOTES
EMERGENCY DEPARTMENT ENCOUNTER    CHIEF COMPLAINT  Chief Complaint: nausea  History given by: pt   History limited by: none   Room Number: 09/09  PMD: ALONZO Henry Dr., nephrologist    HPI:  Pt is a 56 y.o. male who presents complaining of nausea since yesterday. Pt also c/o abd discomfort, and frequent stool for the last 3 days. Pt denies fever, chills, vomiting, or abd pain, or other pain. Pt states that he is on dialysis, and that he has had similar sensations with prior kidney failure. Pt states that he was dialyzed last night. Pt states that he called his PCP, ALONZO Cotto yesterday. Pt states that he was recently taken off metoprolol by his PCP in July, and that his b/p had been rising, and that his nephrologist has recently started him back on metoprolol.     Duration:  Since yesterday  Onset: gradual  Timing: constant   Quality: nausea   Intensity/Severity: moderate   Progression: unchanged   Associated Symptoms: abd discomfort   Aggravating Factors: none stated   Alleviating Factors: none stated   Previous Episodes: similar symptoms with prior kidney failure   Treatment before arrival: none     PAST MEDICAL HISTORY  Active Ambulatory Problems     Diagnosis Date Noted   • Hypertensive crisis 07/03/2017   • Hypertension 07/17/2017   • CKD (chronic kidney disease) requiring chronic dialysis 07/17/2017   • Pneumonia 07/17/2017   • Pulmonary nodule 08/17/2017   • Anemia in CKD (chronic kidney disease) 08/17/2017     Resolved Ambulatory Problems     Diagnosis Date Noted   • No Resolved Ambulatory Problems     Past Medical History:   Diagnosis Date   • CKD (chronic kidney disease) requiring chronic dialysis    • Dialysis patient    • Hypertension        PAST SURGICAL HISTORY  Past Surgical History:   Procedure Laterality Date   • ARTERIOVENOUS FISTULA/SHUNT SURGERY W/ HEMODIALYSIS CATHETER INSERTION N/A 7/6/2017    Procedure: ARTERIOVENOUS FISTULA LEFT ARM AND PALINDROME PLACEMENT;  Surgeon:  Kar Morales MD;  Location: [facility] OR;  Service:    • JOINT REPLACEMENT Left     KNEE   • KNEE SURGERY Left        FAMILY HISTORY  Family History   Problem Relation Age of Onset   • Hypertension Mother    • Diabetes Mother    • Hypertension Father    • Diabetes Son        SOCIAL HISTORY  Social History     Social History   • Marital status:      Spouse name: N/A   • Number of children: 5   • Years of education: N/A     Occupational History   • [employer]      Social History Main Topics   • Smoking status: Former Smoker     Packs/day: 0.25   • Smokeless tobacco: Never Used   • Alcohol use No   • Drug use: No   • Sexual activity: Yes     Partners: Female     Other Topics Concern   • Not on file     Social History Narrative   • No narrative on file       ALLERGIES  Review of patient's allergies indicates no known allergies.    REVIEW OF SYSTEMS  Review of Systems   Constitutional: Negative for activity change, appetite change and fever.   HENT: Negative for congestion and sore throat.    Eyes: Negative.    Respiratory: Negative for cough and shortness of breath.    Cardiovascular: Negative for chest pain and leg swelling.   Gastrointestinal: Positive for nausea. Negative for abdominal pain, diarrhea and vomiting.        Adb discomfort   Endocrine: Negative.    Genitourinary: Negative for decreased urine volume and dysuria.   Musculoskeletal: Negative for neck pain.   Skin: Negative for rash and wound.   Allergic/Immunologic: Negative.    Neurological: Negative for weakness, numbness and headaches.   Hematological: Negative.    Psychiatric/Behavioral: Negative.    All other systems reviewed and are negative.      PHYSICAL EXAM  ED Triage Vitals   Temp Heart Rate Resp BP SpO2   09/09/17 0902 09/09/17 0902 09/09/17 0909 09/09/17 0909 09/09/17 0902   97.8 °F (36.6 °C) 68 16 197/106 100 %      Temp src Heart Rate Source Patient Position BP Location FiO2 (%)   09/09/17 0902 09/09/17 0909  09/09/17 0909 09/09/17 0909 --   Tympanic Monitor Sitting Right arm        Physical Exam   Constitutional: He is oriented to person, place, and time and well-developed, well-nourished, and in no distress.   HENT:   Head: Normocephalic and atraumatic.   Eyes: EOM are normal. Pupils are equal, round, and reactive to light.   Neck: Normal range of motion. Neck supple.   Cardiovascular: Normal rate, regular rhythm, normal heart sounds, intact distal pulses and normal pulses.    Shunt in L AC, with good thrill   Pulmonary/Chest: Effort normal and breath sounds normal. No respiratory distress.   Abdominal: Soft. Bowel sounds are normal. He exhibits no distension and no mass. There is no tenderness. There is no rebound and no guarding.   Musculoskeletal: Normal range of motion. He exhibits no edema.   Neurological: He is alert and oriented to person, place, and time. He has normal sensation and normal strength.   Skin: Skin is warm and dry.   Psychiatric: Mood and affect normal.   Nursing note and vitals reviewed.      LAB RESULTS  Lab Results (last 24 hours)     Procedure Component Value Units Date/Time    CBC & Differential [[id number]] Collected:  09/09/17 0940    Specimen:  Blood Updated:  09/09/17 0952    Narrative:       The following orders were created for panel order CBC & Differential.  Procedure                               Abnormality         Status                     ---------                               -----------         ------                     CBC Auto Differential[[id number]]        Abnormal            Final result                 Please view results for these tests on the individual orders.    Comprehensive Metabolic Panel [[id number]]  (Abnormal) Collected:  09/09/17 0940    Specimen:  Blood Updated:  09/09/17 1013     Glucose 119 (H) mg/dL      BUN 22 (H) mg/dL      Creatinine 3.98 (H) mg/dL      Sodium 142 mmol/L      Potassium 3.3 (L) mmol/L      Chloride 100 mmol/L      CO2 29.9 (H) mmol/L       Calcium 9.0 mg/dL      Total Protein 6.9 g/dL      Albumin 3.80 g/dL      ALT (SGPT) 61 (H) U/L      AST (SGOT) 42 (H) U/L      Alkaline Phosphatase 132 (H) U/L      Total Bilirubin 0.6 mg/dL      eGFR  African Amer 19 (L) mL/min/1.73      Globulin 3.1 gm/dL      A/G Ratio 1.2 g/dL      BUN/Creatinine Ratio 5.5 (L)     Anion Gap 12.1 mmol/L     Lipase [[id number]]  (Normal) Collected:  09/09/17 0940    Specimen:  Blood Updated:  09/09/17 1011     Lipase 36 U/L     Magnesium [[id number]]  (Normal) Collected:  09/09/17 0940    Specimen:  Blood Updated:  09/09/17 1011     Magnesium 2.2 mg/dL     Phosphorus [[id number]]  (Normal) Collected:  09/09/17 0940    Specimen:  Blood Updated:  09/09/17 1011     Phosphorus 2.5 mg/dL     CBC Auto Differential [[id number]]  (Abnormal) Collected:  09/09/17 0940    Specimen:  Blood Updated:  09/09/17 0952     WBC 4.38 (L) 10*3/mm3      RBC 3.04 (L) 10*6/mm3      Hemoglobin 9.5 (L) g/dL      Hematocrit 28.3 (L) %      MCV 93.1 fL      MCH 31.3 pg      MCHC 33.6 g/dL      RDW 15.5 (H) %      RDW-SD 51.4 fl      MPV 9.6 fL      Platelets 204 10*3/mm3      Neutrophil % 71.9 %      Lymphocyte % 16.4 (L) %      Monocyte % 9.1 %      Eosinophil % 2.1 %      Basophil % 0.5 %      Immature Grans % 0.0 %      Neutrophils, Absolute 3.15 10*3/mm3      Lymphocytes, Absolute 0.72 (L) 10*3/mm3      Monocytes, Absolute 0.40 10*3/mm3      Eosinophils, Absolute 0.09 10*3/mm3      Basophils, Absolute 0.02 10*3/mm3      Immature Grans, Absolute 0.00 10*3/mm3           I ordered the above labs and reviewed the results    RADIOLOGY  CT Abdomen Pelvis Without Contrast   1. The lung bases are clear. The liver, spleen, pancreas, and both  adrenal glands are unremarkable without intravenous contrast. The  gallbladder is slightly contracted and there is an accentuated  appearance of gallbladder wall thickening which is nonspecific. No  definite stones are seen.        2. There are several bilateral renal cysts  including a prominent cyst on  the right measuring up to 6.2 cm that is similar to the study of 2014.  There is no renal obstruction.        3. There is no aortic aneurysm or retroperitoneal lymphadenopathy. The  large and small bowel loops are normal in caliber and allowing for the  absence of oral or intravenous contrast, there is no evidence of  inflammatory change. In the pelvis there is mild enlargement of the  prostate measuring 5 cm.           I ordered the above noted radiological studies. Interpreted by radiologist. Discussed with radiologist (Dr. Stuart). Reviewed by me in PACS.       PROCEDURES  Procedures      PROGRESS AND CONSULTS  ED Course   0927  Ordered labs and CT abd/pel for further evaluation.   1028  Received a call from Dr. Stuart and discussed pt's case. Dr. Stuart described the pt's CT abd/pel, which was negative.  1053  Rechecked pt, who is resting comfortably. Discussed the pt's normal CT scan, HGB of 9.5, and kidney function results. Plan to d/c the pt with nausea meds and impression of a viral infection. Pt understands and agrees with the plan, and all questions were answered.   MEDICAL DECISION MAKING  Results were reviewed/discussed with the patient and they were also made aware of online access. Pt also made aware that some labs, such as cultures, will not be resulted during ER visit and follow up with PMD is necessary.     MDM  Number of Diagnoses or Management Options  Abdominal discomfort:   CRF (chronic renal failure), stage 5:   Nausea:      Amount and/or Complexity of Data Reviewed  Clinical lab tests: reviewed and ordered (BUN 22, Creatine 3.98, GFR 19, K 3.3, HGB 9.5)  Tests in the radiology section of CPT®: ordered and reviewed (CT abd/pel: negative)  Discussion of test results with the performing providers: yes (Dr. Stuart)  Decide to obtain previous medical records or to obtain history from someone other than the patient: yes  Review and summarize past medical  records: yes (Pt seen here 8/9/17 for anemia, and f/u with his nephrologist. Pt was here in July for a hypotensive emergency and renal failure. )    Patient Progress  Patient progress: stable         DIAGNOSIS  Final diagnoses:   Abdominal discomfort   Nausea   CRF (chronic renal failure), stage 5       DISPOSITION  DISCHARGE    Patient discharged in stable condition.    Reviewed implications of results, diagnosis, meds, responsibility to follow up, warning signs and symptoms of possible worsening, potential complications and reasons to return to ER.    Patient/Family voiced understanding of above instructions.    Discussed plan for discharge, as there is no emergent indication for admission.  Pt/family is agreeable and understands need for follow up and repeat testing.  Pt is aware that discharge does not mean that nothing is wrong but it indicates no emergency is present that requires admission and they must continue care with follow-up as given below or physician of their choice.     FOLLOW-UP  Marichuy Cotto, APRN  [address] [address]  [phone]    Schedule an appointment as soon as possible for a visit  If symptoms worsen or do not improve         Medication List      New Prescriptions          ondansetron 4 MG tablet   Commonly known as:  ZOFRAN   Take 1 tablet by mouth Every 6 (Six) Hours.         Stop          traZODone 50 MG tablet   Commonly known as:  DESYREL               Latest Documented Vital Signs:  As of 10:57 AM  BP- 160/95 HR- 62 Temp- 97.8 °F (36.6 °C) (Tympanic) O2 sat- 98%    --  Documentation assistance provided by kevin Pollock for Dr. Smyth.  Information recorded by the kevin was done at my direction and has been verified and validated by me.     Sha Pollock  09/09/17 3395       Rosalio Smyth MD  09/09/17 6879

## 2017-09-13 ENCOUNTER — TELEPHONE (OUTPATIENT)
Dept: SOCIAL WORK | Facility: HOSPITAL | Age: 57
End: 2017-09-13

## 2017-09-13 NOTE — TELEPHONE ENCOUNTER
Spoke with pt today in f/u and he states his nausea and abd pain is better. He was able to get the Zofran filled and is taking it as directed. He will make his f/u with his APRN when he gets home today. No other questions or concerns voiced by pt at this time. Carola CAMEJO

## 2017-09-19 ENCOUNTER — OFFICE VISIT (OUTPATIENT)
Dept: GASTROENTEROLOGY | Facility: CLINIC | Age: 57
End: 2017-09-19

## 2017-09-19 VITALS
SYSTOLIC BLOOD PRESSURE: 170 MMHG | WEIGHT: 150 LBS | BODY MASS INDEX: 21.47 KG/M2 | HEIGHT: 70 IN | DIASTOLIC BLOOD PRESSURE: 100 MMHG | TEMPERATURE: 97.7 F

## 2017-09-19 DIAGNOSIS — D50.8 OTHER IRON DEFICIENCY ANEMIA: Primary | ICD-10-CM

## 2017-09-19 DIAGNOSIS — K59.01 SLOW TRANSIT CONSTIPATION: ICD-10-CM

## 2017-09-19 PROBLEM — D50.9 IRON DEFICIENCY ANEMIA: Status: ACTIVE | Noted: 2017-09-19

## 2017-09-19 PROCEDURE — 99204 OFFICE O/P NEW MOD 45 MIN: CPT | Performed by: INTERNAL MEDICINE

## 2017-09-19 NOTE — PROGRESS NOTES
Chief Complaint   Patient presents with   • Pre-op Exam     Colonoscopy       Harry Ortega is a 56 y.o. male who presents with Anemia, chronic renal insufficiency, dialysis patient, dialyzes Monday/Wednesday/Friday.  Constipation. nausea    HPI Comments: 56-year-old gentleman with anemia.  Dialysis patient. NO  Previous colonoscopy or EGD.  Chronic intermittent nausea.  No vomiting.  No weight loss.  Constipation with inability to evacuate occasionally.  No rectal bleeding.      Past Medical History:   Diagnosis Date   • CKD (chronic kidney disease) requiring chronic dialysis    • Dialysis patient    • Hypertension        Past Surgical History:   Procedure Laterality Date   • ARTERIOVENOUS FISTULA/SHUNT SURGERY W/ HEMODIALYSIS CATHETER INSERTION N/A 7/6/2017    Procedure: ARTERIOVENOUS FISTULA LEFT ARM AND PALINDROME PLACEMENT;  Surgeon: Kar Morales MD;  Location: [facility];  Service:    • JOINT REPLACEMENT Left     KNEE   • KNEE SURGERY Left          Current Outpatient Prescriptions:   •  metoprolol succinate XL (TOPROL-XL) 100 MG 24 hr tablet, Take 50 mg by mouth Daily., Disp: , Rfl:   •  minoxidil (LONITEN) 2.5 MG tablet, Take 1 tablet by mouth Daily., Disp: 60 tablet, Rfl: 1  •  Multiple Vitamins-Minerals (MULTIVITAMIN ADULT PO), Take 1 tablet by mouth Daily., Disp: , Rfl:   •  ondansetron (ZOFRAN) 4 MG tablet, Take 1 tablet by mouth Every 6 (Six) Hours., Disp: 12 tablet, Rfl: 0  •  traZODone (DESYREL) 50 MG tablet, Take 1 tablet by mouth Every Night., Disp: 30 tablet, Rfl: 0    No Known Allergies    Social History     Social History   • Marital status:      Spouse name: N/A   • Number of children: 5   • Years of education: N/A     Occupational History   • [employer]      Social History Main Topics   • Smoking status: Former Smoker     Packs/day: 0.25   • Smokeless tobacco: Never Used   • Alcohol use No   • Drug use: No   • Sexual activity: Yes     Partners: Female     Other  Topics Concern   • Not on file     Social History Narrative       Family History   Problem Relation Age of Onset   • Hypertension Mother    • Diabetes Mother    • Hypertension Father    • Diabetes Son        Review of Systems   Gastrointestinal: Positive for constipation and nausea. Negative for abdominal distention, abdominal pain, anal bleeding, blood in stool and diarrhea.   All other systems reviewed and are negative.      Vitals:    09/19/17 0907   BP: 170/100   Temp: 97.7 °F (36.5 °C)       Physical Exam   Constitutional: He is oriented to person, place, and time. He appears well-developed and well-nourished.   HENT:   Head: Normocephalic and atraumatic.   Eyes: Conjunctivae and EOM are normal.   Neck: Normal range of motion. No tracheal deviation present.   Cardiovascular: Normal rate and regular rhythm.    Pulmonary/Chest: Effort normal and breath sounds normal. No respiratory distress.   Abdominal: Soft. Bowel sounds are normal. He exhibits no distension and no mass. There is no tenderness. There is no rebound and no guarding.   Musculoskeletal: Normal range of motion.   Neurological: He is alert and oriented to person, place, and time.   Skin: Skin is warm and dry.   Psychiatric: He has a normal mood and affect. Judgment normal.   Nursing note and vitals reviewed.    Problem list    incomplete evacuation and constipation  Nausea  Anemia  Dialyzes Monday Wednesday and Friday  Needs a colonoscopy EGD Tuesday or Thursday      Assessment/Plan    EGD and colonoscopy scheduled    An EGD and  colonoscopy will be scheduled by my staff, the instructions will either be handed to you or mailed to you.  You'll receive an appointment date and time.  You will need to bring a  with you on that day to drive you home.    Please begin a probiotic.  You can try activia yogurt, align, or florastor.  These can be found over-the-counter at a local grocery store.    Begin a fiber supplement.  Benefiber, Citrucel or  Metamucil is acceptable.  Fiber gummies are also acceptable.  Please take 1 dose of fiber in the morning and 1 in the evening for 4 weeks and increase to 2 doses of fiber in the morning and 2 in the evening after that. Please  try to maintain a high-fiber diet.  We obtain 10 g of fiber from a high-fiber diet every day.  Women should consume a total of 25 grams of fiber a day, men 30 grams a day.  We can reach the total daily recommended dose of fiber a day utilizing the high-fiber diet and also fiber supplements.

## 2017-09-21 ENCOUNTER — HOSPITAL ENCOUNTER (OUTPATIENT)
Dept: CT IMAGING | Facility: HOSPITAL | Age: 57
Discharge: HOME | End: 2017-09-21
Attending: INTERNAL MEDICINE | Admitting: INTERNAL MEDICINE

## 2017-09-21 ENCOUNTER — TELEPHONE (OUTPATIENT)
Dept: GASTROENTEROLOGY | Facility: CLINIC | Age: 57
End: 2017-09-21

## 2017-09-21 DIAGNOSIS — R91.1 PULMONARY NODULE: ICD-10-CM

## 2017-09-21 PROCEDURE — 71250 CT THORAX DX C-: CPT

## 2017-09-21 NOTE — TELEPHONE ENCOUNTER
Pt has upcoming appt sched with Dr Zaman 9/26/17 but pt has already seen Dr Terrazas 9/19/17 and has EGD/Colonoscopy sched 10/12/17.  LVM with pt to call office so we can cancel appt with Dr Zaman.

## 2017-10-12 ENCOUNTER — HOSPITAL ENCOUNTER (OUTPATIENT)
Facility: HOSPITAL | Age: 57
Setting detail: HOSPITAL OUTPATIENT SURGERY
Discharge: HOME | End: 2017-10-12
Attending: INTERNAL MEDICINE | Admitting: INTERNAL MEDICINE

## 2017-10-12 ENCOUNTER — ANESTHESIA EVENT (OUTPATIENT)
Dept: GASTROENTEROLOGY | Facility: HOSPITAL | Age: 57
End: 2017-10-12

## 2017-10-12 ENCOUNTER — ANESTHESIA (OUTPATIENT)
Dept: GASTROENTEROLOGY | Facility: HOSPITAL | Age: 57
End: 2017-10-12

## 2017-10-12 VITALS
BODY MASS INDEX: 20.96 KG/M2 | RESPIRATION RATE: 18 BRPM | WEIGHT: 146.38 LBS | HEIGHT: 70 IN | TEMPERATURE: 98.4 F | HEART RATE: 62 BPM | OXYGEN SATURATION: 99 % | SYSTOLIC BLOOD PRESSURE: 173 MMHG | DIASTOLIC BLOOD PRESSURE: 91 MMHG

## 2017-10-12 DIAGNOSIS — K59.01 SLOW TRANSIT CONSTIPATION: ICD-10-CM

## 2017-10-12 DIAGNOSIS — D50.8 OTHER IRON DEFICIENCY ANEMIA: ICD-10-CM

## 2017-10-12 PROCEDURE — 25010000002 PROPOFOL 10 MG/ML EMULSION: Performed by: ANESTHESIOLOGY

## 2017-10-12 PROCEDURE — 43239 EGD BIOPSY SINGLE/MULTIPLE: CPT | Performed by: INTERNAL MEDICINE

## 2017-10-12 PROCEDURE — 88305 TISSUE EXAM BY PATHOLOGIST: CPT | Performed by: INTERNAL MEDICINE

## 2017-10-12 PROCEDURE — 88312 SPECIAL STAINS GROUP 1: CPT | Performed by: INTERNAL MEDICINE

## 2017-10-12 PROCEDURE — 45378 DIAGNOSTIC COLONOSCOPY: CPT | Performed by: INTERNAL MEDICINE

## 2017-10-12 RX ORDER — PROPOFOL 10 MG/ML
VIAL (ML) INTRAVENOUS AS NEEDED
Status: DISCONTINUED | OUTPATIENT
Start: 2017-10-12 | End: 2017-10-12 | Stop reason: SURG

## 2017-10-12 RX ORDER — PROPOFOL 10 MG/ML
VIAL (ML) INTRAVENOUS CONTINUOUS PRN
Status: DISCONTINUED | OUTPATIENT
Start: 2017-10-12 | End: 2017-10-12 | Stop reason: SURG

## 2017-10-12 RX ORDER — PANTOPRAZOLE SODIUM 40 MG/1
40 TABLET, DELAYED RELEASE ORAL DAILY
Qty: 30 TABLET | Refills: 12 | Status: SHIPPED | OUTPATIENT
Start: 2017-10-12 | End: 2022-03-15 | Stop reason: ALTCHOICE

## 2017-10-12 RX ORDER — LIDOCAINE HYDROCHLORIDE 20 MG/ML
INJECTION, SOLUTION INFILTRATION; PERINEURAL AS NEEDED
Status: DISCONTINUED | OUTPATIENT
Start: 2017-10-12 | End: 2017-10-12 | Stop reason: SURG

## 2017-10-12 RX ORDER — SODIUM CHLORIDE 9 MG/ML
500 INJECTION, SOLUTION INTRAVENOUS CONTINUOUS PRN
Status: DISCONTINUED | OUTPATIENT
Start: 2017-10-12 | End: 2017-10-12 | Stop reason: HOSPADM

## 2017-10-12 RX ADMIN — SODIUM CHLORIDE 1000 ML: 9 INJECTION, SOLUTION INTRAVENOUS at 13:52

## 2017-10-12 RX ADMIN — PROPOFOL 50 MG: 10 INJECTION, EMULSION INTRAVENOUS at 14:05

## 2017-10-12 RX ADMIN — PROPOFOL 100 MG: 10 INJECTION, EMULSION INTRAVENOUS at 14:00

## 2017-10-12 RX ADMIN — LIDOCAINE HYDROCHLORIDE 60 MG: 20 INJECTION, SOLUTION INFILTRATION; PERINEURAL at 14:00

## 2017-10-12 RX ADMIN — PROPOFOL 100 MCG/KG/MIN: 10 INJECTION, EMULSION INTRAVENOUS at 14:00

## 2017-10-12 NOTE — ANESTHESIA PREPROCEDURE EVALUATION
Anesthesia Evaluation     Patient summary reviewed and Nursing notes reviewed          Airway   Mallampati: I  TM distance: <3 FB  Neck ROM: full  no difficulty expected  Dental - normal exam     Pulmonary - normal exam   (+) pneumonia ,   Cardiovascular - normal exam  Exercise tolerance: good (4-7 METS)    Patient on routine beta blocker and Beta blocker given within 24 hours of surgery    (+) hypertension,       Neuro/Psych- negative ROS  GI/Hepatic/Renal/Endo    (+)  renal disease,     Musculoskeletal (-) negative ROS    Abdominal  - normal exam    Bowel sounds: normal.   Substance History - negative use     OB/GYN negative ob/gyn ROS         Other                                      Anesthesia Plan    ASA 3     MAC     Anesthetic plan and risks discussed with patient.

## 2017-10-12 NOTE — PLAN OF CARE
Problem: Patient Care Overview (Adult)  Goal: Plan of Care Review  Outcome: Ongoing (interventions implemented as appropriate)    10/12/17 1326   Coping/Psychosocial Response Interventions   Plan Of Care Reviewed With patient       Goal: Adult Individualization and Mutuality  Outcome: Ongoing (interventions implemented as appropriate)    10/12/17 1326   Individualization   Patient Specific Preferences none       Goal: Discharge Needs Assessment  Outcome: Ongoing (interventions implemented as appropriate)    10/12/17 1326   Discharge Needs Assessment   Concerns To Be Addressed no discharge needs identified   Living Environment   Transportation Available family or friend will provide         Problem: GI Endoscopy (Adult)  Goal: Signs and Symptoms of Listed Potential Problems Will be Absent or Manageable (GI Endoscopy)  Outcome: Ongoing (interventions implemented as appropriate)    10/12/17 1326   GI Endoscopy   Problems Assessed (GI Endoscopy) pain   Problems Present (GI Endoscopy) none

## 2017-10-12 NOTE — H&P (VIEW-ONLY)
Chief Complaint   Patient presents with   • Pre-op Exam     Colonoscopy       Harry Ortega is a 56 y.o. male who presents with Anemia, chronic renal insufficiency, dialysis patient, dialyzes Monday/Wednesday/Friday.  Constipation. nausea    HPI Comments: 56-year-old gentleman with anemia.  Dialysis patient. NO  Previous colonoscopy or EGD.  Chronic intermittent nausea.  No vomiting.  No weight loss.  Constipation with inability to evacuate occasionally.  No rectal bleeding.      Past Medical History:   Diagnosis Date   • CKD (chronic kidney disease) requiring chronic dialysis    • Dialysis patient    • Hypertension        Past Surgical History:   Procedure Laterality Date   • ARTERIOVENOUS FISTULA/SHUNT SURGERY W/ HEMODIALYSIS CATHETER INSERTION N/A 7/6/2017    Procedure: ARTERIOVENOUS FISTULA LEFT ARM AND PALINDROME PLACEMENT;  Surgeon: Kar Morales MD;  Location: [facility];  Service:    • JOINT REPLACEMENT Left     KNEE   • KNEE SURGERY Left          Current Outpatient Prescriptions:   •  metoprolol succinate XL (TOPROL-XL) 100 MG 24 hr tablet, Take 50 mg by mouth Daily., Disp: , Rfl:   •  minoxidil (LONITEN) 2.5 MG tablet, Take 1 tablet by mouth Daily., Disp: 60 tablet, Rfl: 1  •  Multiple Vitamins-Minerals (MULTIVITAMIN ADULT PO), Take 1 tablet by mouth Daily., Disp: , Rfl:   •  ondansetron (ZOFRAN) 4 MG tablet, Take 1 tablet by mouth Every 6 (Six) Hours., Disp: 12 tablet, Rfl: 0  •  traZODone (DESYREL) 50 MG tablet, Take 1 tablet by mouth Every Night., Disp: 30 tablet, Rfl: 0    No Known Allergies    Social History     Social History   • Marital status:      Spouse name: N/A   • Number of children: 5   • Years of education: N/A     Occupational History   • [employer]      Social History Main Topics   • Smoking status: Former Smoker     Packs/day: 0.25   • Smokeless tobacco: Never Used   • Alcohol use No   • Drug use: No   • Sexual activity: Yes     Partners: Female     Other  Topics Concern   • Not on file     Social History Narrative       Family History   Problem Relation Age of Onset   • Hypertension Mother    • Diabetes Mother    • Hypertension Father    • Diabetes Son        Review of Systems   Gastrointestinal: Positive for constipation and nausea. Negative for abdominal distention, abdominal pain, anal bleeding, blood in stool and diarrhea.   All other systems reviewed and are negative.      Vitals:    09/19/17 0907   BP: 170/100   Temp: 97.7 °F (36.5 °C)       Physical Exam   Constitutional: He is oriented to person, place, and time. He appears well-developed and well-nourished.   HENT:   Head: Normocephalic and atraumatic.   Eyes: Conjunctivae and EOM are normal.   Neck: Normal range of motion. No tracheal deviation present.   Cardiovascular: Normal rate and regular rhythm.    Pulmonary/Chest: Effort normal and breath sounds normal. No respiratory distress.   Abdominal: Soft. Bowel sounds are normal. He exhibits no distension and no mass. There is no tenderness. There is no rebound and no guarding.   Musculoskeletal: Normal range of motion.   Neurological: He is alert and oriented to person, place, and time.   Skin: Skin is warm and dry.   Psychiatric: He has a normal mood and affect. Judgment normal.   Nursing note and vitals reviewed.    Problem list    incomplete evacuation and constipation  Nausea  Anemia  Dialyzes Monday Wednesday and Friday  Needs a colonoscopy EGD Tuesday or Thursday      Assessment/Plan    EGD and colonoscopy scheduled    An EGD and  colonoscopy will be scheduled by my staff, the instructions will either be handed to you or mailed to you.  You'll receive an appointment date and time.  You will need to bring a  with you on that day to drive you home.    Please begin a probiotic.  You can try activia yogurt, align, or florastor.  These can be found over-the-counter at a local grocery store.    Begin a fiber supplement.  Benefiber, Citrucel or  Metamucil is acceptable.  Fiber gummies are also acceptable.  Please take 1 dose of fiber in the morning and 1 in the evening for 4 weeks and increase to 2 doses of fiber in the morning and 2 in the evening after that. Please  try to maintain a high-fiber diet.  We obtain 10 g of fiber from a high-fiber diet every day.  Women should consume a total of 25 grams of fiber a day, men 30 grams a day.  We can reach the total daily recommended dose of fiber a day utilizing the high-fiber diet and also fiber supplements.

## 2017-10-13 LAB
CYTO UR: NORMAL
LAB AP CASE REPORT: NORMAL
Lab: NORMAL
PATH REPORT.FINAL DX SPEC: NORMAL
PATH REPORT.GROSS SPEC: NORMAL

## 2017-10-17 NOTE — PROGRESS NOTES
Pathology shows H. pylori.  Call in Prevpac for 10 days    H. pylori breath test in 6 weeks off of all PPI and antibiotics 2 weeks    Colonoscopy 10 years

## 2017-10-18 ENCOUNTER — TELEPHONE (OUTPATIENT)
Dept: GASTROENTEROLOGY | Facility: CLINIC | Age: 57
End: 2017-10-18

## 2017-10-18 RX ORDER — LANSOPRAZOLE, AMOXICILLIN, CLARITHROMYCIN 30-500-500
KIT ORAL 2 TIMES DAILY
Qty: 1 KIT | Refills: 0 | Status: SHIPPED | OUTPATIENT
Start: 2017-10-18 | End: 2017-12-14

## 2017-10-18 NOTE — TELEPHONE ENCOUNTER
Patient called advised of Dr. Terrazas's note. He states he will call back to get more information because he is currently driving.

## 2017-10-18 NOTE — TELEPHONE ENCOUNTER
----- Message from Doni Terrazas MD sent at 10/17/2017  5:22 PM EDT -----  Pathology shows H. pylori.  Call in Prevpac for 10 days    H. pylori breath test in 6 weeks off of all PPI and antibiotics 2 weeks    Colonoscopy 10 years

## 2017-10-19 ENCOUNTER — TELEPHONE (OUTPATIENT)
Dept: GASTROENTEROLOGY | Facility: CLINIC | Age: 57
End: 2017-10-19

## 2017-10-19 DIAGNOSIS — A04.8 BACTERIAL INFECTION DUE TO HELICOBACTER PYLORI: Primary | ICD-10-CM

## 2017-10-19 NOTE — TELEPHONE ENCOUNTER
Patient called, spoke with his spouse. Advised of the Prevpac and subsequent f/u for HPBT.  She verb understanding.

## 2017-10-19 NOTE — TELEPHONE ENCOUNTER
----- Message from Rachel Mast sent at 10/19/2017  8:55 AM EDT -----  Regarding: PT RETURNED YOUR CALL RESULTS  Contact: [phone]  RESULTS. CALL WAS DROPPED B4 GETTING BACK TO PT.

## 2017-11-08 ENCOUNTER — TRANSCRIBE ORDERS (OUTPATIENT)
Dept: ADMINISTRATIVE | Facility: HOSPITAL | Age: 57
End: 2017-11-08

## 2017-11-08 DIAGNOSIS — R33.9 UNABLE TO VOID: Primary | ICD-10-CM

## 2017-11-14 ENCOUNTER — HOSPITAL ENCOUNTER (OUTPATIENT)
Dept: GENERAL RADIOLOGY | Facility: HOSPITAL | Age: 57
Discharge: HOME | End: 2017-11-14
Attending: INTERNAL MEDICINE

## 2017-11-14 DIAGNOSIS — R33.9 UNABLE TO VOID: ICD-10-CM

## 2017-12-06 LAB — UREA BREATH TEST QL: NEGATIVE

## 2017-12-14 ENCOUNTER — OFFICE VISIT (OUTPATIENT)
Dept: INTERNAL MEDICINE | Age: 57
End: 2017-12-14

## 2017-12-14 VITALS
WEIGHT: 155.2 LBS | HEIGHT: 70 IN | HEART RATE: 58 BPM | BODY MASS INDEX: 22.22 KG/M2 | OXYGEN SATURATION: 99 % | TEMPERATURE: 98.9 F | SYSTOLIC BLOOD PRESSURE: 158 MMHG | DIASTOLIC BLOOD PRESSURE: 92 MMHG

## 2017-12-14 DIAGNOSIS — N18.6 CKD (CHRONIC KIDNEY DISEASE) REQUIRING CHRONIC DIALYSIS (HCC): ICD-10-CM

## 2017-12-14 DIAGNOSIS — I10 ESSENTIAL HYPERTENSION: Primary | ICD-10-CM

## 2017-12-14 DIAGNOSIS — Z99.2 CKD (CHRONIC KIDNEY DISEASE) REQUIRING CHRONIC DIALYSIS (HCC): ICD-10-CM

## 2017-12-14 DIAGNOSIS — R91.1 PULMONARY NODULE: ICD-10-CM

## 2017-12-14 PROBLEM — J18.9 PNEUMONIA: Status: RESOLVED | Noted: 2017-07-17 | Resolved: 2017-12-14

## 2017-12-14 PROCEDURE — 99214 OFFICE O/P EST MOD 30 MIN: CPT | Performed by: NURSE PRACTITIONER

## 2017-12-14 RX ORDER — LORATADINE 10 MG/1
10 TABLET ORAL DAILY
COMMUNITY
End: 2018-09-06

## 2017-12-14 NOTE — PROGRESS NOTES
Subjective   Harry Ortega is a 57 y.o. male.     Hypertension   This is a chronic problem. Pertinent negatives include no anxiety, blurred vision, chest pain, headaches, malaise/fatigue, neck pain, orthopnea, palpitations or shortness of breath. Risk factors for coronary artery disease include diabetes mellitus. Past treatments include direct vasodilators and beta blockers. The current treatment provides significant improvement. There are no compliance problems.  Hypertensive end-organ damage includes kidney disease (ESRD on dialysis) and renovascular disease. Identifiable causes of hypertension include chronic renal disease.    He is currently on the kidney transplant list.  Sees Dr. Shyam Naik for nephrology, reports some changes were made to his blood pressure medication last week.    Also has history of pulmonary nodules, followed by pulmonology.  Patient reports he had repeat chest CT last week in addition to follow-up appointment with his pulmonologist and everything was good.  He also reports he had EGD and colonoscopy in October performed by Dr. Montes.  EGD did show ulcer, for which she was treated for H. pylori.  Patient reports colonoscopy was normal.    The following portions of the patient's history were reviewed and updated as appropriate: allergies, current medications, past family history, past medical history, past social history, past surgical history and problem list.    Review of Systems   Constitutional: Negative for chills, fatigue, fever and malaise/fatigue.   Eyes: Negative for blurred vision.   Respiratory: Negative for shortness of breath.    Cardiovascular: Negative for chest pain, palpitations, orthopnea and leg swelling.   Musculoskeletal: Negative for neck pain.   Neurological: Negative for headaches.       Objective   Physical Exam   Constitutional: He is oriented to person, place, and time. He appears well-developed and well-nourished. He is cooperative. He does not appear ill. No  distress.   Cardiovascular: Normal rate, regular rhythm, S1 normal, S2 normal and normal heart sounds.    No murmur heard.  Pulmonary/Chest: Effort normal and breath sounds normal. He has no decreased breath sounds. He has no wheezes. He has no rhonchi. He has no rales.   Neurological: He is alert and oriented to person, place, and time.   Skin: Skin is warm, dry and intact.   Psychiatric: He has a normal mood and affect. His speech is normal and behavior is normal. Judgment and thought content normal. Cognition and memory are normal.   Nursing note and vitals reviewed.      Assessment/Plan   Problems Addressed this Visit        Cardiovascular and Mediastinum    Hypertension - Primary       Respiratory    Pulmonary nodule       Genitourinary    CKD (chronic kidney disease) requiring chronic dialysis        1. Essential hypertension  Currently managed by his nephrologist.  He is taking Toprol-XL 50 mg by mouth twice a day in addition to minoxidil 2.5 mg by mouth twice a day.     2. Pulmonary nodule  Currently followed by pulmonology.  He had follow-up visit last week in addition to follow-up CT scan that showed resolution of pulmonary nodule.    3. CKD (chronic kidney disease) requiring chronic dialysis  Patient currently on chronic hemodialysis on Monday Wednesday Friday.  Nephrologist is Dr. Shyam Naik.  Patient is also on the [place] transplant list.    I did review labs that were last performed by Dr. Ruslan royal in October, no outstanding levels in regards to patient history.  Lipid panel was within normal limits.

## 2017-12-14 NOTE — PATIENT INSTRUCTIONS

## 2017-12-27 ENCOUNTER — TELEPHONE (OUTPATIENT)
Dept: GASTROENTEROLOGY | Facility: CLINIC | Age: 57
End: 2017-12-27

## 2017-12-27 NOTE — TELEPHONE ENCOUNTER
Called pt and advised that per Dr Terrazas: the H pylori breath test was negative. Pt verb understanding.

## 2018-04-12 ENCOUNTER — OFFICE VISIT (OUTPATIENT)
Dept: INTERNAL MEDICINE | Age: 58
End: 2018-04-12

## 2018-04-12 VITALS
OXYGEN SATURATION: 99 % | TEMPERATURE: 98.7 F | DIASTOLIC BLOOD PRESSURE: 84 MMHG | SYSTOLIC BLOOD PRESSURE: 138 MMHG | WEIGHT: 160.4 LBS | BODY MASS INDEX: 22.96 KG/M2 | HEART RATE: 63 BPM | HEIGHT: 70 IN

## 2018-04-12 DIAGNOSIS — Z99.2 CKD (CHRONIC KIDNEY DISEASE) REQUIRING CHRONIC DIALYSIS (HCC): ICD-10-CM

## 2018-04-12 DIAGNOSIS — M79.672 BILATERAL FOOT PAIN: ICD-10-CM

## 2018-04-12 DIAGNOSIS — M79.671 BILATERAL FOOT PAIN: ICD-10-CM

## 2018-04-12 DIAGNOSIS — N18.6 CKD (CHRONIC KIDNEY DISEASE) REQUIRING CHRONIC DIALYSIS (HCC): ICD-10-CM

## 2018-04-12 DIAGNOSIS — I10 ESSENTIAL HYPERTENSION: Primary | ICD-10-CM

## 2018-04-12 DIAGNOSIS — N52.9 ERECTILE DYSFUNCTION, UNSPECIFIED ERECTILE DYSFUNCTION TYPE: ICD-10-CM

## 2018-04-12 PROCEDURE — 99214 OFFICE O/P EST MOD 30 MIN: CPT | Performed by: NURSE PRACTITIONER

## 2018-04-12 RX ORDER — SILDENAFIL 25 MG/1
25 TABLET, FILM COATED ORAL DAILY PRN
Qty: 30 TABLET | Refills: 11 | Status: SHIPPED | OUTPATIENT
Start: 2018-04-12 | End: 2018-04-12

## 2018-04-12 NOTE — PROGRESS NOTES
Subjective   Harry Ortega is a 57 y.o. male.     Hypertension   This is a chronic problem. The problem is controlled. Pertinent negatives include no anxiety, blurred vision, chest pain, headaches, malaise/fatigue, neck pain, orthopnea, palpitations, peripheral edema, PND, shortness of breath or sweats. There are no associated agents to hypertension. Risk factors for coronary artery disease include male gender. Past treatments include beta blockers. Current antihypertension treatment includes direct vasodilators and beta blockers. There are no compliance problems.  Hypertensive end-organ damage includes kidney disease. Identifiable causes of hypertension include chronic renal disease (on dialysis).   Foot Pain   This is a new problem. The current episode started 1 to 4 weeks ago. The problem occurs intermittently. The problem has been waxing and waning. Associated symptoms include arthralgias. Pertinent negatives include no chest pain, headaches or neck pain. The symptoms are aggravated by walking. Treatments tried: Has been using foot orthotics for the past year r/t knee pain, he walks throughout the day on concrete floors and thinks his orthotics may be worn out.   Erectile Dysfunction   This is a new problem. The current episode started more than 1 month ago (since discharge from hospital last July). He reports no anxiety, decreased libido or performance anxiety. Irritative symptoms do not include frequency, nocturia or urgency. Nothing aggravates the symptoms. Past treatments include nothing.        The following portions of the patient's history were reviewed and updated as appropriate: allergies, current medications, past family history, past medical history, past social history, past surgical history and problem list.    Review of Systems   Constitutional: Negative for malaise/fatigue.   Eyes: Negative for blurred vision.   Respiratory: Negative for shortness of breath.    Cardiovascular: Negative for chest  pain, palpitations, orthopnea and PND.   Genitourinary: Negative for decreased libido, frequency, nocturia and urgency.   Musculoskeletal: Positive for arthralgias. Negative for neck pain.   Neurological: Negative for headaches.   Psychiatric/Behavioral: The patient is not nervous/anxious.        Objective   Physical Exam   Constitutional: He appears well-developed and well-nourished. He is active. He does not appear ill. No distress.   Cardiovascular: Normal rate, regular rhythm and normal heart sounds.    Pulmonary/Chest: Effort normal and breath sounds normal.        Neurological: He is alert.   Psychiatric: He has a normal mood and affect. His speech is normal and behavior is normal. Thought content normal.   Nursing note and vitals reviewed.        Assessment/Plan   Problems Addressed this Visit        Cardiovascular and Mediastinum    Hypertension - Primary       Genitourinary    CKD (chronic kidney disease) requiring chronic dialysis      Other Visit Diagnoses     Erectile dysfunction, unspecified erectile dysfunction type        Bilateral foot pain        Relevant Orders    Ambulatory Referral to Podiatry        1. Essential hypertension  Blood pressure currently well controlled, managed by nephrology.  Continue current medications.  Follow-up in 4 months.    2. CKD (chronic kidney disease) requiring chronic dialysis      3. Erectile dysfunction, unspecified erectile dysfunction type  Patient with new onset daily since July 2017 when he was initially hospitalized for acute renal failure. Discussed with patient that ED likely related to CKD as well as beta blocker patient is on for HTN. Because of the minoxidil he is on, Viagra and other phosphodiesterase inhibitors are contraindicated due to risk of hypotension.  Discussed with patient he may want to discuss with his nephrologist consideration of changing beta blocker to another medication if he thinks this is contributing to cost.  May need to consider  referral to urologist if patient wishes in the future.     4. Bilateral foot pain  Ongoing bilateral foot pain to top of feet x 2 weeks, NKI. Patient uses orthotics x knee pain, discussed with patient referral to podiatrist for orthotic fitting.     - Ambulatory Referral to Podiatry

## 2018-08-16 RX ORDER — SILDENAFIL CITRATE 20 MG/1
TABLET ORAL
Refills: 3 | COMMUNITY
Start: 2018-06-14 | End: 2019-10-24

## 2018-09-06 ENCOUNTER — OFFICE VISIT (OUTPATIENT)
Dept: INTERNAL MEDICINE | Age: 58
End: 2018-09-06

## 2018-09-06 VITALS
TEMPERATURE: 98.6 F | WEIGHT: 156.4 LBS | DIASTOLIC BLOOD PRESSURE: 88 MMHG | OXYGEN SATURATION: 98 % | HEIGHT: 70 IN | SYSTOLIC BLOOD PRESSURE: 158 MMHG | HEART RATE: 59 BPM | BODY MASS INDEX: 22.39 KG/M2

## 2018-09-06 DIAGNOSIS — Z99.2 CKD (CHRONIC KIDNEY DISEASE) REQUIRING CHRONIC DIALYSIS (HCC): ICD-10-CM

## 2018-09-06 DIAGNOSIS — N18.6 CKD (CHRONIC KIDNEY DISEASE) REQUIRING CHRONIC DIALYSIS (HCC): ICD-10-CM

## 2018-09-06 DIAGNOSIS — D63.1 ANEMIA IN CHRONIC KIDNEY DISEASE, ON CHRONIC DIALYSIS (HCC): ICD-10-CM

## 2018-09-06 DIAGNOSIS — Z99.2 ANEMIA IN CHRONIC KIDNEY DISEASE, ON CHRONIC DIALYSIS (HCC): ICD-10-CM

## 2018-09-06 DIAGNOSIS — Z00.00 ROUTINE ADULT HEALTH MAINTENANCE: Primary | ICD-10-CM

## 2018-09-06 DIAGNOSIS — N18.6 ANEMIA IN CHRONIC KIDNEY DISEASE, ON CHRONIC DIALYSIS (HCC): ICD-10-CM

## 2018-09-06 DIAGNOSIS — I10 ESSENTIAL HYPERTENSION: ICD-10-CM

## 2018-09-06 PROCEDURE — 99213 OFFICE O/P EST LOW 20 MIN: CPT | Performed by: NURSE PRACTITIONER

## 2018-09-06 NOTE — PROGRESS NOTES
Subjective   Harry Ortega is a 57 y.o. male.     Hypertension   The problem is unchanged. The problem is controlled. Pertinent negatives include no anxiety, blurred vision, chest pain, headaches, malaise/fatigue, neck pain, orthopnea, palpitations, peripheral edema, PND, shortness of breath or sweats. There are no associated agents to hypertension. Current antihypertension treatment includes beta blockers and direct vasodilators. There are no compliance problems.  Hypertensive end-organ damage includes kidney disease. Identifiable causes of hypertension include chronic renal disease (on dialysis) and renovascular disease.        The following portions of the patient's history were reviewed and updated as appropriate: allergies, current medications, past family history, past medical history, past social history, past surgical history and problem list.    Review of Systems   Constitutional: Negative for malaise/fatigue.   Eyes: Negative for blurred vision.   Respiratory: Negative for shortness of breath.    Cardiovascular: Negative for chest pain, palpitations, orthopnea and PND.   Musculoskeletal: Negative for neck pain.       Objective   Physical Exam   Constitutional: He appears well-developed and well-nourished. He is active. He does not appear ill. No distress.   Cardiovascular: Normal rate, regular rhythm and normal heart sounds.    Pulmonary/Chest: Effort normal and breath sounds normal.   Neurological: He is alert.   Psychiatric: He has a normal mood and affect. His speech is normal and behavior is normal. Thought content normal.   Nursing note and vitals reviewed.        Assessment/Plan   Problems Addressed this Visit        Cardiovascular and Mediastinum    Hypertension    Relevant Orders    Comprehensive Metabolic Panel       Genitourinary    CKD (chronic kidney disease) requiring chronic dialysis (CMS/[facility])    Relevant Orders    Comprehensive Metabolic Panel       Hematopoietic and Hemostatic    Anemia in  CKD (chronic kidney disease)    Relevant Orders    CBC & Differential      Other Visit Diagnoses     Routine adult health maintenance    -  Primary    Relevant Orders    Comprehensive Metabolic Panel    Hemoglobin A1c    Lipid Panel With / Chol / HDL Ratio    TSH Rfx On Abnormal To Free T4    CBC & Differential        1. Routine adult health maintenance    - Comprehensive Metabolic Panel  - Hemoglobin A1c  - Lipid Panel With / Chol / HDL Ratio  - TSH Rfx On Abnormal To Free T4  - CBC & Differential    2. Essential hypertension  Stable, managed by nephrology. Continue same, follow up 6 months.   - Comprehensive Metabolic Panel    3. CKD (chronic kidney disease) requiring chronic dialysis (CMS/[facility])    - Comprehensive Metabolic Panel    4. Anemia in chronic kidney disease, on chronic dialysis (CMS/[facility])    - CBC & Differential

## 2018-09-14 LAB
ALBUMIN SERPL-MCNC: 4.5 G/DL (ref 3.5–5.2)
ALBUMIN/GLOB SERPL: 2.1 G/DL
ALP SERPL-CCNC: 75 U/L (ref 39–117)
ALT SERPL-CCNC: 30 U/L (ref 1–41)
AST SERPL-CCNC: 23 U/L (ref 1–40)
BASOPHILS # BLD AUTO: 0.01 10*3/MM3 (ref 0–0.2)
BASOPHILS NFR BLD AUTO: 0.2 % (ref 0–1.5)
BILIRUB SERPL-MCNC: 0.9 MG/DL (ref 0.1–1.2)
BUN SERPL-MCNC: 30 MG/DL (ref 6–20)
BUN/CREAT SERPL: 7.1 (ref 7–25)
CALCIUM SERPL-MCNC: 9.2 MG/DL (ref 8.6–10.5)
CHLORIDE SERPL-SCNC: 100 MMOL/L (ref 98–107)
CHOLEST SERPL-MCNC: 179 MG/DL (ref 0–200)
CHOLEST/HDLC SERPL: 2.98 {RATIO}
CO2 SERPL-SCNC: 29.9 MMOL/L (ref 22–29)
CREAT SERPL-MCNC: 4.23 MG/DL (ref 0.76–1.27)
EOSINOPHIL # BLD AUTO: 0.17 10*3/MM3 (ref 0–0.7)
EOSINOPHIL NFR BLD AUTO: 3.4 % (ref 0.3–6.2)
ERYTHROCYTE [DISTWIDTH] IN BLOOD BY AUTOMATED COUNT: 14 % (ref 11.5–14.5)
GLOBULIN SER CALC-MCNC: 2.1 GM/DL
GLUCOSE SERPL-MCNC: 95 MG/DL (ref 65–99)
HBA1C MFR BLD: 4.8 % (ref 4.8–5.6)
HCT VFR BLD AUTO: 36.6 % (ref 40.4–52.2)
HDLC SERPL-MCNC: 60 MG/DL (ref 40–60)
HGB BLD-MCNC: 12.4 G/DL (ref 13.7–17.6)
IMM GRANULOCYTES # BLD: 0 10*3/MM3 (ref 0–0.03)
IMM GRANULOCYTES NFR BLD: 0 % (ref 0–0.5)
LDLC SERPL CALC-MCNC: 102 MG/DL (ref 0–100)
LYMPHOCYTES # BLD AUTO: 1.37 10*3/MM3 (ref 0.9–4.8)
LYMPHOCYTES NFR BLD AUTO: 27.6 % (ref 19.6–45.3)
MCH RBC QN AUTO: 32.1 PG (ref 27–32.7)
MCHC RBC AUTO-ENTMCNC: 33.9 G/DL (ref 32.6–36.4)
MCV RBC AUTO: 94.8 FL (ref 79.8–96.2)
MONOCYTES # BLD AUTO: 0.31 10*3/MM3 (ref 0.2–1.2)
MONOCYTES NFR BLD AUTO: 6.3 % (ref 5–12)
NEUTROPHILS # BLD AUTO: 3.1 10*3/MM3 (ref 1.9–8.1)
NEUTROPHILS NFR BLD AUTO: 62.5 % (ref 42.7–76)
PLATELET # BLD AUTO: 187 10*3/MM3 (ref 140–500)
POTASSIUM SERPL-SCNC: 3.7 MMOL/L (ref 3.5–5.2)
PROT SERPL-MCNC: 6.6 G/DL (ref 6–8.5)
RBC # BLD AUTO: 3.86 10*6/MM3 (ref 4.6–6)
SODIUM SERPL-SCNC: 143 MMOL/L (ref 136–145)
TRIGL SERPL-MCNC: 83 MG/DL (ref 0–150)
TSH SERPL DL<=0.005 MIU/L-ACNC: 1.55 MIU/ML (ref 0.27–4.2)
VLDLC SERPL CALC-MCNC: 16.6 MG/DL (ref 5–40)
WBC # BLD AUTO: 4.96 10*3/MM3 (ref 4.5–10.7)

## 2019-03-07 ENCOUNTER — OFFICE VISIT (OUTPATIENT)
Dept: INTERNAL MEDICINE | Age: 59
End: 2019-03-07

## 2019-03-07 VITALS
HEART RATE: 67 BPM | DIASTOLIC BLOOD PRESSURE: 94 MMHG | HEIGHT: 70 IN | BODY MASS INDEX: 23.11 KG/M2 | OXYGEN SATURATION: 97 % | SYSTOLIC BLOOD PRESSURE: 156 MMHG | WEIGHT: 161.4 LBS | TEMPERATURE: 99.8 F

## 2019-03-07 DIAGNOSIS — N18.6 CKD (CHRONIC KIDNEY DISEASE) REQUIRING CHRONIC DIALYSIS (HCC): Primary | ICD-10-CM

## 2019-03-07 DIAGNOSIS — D63.1 ANEMIA IN CHRONIC KIDNEY DISEASE, ON CHRONIC DIALYSIS (HCC): ICD-10-CM

## 2019-03-07 DIAGNOSIS — I10 ESSENTIAL HYPERTENSION: ICD-10-CM

## 2019-03-07 DIAGNOSIS — Z99.2 CKD (CHRONIC KIDNEY DISEASE) REQUIRING CHRONIC DIALYSIS (HCC): Primary | ICD-10-CM

## 2019-03-07 DIAGNOSIS — N18.6 ANEMIA IN CHRONIC KIDNEY DISEASE, ON CHRONIC DIALYSIS (HCC): ICD-10-CM

## 2019-03-07 DIAGNOSIS — Z99.2 ANEMIA IN CHRONIC KIDNEY DISEASE, ON CHRONIC DIALYSIS (HCC): ICD-10-CM

## 2019-03-07 PROCEDURE — 99213 OFFICE O/P EST LOW 20 MIN: CPT | Performed by: NURSE PRACTITIONER

## 2019-03-07 RX ORDER — FERRIC CITRATE 210 MG/1
1 TABLET, COATED ORAL DAILY
COMMUNITY
Start: 2019-01-11 | End: 2021-06-03 | Stop reason: SDUPTHER

## 2019-03-07 RX ORDER — LATANOPROST 50 UG/ML
1 SOLUTION/ DROPS OPHTHALMIC NIGHTLY
COMMUNITY
Start: 2019-02-26 | End: 2021-06-03

## 2019-03-07 NOTE — PROGRESS NOTES
Subjective   Harry Ortega is a 58 y.o. male.     History of Present Illness     Patient here today for six-month chronic medical follow-up and blood pressure check.    Currently on dialysis 3 days a week.  Patient is currently on kidney transplant list, reports had a possible donor lined up but was declined due to blood type.  He has another live donor that is waiting to be tested.  Reports blood pressures at dialysis have been about 140s-150s over 80s, his nephrologist is happy with these numbers.  Reports feeling good otherwise, no other acute concerns at this time.  Currently being treated with oral Fe replacement for anemia CKD.     The following portions of the patient's history were reviewed and updated as appropriate: allergies, current medications, past family history, past medical history, past social history, past surgical history and problem list.    Review of Systems   Constitutional: Negative for activity change, appetite change, unexpected weight gain and unexpected weight loss.   Respiratory: Negative for shortness of breath.    Cardiovascular: Negative for chest pain, palpitations and leg swelling.       Objective   Physical Exam   Constitutional: He appears well-developed and well-nourished. He is active. He does not appear ill. No distress.   Cardiovascular: Normal rate, regular rhythm and normal heart sounds.   Pulmonary/Chest: Effort normal and breath sounds normal.   Neurological: He is alert.   Psychiatric: He has a normal mood and affect. His speech is normal and behavior is normal. Thought content normal.   Nursing note and vitals reviewed.    Lab Results   Component Value Date    GLUCOSE 119 (H) 09/09/2017    BUN 30 (H) 09/13/2018    CREATININE 4.23 (H) 09/13/2018    EGFRIFNONA 15 (L) 09/13/2018    EGFRIFAFRI 18 (L) 09/13/2018    BCR 7.1 09/13/2018    K 3.7 09/13/2018    CO2 29.9 (H) 09/13/2018    CALCIUM 9.2 09/13/2018    PROTENTOTREF 6.6 09/13/2018    ALBUMIN 4.50 09/13/2018    LABIL2  2.1 09/13/2018    AST 23 09/13/2018    ALT 30 09/13/2018     Lab Results   Component Value Date    CHLPL 179 09/13/2018    CHLPL 133 07/24/2017     Lab Results   Component Value Date    TRIG 83 09/13/2018    TRIG 86 07/24/2017     Lab Results   Component Value Date    HDL 60 09/13/2018    HDL 46 07/24/2017     Lab Results   Component Value Date     (H) 09/13/2018    LDL 70 07/24/2017     Lab Results   Component Value Date    WBC 4.96 09/13/2018    HGB 12.4 (L) 09/13/2018    HCT 36.6 (L) 09/13/2018    MCV 94.8 09/13/2018     09/13/2018     Lab Results   Component Value Date    HGBA1C 4.80 09/13/2018         Assessment/Plan   Problems Addressed this Visit        Cardiovascular and Mediastinum    Hypertension       Genitourinary    CKD (chronic kidney disease) requiring chronic dialysis (CMS/HCC) - Primary        1. CKD (chronic kidney disease) requiring chronic dialysis (CMS/[facility])  Currently stable, on dialysis 3 days a week.  Patient is on renal transplant list.    2. Essential hypertension  Blood pressure high today, nephrologist is okay with these numbers considering the patient is on dialysis 3 days a week.  We will continue same for now.  Reviewed labs from most recent visit in September, labs are within acceptable range.  We will follow-up chronic medical issues in 6 months.

## 2019-04-25 ENCOUNTER — TELEPHONE (OUTPATIENT)
Dept: INTERNAL MEDICINE | Age: 59
End: 2019-04-25

## 2019-04-25 NOTE — TELEPHONE ENCOUNTER
"S/w pt re: possible hernia.    Pt stated he does not noticed any protrusion from his abdomen, nor does he have constant pain.    Pt did report a \"pulling\" sensation, and pressure, when he bends forward or lifts something. He rated any pain associated w/ pressure 5/10 at the moment of bending or lifting.    Pt was advised that if the pain worsens, or becomes unbearable, he should go to ER immediately.    Pt demonstrated understanding.     KD  "

## 2019-04-25 NOTE — TELEPHONE ENCOUNTER
LVM for pt to call office to discuss symptoms, including pain.    Pt was also advised to seek Tx at ER if pain becomes too severe to wait.    KD

## 2019-05-02 ENCOUNTER — OFFICE VISIT (OUTPATIENT)
Dept: INTERNAL MEDICINE | Age: 59
End: 2019-05-02

## 2019-05-02 VITALS
BODY MASS INDEX: 22.9 KG/M2 | TEMPERATURE: 98.4 F | HEIGHT: 70 IN | HEART RATE: 61 BPM | OXYGEN SATURATION: 96 % | WEIGHT: 160 LBS | DIASTOLIC BLOOD PRESSURE: 80 MMHG | SYSTOLIC BLOOD PRESSURE: 164 MMHG

## 2019-05-02 DIAGNOSIS — R10.32 LEFT GROIN PAIN: Primary | ICD-10-CM

## 2019-05-02 PROCEDURE — 99213 OFFICE O/P EST LOW 20 MIN: CPT | Performed by: NURSE PRACTITIONER

## 2019-05-02 NOTE — PROGRESS NOTES
"[facility] [place]  Marichuy SCHOFIELD Brown / 58 y.o. / male  05/02/2019      ASSESSMENT & PLAN:    Problem List Items Addressed This Visit     None      Visit Diagnoses     Left groin pain    -  Primary    Relevant Orders    CT Abdomen Pelvis Without Contrast        Orders Placed This Encounter   Procedures   • CT Abdomen Pelvis Without Contrast     No orders of the defined types were placed in this encounter.      Summary/Discussion:    1. Left groin pain  Suspect likely left inguinal hernia, although no apparent reducible bulge is noted on exam.  Will obtain CT of abdomen and pelvis without contrast for further evaluation, likely will need referral to general surgery.  Discussed with patient avoidance of heavy lifting greater than 20 to 30 pounds and other activities which exacerbate pain.  Discussed with patient symptoms of strangulation or incarceration and when to go to the ER for further evaluation.     - CT Abdomen Pelvis Without Contrast        No Follow-up on file.    ____________________________________________________________________    MEDICATIONS  Current Outpatient Medications   Medication Sig Dispense Refill   • AURYXIA 1  MG(Fe) tablet      • B Complex-C-Folic Acid (RENALPREN PO) Take  by mouth.     • latanoprost (XALATAN) 0.005 % ophthalmic solution      • metoprolol succinate XL (TOPROL-XL) 100 MG 24 hr tablet Take 100 mg by mouth 2 (Two) Times a Day.     • minoxidil (LONITEN) 2.5 MG tablet Take 2.5 mg by mouth 2 (Two) Times a Day. 60 tablet 1   • pantoprazole (PROTONIX) 40 MG EC tablet Take 1 tablet by mouth Daily. 30 tablet 12   • sildenafil (REVATIO) 20 MG tablet TAKE 2 TO 5 TABLETS BY MOUTH 30 MIN TO 1 HOUR PRIOR TO SEXUAL ACTIVITY  3     No current facility-administered medications for this visit.           VITALS:    Visit Vitals  /80 (BP Location: Right arm, Patient Position: Sitting)   Pulse 61   Temp 98.4 °F (36.9 °C)   Ht 177.8 cm (70\")   Wt 72.6 kg (160 " "lb)   SpO2 96%   BMI 22.96 kg/m²       BP Readings from Last 3 Encounters:   05/02/19 164/80   03/07/19 156/94   09/06/18 158/88     Wt Readings from Last 3 Encounters:   05/02/19 72.6 kg (160 lb)   03/07/19 73.2 kg (161 lb 6.4 oz)   09/06/18 70.9 kg (156 lb 6.4 oz)      Body mass index is 22.96 kg/m².    CC:  Main reason(s) for today's visit: Abdominal Pain (pt c/o LLQ abdominal pain, pressure x 1 mo; pt reports \"pulling\" sensation with bending and lifting, and coughing, rates pain with activities 5/10)      HPI:      Abdominal Pain: Patient complains of abdominal pain. The pain is described as aching and pulling, and is moderate in intensity. Pain is located in the LLQ without radiation. Onset was 1 month ago. Symptoms have been gradually worsening since. Aggravating factors: coughing, bending, lifting.  Alleviating factors: recumbency. Associated symptoms: none. The patient denies anorexia, chills, constipation, diarrhea and fever. No palpable bulge noted by patient. He denies any testicular pain or fullness.       Patient Care Team:  Marichuy Cotto APRN as PCP - General (Internal Medicine)  Allen Rogers MD as Consulting Physician (Pulmonary Disease)  Shyam Naik MD as Consulting Physician (Nephrology)    ____________________________________________________________________    REVIEW OF SYSTEMS    Review of Systems   Gastrointestinal: Positive for abdominal pain (lower left abdomen/groin area). Negative for constipation, nausea and vomiting.   Genitourinary: Negative for dysuria and frequency.         PHYSICAL EXAMINATION    Physical Exam   Abdominal: A hernia is present. Hernia confirmed positive in the left inguinal area.       Genitourinary: Left testis shows no mass and no tenderness.   Lymphadenopathy: No inguinal adenopathy noted on the right or left side.       REVIEWED DATA:    Labs:     Lab Results   Component Value Date     09/13/2018    K 3.7 09/13/2018    AST 23 09/13/2018 "    ALT 30 09/13/2018    BUN 30 (H) 09/13/2018    CREATININE 4.23 (H) 09/13/2018    CREATININE 3.98 (H) 09/09/2017    CREATININE 3.55 (H) 08/19/2017    EGFRIFNONA 15 (L) 09/13/2018    EGFRIFAFRI 18 (L) 09/13/2018       Lab Results   Component Value Date    HGBA1C 4.80 09/13/2018    HGBA1C 5.03 07/24/2017    GLUCOSE 119 (H) 09/09/2017    GLUCOSE 131 (H) 08/19/2017    GLUCOSE 124 (H) 07/09/2017       Lab Results   Component Value Date     (H) 09/13/2018    LDL 70 07/24/2017    HDL 60 09/13/2018    HDL 46 07/24/2017    TRIG 83 09/13/2018    TRIG 86 07/24/2017    CHOLHDLRATIO 2.98 09/13/2018    CHOLHDLRATIO 2.89 07/24/2017       Lab Results   Component Value Date    TSH 1.55 09/13/2018    FREET4 1.53 07/24/2017       Lab Results   Component Value Date    WBC 4.96 09/13/2018    HGB 12.4 (L) 09/13/2018    HGB 9.5 (L) 09/09/2017    HGB 7.3 (L) 08/19/2017     09/13/2018       Lab Results   Component Value Date    PROTEIN See below: (A) 07/24/2017    GLUCOSEU Negative 07/24/2017    BLOODU See below: (A) 07/24/2017    NITRITEU Negative 07/24/2017    LEUKOCYTESUR Negative 07/24/2017       Imaging:         Medical Tests:         Summary of old records / correspondence / consultant report:         Request outside records:         ALLERGIES  No Known Allergies     PFSH:     The following portions of the patient's history were reviewed and updated as appropriate: Allergies / Current Medications / Past Medical History / Surgical History / Social History / Family History    PROBLEM LIST   Patient Active Problem List   Diagnosis   • Hypertensive crisis   • Hypertension   • CKD (chronic kidney disease) requiring chronic dialysis (CMS/[facility])   • Pulmonary nodule   • Anemia in CKD (chronic kidney disease)   • Slow transit constipation   • Iron deficiency anemia       PAST MEDICAL HISTORY  Past Medical History:   Diagnosis Date   • CKD (chronic kidney disease) requiring chronic dialysis (CMS/HCC)    • Dialysis patient  (CMS/HCC)    • Hypertension        SURGICAL HISTORY  Past Surgical History:   Procedure Laterality Date   • ARTERIOVENOUS FISTULA/SHUNT SURGERY W/ HEMODIALYSIS CATHETER INSERTION N/A 7/6/2017    Procedure: ARTERIOVENOUS FISTULA LEFT ARM AND PALINDROME PLACEMENT;  Surgeon: Kar Morales MD;  Location: [facility] MAIN OR;  Service:    • COLONOSCOPY N/A 10/12/2017    Procedure: COLONOSCOPY TO CECUM AND TERMINAL ILEUM;  Surgeon: Doni Terrazas MD;  Location: [facility] ENDOSCOPY;  Service:    • ENDOSCOPY N/A 10/12/2017    Procedure: ESOPHAGOGASTRODUODENOSCOPY WITH BIOPSIES;  Surgeon: Doni Terrazas MD;  Location: [facility] ENDOSCOPY;  Service:    • JOINT REPLACEMENT Left     KNEE   • KNEE SURGERY Left        SOCIAL HISTORY  Social History     Socioeconomic History   • Marital status:      Spouse name: Not on file   • Number of children: 5   • Years of education: Not on file   • Highest education level: Not on file   Occupational History   • Occupation: [employer]   Tobacco Use   • Smoking status: Former Smoker     Packs/day: 0.25   • Smokeless tobacco: Never Used   Substance and Sexual Activity   • Alcohol use: No   • Drug use: No   • Sexual activity: Yes     Partners: Female       FAMILY HISTORY  Family History   Problem Relation Age of Onset   • Hypertension Mother    • Diabetes Mother    • Hypertension Father    • Diabetes Son          **Dragon Disclaimer:   Much of this encounter note is an electronic transcription/translation of spoken language to printed text. The electronic translation of spoken language may permit erroneous, or at times, nonsensical words or phrases to be inadvertently transcribed. Although I have reviewed the note for such errors, some may still exist.

## 2019-05-02 NOTE — PATIENT INSTRUCTIONS
Inguinal Hernia, Adult    An inguinal hernia develops when fat or the intestines push through a weak spot in a muscle where your leg meets your lower abdomen (groin). This creates a bulge. This kind of hernia could also be:  · In your scrotum, if you are male.  · In folds of skin around your vagina, if you are female.    There are three types of inguinal hernias:  · Hernias that can be pushed back into the abdomen (are reducible). This type rarely causes pain.  · Hernias that are not reducible (are incarcerated).  · Hernias that are not reducible and lose their blood supply (are strangulated). This type of hernia requires emergency surgery.    What are the causes?  This condition is caused by having a weak spot in the muscles or tissues in the groin. This weak spot develops over time. The hernia may poke through the weak spot when you suddenly strain your lower abdominal muscles, such as when you:  · Lift a heavy object.  · Strain to have a bowel movement. Constipation can lead to straining.  · Cough.    What increases the risk?  This condition is more likely to develop in:  · Men.  · Pregnant women.  · People who:  ? Are overweight.  ? Work in jobs that require long periods of standing or heavy lifting.  ? Have had an inguinal hernia before.  ? Smoke or have lung disease. These factors can lead to long-lasting (chronic) coughing.    What are the signs or symptoms?  Symptoms may depend on the size of the hernia. Often, a small inguinal hernia has no symptoms. Symptoms of a larger hernia may include:  · A lump in the groin area. This is easier to see when standing. It might not be visible when lying down.  · Pain or burning in the groin. This may get worse when lifting, straining, or coughing.  · A dull ache or a feeling of pressure in the groin.  · In men, an unusual lump in the scrotum.    Symptoms of a strangulated inguinal hernia may include:  · A bulge in your groin that is very painful and tender to the  touch.  · A bulge that turns red or purple.  · Fever, nausea, and vomiting.  · Inability to have a bowel movement or to pass gas.    How is this diagnosed?  This condition is diagnosed based on your symptoms, your medical history, and a physical exam. Your health care provider may feel your groin area and ask you to cough.  How is this treated?  Treatment depends on the size of your hernia and whether you have symptoms. If you do not have symptoms, your health care provider may have you watch your hernia carefully and have you come in for follow-up visits. If your hernia is large or if you have symptoms, you may need surgery to repair the hernia.  Follow these instructions at home:  Lifestyle  · Avoid lifting heavy objects.  · Avoid standing for long periods of time.  · Do not use any products that contain nicotine or tobacco, such as cigarettes and e-cigarettes. If you need help quitting, ask your health care provider.  · Maintain a healthy weight.  Preventing constipation  · Take actions to prevent constipation. Constipation leads to straining with bowel movements, which can make a hernia worse or cause a hernia repair to break down. Your health care provider may recommend that you:  ? Drink enough fluid to keep your urine pale yellow.  ? Eat foods that are high in fiber, such as fresh fruits and vegetables, whole grains, and beans.  ? Limit foods that are high in fat and processed sugars, such as fried or sweet foods.  ? Take an over-the-counter or prescription medicine for constipation.  General instructions  · You may try to push the hernia back in place by very gently pressing on it while lying down. Do not try to force the bulge back in if it will not push in easily.  · Watch your hernia for any changes in shape, size, or color. Get help right away if you notice any changes.  · Take over-the-counter and prescription medicines only as told by your health care provider.  · Keep all follow-up visits as told by  your health care provider. This is important.  Contact a health care provider if:  · You have a fever.  · You develop new symptoms.  · Your symptoms get worse.  Get help right away if:  · You have pain in your groin that suddenly gets worse.  · You have a bulge in your groin that:  ? Suddenly gets bigger and does not get smaller.  ? Becomes red or purple or painful to the touch.  · You are a man and you have a sudden pain in your scrotum, or the size of your scrotum suddenly changes.  · You cannot push the hernia back in place by very gently pressing on it when you are lying down. Do not try to force the bulge back in if it will not push in easily.  · You have nausea or vomiting that does not go away.  · You have a fast heartbeat.  · You cannot have a bowel movement or pass gas.  These symptoms may represent a serious problem that is an emergency. Do not wait to see if the symptoms will go away. Get medical help right away. Call your local emergency services (911 in the [place]  Summary  · An inguinal hernia develops when fat or the intestines push through a weak spot in a muscle where your leg meets your lower abdomen (groin).  · This condition is caused by having a weak spot in muscles or tissue in your groin.  · Symptoms may depend on the size of the hernia, and they may include pain or swelling in your groin. A small inguinal hernia often has no symptoms.  · Treatment may not be needed if you do not have symptoms. If you have symptoms or a large hernia, you may need surgery to repair the hernia.  · Avoid lifting heavy objects. Also avoid standing for long amounts of time.  This information is not intended to replace advice given to you by your health care provider. Make sure you discuss any questions you have with your health care provider.  Document Released: 05/06/2010 Document Revised: 09/19/2018 Document Reviewed: 09/19/2018  [employer] Interactive Patient Education © 2019 [employer]

## 2019-05-23 ENCOUNTER — HOSPITAL ENCOUNTER (OUTPATIENT)
Dept: CT IMAGING | Facility: HOSPITAL | Age: 59
Discharge: HOME | End: 2019-05-23
Admitting: NURSE PRACTITIONER

## 2019-05-23 PROCEDURE — 74176 CT ABD & PELVIS W/O CONTRAST: CPT

## 2019-05-31 ENCOUNTER — TELEPHONE (OUTPATIENT)
Dept: INTERNAL MEDICINE | Age: 59
End: 2019-05-31

## 2019-05-31 DIAGNOSIS — S76.212D GROIN STRAIN, LEFT, SUBSEQUENT ENCOUNTER: Primary | ICD-10-CM

## 2019-05-31 NOTE — TELEPHONE ENCOUNTER
----- Message from Ingrid Werner LPN sent at 5/30/2019  1:01 PM EDT -----  Pt was notified of CT results. Pt was advised of referrals to PT or Ortho. Pt stated since the pain has not subsided, he would like referral to PT. KD

## 2019-06-13 ENCOUNTER — HOSPITAL ENCOUNTER (OUTPATIENT)
Dept: PHYSICAL THERAPY | Facility: HOSPITAL | Age: 59
Setting detail: THERAPIES SERIES
Discharge: HOME | End: 2019-06-13

## 2019-06-13 DIAGNOSIS — M25.552 LEFT HIP PAIN: Primary | ICD-10-CM

## 2019-06-13 PROCEDURE — 97162 PT EVAL MOD COMPLEX 30 MIN: CPT

## 2019-06-13 PROCEDURE — 97110 THERAPEUTIC EXERCISES: CPT

## 2019-06-13 NOTE — THERAPY EVALUATION
Outpatient Physical Therapy Ortho Initial Evaluation  [facility]     Patient Name: Harry Ortega  : 1960  MRN: [id number]  Today's Date: 2019      Visit Date: 2019    Patient Active Problem List   Diagnosis   • Hypertensive crisis   • Hypertension   • CKD (chronic kidney disease) requiring chronic dialysis (CMS/HCC)   • Pulmonary nodule   • Anemia in CKD (chronic kidney disease)   • Slow transit constipation   • Iron deficiency anemia        Past Medical History:   Diagnosis Date   • CKD (chronic kidney disease) requiring chronic dialysis (CMS/HCC)    • Dialysis patient (CMS/HCC)    • Hypertension         Past Surgical History:   Procedure Laterality Date   • ARTERIOVENOUS FISTULA/SHUNT SURGERY W/ HEMODIALYSIS CATHETER INSERTION N/A 2017    Procedure: ARTERIOVENOUS FISTULA LEFT ARM AND PALINDROME PLACEMENT;  Surgeon: Kar Morales MD;  Location: [facility] MAIN OR;  Service:    • COLONOSCOPY N/A 10/12/2017    Procedure: COLONOSCOPY TO CECUM AND TERMINAL ILEUM;  Surgeon: Doni Terrazas MD;  Location: [facility] ENDOSCOPY;  Service:    • ENDOSCOPY N/A 10/12/2017    Procedure: ESOPHAGOGASTRODUODENOSCOPY WITH BIOPSIES;  Surgeon: Doni Terrazas MD;  Location: [facility] ENDOSCOPY;  Service:    • JOINT REPLACEMENT Left     KNEE   • KNEE SURGERY Left        Visit Dx:     ICD-10-CM ICD-9-CM   1. Left hip pain M25.552 719.45         Patient History     Row Name 19 1300             History    Brief Description of Current Complaint  Pt reports onset of L groin pain about 1 month ago. Pt began to notice pain with coughing/sneezing and went to MD due to concern regarding hernia. CT ordered negative for hernia. Pt reports increased symptoms delayed onset after activity such as cutting the grass.   -CN      Patient/Caregiver Goals  Relieve pain;Return to prior level of function;Know what to do to help the symptoms  -CN      Patient seeing anyone else for problem(s)?  Yes  -CN      What clinical  tests have you had for this problem?  CT scan  -CN         Pain     Pain Location  Groin  -CN      Pain at Present  2  -CN      Pain at Best  2  -CN      Pain at Worst  7  -CN      Pain Frequency  Constant/continuous  -CN      Pain Description  Aching  -CN      What Performance Factors Make the Current Problem(s) WORSE?  Cough, sneezing, increased physical activity  -CN      What Performance Factors Make the Current Problem(s) BETTER?  Rest  -CN      Is your sleep disturbed?  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   -CN      Difficulties with ADL's?  Yes, cutting the grass  -CN      Difficulties with recreational activities?  Able to play golf without symtpoms  -CN         Fall Risk Assessment    Any falls in the past year:  No  -CN         Services    Are you currently receiving Home Health services  No  -CN         Daily Activities    Primary Language  English  -CN      How does patient learn best?  Listening;Reading  -CN      Teaching needs identified  Home Exercise Program;Management of Condition  -CN      Barriers to learning  None  -CN      Pt Participated in POC and Goals  Yes  -CN         Safety    Are you being hurt, hit, or frightened by anyone at home or in your life?  No  -CN      Are you being neglected by a caregiver  No  -CN        User Key  (r) = Recorded By, (t) = Taken By, (c) = Cosigned By    Initials Name Provider Type    CN Elen Colin, PT Physical Therapist          PT Ortho     Row Name 06/13/19 1400       Myotomal Screen- Lower Quarter Clearing    Hip flexion (L2)  Right:;5 (Normal);Left:;4+ (Good +)  -CN    Knee extension (L3)  Bilateral:;5 (Normal)  -CN    Ankle DF (L4)  Bilateral:;5 (Normal)  -CN    Ankle PF (S1)  Bilateral:;5 (Normal)  -CN    Knee flexion (S2)  Bilateral:;5 (Normal)  -CN       Special Tests/Palpation    Special Tests/Palpation  -- reports slight tenderness throughout groin  -CN       Hip/Thigh Palpation     Iliopsoas  Left:;Tender  -CN       Hip Special Tests    LORETO (hip vs SI pathology)  Left:;Positive for groin pain  -CN    Hip scour test (labral vs hip pathology)  Left:;Positive for groin pain  -CN       Lower Extremity Flexibility    Hamstrings  Right:;Mildly limited;Left:;Moderately limited  -CN    Hip External Rotators  Left:;Moderately limited  -CN    Hip Internal Rotators  Left:;Moderately limited  -CN      User Key  (r) = Recorded By, (t) = Taken By, (c) = Cosigned By    Initials Name Provider Type    Elen Simmons, PT Physical Therapist                      Therapy Education  Education Details: Anatomy, goals of PT, eval findings, differential diagnosis  Given: HEP, Symptoms/condition management, Pain management  Program: New  How Provided: Verbal, Demonstration, Written  Provided to: Patient  Level of Understanding: Teach back education performed, Verbalized, Demonstrated     PT OP Goals     Row Name 06/13/19 1400          PT Short Term Goals    STG Date to Achieve  07/04/19  -CN     STG 1  Pt will report pain rated 2/10 at worst in order to demonstrate ability to return to normalized ADLs and functional activities.  -CN     STG 1 Progress  New  -CN     STG 2  Pt will be independent with initial HEP for symptom management.  -CN     STG 2 Progress  New  -CN        Long Term Goals    LTG Date to Achieve  07/25/19  -CN     LTG 1  Pt will be independent and compliant with advanced HEP for long term management of symptoms and prevention of future occurrence.   -CN     LTG 1 Progress  New  -CN     LTG 2  Pt will report 50% improvement in symptoms with coughing/sneezing in order to return to PLOF.   -CN     LTG 2 Progress  New  -CN     LTG 3  Pt will be able to return to all yard tasks including cutting the grass without reports of next day soreness.  -CN     LTG 3 Progress  New  -CN        Time Calculation    PT Goal Re-Cert Due Date  07/13/19  -CN       User Key  (r) = Recorded By, (t) = Taken By,  (c) = Cosigned By    Initials Name Provider Type    Elen Simmons, PT Physical Therapist          PT Assessment/Plan     Row Name 06/13/19 1441          PT Assessment    Functional Limitations  Limitations in functional capacity and performance;Performance in leisure activities;Limitation in home management  -CN     Impairments  Impaired flexibility;Muscle strength;Pain;Range of motion  -CN     Assessment Comments  58 y.o. male referred to outpatient physical therapy for evaluation and treatment of left groin pain with insidioius onset 1 month ago.  Patient presents with pain with increased intraabdominal pressure, positive hip scour test, pain at end range hip ER/IR, tenderness to iliopsoas and generalized soreness in groin. Pt's signs and symptoms are consistent with hip vs pubic symphysis pathology.  May consider referral for further imaging due to symptoms as well as possible referral to PT pelvic . Pertinent comorbidities and personal factors that may affect progress include, but are not limited to, dialysis 3x/week and active lifestyle.  Pt scored 82.5% on the LEFS where 100% is no disability and is in evolving clinical condition. Pt would benefit from skilled PT to address functional deficits and return to PLOF.   -CN     Please refer to paper survey for additional self-reported information  Yes  -CN     Rehab Potential  Good  -CN     Patient/caregiver participated in establishment of treatment plan and goals  Yes  -CN     Patient would benefit from skilled therapy intervention  Yes  -CN        PT Plan    PT Frequency  2x/week  -CN     Predicted Duration of Therapy Intervention (Therapy Eval)  6 weeks  -CN     Planned CPT's?  PT EVAL MOD COMPLELITY: 67410;PT RE-EVAL: 63104;PT THER PROC EA 15 MIN: 89154;PT THER ACT EA 15 MIN: 23447;PT MANUAL THERAPY EA 15 MIN: 62454;PT NEUROMUSC RE-EDUCATION EA 15 MIN: 73176;PT HOT OR COLD PACK TREAT MCARE;PT ELECTRICAL STIM UNATTEND: ;PT  ULTRASOUND EA 15 MIN: 52528  -CN     PT Plan Comments  Assess response to initial HEP and consider referral for further imaging vs pelvic floor PT pending symptom relief.   -CN       User Key  (r) = Recorded By, (t) = Taken By, (c) = Cosigned By    Initials Name Provider Type    Elen Simmons PT Physical Therapist            Exercises     Row Name 06/13/19 1400             Total Minutes    56735 - PT Therapeutic Exercise Minutes  10  -CN         Exercise 1    Exercise Name 1  TA hold  -CN      Cueing 1  Demo  -CN      Reps 1  10  -CN      Time 1  5 sec  -CN         Exercise 2    Exercise Name 2  PPT  -CN      Cueing 2  Verbal  -CN      Reps 2  10  -CN      Time 2  5 sec  -CN         Exercise 3    Exercise Name 3  HL hip abduction  -CN      Cueing 3  Verbal  -CN      Reps 3  10  -CN      Additional Comments  RTB  -CN         Exercise 4    Exercise Name 4  HL hip adduction  -CN      Cueing 4  Verbal  -CN      Reps 4  10  -CN      Time 4  5 sec  -CN         Exercise 5    Exercise Name 5  Glute sets  -CN      Cueing 5  Verbal  -CN      Reps 5  10  -CN      Time 5  5 sec  -CN        User Key  (r) = Recorded By, (t) = Taken By, (c) = Cosigned By    Initials Name Provider Type    Elen Simmons, YONAS Physical Therapist                        Outcome Measure Options: Lower Extremity Functional Scale (LEFS)(82.5% where 100% is no disability)         Time Calculation:     Start Time: 1350  Stop Time: 1429  Time Calculation (min): 39 min     Therapy Charges for Today     Code Description Service Date Service Provider Modifiers Qty    58498609064  PT THER PROC EA 15 MIN 6/13/2019 Elen Colin, PT GP 1    [id number]  PT EVAL MOD COMPLEXITY 2 6/13/2019 Elen Colin, PT GP 1          PT G-Codes  Outcome Measure Options: Lower Extremity Functional Scale (LEFS)(82.5% where 100% is no disability)         Elen Colin PT  6/13/2019

## 2019-06-20 ENCOUNTER — APPOINTMENT (OUTPATIENT)
Dept: PHYSICAL THERAPY | Facility: HOSPITAL | Age: 59
End: 2019-06-20

## 2019-06-25 ENCOUNTER — HOSPITAL ENCOUNTER (OUTPATIENT)
Dept: PHYSICAL THERAPY | Facility: HOSPITAL | Age: 59
Setting detail: THERAPIES SERIES
Discharge: HOME | End: 2019-06-25

## 2019-06-25 DIAGNOSIS — M25.552 LEFT HIP PAIN: Primary | ICD-10-CM

## 2019-06-25 PROCEDURE — 97110 THERAPEUTIC EXERCISES: CPT

## 2019-06-25 NOTE — THERAPY TREATMENT NOTE
Outpatient Physical Therapy Ortho Treatment Note  [facility]     Patient Name: Harry Ortega  : 1960  MRN: [id number]  Today's Date: 2019      Visit Date: 2019    Visit Dx:    ICD-10-CM ICD-9-CM   1. Left hip pain M25.552 719.45       Patient Active Problem List   Diagnosis   • Hypertensive crisis   • Hypertension   • CKD (chronic kidney disease) requiring chronic dialysis (CMS/HCC)   • Pulmonary nodule   • Anemia in CKD (chronic kidney disease)   • Slow transit constipation   • Iron deficiency anemia        Past Medical History:   Diagnosis Date   • CKD (chronic kidney disease) requiring chronic dialysis (CMS/HCC)    • Dialysis patient (CMS/HCC)    • Hypertension         Past Surgical History:   Procedure Laterality Date   • ARTERIOVENOUS FISTULA/SHUNT SURGERY W/ HEMODIALYSIS CATHETER INSERTION N/A 2017    Procedure: ARTERIOVENOUS FISTULA LEFT ARM AND PALINDROME PLACEMENT;  Surgeon: Kar Morales MD;  Location: [facility] MAIN OR;  Service:    • COLONOSCOPY N/A 10/12/2017    Procedure: COLONOSCOPY TO CECUM AND TERMINAL ILEUM;  Surgeon: Doni Terrazas MD;  Location: [facility] ENDOSCOPY;  Service:    • ENDOSCOPY N/A 10/12/2017    Procedure: ESOPHAGOGASTRODUODENOSCOPY WITH BIOPSIES;  Surgeon: Doni Terrazas MD;  Location: [facility] ENDOSCOPY;  Service:    • JOINT REPLACEMENT Left     KNEE   • KNEE SURGERY Left                        PT Assessment/Plan     Row Name 19 1800          PT Assessment    Assessment Comments  The pt returns for first follow up after initial eval with no significant changes. He tolerates therex progression to include increased deep abdominal strengthening and strenght of the hip flexors/adductors. He continues to be a good candidate for skilled PT.  -RS        PT Plan    PT Plan Comments  Assess response to therex progression, consider addition of prone windsheild wipers,sidelying hip abduction, standing hip 4 way as tolerance allows  -RS       User Key   (r) = Recorded By, (t) = Taken By, (c) = Cosigned By    Initials Name Provider Type    RS Marilyn Can, PT Physical Therapist            Exercises     Row Name 06/25/19 1700             Subjective Comments    Subjective Comments  It isn't hurting too badly today but I haven't done a lot.  -RS         Subjective Pain    Able to rate subjective pain?  yes  -RS      Pre-Treatment Pain Level  2  -RS         Total Minutes    79656 - PT Therapeutic Exercise Minutes  38  -RS         Exercise 1    Exercise Name 1  TA hold  -RS      Cueing 1  Demo  -RS      Reps 1  10  -RS      Time 1  5 sec  -RS         Exercise 2    Exercise Name 2  PPT  -RS      Cueing 2  Verbal  -RS      Reps 2  10  -RS      Time 2  5 sec  -RS         Exercise 3    Exercise Name 3  HL hip abduction  -RS      Cueing 3  Verbal  -RS      Reps 3  15  -RS      Time 3  3s  -RS      Additional Comments  GTB  -RS         Exercise 4    Exercise Name 4  HL hip adduction  -RS      Cueing 4  Verbal  -RS      Reps 4  10  -RS      Time 4  5 sec  -RS         Exercise 5    Exercise Name 5  modified bridge  -RS      Cueing 5  Verbal  -RS      Reps 5  10  -RS      Time 5  5 sec  -RS         Exercise 6    Exercise Name 6  supine hip flexor stretch off table  -RS      Cueing 6  Verbal  -RS      Time 6  1 min  -RS         Exercise 7    Exercise Name 7  seated adductor stretch  -RS      Cueing 7  Verbal  -RS      Reps 7  2  -RS      Time 7  30s  -RS         Exercise 8    Exercise Name 8  standing hip ER/IR AROM on stool  -RS      Cueing 8  Verbal;Demo  -RS      Reps 8  10 each  -RS         Exercise 9    Exercise Name 9  supine pelvic brace  -RS      Cueing 9  Verbal  -RS      Reps 9  10  -RS      Time 9  3s  -RS      Additional Comments  cues to avoid glute activation  -RS         Exercise 10    Exercise Name 10  prone hip extension (small ROM)  -RS      Cueing 10  Verbal  -RS      Reps 10  10  -RS      Additional Comments  cues to avoid lumbar extension  -RS          Exercise 11    Exercise Name 11  sidelying clamshell in rosalba ROM  -RS      Cueing 11  Verbal  -RS      Reps 11  20  -RS        User Key  (r) = Recorded By, (t) = Taken By, (c) = Cosigned By    Initials Name Provider Type    RS Marilyn Can, PT Physical Therapist                                          Time Calculation:   Start Time: 1545  Stop Time: 1625  Time Calculation (min): 40 min  Therapy Charges for Today     Code Description Service Date Service Provider Modifiers Qty    [id number]  PT THER PROC EA 15 MIN 6/25/2019 Marilyn Can, PT GP 3                    Marilyn Can, YONAS  6/25/2019

## 2019-06-27 ENCOUNTER — HOSPITAL ENCOUNTER (OUTPATIENT)
Dept: PHYSICAL THERAPY | Facility: HOSPITAL | Age: 59
Setting detail: THERAPIES SERIES
Discharge: HOME | End: 2019-06-27

## 2019-06-27 DIAGNOSIS — M25.552 LEFT HIP PAIN: Primary | ICD-10-CM

## 2019-06-27 PROCEDURE — 97110 THERAPEUTIC EXERCISES: CPT

## 2019-06-27 NOTE — THERAPY TREATMENT NOTE
Outpatient Physical Therapy Ortho Treatment Note  [facility]     Patient Name: Harry Ortega  : 1960  MRN: [id number]  Today's Date: 2019      Visit Date: 2019    Visit Dx:    ICD-10-CM ICD-9-CM   1. Left hip pain M25.552 719.45       Patient Active Problem List   Diagnosis   • Hypertensive crisis   • Hypertension   • CKD (chronic kidney disease) requiring chronic dialysis (CMS/HCC)   • Pulmonary nodule   • Anemia in CKD (chronic kidney disease)   • Slow transit constipation   • Iron deficiency anemia        Past Medical History:   Diagnosis Date   • CKD (chronic kidney disease) requiring chronic dialysis (CMS/HCC)    • Dialysis patient (CMS/HCC)    • Hypertension         Past Surgical History:   Procedure Laterality Date   • ARTERIOVENOUS FISTULA/SHUNT SURGERY W/ HEMODIALYSIS CATHETER INSERTION N/A 2017    Procedure: ARTERIOVENOUS FISTULA LEFT ARM AND PALINDROME PLACEMENT;  Surgeon: Kar Morales MD;  Location: [facility] MAIN OR;  Service:    • COLONOSCOPY N/A 10/12/2017    Procedure: COLONOSCOPY TO CECUM AND TERMINAL ILEUM;  Surgeon: Doni Terrazas MD;  Location: [facility] ENDOSCOPY;  Service:    • ENDOSCOPY N/A 10/12/2017    Procedure: ESOPHAGOGASTRODUODENOSCOPY WITH BIOPSIES;  Surgeon: Doni Terrazas MD;  Location: [facility] ENDOSCOPY;  Service:    • JOINT REPLACEMENT Left     KNEE   • KNEE SURGERY Left                        PT Assessment/Plan     Row Name 19 1609          PT Assessment    Assessment Comments  The pt reports increased soreness after previous session that resolved within 24 hours and reports decreased pain overall. Added standing activities including reverse monster walks, sidelying hip abduction, seated pelvic brace, step up with hip flexion on BOSU for improved pelvic girdle stability. Pt with good tolerance and excellent motivation for participation with skilled PT. He continues to be a good candidate for skilled PT.   -RS        PT Plan    PT Plan  Comments  Assess tolerance for therex progression, consider prone windshield wiper stretch, mini squat as tolerance allows. Consider hip mobs (inf distract) with belt.  -RS       User Key  (r) = Recorded By, (t) = Taken By, (c) = Cosigned By    Initials Name Provider Type    RS Marilyn Can, PT Physical Therapist            Exercises     Row Name 06/27/19 1500             Subjective Comments    Subjective Comments  My abs were sore but I am feeling better overall.  -RS         Subjective Pain    Able to rate subjective pain?  yes  -RS      Pre-Treatment Pain Level  1  -RS         Total Minutes    36370 - PT Therapeutic Exercise Minutes  42  -RS         Exercise 1    Exercise Name 1  TA hold  -RS      Cueing 1  Demo  -RS      Reps 1  10  -RS      Time 1  5 sec  -RS         Exercise 2    Exercise Name 2  PPT  -RS      Cueing 2  Verbal  -RS      Reps 2  10  -RS      Time 2  5 sec  -RS      Additional Comments  plus march  -RS         Exercise 3    Exercise Name 3  HL hip abduction  -RS      Cueing 3  Verbal  -RS      Reps 3  15  -RS      Time 3  3s  -RS      Additional Comments  GTB  -RS         Exercise 4    Exercise Name 4  HL hip adduction  -RS      Cueing 4  Verbal  -RS      Reps 4  10  -RS      Time 4  5 sec  -RS         Exercise 5    Exercise Name 5  modified bridge  -RS      Cueing 5  Verbal  -RS      Reps 5  10  -RS      Time 5  5 sec  -RS         Exercise 6    Exercise Name 6  standing hip flexor stretch off table  -RS      Cueing 6  Verbal  -RS      Time 6  1 min  -RS         Exercise 7    Exercise Name 7  seated adductor stretch  -RS      Cueing 7  Verbal  -RS      Reps 7  2  -RS      Time 7  30s  -RS         Exercise 8    Exercise Name 8  standing hip ER/IR AROM on stool  -RS      Cueing 8  Verbal;Demo  -RS      Reps 8  10 each  -RS         Exercise 9    Exercise Name 9  supine pelvic brace  -RS      Cueing 9  Verbal  -RS      Reps 9  10  -RS      Time 9  3s  -RS      Additional Comments  cues to avoid  glute activation  -RS         Exercise 10    Exercise Name 10  prone hip extension (small ROM)  -RS      Cueing 10  Verbal  -RS      Reps 10  10  -RS      Additional Comments  cues to avoid lumbar extension  -RS         Exercise 11    Exercise Name 11  sidelying clamshell in rosalba ROM  -RS      Cueing 11  Verbal  -RS      Reps 11  20  -RS         Exercise 12    Exercise Name 12  seated pelvic brace  -RS      Cueing 12  Verbal  -RS      Reps 12  10  -RS      Time 12  5s  -RS         Exercise 13    Exercise Name 13  standing hip ext at 45 degrees  -RS      Cueing 13  Verbal  -RS      Reps 13  15  -RS      Additional Comments  cues to avoid lumbar extension  -RS         Exercise 14    Exercise Name 14  sidelying hip abduction  -RS      Cueing 14  Verbal  -RS      Sets 14  2  -RS      Reps 14  10  -RS         Exercise 15    Exercise Name 15  BOSU step up with alt hip flexion  -RS      Cueing 15  Verbal  -RS      Reps 15  5 each side  -RS         Exercise 16    Exercise Name 16  reverse monster walk  -RS      Cueing 16  Verbal;Demo  -RS      Reps 16  2 laps  -RS      Additional Comments  YTB at knees  -RS        User Key  (r) = Recorded By, (t) = Taken By, (c) = Cosigned By    Initials Name Provider Type    RS Marilyn Can, PT Physical Therapist                       PT OP Goals     Row Name 06/27/19 1600          PT Short Term Goals    STG Date to Achieve  07/04/19  -RS     STG 1  Pt will report pain rated 2/10 at worst in order to demonstrate ability to return to normalized ADLs and functional activities.  -RS     STG 1 Progress  New  -RS     STG 2  Pt will be independent with initial HEP for symptom management.  -RS     STG 2 Progress  New  -RS        Long Term Goals    LTG Date to Achieve  07/25/19  -RS     LTG 1  Pt will be independent and compliant with advanced HEP for long term management of symptoms and prevention of future occurrence.   -RS     LTG 1 Progress  New  -RS     LTG 2  Pt will report 50%  improvement in symptoms with coughing/sneezing in order to return to PLOF.   -RS     LTG 2 Progress  New  -RS     LTG 3  Pt will be able to return to all yard tasks including cutting the grass without reports of next day soreness.  -RS     LTG 3 Progress  New  -RS       User Key  (r) = Recorded By, (t) = Taken By, (c) = Cosigned By    Initials Name Provider Type    RS Marilyn Can, YONAS Physical Therapist          Therapy Education  Education Details: progress, POC, exercise rationale  Program: Reinforced  How Provided: Verbal  Provided to: Patient  Level of Understanding: Verbalized              Time Calculation:   Start Time: 1527  Stop Time: 1611  Time Calculation (min): 44 min  Therapy Charges for Today     Code Description Service Date Service Provider Modifiers Qty    97887649402 HC PT THER PROC EA 15 MIN 6/27/2019 Marilyn Can, PT GP 3                    Marilyn Can PT  6/27/2019

## 2019-07-02 ENCOUNTER — HOSPITAL ENCOUNTER (OUTPATIENT)
Dept: PHYSICAL THERAPY | Facility: HOSPITAL | Age: 59
Setting detail: THERAPIES SERIES
Discharge: HOME | End: 2019-07-02

## 2019-07-02 DIAGNOSIS — M25.552 LEFT HIP PAIN: Primary | ICD-10-CM

## 2019-07-02 PROCEDURE — 97110 THERAPEUTIC EXERCISES: CPT

## 2019-07-02 NOTE — THERAPY TREATMENT NOTE
Outpatient Physical Therapy Ortho Treatment Note  [facility]     Patient Name: Harry Ortega  : 1960  MRN: [id number]  Today's Date: 2019      Visit Date: 2019    Visit Dx:    ICD-10-CM ICD-9-CM   1. Left hip pain M25.552 719.45       Patient Active Problem List   Diagnosis   • Hypertensive crisis   • Hypertension   • CKD (chronic kidney disease) requiring chronic dialysis (CMS/HCC)   • Pulmonary nodule   • Anemia in CKD (chronic kidney disease)   • Slow transit constipation   • Iron deficiency anemia        Past Medical History:   Diagnosis Date   • CKD (chronic kidney disease) requiring chronic dialysis (CMS/HCC)    • Dialysis patient (CMS/HCC)    • Hypertension         Past Surgical History:   Procedure Laterality Date   • ARTERIOVENOUS FISTULA/SHUNT SURGERY W/ HEMODIALYSIS CATHETER INSERTION N/A 2017    Procedure: ARTERIOVENOUS FISTULA LEFT ARM AND PALINDROME PLACEMENT;  Surgeon: Kar Morales MD;  Location: [facility] MAIN OR;  Service:    • COLONOSCOPY N/A 10/12/2017    Procedure: COLONOSCOPY TO CECUM AND TERMINAL ILEUM;  Surgeon: Doni Terrazas MD;  Location: [facility] ENDOSCOPY;  Service:    • ENDOSCOPY N/A 10/12/2017    Procedure: ESOPHAGOGASTRODUODENOSCOPY WITH BIOPSIES;  Surgeon: Doni Terrazas MD;  Location: [facility] ENDOSCOPY;  Service:    • JOINT REPLACEMENT Left     KNEE   • KNEE SURGERY Left                        PT Assessment/Plan     Row Name 19 7520          PT Assessment    Assessment Comments  The pt demonstrates excellent progress toward functional and pain goals this date with reports of 0/10 pain on average aand 3/10 pain at highest for short perior in am after PT. He tolerates therex progression to include increased pelvic girdle strengthening well without reports of increased pain. He continues to be a good cadidate for skilled PT.   -RS        PT Plan    PT Plan Comments  Assess tolerance for therex progression, consider resisted side steps,  mini  squat with ER step back, and single limb activities.   -RS       User Key  (r) = Recorded By, (t) = Taken By, (c) = Cosigned By    Initials Name Provider Type    RS Marilyn Can, PT Physical Therapist            Exercises     Row Name 07/02/19 1600             Subjective Comments    Subjective Comments  I was a little sore the next morning but after that I felt nothing.  -RS         Subjective Pain    Able to rate subjective pain?  yes  -RS      Pre-Treatment Pain Level  0  -RS         Total Minutes    07505 - PT Therapeutic Exercise Minutes  45  -RS         Exercise 1    Exercise Name 1  TA hold  -RS      Cueing 1  Demo  -RS      Reps 1  10  -RS      Time 1  5 sec  -RS         Exercise 2    Exercise Name 2  PPT  -RS      Cueing 2  Verbal  -RS      Reps 2  10  -RS      Time 2  5 sec  -RS      Additional Comments  plus march  -RS         Exercise 3    Exercise Name 3  HL hip abduction  -RS      Cueing 3  Verbal  -RS      Reps 3  15  -RS      Time 3  3s  -RS      Additional Comments  BTB  -RS         Exercise 4    Exercise Name 4  HL hip adduction  -RS      Cueing 4  Verbal  -RS      Reps 4  10  -RS      Time 4  5 sec  -RS         Exercise 5    Exercise Name 5  modified bridge  -RS      Cueing 5  Verbal  -RS      Reps 5  10  -RS      Time 5  5 sec  -RS         Exercise 6    Exercise Name 6  standing hip flexor stretch stair  -RS      Cueing 6  Verbal  -RS      Time 6  1 min  -RS         Exercise 7    Exercise Name 7  seated adductor stretch  -RS      Cueing 7  Verbal  -RS      Reps 7  2  -RS      Time 7  30s  -RS         Exercise 8    Exercise Name 8  standing hip ER/IR AROM on stool  -RS      Cueing 8  Verbal;Demo  -RS      Reps 8  10 each  -RS         Exercise 9    Exercise Name 9  supine pelvic brace  -RS      Cueing 9  Verbal  -RS      Reps 9  10  -RS      Time 9  3s  -RS      Additional Comments  cues to avoid glute activation  -RS         Exercise 10    Exercise Name 10  prone hip extension (small ROM)  -RS       Cueing 10  Verbal  -RS      Reps 10  10  -RS      Additional Comments  cues to avoid lumbar extension  -RS         Exercise 11    Exercise Name 11  sidelying clamshell in rosalba ROM  -RS      Cueing 11  Verbal  -RS      Reps 11  20  -RS      Additional Comments  RTB  -RS         Exercise 12    Exercise Name 12  seated pelvic brace  -RS      Cueing 12  Verbal  -RS      Reps 12  10  -RS      Time 12  5s  -RS         Exercise 13    Exercise Name 13  standing hip ext at 45 degrees  -RS      Cueing 13  Verbal  -RS      Reps 13  15  -RS      Additional Comments  cues to avoid lumbar extension  -RS         Exercise 14    Exercise Name 14  sidelying hip abduction  -RS      Cueing 14  Verbal  -RS      Sets 14  2  -RS      Reps 14  10  -RS      Additional Comments  2#  -RS         Exercise 15    Exercise Name 15  BOSU step up with alt hip flexion  -RS      Cueing 15  Verbal  -RS      Reps 15  5 each side  -RS         Exercise 16    Exercise Name 16  reverse monster walk  -RS      Cueing 16  Verbal;Demo  -RS      Reps 16  2 laps  -RS      Additional Comments  YTB at ankles  -RS         Exercise 17    Exercise Name 17  mini squat on round side of BOSU  -RS      Cueing 17  Verbal  -RS      Reps 17  15  -RS      Additional Comments  cues for hips back  -RS        User Key  (r) = Recorded By, (t) = Taken By, (c) = Cosigned By    Initials Name Provider Type    RS Marilyn Can, PT Physical Therapist                       PT OP Goals     Row Name 07/02/19 1600          PT Short Term Goals    STG Date to Achieve  07/04/19  -RS     STG 1  Pt will report pain rated 2/10 at worst in order to demonstrate ability to return to normalized ADLs and functional activities.  -RS     STG 1 Progress  Progressing  -RS     STG 1 Progress Comments  rates pain 3/10 at highest  -RS     STG 2  Pt will be independent with initial HEP for symptom management.  -RS     STG 2 Progress  Progressing  -RS        Long Term Goals    LTG Date to Achieve   07/25/19  -RS     LTG 1  Pt will be independent and compliant with advanced HEP for long term management of symptoms and prevention of future occurrence.   -RS     LTG 1 Progress  Ongoing  -RS     LTG 2  Pt will report 50% improvement in symptoms with coughing/sneezing in order to return to PLOF.   -RS     LTG 2 Progress  Ongoing  -RS     LTG 3  Pt will be able to return to all yard tasks including cutting the grass without reports of next day soreness.  -RS     LTG 3 Progress  Ongoing  -RS       User Key  (r) = Recorded By, (t) = Taken By, (c) = Cosigned By    Initials Name Provider Type    Marilyn Pack PT Physical Therapist          Therapy Education  Education Details: POC, goals, ex rationale  Program: Reinforced  How Provided: Verbal  Provided to: Patient  Level of Understanding: Verbalized              Time Calculation:   Start Time: 1605  Stop Time: 1652  Time Calculation (min): 47 min  Therapy Charges for Today     Code Description Service Date Service Provider Modifiers Qty    85387672965 HC PT THER PROC EA 15 MIN 7/2/2019 Marilyn Can, PT GP 3                    Marilyn Can PT  7/2/2019

## 2019-07-09 ENCOUNTER — HOSPITAL ENCOUNTER (OUTPATIENT)
Dept: PHYSICAL THERAPY | Facility: HOSPITAL | Age: 59
Setting detail: THERAPIES SERIES
Discharge: HOME | End: 2019-07-09

## 2019-07-09 DIAGNOSIS — M25.552 LEFT HIP PAIN: Primary | ICD-10-CM

## 2019-07-09 PROCEDURE — 97110 THERAPEUTIC EXERCISES: CPT

## 2019-07-09 NOTE — THERAPY TREATMENT NOTE
Outpatient Physical Therapy Ortho Treatment Note  [facility]     Patient Name: Harry Ortega  : 1960  MRN: [id number]  Today's Date: 2019      Visit Date: 2019    Visit Dx:    ICD-10-CM ICD-9-CM   1. Left hip pain M25.552 719.45       Patient Active Problem List   Diagnosis   • Hypertensive crisis   • Hypertension   • CKD (chronic kidney disease) requiring chronic dialysis (CMS/HCC)   • Pulmonary nodule   • Anemia in CKD (chronic kidney disease)   • Slow transit constipation   • Iron deficiency anemia        Past Medical History:   Diagnosis Date   • CKD (chronic kidney disease) requiring chronic dialysis (CMS/HCC)    • Dialysis patient (CMS/[facility])    • Hypertension         Past Surgical History:   Procedure Laterality Date   • ARTERIOVENOUS FISTULA/SHUNT SURGERY W/ HEMODIALYSIS CATHETER INSERTION N/A 2017    Procedure: ARTERIOVENOUS FISTULA LEFT ARM AND PALINDROME PLACEMENT;  Surgeon: Kar Morales MD;  Location: [facility] MAIN OR;  Service:    • COLONOSCOPY N/A 10/12/2017    Procedure: COLONOSCOPY TO CECUM AND TERMINAL ILEUM;  Surgeon: Doni Terrazas MD;  Location: [facility] ENDOSCOPY;  Service:    • ENDOSCOPY N/A 10/12/2017    Procedure: ESOPHAGOGASTRODUODENOSCOPY WITH BIOPSIES;  Surgeon: Doni Terrazas MD;  Location: [facility] ENDOSCOPY;  Service:    • JOINT REPLACEMENT Left     KNEE   • KNEE SURGERY Left                        PT Assessment/Plan     Row Name 19 1600          PT Assessment    Assessment Comments  Pt with good tolerance for therex progression to include increased dynamic single limb activities well without reports of increased pain. He reports increased soreness after mowing grass over uneven ground. Reports no pain at end of session. Told to increase hip flexor and adductor stretch performance.  -RS        PT Plan    PT Plan Comments  Assess tolerance for therex progression, consider mini squat with ER step back  -RS       User Key  (r) = Recorded By, (t) =  Taken By, (c) = Cosigned By    Initials Name Provider Type    RS Marilyn Can, PT Physical Therapist            Exercises     Row Name 07/09/19 1600             Subjective Comments    Subjective Comments  I think I aggravated my pain mowing this weekend, it isn't as bad as it was but still sore.  -RS         Subjective Pain    Able to rate subjective pain?  yes  -RS      Pre-Treatment Pain Level  3  -RS         Total Minutes    69424 - PT Therapeutic Exercise Minutes  45  -RS         Exercise 1    Exercise Name 1  TA hold  -RS      Cueing 1  Demo  -RS      Reps 1  10  -RS      Time 1  5 sec  -RS         Exercise 2    Exercise Name 2  PPT  -RS      Cueing 2  Verbal  -RS      Reps 2  10  -RS      Time 2  5 sec  -RS      Additional Comments  plus leg straight out  -RS         Exercise 3    Exercise Name 3  bridge with ball  -RS      Cueing 3  Verbal  -RS      Reps 3  15  -RS      Time 3  3s  -RS      Additional Comments  BTB  -RS         Exercise 4    Exercise Name 4  HL hip adduction  -RS      Cueing 4  Verbal  -RS      Reps 4  10  -RS      Time 4  5 sec  -RS         Exercise 5    Exercise Name 5  --  -RS      Cueing 5  --  -RS      Reps 5  --  -RS      Time 5  --  -RS         Exercise 6    Exercise Name 6  standing hip flexor stretch stair  -RS      Cueing 6  Verbal  -RS      Time 6  1 min  -RS         Exercise 7    Exercise Name 7  seated adductor stretch  -RS      Cueing 7  Verbal  -RS      Reps 7  2  -RS      Time 7  30s  -RS         Exercise 8    Exercise Name 8  standing hip ER/IR AROM on stool  -RS      Cueing 8  Verbal;Demo  -RS      Reps 8  10 each  -RS         Exercise 9    Exercise Name 9  lateral step over BOSU  -RS      Cueing 9  Demo  -RS      Reps 9  10ea  -RS      Time 9  --  -RS      Additional Comments  --  -RS         Exercise 10    Exercise Name 10  prone hip extension (small ROM)  -RS      Cueing 10  Verbal  -RS      Reps 10  10  -RS      Additional Comments  cues to avoid lumbar extension   -RS         Exercise 11    Exercise Name 11  sidelying clamshell in rosalba ROM  -RS      Cueing 11  Verbal  -RS      Reps 11  20  -RS      Additional Comments  RTB  -RS         Exercise 12    Exercise Name 12  seated pelvic brace  -RS      Cueing 12  Verbal  -RS      Reps 12  10  -RS      Time 12  5s  -RS         Exercise 13    Exercise Name 13  standing hip ext at 45 degrees  -RS      Cueing 13  Verbal  -RS      Reps 13  15  -RS      Additional Comments  cues to avoid lumbar extension  -RS         Exercise 14    Exercise Name 14  sidelying hip abduction  -RS      Cueing 14  Verbal  -RS      Sets 14  2  -RS      Reps 14  10  -RS      Additional Comments  2#  -RS         Exercise 15    Exercise Name 15  BOSU step up with alt hip flexion  -RS      Cueing 15  Verbal  -RS      Reps 15  5 each side  -RS         Exercise 16    Exercise Name 16  reverse monster walk  -RS      Cueing 16  Verbal;Demo  -RS      Reps 16  2 laps  -RS      Additional Comments  RTB ankles  -RS         Exercise 17    Exercise Name 17  mini squat on round side of BOSU  -RS      Cueing 17  Verbal  -RS      Reps 17  15  -RS      Additional Comments  cues for hips back  -RS         Exercise 18    Exercise Name 18  reverse clamshell  -RS      Cueing 18  Verbal  -RS      Reps 18  20 each  -RS         Exercise 19    Exercise Name 19  y balance in front of mirror  -RS      Cueing 19  Verbal;Demo  -RS      Reps 19  5 each way  -RS        User Key  (r) = Recorded By, (t) = Taken By, (c) = Cosigned By    Initials Name Provider Type    RS Marilyn Can, PT Physical Therapist                       PT OP Goals     Row Name 07/09/19 1600          PT Short Term Goals    STG Date to Achieve  07/04/19  -RS     STG 1  Pt will report pain rated 2/10 at worst in order to demonstrate ability to return to normalized ADLs and functional activities.  -RS     STG 1 Progress  Progressing 3/10 at worst  -RS     STG 2  Pt will be independent with initial HEP for symptom  management.  -RS     STG 2 Progress  Progressing  -RS        Long Term Goals    LTG Date to Achieve  07/25/19  -RS     LTG 1  Pt will be independent and compliant with advanced HEP for long term management of symptoms and prevention of future occurrence.   -RS     LTG 1 Progress  Ongoing  -RS     LTG 2  Pt will report 50% improvement in symptoms with coughing/sneezing in order to return to PLOF.   -RS     LTG 2 Progress  Progressing  -RS     LTG 3  Pt will be able to return to all yard tasks including cutting the grass without reports of next day soreness.  -RS     LTG 3 Progress  Ongoing  -RS       User Key  (r) = Recorded By, (t) = Taken By, (c) = Cosigned By    Initials Name Provider Type    RS Marilyn Can PT Physical Therapist          Therapy Education  Education Details: HEP for pain management, progress, exercise rationale  Given: HEP  Program: Reinforced  How Provided: Verbal  Provided to: Patient  Level of Understanding: Verbalized              Time Calculation:   Start Time: 1613  Stop Time: 1700  Time Calculation (min): 47 min  Therapy Charges for Today     Code Description Service Date Service Provider Modifiers Qty    71485142334 HC PT THER PROC EA 15 MIN 7/9/2019 Marilyn Can, PT GP 3                    Marilyn Can PT  7/9/2019

## 2019-07-11 ENCOUNTER — HOSPITAL ENCOUNTER (OUTPATIENT)
Dept: PHYSICAL THERAPY | Facility: HOSPITAL | Age: 59
Setting detail: THERAPIES SERIES
Discharge: HOME | End: 2019-07-11

## 2019-07-11 DIAGNOSIS — M25.552 LEFT HIP PAIN: Primary | ICD-10-CM

## 2019-07-11 PROCEDURE — 97112 NEUROMUSCULAR REEDUCATION: CPT

## 2019-07-11 PROCEDURE — 97110 THERAPEUTIC EXERCISES: CPT

## 2019-07-11 NOTE — THERAPY TREATMENT NOTE
Outpatient Physical Therapy Ortho Treatment Note  [facility]     Patient Name: Harry Ortega  : 1960  MRN: [id number]  Today's Date: 2019      Visit Date: 2019    Visit Dx:    ICD-10-CM ICD-9-CM   1. Left hip pain M25.552 719.45       Patient Active Problem List   Diagnosis   • Hypertensive crisis   • Hypertension   • CKD (chronic kidney disease) requiring chronic dialysis (CMS/HCC)   • Pulmonary nodule   • Anemia in CKD (chronic kidney disease)   • Slow transit constipation   • Iron deficiency anemia        Past Medical History:   Diagnosis Date   • CKD (chronic kidney disease) requiring chronic dialysis (CMS/HCC)    • Dialysis patient (CMS/[facility])    • Hypertension         Past Surgical History:   Procedure Laterality Date   • ARTERIOVENOUS FISTULA/SHUNT SURGERY W/ HEMODIALYSIS CATHETER INSERTION N/A 2017    Procedure: ARTERIOVENOUS FISTULA LEFT ARM AND PALINDROME PLACEMENT;  Surgeon: Kar Morales MD;  Location: [facility] MAIN OR;  Service:    • COLONOSCOPY N/A 10/12/2017    Procedure: COLONOSCOPY TO CECUM AND TERMINAL ILEUM;  Surgeon: Doni Terrazas MD;  Location: [facility] ENDOSCOPY;  Service:    • ENDOSCOPY N/A 10/12/2017    Procedure: ESOPHAGOGASTRODUODENOSCOPY WITH BIOPSIES;  Surgeon: Doni Terrazas MD;  Location: [facility] ENDOSCOPY;  Service:    • JOINT REPLACEMENT Left     KNEE   • KNEE SURGERY Left                        PT Assessment/Plan     Row Name 19 1439          PT Assessment    Assessment Comments  The pt reports little to no pain this date with reports of improved function overall. Progressed dynamic balance activities and lunging with good tolerance no reports of increased pain. He is progressing excellently toward stated goals.  -RS        PT Plan    PT Plan Comments  Progress note next visit, assess tolerance for therex progression, plan to decrease frequency of POC prior to DC.  -RS       User Key  (r) = Recorded By, (t) = Taken By, (c) = Cosigned By     Initials Name Provider Type    RS Marilyn Can, PT Physical Therapist            Exercises     Row Name 07/11/19 1300             Subjective Comments    Subjective Comments  I have been feeling really good.  -RS         Subjective Pain    Able to rate subjective pain?  yes  -RS      Pre-Treatment Pain Level  0  -RS         Total Minutes    23644 - PT Therapeutic Exercise Minutes  30  -RS      18890 -  PT Neuromuscular Reeducation Minutes  15  -RS         Exercise 1    Exercise Name 1  --  -RS      Cueing 1  --  -RS      Reps 1  --  -RS      Time 1  --  -RS         Exercise 2    Exercise Name 2  PPT  -RS      Cueing 2  Verbal  -RS      Reps 2  10  -RS      Time 2  5 sec  -RS      Additional Comments  plus leg straight out  -RS         Exercise 3    Exercise Name 3  bridge with band  -RS      Cueing 3  Verbal  -RS      Reps 3  15  -RS      Time 3  3s  -RS      Additional Comments  BTB  -RS         Exercise 4    Exercise Name 4  HL hip adduction  -RS      Cueing 4  Verbal  -RS      Reps 4  10  -RS      Time 4  5 sec  -RS         Exercise 6    Exercise Name 6  standing hip flexor stretch stair  -RS      Cueing 6  Verbal  -RS      Time 6  1 min  -RS         Exercise 7    Exercise Name 7  seated adductor stretch  -RS      Cueing 7  Verbal  -RS      Reps 7  2  -RS      Time 7  30s  -RS         Exercise 8    Exercise Name 8  standing hip ER/IR AROM on stool  -RS      Cueing 8  Verbal;Demo  -RS      Reps 8  10 each  -RS         Exercise 9    Exercise Name 9  lateral lunge on BOSU  -RS      Cueing 9  Demo  -RS      Reps 9  10ea  -RS         Exercise 10    Exercise Name 10  --  -RS      Cueing 10  --  -RS      Reps 10  --  -RS         Exercise 11    Exercise Name 11  sidelying clamshell in rosalba ROM  -RS      Cueing 11  Verbal  -RS      Reps 11  20  -RS      Additional Comments  BTB  -RS         Exercise 12    Exercise Name 12  seated pelvic brace  -RS      Cueing 12  Verbal  -RS      Reps 12  10  -RS      Time 12  5s  -RS          Exercise 13    Exercise Name 13  --  -RS      Cueing 13  --  -RS      Reps 13  --  -RS         Exercise 14    Exercise Name 14  --  -RS      Cueing 14  --  -RS      Sets 14  --  -RS      Reps 14  --  -RS         Exercise 15    Exercise Name 15  BOSU lunge  -RS      Cueing 15  Verbal  -RS      Reps 15  10 each  -RS         Exercise 16    Exercise Name 16  reverse monster walk  -RS      Cueing 16  Verbal;Demo  -RS      Reps 16  2 laps  -RS      Additional Comments  RTB ankles  -RS         Exercise 17    Exercise Name 17  mini squat on round side of BOSU  -RS      Cueing 17  Verbal  -RS      Reps 17  15  -RS      Additional Comments  cues for hips back  -RS         Exercise 18    Exercise Name 18  reverse clamshell  -RS      Cueing 18  Verbal  -RS      Reps 18  20 each  -RS         Exercise 19    Exercise Name 19  y balance in front of mirror  -RS      Cueing 19  Verbal;Demo  -RS      Reps 19  5 each way  -RS         Exercise 20    Exercise Name 20  rebounder on LLE with R kickstand- 3 way  -RS      Cueing 20  Verbal  -RS      Reps 20  10 each  -RS      Additional Comments  L1 ball  -RS        User Key  (r) = Recorded By, (t) = Taken By, (c) = Cosigned By    Initials Name Provider Type    RS Marilyn Can, YONAS Physical Therapist                                          Time Calculation:   Start Time: 1345  Stop Time: 1430  Time Calculation (min): 45 min  Therapy Charges for Today     Code Description Service Date Service Provider Modifiers Qty    43247523666 HC PT NEUROMUSC RE EDUCATION EA 15 MIN 7/11/2019 Marilyn Can, PT GP 1    [id number] HC PT THER PROC EA 15 MIN 7/11/2019 Marilyn Can, PT GP 2                    Marilyn Can PT  7/11/2019

## 2019-07-16 ENCOUNTER — TELEPHONE (OUTPATIENT)
Dept: PHYSICAL THERAPY | Facility: HOSPITAL | Age: 59
End: 2019-07-16

## 2019-07-16 ENCOUNTER — APPOINTMENT (OUTPATIENT)
Dept: PHYSICAL THERAPY | Facility: HOSPITAL | Age: 59
End: 2019-07-16

## 2019-07-18 ENCOUNTER — HOSPITAL ENCOUNTER (OUTPATIENT)
Dept: PHYSICAL THERAPY | Facility: HOSPITAL | Age: 59
Setting detail: THERAPIES SERIES
Discharge: HOME | End: 2019-07-18

## 2019-07-18 DIAGNOSIS — M25.552 LEFT HIP PAIN: Primary | ICD-10-CM

## 2019-07-18 PROCEDURE — 97110 THERAPEUTIC EXERCISES: CPT

## 2019-07-18 PROCEDURE — 97112 NEUROMUSCULAR REEDUCATION: CPT

## 2019-07-18 NOTE — THERAPY PROGRESS REPORT/RE-CERT
Outpatient Physical Therapy Ortho Progress Note  Psychiatric     Patient Name: Harry Ortega  : 1960  MRN: [id number]  Today's Date: 2019      Visit Date: 2019    Visit Dx:    ICD-10-CM ICD-9-CM   1. Left hip pain M25.552 719.45       Patient Active Problem List   Diagnosis   • Hypertensive crisis   • Hypertension   • CKD (chronic kidney disease) requiring chronic dialysis (CMS/HCC)   • Pulmonary nodule   • Anemia in CKD (chronic kidney disease)   • Slow transit constipation   • Iron deficiency anemia        Past Medical History:   Diagnosis Date   • CKD (chronic kidney disease) requiring chronic dialysis (CMS/HCC)    • Dialysis patient (CMS/HCC)    • Hypertension         Past Surgical History:   Procedure Laterality Date   • ARTERIOVENOUS FISTULA/SHUNT SURGERY W/ HEMODIALYSIS CATHETER INSERTION N/A 2017    Procedure: ARTERIOVENOUS FISTULA LEFT ARM AND PALINDROME PLACEMENT;  Surgeon: Kar Morales MD;  Location: [facility] MAIN OR;  Service:    • COLONOSCOPY N/A 10/12/2017    Procedure: COLONOSCOPY TO CECUM AND TERMINAL ILEUM;  Surgeon: Doni Terrazas MD;  Location: [facility] ENDOSCOPY;  Service:    • ENDOSCOPY N/A 10/12/2017    Procedure: ESOPHAGOGASTRODUODENOSCOPY WITH BIOPSIES;  Surgeon: Doni Terrazas MD;  Location: [facility] ENDOSCOPY;  Service:    • JOINT REPLACEMENT Left     KNEE   • KNEE SURGERY Left                        PT Assessment/Plan     Row Name 19 1243          PT Assessment    Functional Limitations  Performance in leisure activities;Performance in work activities  -RS     Impairments  Endurance;Pain;Muscle strength  -RS     Assessment Comments  The pt has met short term goal regarding initial HEP and demonstrates excellent progress toward STG regarding decreased pain, rated 3/10 at highest. He has also met long term goal regarding improvement with cough/sneeze with no reports of pain. He demonstrates good copliance with and understanding of HEP and reports  return to greater than80% of premorbid function. He continues to be a good candidate for skilled PT focused onrn emaining limitations to facilicate return to PLOF.  -RS     Please refer to paper survey for additional self-reported information  Yes  -RS     Rehab Potential  Excellent  -RS     Patient/caregiver participated in establishment of treatment plan and goals  Yes  -RS     Patient would benefit from skilled therapy intervention  Yes  -RS        PT Plan    PT Frequency  1x/week  -RS     Predicted Duration of Therapy Intervention (Therapy Eval)  4 weeks  -RS     PT Plan Comments  Assess tolerance for decreased PT POC and MD follow up, continue to progress as tolerated  -RS       User Key  (r) = Recorded By, (t) = Taken By, (c) = Cosigned By    Initials Name Provider Type    RS Marilyn Can, PT Physical Therapist            Exercises     Row Name 07/18/19 1200             Subjective Comments    Subjective Comments  I was really sore in my hips after last time but it just felt like I did a new workout.  -RS         Subjective Pain    Able to rate subjective pain?  yes  -RS      Pre-Treatment Pain Level  0  -RS         Total Minutes    00705 - PT Therapeutic Exercise Minutes  30  -RS      46337 -  PT Neuromuscular Reeducation Minutes  15  -RS         Exercise 2    Exercise Name 2  PPT  -RS      Cueing 2  Verbal  -RS      Reps 2  10  -RS      Time 2  5 sec  -RS      Additional Comments  plus leg straight out  -RS         Exercise 3    Exercise Name 3  bridge with band  -RS      Cueing 3  Verbal  -RS      Reps 3  15  -RS      Time 3  3s  -RS      Additional Comments  BTB  -RS         Exercise 4    Exercise Name 4  --  -RS      Cueing 4  --  -RS      Reps 4  --  -RS      Time 4  --  -RS         Exercise 6    Exercise Name 6  standing hip flexor stretch stair  -RS      Cueing 6  Verbal  -RS      Time 6  1 min  -RS         Exercise 7    Exercise Name 7  seated adductor stretch  -RS      Cueing 7  Verbal  -RS       Reps 7  2  -RS      Time 7  30s  -RS         Exercise 8    Exercise Name 8  standing hip ER/IR AROM on stool  -RS      Cueing 8  Verbal;Demo  -RS      Reps 8  10 each  -RS         Exercise 9    Exercise Name 9  lateral lunge on BOSU  -RS      Cueing 9  Demo  -RS      Reps 9  10ea  -RS         Exercise 11    Exercise Name 11  sidelying clamshell in rosalba ROM  -RS      Cueing 11  Verbal  -RS      Reps 11  20  -RS      Additional Comments  BTB  -RS         Exercise 12    Exercise Name 12  standing pelvic brace weight   -RS      Cueing 12  Verbal  -RS      Reps 12  10  -RS      Time 12  5s  -RS         Exercise 15    Exercise Name 15  BOSU lunge  -RS      Cueing 15  Verbal  -RS      Reps 15  10 each  -RS         Exercise 16    Exercise Name 16  reverse monster walk  -RS      Cueing 16  Verbal;Demo  -RS      Reps 16  2 laps  -RS      Additional Comments  GTB knees  -RS         Exercise 17    Exercise Name 17  mini squat on round side of BOSU  -RS      Cueing 17  Verbal  -RS      Reps 17  15  -RS      Additional Comments  cues for hips back  -RS         Exercise 18    Exercise Name 18  reverse clamshell  -RS      Cueing 18  Verbal  -RS      Reps 18  20 each  -RS         Exercise 19    Exercise Name 19  y balance in front of mirror  -RS      Cueing 19  Verbal;Demo  -RS      Reps 19  5 each way  -RS         Exercise 20    Exercise Name 20  rebounder on LLE with R kickstand- 3 way  -RS      Cueing 20  Verbal  -RS      Sets 20  3  -RS      Reps 20  10 each  -RS      Additional Comments  L1 ball  -RS        User Key  (r) = Recorded By, (t) = Taken By, (c) = Cosigned By    Initials Name Provider Type    RS Marilyn Can, PT Physical Therapist                       PT OP Goals     Row Name 07/18/19 1200          PT Short Term Goals    STG Date to Achieve  07/04/19  -RS     STG 1  Pt will report pain rated 2/10 at worst in order to demonstrate ability to return to normalized ADLs and functional activities.  -RS     STG 1  Progress  Partially Met 3/10 at worst  -RS     STG 1 Progress Comments  3/10 at higest- rated as muscle soreness  -RS     STG 2  Pt will be independent with initial HEP for symptom management.  -RS     STG 2 Progress  Met  -RS        Long Term Goals    LTG Date to Achieve  07/25/19  -RS     LTG 1  Pt will be independent and compliant with advanced HEP for long term management of symptoms and prevention of future occurrence.   -RS     LTG 1 Progress  Progressing  -RS     LTG 2  Pt will report 50% improvement in symptoms with coughing/sneezing in order to return to PLOF.   -RS     LTG 2 Progress  Met  -RS     LTG 2 Progress Comments  pt reports no pain with cough/sneeze  -RS     LTG 3  Pt will be able to return to all yard tasks including cutting the grass without reports of next day soreness.  -RS     LTG 3 Progress  Progressing  -RS     LTG 3 Progress Comments  pt has cut grass with pain for 2-3 min  -RS       User Key  (r) = Recorded By, (t) = Taken By, (c) = Cosigned By    Initials Name Provider Type    RS Marilyn Can, YONAS Physical Therapist               Outcome Measure Options: Lower Extremity Functional Scale (LEFS)(91% ability)         Time Calculation:   Start Time: 1215  Stop Time: 1305  Time Calculation (min): 50 min  Therapy Charges for Today     Code Description Service Date Service Provider Modifiers Qty    59111712737 HC PT NEUROMUSC RE EDUCATION EA 15 MIN 7/18/2019 Marilyn Can, PT GP 1    81815677121 HC PT THER PROC EA 15 MIN 7/18/2019 Marilyn Can, PT GP 2          PT G-Codes  Outcome Measure Options: Lower Extremity Functional Scale (LEFS)(91% ability)         Marilyn Can PT  7/18/2019

## 2019-08-01 ENCOUNTER — DOCUMENTATION (OUTPATIENT)
Dept: PHYSICAL THERAPY | Facility: HOSPITAL | Age: 59
End: 2019-08-01

## 2019-08-01 ENCOUNTER — APPOINTMENT (OUTPATIENT)
Dept: PHYSICAL THERAPY | Facility: HOSPITAL | Age: 59
End: 2019-08-01

## 2019-08-01 ENCOUNTER — TELEPHONE (OUTPATIENT)
Dept: PHYSICAL THERAPY | Facility: HOSPITAL | Age: 59
End: 2019-08-01

## 2019-08-01 DIAGNOSIS — M25.552 LEFT HIP PAIN: Primary | ICD-10-CM

## 2019-08-01 NOTE — THERAPY DISCHARGE NOTE
Outpatient Physical Therapy Discharge Summary         Patient Name: Harry Ortega  : 1960  MRN: [id number]    Today's Date: 2019    Visit Dx:    ICD-10-CM ICD-9-CM   1. Left hip pain M25.552 719.45       PT OP Goals     Row Name 19 1500          PT Short Term Goals    STG Date to Achieve  19  -RS     STG 1  Pt will report pain rated 2/10 at worst in order to demonstrate ability to return to normalized ADLs and functional activities.  -RS     STG 1 Progress  Met 3/10 at worst  -RS     STG 2  Pt will be independent with initial HEP for symptom management.  -RS     STG 2 Progress  Met  -RS        Long Term Goals    LTG Date to Achieve  19  -RS     LTG 1  Pt will be independent and compliant with advanced HEP for long term management of symptoms and prevention of future occurrence.   -RS     LTG 1 Progress  Met  -RS     LTG 2  Pt will report 50% improvement in symptoms with coughing/sneezing in order to return to PLOF.   -RS     LTG 2 Progress  Met  -RS     LTG 3  Pt will be able to return to all yard tasks including cutting the grass without reports of next day soreness.  -RS     LTG 3 Progress  Met  -RS       User Key  (r) = Recorded By, (t) = Taken By, (c) = Cosigned By    Initials Name Provider Type    Marilyn Pack PT Physical Therapist          OP PT Discharge Summary  Date of Discharge: 19  Reason for Discharge: All goals achieved  Outcomes Achieved: Able to achieve all goals within established timeline  Discharge Destination: Home with home program  Discharge Instructions/Additional Comments: Mr. Ortega is discharged this date secondary to meeting all functional goals. He reports he had 2 short instances of pain in the past two weeks which he describes as mild and lasting less than 20 min. He reports HEP decreases his pain and he has been compliant with stretching. He is appropriate for and agreeable to DC this date.       Time Calculation:                     Marilyn Can, PT  8/1/2019

## 2019-08-01 NOTE — TELEPHONE ENCOUNTER
Spoke to pt, was unable to make appointment today due to eye doctor appointment. He believes he is ready to DC. He has been doing well.

## 2019-09-19 ENCOUNTER — OFFICE VISIT (OUTPATIENT)
Dept: INTERNAL MEDICINE | Age: 59
End: 2019-09-19

## 2019-09-19 VITALS
DIASTOLIC BLOOD PRESSURE: 84 MMHG | OXYGEN SATURATION: 97 % | HEIGHT: 70 IN | BODY MASS INDEX: 23.34 KG/M2 | HEART RATE: 54 BPM | WEIGHT: 163 LBS | SYSTOLIC BLOOD PRESSURE: 170 MMHG | TEMPERATURE: 98.3 F

## 2019-09-19 DIAGNOSIS — Z99.2 CKD (CHRONIC KIDNEY DISEASE) REQUIRING CHRONIC DIALYSIS (HCC): ICD-10-CM

## 2019-09-19 DIAGNOSIS — K43.9 HERNIA OF ABDOMINAL WALL: ICD-10-CM

## 2019-09-19 DIAGNOSIS — N18.6 CKD (CHRONIC KIDNEY DISEASE) REQUIRING CHRONIC DIALYSIS (HCC): ICD-10-CM

## 2019-09-19 DIAGNOSIS — I10 ESSENTIAL HYPERTENSION: Primary | ICD-10-CM

## 2019-09-19 PROCEDURE — 99214 OFFICE O/P EST MOD 30 MIN: CPT | Performed by: NURSE PRACTITIONER

## 2019-09-19 RX ORDER — FOLIC ACID/VIT B COMPLEX AND C 0.8 MG
1 TABLET ORAL DAILY
Refills: 4 | COMMUNITY
Start: 2019-07-09 | End: 2021-06-03

## 2019-09-19 RX ORDER — BRIMONIDINE TARTRATE 2 MG/ML
SOLUTION/ DROPS OPHTHALMIC
Refills: 4 | COMMUNITY
Start: 2019-09-05 | End: 2019-10-24

## 2019-09-19 NOTE — PROGRESS NOTES
[facility]  ALONZO Mcintosh CHANDU Brown / 58 y.o. / male  09/19/2019      ASSESSMENT & PLAN:    Problem List Items Addressed This Visit        Cardiovascular and Mediastinum    Hypertension - Primary    Relevant Medications    minoxidil (LONITEN) 2.5 MG tablet    metoprolol succinate XL (TOPROL-XL) 100 MG 24 hr tablet       Genitourinary    CKD (chronic kidney disease) requiring chronic dialysis (CMS/[facility])      Other Visit Diagnoses     Hernia of abdominal wall        Relevant Orders    Ambulatory Referral to General Surgery        Orders Placed This Encounter   Procedures   • Ambulatory Referral to General Surgery     No orders of the defined types were placed in this encounter.      Summary/Discussion:  1. Essential hypertension  - Requested patient confirm with nephrologist what his goal BP is, particularly SBP. He tends to run elevated when he comes to our office, however when he is monitored through dialysis every other day, it is stable in 140's during entire treatment. No medication changes at this time. Also requested most recent lab results to be sent to us from dialysis center, fax number provided to patient. Check HTN 6 months.   - Continue metoprolol XL and minoxodil QD.     2. CKD (chronic kidney disease) requiring chronic dialysis (CMS/[facility])  - Managed closely by nephrology. Sees nephrologist every Monday. Dialysis 3 days per week.     3. Hernia of abdominal wall  - Advised patient avoid heavy lifting or strain, use heat and support to area with movement, and Tylenol as needed for pain.   - Ambulatory Referral to General Surgery      Return in about 6 months (around 3/19/2020) for 6 month Follow up with ALONZO Valente.  ____________________________________________________________________    MEDICATIONS  Current Outpatient Medications   Medication Sig Dispense Refill   • AURYXIA 1  MG(Fe) tablet      • B Complex-C-Folic Acid (GENNA-KATTY) tablet Take 1 tablet by mouth  "Daily.  4   • B Complex-C-Folic Acid (RENALPREN PO) Take  by mouth.     • brimonidine (ALPHAGAN) 0.2 % ophthalmic solution INSTILL 1 DROP INTO EACH EYE TWICE DAILY  4   • latanoprost (XALATAN) 0.005 % ophthalmic solution      • metoprolol succinate XL (TOPROL-XL) 100 MG 24 hr tablet Take 100 mg by mouth 2 (Two) Times a Day.     • minoxidil (LONITEN) 2.5 MG tablet Take 2.5 mg by mouth 2 (Two) Times a Day. 60 tablet 1   • pantoprazole (PROTONIX) 40 MG EC tablet Take 1 tablet by mouth Daily. 30 tablet 12   • sildenafil (REVATIO) 20 MG tablet TAKE 2 TO 5 TABLETS BY MOUTH 30 MIN TO 1 HOUR PRIOR TO SEXUAL ACTIVITY  3     No current facility-administered medications for this visit.        VITALS    Visit Vitals  /84   Pulse 54   Temp 98.3 °F (36.8 °C) (Temporal)   Ht 177.8 cm (70\")   Wt 73.9 kg (163 lb)   SpO2 97%   BMI 23.39 kg/m²       BP Readings from Last 3 Encounters:   09/19/19 170/84   05/02/19 164/80   03/07/19 156/94     Wt Readings from Last 3 Encounters:   09/19/19 73.9 kg (163 lb)   05/02/19 72.6 kg (160 lb)   03/07/19 73.2 kg (161 lb 6.4 oz)      Body mass index is 23.39 kg/m².    CC:  Main reason(s) for today's visit: Follow-up for hypertension and hyperlipidemia    HPI:   He is a new patient to me. These are new problems to me. I am providing cross coverage for her PCP, Marichuy Cotto while she is on leave.   Patient here today for six-month chronic medical follow-up and blood pressure check.    Chronic essential hypertension:  Since prior visit: compliant with medication(s), checks blood pressure regularly, denies significant problems with medication(s) and SBP >150 . Currently taking metoprolol and minoxidil. Currently on dialysis 3 days per week,  Reports blood pressures at dialysis have been about 140-150s over 80s, his nephrologist is happy with these numbers (he sees nephrologist every Monday). He is exercising, 5+ miles daily, and is adherent to low sodium diet.  he denies symptoms of chest " pain/pressure, dyspnea, swelling, vision impairment. CVD risk factors include: advanced age (>55 for males, >65 for females), HTN.  Most recent in-office blood pressure readings:   BP Readings from Last 3 Encounters:   09/19/19 170/84   05/02/19 164/80   03/07/19 156/94     CKD:   Currently on dialysis 3 days a week.  Patient is currently on kidney transplant list.  Reports blood pressures at dialysis have been about 140s-150s over 80s, his nephrologist is happy with these numbers.Reports feeling good otherwise, no other acute concerns at this time.  Currently being treated with oral Fe replacement for anemia CKD.     LLQ Hernia: LLQ/pelvic tenderness and protrusion of muscle through abdominal wall. Previously evaluated approximately 4 months ago for similar complaint to lesser degree, CT scan completed and negative, treated as a muscle strain and he completed 6-7 weeks of PT rehab which helped to relieve acute pain. Pain has returned and he now states that he is seeing bump or protrusion through muscle tissue when he is standing or active. He would like to be evaluated at this time for possible hernia repair.     Patient Care Team:  Marichuy Cotto APRN as PCP - General (Internal Medicine)  Allen Rogers MD as Consulting Physician (Pulmonary Disease)  Shyam Naik MD as Consulting Physician (Nephrology)  ____________________________________________________________________      REVIEW OF SYSTEMS    Review of Systems  As noted per HPI  Constitutional neg  Resp neg  CV neg       PHYSICAL EXAMINATION  Constitutional  No distress  Cardiovascular Rate  normal . Rhythm: regular . Heart sounds:  Normal  Pulmonary/Chest: Effort normal and breath sounds normal.    Psychiatric  Alert. Judgment and thought content normal. Mood normal  Physical Exam   Abdominal:       Location of hernia pain felt and protrusion noted by patient, not palpable on physical exam. No discoloration or acute pain.          REVIEWED  DATA:    Labs:   Lab Results   Component Value Date     09/13/2018    K 3.7 09/13/2018    AST 23 09/13/2018    ALT 30 09/13/2018    BUN 30 (H) 09/13/2018    CREATININE 4.23 (H) 09/13/2018    CREATININE 3.98 (H) 09/09/2017    CREATININE 3.55 (H) 08/19/2017    EGFRIFNONA 15 (L) 09/13/2018    EGFRIFAFRI 18 (L) 09/13/2018       Lab Results   Component Value Date    HGBA1C 4.80 09/13/2018    HGBA1C 5.03 07/24/2017    GLUCOSE 119 (H) 09/09/2017    GLUCOSE 131 (H) 08/19/2017    GLUCOSE 124 (H) 07/09/2017       Lab Results   Component Value Date     (H) 09/13/2018    LDL 70 07/24/2017    HDL 60 09/13/2018    HDL 46 07/24/2017    TRIG 83 09/13/2018    TRIG 86 07/24/2017    CHOLHDLRATIO 2.98 09/13/2018    CHOLHDLRATIO 2.89 07/24/2017       Lab Results   Component Value Date    TSH 1.55 09/13/2018    FREET4 1.53 07/24/2017          Lab Results   Component Value Date    WBC 4.96 09/13/2018    HGB 12.4 (L) 09/13/2018    HGB 9.5 (L) 09/09/2017    HGB 7.3 (L) 08/19/2017     09/13/2018       Lab Results   Component Value Date    PROTEIN See below: (A) 07/24/2017    GLUCOSEU Negative 07/24/2017    BLOODU See below: (A) 07/24/2017    NITRITEU Negative 07/24/2017    LEUKOCYTESUR Negative 07/24/2017       Imaging:        Medical Tests:        Summary of old records / correspondence / consultant report:        Request outside records:        ALLERGIES  No Known Allergies     PFSH:     The following portions of the patient's history were reviewed and updated as appropriate: Allergies / Current Medications / Past Medical History / Surgical History / Social History / Family History    PROBLEM LIST   Patient Active Problem List   Diagnosis   • Hypertensive crisis   • Hypertension   • CKD (chronic kidney disease) requiring chronic dialysis (CMS/HCC)   • Pulmonary nodule   • Anemia in CKD (chronic kidney disease)   • Slow transit constipation   • Iron deficiency anemia       PAST MEDICAL HISTORY  Past Medical History:    Diagnosis Date   • CKD (chronic kidney disease) requiring chronic dialysis (CMS/[facility])    • Dialysis patient (CMS/[facility])    • Hypertension        SURGICAL HISTORY  Past Surgical History:   Procedure Laterality Date   • ARTERIOVENOUS FISTULA/SHUNT SURGERY W/ HEMODIALYSIS CATHETER INSERTION N/A 7/6/2017    Procedure: ARTERIOVENOUS FISTULA LEFT ARM AND PALINDROME PLACEMENT;  Surgeon: Kar Morales MD;  Location: [facility] MAIN OR;  Service:    • COLONOSCOPY N/A 10/12/2017    Procedure: COLONOSCOPY TO CECUM AND TERMINAL ILEUM;  Surgeon: Doni Terrazas MD;  Location: [facility] ENDOSCOPY;  Service:    • ENDOSCOPY N/A 10/12/2017    Procedure: ESOPHAGOGASTRODUODENOSCOPY WITH BIOPSIES;  Surgeon: Doni Terrazas MD;  Location: [facility] ENDOSCOPY;  Service:    • JOINT REPLACEMENT Left     KNEE   • KNEE SURGERY Left        SOCIAL HISTORY  Social History     Socioeconomic History   • Marital status:      Spouse name: Not on file   • Number of children: 5   • Years of education: Not on file   • Highest education level: Not on file   Occupational History   • Occupation: [employer]   Tobacco Use   • Smoking status: Former Smoker     Packs/day: 0.25   • Smokeless tobacco: Never Used   Substance and Sexual Activity   • Alcohol use: No   • Drug use: No   • Sexual activity: Yes     Partners: Female       FAMILY HISTORY  Family History   Problem Relation Age of Onset   • Hypertension Mother    • Diabetes Mother    • Hypertension Father    • Diabetes Son          **Dragon Disclaimer:   Much of this encounter note is an electronic transcription/translation of spoken language to printed text. The electronic translation of spoken language may permit erroneous, or at times, nonsensical words or phrases to be inadvertently transcribed. Although I have reviewed the note for such errors, some may still exist.       Template created by Mary Segovia MD

## 2019-10-09 ENCOUNTER — OFFICE VISIT (OUTPATIENT)
Dept: SURGERY | Facility: CLINIC | Age: 59
End: 2019-10-09

## 2019-10-09 VITALS — OXYGEN SATURATION: 98 % | HEIGHT: 70 IN | BODY MASS INDEX: 23.59 KG/M2 | HEART RATE: 54 BPM | WEIGHT: 164.8 LBS

## 2019-10-09 DIAGNOSIS — K40.90 NON-RECURRENT UNILATERAL INGUINAL HERNIA WITHOUT OBSTRUCTION OR GANGRENE: Primary | ICD-10-CM

## 2019-10-09 PROCEDURE — 99203 OFFICE O/P NEW LOW 30 MIN: CPT | Performed by: SURGERY

## 2019-10-09 RX ORDER — CEFAZOLIN SODIUM 2 G/100ML
2 INJECTION, SOLUTION INTRAVENOUS ONCE
Status: CANCELLED | OUTPATIENT
Start: 2019-10-29 | End: 2019-10-09

## 2019-10-10 NOTE — PROGRESS NOTES
Cc: Inguinal hernia    History of presenting illness:   This is a quite a nice 59-year-old gentleman with a history of chronic kidney disease on dialysis who says that around 3 months ago he noticed some pain in the left groin.  He was initially diagnosed with a groin strain and it did seem to improve with rest, but every time he did something strenuous he noticed more discomfort.  Over the last month or so he has felt a bulge associated with this.  It improves when he lays flat.  It is tender and there is some radiation into the testicle.    Past Medical History: Chronic kidney disease on dialysis, hypertension, anemia, chronic constipation    Past Surgical History: Left upper extremity arteriovenous fistula, left knee surgery    Medications: Protonix, Viagra, metoprolol, minoxidil, Zehra-Rosie vitamin, Auryxia    Allergies: None known    Social History: He is a non-smoker.  He quit 20 years ago.  He admits to drinking a couple of beers a couple of times per week.  He is employed in a job which does sometimes require repetitive lifting, but generally not more than 25 or 30 pounds.    Family History: Negative for known colorectal cancer    Review of Systems:  Constitutional: Negative for fever, chills, change in weight  Neck: no swollen glands or dysphagia or odynophagia  Respiratory: negative for SOB, cough, hemoptysis or wheezing  Cardiovascular: negative for chest pain, palpitations or peripheral edema  Gastrointestinal: Negative for nausea or vomiting or abdominal pain, positive for constipation      Physical Exam:   Body mass index 23.7  General: alert and oriented, appropriate, no acute distress  Neck: Supple without lymphadenopathy or thyromegaly, trachea is in the midline  Respiratory: Lungs are clear bilaterally without wheezing, no use of accessory muscles is noted  Cardiovascular: Regular rate and rhythm without murmur, no peripheral edema  Gastrointestinal: Soft, benign, no mass, no ventral hernia, no  guarding or rebound  Genitourinary: Positive confirmed left inguinal hernia, no right inguinal hernia felt.  Normal male external genitalia.    Laboratory data: None    Imaging data: None      Assessment and plan:   -Symptomatic reducible and initial left inguinal hernia  -Chronic kidney disease on hemodialysis Monday Wednesday and Friday  -Options were discussed with the patient.  I have recommended laparoscopic left inguinal hernia repair with mesh placement and use of the da Lisa robot.  The risks including bleeding, pain, recurrence, injury to intra-abdominal structures and other potential problems were discussed with the patient, who agrees to proceed.      Konstantin Espinoza MD, FACS  General, Minimally Invasive and Endoscopic Surgery  [facility]    [address], [address]  [place], [place], [address]  P: [phone]  F: [phone]

## 2019-10-24 ENCOUNTER — APPOINTMENT (OUTPATIENT)
Dept: PREADMISSION TESTING | Facility: HOSPITAL | Age: 59
End: 2019-10-24

## 2019-10-24 VITALS
HEIGHT: 70 IN | BODY MASS INDEX: 23.34 KG/M2 | HEART RATE: 50 BPM | RESPIRATION RATE: 18 BRPM | WEIGHT: 163 LBS | TEMPERATURE: 98.2 F | OXYGEN SATURATION: 99 % | DIASTOLIC BLOOD PRESSURE: 92 MMHG | SYSTOLIC BLOOD PRESSURE: 192 MMHG

## 2019-10-24 DIAGNOSIS — K40.90 NON-RECURRENT UNILATERAL INGUINAL HERNIA WITHOUT OBSTRUCTION OR GANGRENE: ICD-10-CM

## 2019-10-24 LAB
ANION GAP SERPL CALCULATED.3IONS-SCNC: 7.7 MMOL/L (ref 5–15)
BASOPHILS # BLD AUTO: 0.02 10*3/MM3 (ref 0–0.2)
BASOPHILS NFR BLD AUTO: 0.5 % (ref 0–1.5)
BUN BLD-MCNC: 20 MG/DL (ref 6–20)
BUN/CREAT SERPL: 6.5 (ref 7–25)
CALCIUM SPEC-SCNC: 9.1 MG/DL (ref 8.6–10.5)
CHLORIDE SERPL-SCNC: 102 MMOL/L (ref 98–107)
CO2 SERPL-SCNC: 32.3 MMOL/L (ref 22–29)
CREAT BLD-MCNC: 3.1 MG/DL (ref 0.76–1.27)
DEPRECATED RDW RBC AUTO: 50.4 FL (ref 37–54)
EOSINOPHIL # BLD AUTO: 0.11 10*3/MM3 (ref 0–0.4)
EOSINOPHIL NFR BLD AUTO: 2.9 % (ref 0.3–6.2)
ERYTHROCYTE [DISTWIDTH] IN BLOOD BY AUTOMATED COUNT: 14.3 % (ref 12.3–15.4)
GFR SERPL CREATININE-BSD FRML MDRD: 25 ML/MIN/1.73
GLUCOSE BLD-MCNC: 97 MG/DL (ref 65–99)
HCT VFR BLD AUTO: 36.1 % (ref 37.5–51)
HGB BLD-MCNC: 12.2 G/DL (ref 13–17.7)
IMM GRANULOCYTES # BLD AUTO: 0.01 10*3/MM3 (ref 0–0.05)
IMM GRANULOCYTES NFR BLD AUTO: 0.3 % (ref 0–0.5)
LYMPHOCYTES # BLD AUTO: 0.92 10*3/MM3 (ref 0.7–3.1)
LYMPHOCYTES NFR BLD AUTO: 24.1 % (ref 19.6–45.3)
MCH RBC QN AUTO: 32.5 PG (ref 26.6–33)
MCHC RBC AUTO-ENTMCNC: 33.8 G/DL (ref 31.5–35.7)
MCV RBC AUTO: 96.3 FL (ref 79–97)
MONOCYTES # BLD AUTO: 0.34 10*3/MM3 (ref 0.1–0.9)
MONOCYTES NFR BLD AUTO: 8.9 % (ref 5–12)
NEUTROPHILS # BLD AUTO: 2.42 10*3/MM3 (ref 1.7–7)
NEUTROPHILS NFR BLD AUTO: 63.3 % (ref 42.7–76)
NRBC BLD AUTO-RTO: 0 /100 WBC (ref 0–0.2)
PLATELET # BLD AUTO: 167 10*3/MM3 (ref 140–450)
PMV BLD AUTO: 10 FL (ref 6–12)
POTASSIUM BLD-SCNC: 4.2 MMOL/L (ref 3.5–5.2)
RBC # BLD AUTO: 3.75 10*6/MM3 (ref 4.14–5.8)
SODIUM BLD-SCNC: 142 MMOL/L (ref 136–145)
WBC NRBC COR # BLD: 3.82 10*3/MM3 (ref 3.4–10.8)

## 2019-10-24 PROCEDURE — 36415 COLL VENOUS BLD VENIPUNCTURE: CPT

## 2019-10-24 PROCEDURE — 80048 BASIC METABOLIC PNL TOTAL CA: CPT | Performed by: SURGERY

## 2019-10-24 PROCEDURE — 85025 COMPLETE CBC W/AUTO DIFF WBC: CPT | Performed by: SURGERY

## 2019-10-24 RX ORDER — SILDENAFIL CITRATE 20 MG/1
20 TABLET ORAL DAILY PRN
COMMUNITY
End: 2023-02-16 | Stop reason: ALTCHOICE

## 2019-10-24 RX ORDER — BRIMONIDINE TARTRATE 2 MG/ML
1 SOLUTION/ DROPS OPHTHALMIC 2 TIMES DAILY
COMMUNITY
End: 2021-06-03

## 2019-10-24 NOTE — DISCHARGE INSTRUCTIONS
Take the following medications the morning of surgery with a small sip of water:  METOPROLOL,MINOXIDIL, AND PANTOPRAZOLE  MAY USE EYE DROPS      General Instructions: CLEAR LIQUIDS UNTIL 7:00 AM MORNING OF SURGERY  • Do not eat solid food after midnight the night before surgery.  • You may drink clear liquids day of surgery but must stop at least one hour before your hospital arrival time.  • It is beneficial for you to have a clear drink that contains carbohydrates the day of surgery.  We suggest a 12 to 20 ounce bottle of Gatorade or Powerade for non-diabetic patients or a 12 to 20 ounce bottle of G2 or Powerade Zero for diabetic patients. (Pediatric patients, are not advised to drink a 12 to 20 ounce carbohydrate drink)    Clear liquids are liquids you can see through.  Nothing red in color.     Plain water                               Sports drinks  Sodas                                   Gelatin (Jell-O)  Fruit juices without pulp such as white grape juice and apple juice  Popsicles that contain no fruit or yogurt  Tea or coffee (no cream or milk added)  Gatorade / Powerade  G2 / Powerade Zero    • Infants may have breast milk up to four hours before surgery.  • Infants drinking formula may drink formula up to six hours before surgery.   • Patients who avoid smoking, chewing tobacco and alcohol for 4 weeks prior to surgery have a reduced risk of post-operative complications.  Quit smoking as many days before surgery as you can.  • Do not smoke, use chewing tobacco or drink alcohol the day of surgery.   • If applicable bring your C-PAP/ BI-PAP machine.  • Bring any papers given to you in the doctor’s office.  • Wear clean comfortable clothes.  • Do not wear contact lenses, false eyelashes or make-up.  Bring a case for your glasses.   • Bring crutches or walker if applicable.  • Remove all piercings.  Leave jewelry and any other valuables at home.  • Hair extensions with metal clips must be removed prior to  surgery.  • The Pre-Admission Testing nurse will instruct you to bring medications if unable to obtain an accurate list in Pre-Admission Testing.        If you were given a blood bank ID arm band remember to bring it with you the day of surgery.    Preventing a Surgical Site Infection:  • For 2 to 3 days before surgery, avoid shaving with a razor because the razor can irritate skin and make it easier to develop an infection.    • Any areas of open skin can increase the risk of a post-operative wound infection by allowing bacteria to enter and travel throughout the body.  Notify your surgeon if you have any skin wounds / rashes even if it is not near the expected surgical site.  The area will need assessed to determine if surgery should be delayed until it is healed.  • The night prior to surgery sleep in a clean bed with clean clothing.  Do not allow pets to sleep with you.  • Shower on the morning of surgery using a fresh bar of anti-bacterial soap (such as Dial) and clean washcloth.  Dry with a clean towel and dress in clean clothing.  • Ask your surgeon if you will be receiving antibiotics prior to surgery.  • Make sure you, your family, and all healthcare providers clean their hands with soap and water or an alcohol based hand  before caring for you or your wound.    Day of surgery: 10/29/2019 ARRIVAL TIME 8:00 AM  Your arrival time is approximately two hours before your scheduled surgery time.  Upon arrival, a Pre-op nurse and Anesthesiologist will review your health history, obtain vital signs, and answer questions you may have.  The only belongings needed at this time will be a list of your home medications and if applicable your C-PAP/BI-PAP machine.  If you are staying overnight your family can leave the rest of your belongings in the car and bring them to your room later.  A Pre-op nurse will start an IV and you may receive medication in preparation for surgery, including something to help you  relax.  Your family will be able to see you in the Pre-op area.  Two visitors at a time will be allowed in the Pre-op room.  While you are in surgery your family should notify the waiting room  if they leave the waiting room area and provide a contact phone number.    Please be aware that surgery does come with discomfort.  We want to make every effort to control your discomfort so please discuss any uncontrolled symptoms with your nurse.   Your doctor will most likely have prescribed pain medications.      If you are going home after surgery you will receive individualized written care instructions before being discharged.  A responsible adult must drive you to and from the hospital on the day of your surgery and stay with you for 24 hours.    If you are staying overnight following surgery, you will be transported to your hospital room following the recovery period.  [facility] has all private rooms.    You have received a list of surgical assistants for your reference.  If you have any questions please call Pre-Admission Testing at [phone].  Deductibles and co-payments are collected on the day of service. Please be prepared to pay the required co-pay, deductible or deposit on the day of service as defined by your plan.  2% CHLORAHEXIDINE GLUCONATE* CLOTH  Preparing or “prepping” skin before surgery can reduce the risk of infection at the surgical site. To make the process easier, [facility] has chosen disposable cloths moistened with a rinse-free, 2% Chlorhexidine Gluconate (CHG) antiseptic solution. The steps below outline the prepping process and should be carefully followed.        Use the prep cloth on the area that is circled in the diagram             Directions Night before Surgery  1) Shower using a fresh bar of anti-bacterial soap (such as Dial) and clean washcloth.  Use a clean towel to completely dry your skin.  2) Do not use any lotions, oils or creams on your  skin.  3) Open the package and remove 1 cloth, wipe your skin for 30 seconds in a circular motion.  Allow to dry for 3 minutes.  4) Repeat #3 with second cloth.  5) Do not touch your eyes, ears, or mouth with the prep cloth.  6) Allow the wet prep solution to air dry.  7) Discard the prep cloth and wash your hands with soap and water.   8) Dress in clean bed clothes and sleep on fresh clean bed sheets.   9) You may experience some temporary itching after the prep.    Directions Day of Surgery  1) Repeat steps 1,2,3,4,5,6,7, and 9.   2) Dress in clean clothes before coming to the hospital.

## 2019-10-29 ENCOUNTER — HOSPITAL ENCOUNTER (OUTPATIENT)
Facility: HOSPITAL | Age: 59
Setting detail: HOSPITAL OUTPATIENT SURGERY
Discharge: HOME | End: 2019-10-29
Attending: SURGERY | Admitting: SURGERY

## 2019-10-29 ENCOUNTER — ANESTHESIA (OUTPATIENT)
Dept: PERIOP | Facility: HOSPITAL | Age: 59
End: 2019-10-29

## 2019-10-29 ENCOUNTER — ANESTHESIA EVENT (OUTPATIENT)
Dept: PERIOP | Facility: HOSPITAL | Age: 59
End: 2019-10-29

## 2019-10-29 VITALS
HEIGHT: 70 IN | HEART RATE: 62 BPM | OXYGEN SATURATION: 96 % | WEIGHT: 158.9 LBS | DIASTOLIC BLOOD PRESSURE: 89 MMHG | SYSTOLIC BLOOD PRESSURE: 166 MMHG | RESPIRATION RATE: 16 BRPM | TEMPERATURE: 97.8 F | BODY MASS INDEX: 22.75 KG/M2

## 2019-10-29 DIAGNOSIS — K40.90 NON-RECURRENT UNILATERAL INGUINAL HERNIA WITHOUT OBSTRUCTION OR GANGRENE: ICD-10-CM

## 2019-10-29 LAB
ANION GAP SERPL CALCULATED.3IONS-SCNC: 9.8 MMOL/L (ref 5–15)
BUN BLD-MCNC: 22 MG/DL (ref 6–20)
BUN/CREAT SERPL: 6.8 (ref 7–25)
CALCIUM SPEC-SCNC: 8.8 MG/DL (ref 8.6–10.5)
CHLORIDE SERPL-SCNC: 99 MMOL/L (ref 98–107)
CO2 SERPL-SCNC: 33.2 MMOL/L (ref 22–29)
CREAT BLD-MCNC: 3.22 MG/DL (ref 0.76–1.27)
GFR SERPL CREATININE-BSD FRML MDRD: 24 ML/MIN/1.73
GLUCOSE BLD-MCNC: 114 MG/DL (ref 65–99)
POTASSIUM BLD-SCNC: 3.6 MMOL/L (ref 3.5–5.2)
SODIUM BLD-SCNC: 142 MMOL/L (ref 136–145)

## 2019-10-29 PROCEDURE — C1781 MESH (IMPLANTABLE): HCPCS | Performed by: SURGERY

## 2019-10-29 PROCEDURE — 25010000003 CEFAZOLIN IN DEXTROSE 2-4 GM/100ML-% SOLUTION: Performed by: SURGERY

## 2019-10-29 PROCEDURE — 25010000002 HYDRALAZINE PER 20 MG: Performed by: NURSE ANESTHETIST, CERTIFIED REGISTERED

## 2019-10-29 PROCEDURE — 25010000002 FENTANYL CITRATE (PF) 100 MCG/2ML SOLUTION: Performed by: NURSE ANESTHETIST, CERTIFIED REGISTERED

## 2019-10-29 PROCEDURE — 49650 LAP ING HERNIA REPAIR INIT: CPT | Performed by: SURGERY

## 2019-10-29 PROCEDURE — 25010000002 PROPOFOL 10 MG/ML EMULSION: Performed by: NURSE ANESTHETIST, CERTIFIED REGISTERED

## 2019-10-29 PROCEDURE — 25010000002 NEOSTIGMINE PER 0.5 MG: Performed by: NURSE ANESTHETIST, CERTIFIED REGISTERED

## 2019-10-29 PROCEDURE — S2900 ROBOTIC SURGICAL SYSTEM: HCPCS | Performed by: SURGERY

## 2019-10-29 PROCEDURE — 25010000002 MIDAZOLAM PER 1 MG: Performed by: ANESTHESIOLOGY

## 2019-10-29 PROCEDURE — 80048 BASIC METABOLIC PNL TOTAL CA: CPT | Performed by: SURGERY

## 2019-10-29 PROCEDURE — 25010000002 ONDANSETRON PER 1 MG: Performed by: NURSE ANESTHETIST, CERTIFIED REGISTERED

## 2019-10-29 DEVICE — BARD 3DMAX MESH LEFT LARGE
Type: IMPLANTABLE DEVICE | Site: PELVIS | Status: FUNCTIONAL
Brand: BARD 3DMAX MESH

## 2019-10-29 RX ORDER — SODIUM CHLORIDE 9 MG/ML
INJECTION, SOLUTION INTRAVENOUS AS NEEDED
Status: DISCONTINUED | OUTPATIENT
Start: 2019-10-29 | End: 2019-10-29 | Stop reason: HOSPADM

## 2019-10-29 RX ORDER — PROPOFOL 10 MG/ML
VIAL (ML) INTRAVENOUS AS NEEDED
Status: DISCONTINUED | OUTPATIENT
Start: 2019-10-29 | End: 2019-10-29 | Stop reason: SURG

## 2019-10-29 RX ORDER — FLUMAZENIL 0.1 MG/ML
0.2 INJECTION INTRAVENOUS AS NEEDED
Status: DISCONTINUED | OUTPATIENT
Start: 2019-10-29 | End: 2019-10-29 | Stop reason: HOSPADM

## 2019-10-29 RX ORDER — PROMETHAZINE HYDROCHLORIDE 25 MG/ML
12.5 INJECTION, SOLUTION INTRAMUSCULAR; INTRAVENOUS ONCE AS NEEDED
Status: DISCONTINUED | OUTPATIENT
Start: 2019-10-29 | End: 2019-10-29 | Stop reason: HOSPADM

## 2019-10-29 RX ORDER — SODIUM CHLORIDE 0.9 % (FLUSH) 0.9 %
3-10 SYRINGE (ML) INJECTION AS NEEDED
Status: DISCONTINUED | OUTPATIENT
Start: 2019-10-29 | End: 2019-10-29 | Stop reason: HOSPADM

## 2019-10-29 RX ORDER — FENTANYL CITRATE 50 UG/ML
50 INJECTION, SOLUTION INTRAMUSCULAR; INTRAVENOUS
Status: DISCONTINUED | OUTPATIENT
Start: 2019-10-29 | End: 2019-10-29 | Stop reason: HOSPADM

## 2019-10-29 RX ORDER — GLYCOPYRROLATE 0.2 MG/ML
INJECTION INTRAMUSCULAR; INTRAVENOUS AS NEEDED
Status: DISCONTINUED | OUTPATIENT
Start: 2019-10-29 | End: 2019-10-29 | Stop reason: SURG

## 2019-10-29 RX ORDER — LIDOCAINE HYDROCHLORIDE 10 MG/ML
0.5 INJECTION, SOLUTION EPIDURAL; INFILTRATION; INTRACAUDAL; PERINEURAL ONCE AS NEEDED
Status: DISCONTINUED | OUTPATIENT
Start: 2019-10-29 | End: 2019-10-29 | Stop reason: HOSPADM

## 2019-10-29 RX ORDER — PROMETHAZINE HYDROCHLORIDE 25 MG/1
25 SUPPOSITORY RECTAL ONCE AS NEEDED
Status: DISCONTINUED | OUTPATIENT
Start: 2019-10-29 | End: 2019-10-29 | Stop reason: HOSPADM

## 2019-10-29 RX ORDER — EPHEDRINE SULFATE 50 MG/ML
5 INJECTION, SOLUTION INTRAVENOUS ONCE AS NEEDED
Status: DISCONTINUED | OUTPATIENT
Start: 2019-10-29 | End: 2019-10-29 | Stop reason: HOSPADM

## 2019-10-29 RX ORDER — ACETAMINOPHEN 325 MG/1
650 TABLET ORAL ONCE AS NEEDED
Status: DISCONTINUED | OUTPATIENT
Start: 2019-10-29 | End: 2019-10-29 | Stop reason: HOSPADM

## 2019-10-29 RX ORDER — ONDANSETRON 2 MG/ML
INJECTION INTRAMUSCULAR; INTRAVENOUS AS NEEDED
Status: DISCONTINUED | OUTPATIENT
Start: 2019-10-29 | End: 2019-10-29 | Stop reason: SURG

## 2019-10-29 RX ORDER — DIPHENHYDRAMINE HCL 25 MG
25 CAPSULE ORAL
Status: DISCONTINUED | OUTPATIENT
Start: 2019-10-29 | End: 2019-10-29 | Stop reason: HOSPADM

## 2019-10-29 RX ORDER — ONDANSETRON 2 MG/ML
4 INJECTION INTRAMUSCULAR; INTRAVENOUS ONCE AS NEEDED
Status: DISCONTINUED | OUTPATIENT
Start: 2019-10-29 | End: 2019-10-29 | Stop reason: HOSPADM

## 2019-10-29 RX ORDER — LIDOCAINE HYDROCHLORIDE 20 MG/ML
INJECTION, SOLUTION INFILTRATION; PERINEURAL AS NEEDED
Status: DISCONTINUED | OUTPATIENT
Start: 2019-10-29 | End: 2019-10-29 | Stop reason: SURG

## 2019-10-29 RX ORDER — NALOXONE HCL 0.4 MG/ML
0.2 VIAL (ML) INJECTION AS NEEDED
Status: DISCONTINUED | OUTPATIENT
Start: 2019-10-29 | End: 2019-10-29 | Stop reason: HOSPADM

## 2019-10-29 RX ORDER — DOCUSATE SODIUM 100 MG/1
100 CAPSULE, LIQUID FILLED ORAL 2 TIMES DAILY
Qty: 60 CAPSULE | Refills: 1 | Status: SHIPPED | OUTPATIENT
Start: 2019-10-29 | End: 2021-06-03

## 2019-10-29 RX ORDER — SODIUM CHLORIDE 0.9 % (FLUSH) 0.9 %
3 SYRINGE (ML) INJECTION EVERY 12 HOURS SCHEDULED
Status: DISCONTINUED | OUTPATIENT
Start: 2019-10-29 | End: 2019-10-29 | Stop reason: HOSPADM

## 2019-10-29 RX ORDER — HYDRALAZINE HYDROCHLORIDE 20 MG/ML
5 INJECTION INTRAMUSCULAR; INTRAVENOUS
Status: DISCONTINUED | OUTPATIENT
Start: 2019-10-29 | End: 2019-10-29 | Stop reason: HOSPADM

## 2019-10-29 RX ORDER — FENTANYL CITRATE 50 UG/ML
INJECTION, SOLUTION INTRAMUSCULAR; INTRAVENOUS AS NEEDED
Status: DISCONTINUED | OUTPATIENT
Start: 2019-10-29 | End: 2019-10-29 | Stop reason: SURG

## 2019-10-29 RX ORDER — OXYCODONE AND ACETAMINOPHEN 7.5; 325 MG/1; MG/1
1 TABLET ORAL ONCE AS NEEDED
Status: DISCONTINUED | OUTPATIENT
Start: 2019-10-29 | End: 2019-10-29 | Stop reason: HOSPADM

## 2019-10-29 RX ORDER — SODIUM CHLORIDE, SODIUM LACTATE, POTASSIUM CHLORIDE, CALCIUM CHLORIDE 600; 310; 30; 20 MG/100ML; MG/100ML; MG/100ML; MG/100ML
9 INJECTION, SOLUTION INTRAVENOUS CONTINUOUS
Status: DISCONTINUED | OUTPATIENT
Start: 2019-10-29 | End: 2019-10-29 | Stop reason: HOSPADM

## 2019-10-29 RX ORDER — HYDROCODONE BITARTRATE AND ACETAMINOPHEN 7.5; 325 MG/1; MG/1
1 TABLET ORAL ONCE AS NEEDED
Status: COMPLETED | OUTPATIENT
Start: 2019-10-29 | End: 2019-10-29

## 2019-10-29 RX ORDER — DIPHENHYDRAMINE HYDROCHLORIDE 50 MG/ML
12.5 INJECTION INTRAMUSCULAR; INTRAVENOUS
Status: DISCONTINUED | OUTPATIENT
Start: 2019-10-29 | End: 2019-10-29 | Stop reason: HOSPADM

## 2019-10-29 RX ORDER — HYDROCODONE BITARTRATE AND ACETAMINOPHEN 7.5; 325 MG/1; MG/1
1 TABLET ORAL EVERY 6 HOURS PRN
Qty: 30 TABLET | Refills: 0 | Status: SHIPPED | OUTPATIENT
Start: 2019-10-29 | End: 2019-11-07

## 2019-10-29 RX ORDER — FAMOTIDINE 10 MG/ML
20 INJECTION, SOLUTION INTRAVENOUS ONCE
Status: COMPLETED | OUTPATIENT
Start: 2019-10-29 | End: 2019-10-29

## 2019-10-29 RX ORDER — LABETALOL HYDROCHLORIDE 5 MG/ML
5 INJECTION, SOLUTION INTRAVENOUS
Status: DISCONTINUED | OUTPATIENT
Start: 2019-10-29 | End: 2019-10-29 | Stop reason: HOSPADM

## 2019-10-29 RX ORDER — HYDROMORPHONE HYDROCHLORIDE 1 MG/ML
0.5 INJECTION, SOLUTION INTRAMUSCULAR; INTRAVENOUS; SUBCUTANEOUS
Status: DISCONTINUED | OUTPATIENT
Start: 2019-10-29 | End: 2019-10-29 | Stop reason: HOSPADM

## 2019-10-29 RX ORDER — PROMETHAZINE HYDROCHLORIDE 25 MG/ML
6.25 INJECTION, SOLUTION INTRAMUSCULAR; INTRAVENOUS
Status: DISCONTINUED | OUTPATIENT
Start: 2019-10-29 | End: 2019-10-29 | Stop reason: HOSPADM

## 2019-10-29 RX ORDER — CEFAZOLIN SODIUM 2 G/100ML
2 INJECTION, SOLUTION INTRAVENOUS ONCE
Status: COMPLETED | OUTPATIENT
Start: 2019-10-29 | End: 2019-10-29

## 2019-10-29 RX ORDER — MIDAZOLAM HYDROCHLORIDE 1 MG/ML
2 INJECTION INTRAMUSCULAR; INTRAVENOUS
Status: DISCONTINUED | OUTPATIENT
Start: 2019-10-29 | End: 2019-10-29 | Stop reason: HOSPADM

## 2019-10-29 RX ORDER — MIDAZOLAM HYDROCHLORIDE 1 MG/ML
1 INJECTION INTRAMUSCULAR; INTRAVENOUS
Status: DISCONTINUED | OUTPATIENT
Start: 2019-10-29 | End: 2019-10-29 | Stop reason: HOSPADM

## 2019-10-29 RX ORDER — BUPIVACAINE HYDROCHLORIDE AND EPINEPHRINE 5; 5 MG/ML; UG/ML
INJECTION, SOLUTION PERINEURAL AS NEEDED
Status: DISCONTINUED | OUTPATIENT
Start: 2019-10-29 | End: 2019-10-29 | Stop reason: HOSPADM

## 2019-10-29 RX ORDER — ROCURONIUM BROMIDE 10 MG/ML
INJECTION, SOLUTION INTRAVENOUS AS NEEDED
Status: DISCONTINUED | OUTPATIENT
Start: 2019-10-29 | End: 2019-10-29 | Stop reason: SURG

## 2019-10-29 RX ORDER — SODIUM CHLORIDE 9 MG/ML
9 INJECTION, SOLUTION INTRAVENOUS CONTINUOUS
Status: DISCONTINUED | OUTPATIENT
Start: 2019-10-29 | End: 2019-10-29 | Stop reason: HOSPADM

## 2019-10-29 RX ORDER — PROMETHAZINE HYDROCHLORIDE 25 MG/1
25 TABLET ORAL ONCE AS NEEDED
Status: DISCONTINUED | OUTPATIENT
Start: 2019-10-29 | End: 2019-10-29 | Stop reason: HOSPADM

## 2019-10-29 RX ADMIN — NEOSTIGMINE METHYLSULFATE 3 MG: 1 INJECTION INTRAMUSCULAR; INTRAVENOUS; SUBCUTANEOUS at 11:34

## 2019-10-29 RX ADMIN — HYDRALAZINE HYDROCHLORIDE 5 MG: 20 INJECTION INTRAMUSCULAR; INTRAVENOUS at 12:06

## 2019-10-29 RX ADMIN — MIDAZOLAM 1 MG: 1 INJECTION INTRAMUSCULAR; INTRAVENOUS at 09:14

## 2019-10-29 RX ADMIN — HYDROCODONE BITARTRATE AND ACETAMINOPHEN 1 TABLET: 7.5; 325 TABLET ORAL at 12:19

## 2019-10-29 RX ADMIN — GLYCOPYRROLATE 0.4 MG: 0.2 INJECTION INTRAMUSCULAR; INTRAVENOUS at 11:34

## 2019-10-29 RX ADMIN — SODIUM CHLORIDE 9 ML/HR: 9 INJECTION, SOLUTION INTRAVENOUS at 09:04

## 2019-10-29 RX ADMIN — ROCURONIUM BROMIDE 35 MG: 10 INJECTION INTRAVENOUS at 10:05

## 2019-10-29 RX ADMIN — SODIUM CHLORIDE 9 ML/HR: 9 INJECTION, SOLUTION INTRAVENOUS at 09:10

## 2019-10-29 RX ADMIN — ONDANSETRON 4 MG: 2 INJECTION INTRAMUSCULAR; INTRAVENOUS at 11:25

## 2019-10-29 RX ADMIN — LIDOCAINE HYDROCHLORIDE 60 MG: 20 INJECTION, SOLUTION INFILTRATION; PERINEURAL at 10:05

## 2019-10-29 RX ADMIN — SODIUM CHLORIDE: 9 INJECTION, SOLUTION INTRAVENOUS at 10:02

## 2019-10-29 RX ADMIN — PROPOFOL 200 MG: 10 INJECTION, EMULSION INTRAVENOUS at 10:05

## 2019-10-29 RX ADMIN — FENTANYL CITRATE 50 MCG: 50 INJECTION INTRAMUSCULAR; INTRAVENOUS at 10:35

## 2019-10-29 RX ADMIN — FAMOTIDINE 20 MG: 10 INJECTION INTRAVENOUS at 09:10

## 2019-10-29 RX ADMIN — FENTANYL CITRATE 50 MCG: 50 INJECTION, SOLUTION INTRAMUSCULAR; INTRAVENOUS at 12:23

## 2019-10-29 RX ADMIN — MIDAZOLAM 1 MG: 1 INJECTION INTRAMUSCULAR; INTRAVENOUS at 09:09

## 2019-10-29 RX ADMIN — CEFAZOLIN SODIUM 2 G: 2 INJECTION, SOLUTION INTRAVENOUS at 10:02

## 2019-10-29 RX ADMIN — FENTANYL CITRATE 50 MCG: 50 INJECTION INTRAMUSCULAR; INTRAVENOUS at 10:05

## 2019-10-29 NOTE — ANESTHESIA PREPROCEDURE EVALUATION
Anesthesia Evaluation     Patient summary reviewed and Nursing notes reviewed   no history of anesthetic complications:  NPO Solid Status: > 8 hours  NPO Liquid Status: > 2 hours           Airway   Mallampati: II  TM distance: >3 FB  Neck ROM: full  no difficulty expected  Dental - normal exam   (+) poor dentition    Pulmonary - normal exam   (+) a smoker Former,   (-) COPD, asthma, sleep apnea, lung cancer  Cardiovascular - normal exam  Exercise tolerance: good (4-7 METS)    ECG reviewed  Rhythm: regular  Rate: normal    (+) hypertension poorly controlled 2 medications or greater,   (-) valvular problems/murmurs, past MI, CAD, dysrhythmias, angina, CHF, orthopnea, cardiac stents, CABG, pericardial effusion      Neuro/Psych- negative ROS  (-) seizures, TIA, CVA  GI/Hepatic/Renal/Endo    (+)  PUD,  renal disease ESRD and dialysis,   (-) hiatal hernia, GERD, hepatitis, liver disease, diabetes, GI bleed    ROS Comment: ESRD on HD for 2+yrs, normal schedule is M/W/F.  Last HD was yesterday.    Musculoskeletal     Abdominal  - normal exam   Substance History - negative use     OB/GYN negative ob/gyn ROS         Other   arthritis,          Phys Exam Other: Multiple missing teeth.                Anesthesia Plan    ASA 3     general     intravenous induction   Anesthetic plan, all risks, benefits, and alternatives have been provided, discussed and informed consent has been obtained with: patient.    Plan discussed with CRNA and attending.

## 2019-10-29 NOTE — ANESTHESIA POSTPROCEDURE EVALUATION
"Patient: Harry Ortega    Procedure Summary     Date:  10/29/19 Room / Location:  [facility] OR  / [facility] MAIN OR    Anesthesia Start:  0954 Anesthesia Stop:  1157    Procedure:  left INGUINAL HERNIA REPAIR LAPAROSCOPIC WITH DAVINCI ROBOT (Left Abdomen) Diagnosis:       Non-recurrent unilateral inguinal hernia without obstruction or gangrene      (Non-recurrent unilateral inguinal hernia without obstruction or gangrene [K40.90])    Surgeon:  Konstantin Espinoza MD Provider:  Darci Quevedo MD    Anesthesia Type:  general ASA Status:  3          Anesthesia Type: general  Last vitals  BP   166/89 (10/29/19 1325)   Temp   36.6 °C (97.8 °F) (10/29/19 1156)   Pulse   62 (10/29/19 1325)   Resp   16 (10/29/19 1325)     SpO2   96 % (10/29/19 1325)     Post Anesthesia Care and Evaluation    Patient location during evaluation: PHASE II  Patient participation: complete - patient participated  Level of consciousness: awake and alert  Pain management: adequate  Airway patency: patent  Anesthetic complications: No anesthetic complications    Cardiovascular status: acceptable  Respiratory status: acceptable  Hydration status: acceptable    Comments: /89   Pulse 62   Temp 36.6 °C (97.8 °F) (Oral)   Resp 16   Ht 177.8 cm (70\")   Wt 72.1 kg (158 lb 14.4 oz)   SpO2 96%   BMI 22.80 kg/m²       "

## 2019-10-29 NOTE — ANESTHESIA PROCEDURE NOTES
Airway  Urgency: elective    Date/Time: 10/29/2019 10:08 AM  Airway not difficult    General Information and Staff    Patient location during procedure: OR  Anesthesiologist: Darci Quevedo MD  CRNA: Mahnaz Dempsey CRNA    Indications and Patient Condition  Indications for airway management: airway protection    Preoxygenated: yes  MILS not maintained throughout  Mask difficulty assessment: 1 - vent by mask    Final Airway Details  Final airway type: endotracheal airway      Successful airway: ETT  Cuffed: yes   Successful intubation technique: direct laryngoscopy  Endotracheal tube insertion site: oral  Blade: Trav  Blade size: 3  ETT size (mm): 7.5  Cormack-Lehane Classification: grade IIa - partial view of glottis  Placement verified by: chest auscultation and capnometry   Measured from: lips  ETT/EBT  to lips (cm): 22  Number of attempts at approach: 1    Additional Comments  PreO2 100%, FEO2 >85, SIVI, easy BMV, sniff position, ETT placed/ confirmed, attraumatic, teeth and lips as preop.

## 2019-11-07 ENCOUNTER — OFFICE VISIT (OUTPATIENT)
Dept: SURGERY | Facility: CLINIC | Age: 59
End: 2019-11-07

## 2019-11-07 DIAGNOSIS — K40.90 NON-RECURRENT UNILATERAL INGUINAL HERNIA WITHOUT OBSTRUCTION OR GANGRENE: Primary | ICD-10-CM

## 2019-11-07 PROCEDURE — 99024 POSTOP FOLLOW-UP VISIT: CPT | Performed by: SURGERY

## 2019-11-07 NOTE — PROGRESS NOTES
Postop left inguinal hernia repair    Subjective:  No specific complaints.  He did have some substantial constipation initially postop, but this has resolved.  He has some mild pain which is gradually improving.    Objective:  Trocar sites are well-healed  No evidence of hernia recurrence in the groin, there is still some mild edema and swelling    Assessment and plan:  -Postop laparoscopic left inguinal hernia repair  -Recovering well, no evidence of complication  -May return to work on light duty next week, full duty at 4 weeks postop    Konstantin Espinoza MD  General and Endoscopic Surgery  [facility]    [address], [address]  [place], [place], [address]  P: [phone]  F: [phone]

## 2020-03-18 ENCOUNTER — TELEPHONE (OUTPATIENT)
Dept: INTERNAL MEDICINE | Age: 60
End: 2020-03-18

## 2020-10-22 ENCOUNTER — HOSPITAL ENCOUNTER (OUTPATIENT)
Dept: GENERAL RADIOLOGY | Facility: HOSPITAL | Age: 60
Discharge: HOME | End: 2020-10-22
Admitting: TRANSPLANT SURGERY

## 2020-10-22 DIAGNOSIS — N18.6 ESRD (END STAGE RENAL DISEASE) (HCC): ICD-10-CM

## 2020-10-22 PROCEDURE — 70355 PANORAMIC X-RAY OF JAWS: CPT

## 2021-05-21 NOTE — TELEPHONE ENCOUNTER
"----- Message from ALONZO Henry sent at 7/25/2017  3:26 PM EDT -----  Please call patient. Also, please ensure that he has follow up appointment scheduled with his nephrologist.     Your tests for thyroid function are normal.   HgbA1c is normal (screening test for diabetes)  Your cholesterol/lipid levels look good at this point. LDL cholesterol (\"bad cholesterol\") is at a great level and your HDL (\"good cholesterol\") is perfect.   Your PSA (prostate level) is in normal range.   Vitamin D is very low, but this should be addressed by nephrologist.   Along with this, your hemoglobin is very low, causing you to be anemic, this is related to your poor kidney function. You need to make sure that you address this with your nephrologist ASAP as they will likely need to start you on medication to correct this.   Your liver function tests were also a little bit elevated as well. Please avoid any alcohol or Tylenol containing products. When your kidney doctor draws blood in the next month or two, I would like you to ask them to recheck your liver enzymes as well as hepatitis C screening.  " Ms. Herrera

## 2021-06-03 ENCOUNTER — OFFICE VISIT (OUTPATIENT)
Dept: INTERNAL MEDICINE | Age: 61
End: 2021-06-03

## 2021-06-03 VITALS
TEMPERATURE: 97.2 F | DIASTOLIC BLOOD PRESSURE: 80 MMHG | SYSTOLIC BLOOD PRESSURE: 158 MMHG | HEART RATE: 74 BPM | OXYGEN SATURATION: 97 % | WEIGHT: 158 LBS | BODY MASS INDEX: 22.62 KG/M2 | HEIGHT: 70 IN

## 2021-06-03 DIAGNOSIS — N18.6 CKD (CHRONIC KIDNEY DISEASE) REQUIRING CHRONIC DIALYSIS (HCC): ICD-10-CM

## 2021-06-03 DIAGNOSIS — Z99.2 CKD (CHRONIC KIDNEY DISEASE) REQUIRING CHRONIC DIALYSIS (HCC): ICD-10-CM

## 2021-06-03 DIAGNOSIS — Z12.11 ENCOUNTER FOR SCREENING COLONOSCOPY: ICD-10-CM

## 2021-06-03 DIAGNOSIS — I10 ESSENTIAL HYPERTENSION: Primary | ICD-10-CM

## 2021-06-03 DIAGNOSIS — Z01.818 ENCOUNTER FOR PRE-TRANSPLANT EVALUATION FOR KIDNEY TRANSPLANT: ICD-10-CM

## 2021-06-03 PROCEDURE — 99213 OFFICE O/P EST LOW 20 MIN: CPT | Performed by: NURSE PRACTITIONER

## 2021-06-03 RX ORDER — CARVEDILOL 12.5 MG/1
6.25 TABLET ORAL 2 TIMES DAILY
COMMUNITY
Start: 2021-05-12 | End: 2023-02-16 | Stop reason: ALTCHOICE

## 2021-06-03 RX ORDER — FERRIC CITRATE 210 MG/1
420 TABLET, COATED ORAL 3 TIMES DAILY
COMMUNITY
End: 2022-03-15 | Stop reason: ALTCHOICE

## 2021-06-03 NOTE — PROGRESS NOTES
"Chief Complaint  Hypertension    Subjective          Harry Ortega presents to [facility]  Hypertension  This is a chronic problem. The problem is resistant. Pertinent negatives include no anxiety, blurred vision, chest pain, headaches, peripheral edema, PND, shortness of breath or sweats. Current antihypertension treatment includes direct vasodilators and beta blockers.   Chronic Kidney Disease  This is a chronic problem. Pertinent negatives include no chest pain or headaches.   He is still attending dialysis three days week. Reports that he was contacted by  Transplant team and is scheduled to have his Transplant physical August 17th.  He will need updated colonoscopy < 1 year old (last colonoscopy 2017).   Otherwise, no acute complaints today.        Objective   Vital Signs:   /80   Pulse 74   Temp 97.2 °F (36.2 °C) (Temporal)   Ht 177.8 cm (70\")   Wt 71.7 kg (158 lb)   SpO2 97%   BMI 22.67 kg/m²     Physical Exam  Vitals and nursing note reviewed.   Constitutional:       General: He is not in acute distress.     Appearance: He is well-developed. He is not ill-appearing.   Cardiovascular:      Rate and Rhythm: Normal rate and regular rhythm.      Heart sounds: Normal heart sounds, S1 normal and S2 normal. No murmur heard.     Pulmonary:      Effort: Pulmonary effort is normal.      Breath sounds: Normal breath sounds. No decreased breath sounds, wheezing, rhonchi or rales.   Skin:     General: Skin is warm and dry.   Neurological:      Mental Status: He is alert and oriented to person, place, and time.   Psychiatric:         Speech: Speech normal.         Behavior: Behavior normal. Behavior is cooperative.         Thought Content: Thought content normal.         Judgment: Judgment normal.        Result Review :   The following data was reviewed by: ALONZO Henry on 06/03/2021:  Common labs    Common Labsle 10/20/20 11/24/20   WBC  4.07 (A)   Hemoglobin  11.3 " (A)   Hematocrit  33.3 (A)   Platelets  177   Hemoglobin A1C 5.3    (A) Abnormal value       Comments are available for some flowsheets but are not being displayed.           Data reviewed: Consultant notes [facility] transplant specialist           Assessment and Plan    Diagnoses and all orders for this visit:    1. Essential hypertension (Primary)  Blood pressure stable on current therapy, continue same. Managed by nephrology.     2. Encounter for screening colonoscopy  -     Ambulatory Referral For Screening Colonoscopy    3. Encounter for pre-transplant evaluation for kidney transplant  Planning for upcoming kidney transplant. Patient will send me form from specialist to sign.     -     Ambulatory Referral For Screening Colonoscopy    4. CKD (chronic kidney disease) requiring chronic dialysis (CMS/[facility])        Follow Up   Return in about 1 year (around 6/3/2022) for Next scheduled follow up.  Patient was given instructions and counseling regarding his condition or for health maintenance advice. Please see specific information pulled into the AVS if appropriate.

## 2021-06-04 ENCOUNTER — TELEPHONE (OUTPATIENT)
Dept: GASTROENTEROLOGY | Facility: CLINIC | Age: 61
End: 2021-06-04

## 2021-06-04 NOTE — TELEPHONE ENCOUNTER
Patient is having a kidney transplant and they would like an update scope.      Last scope 10/12/2017 in epic-- no personal or family hx of polyps or colon ca-- no ASA or blood thinners-- medications:    carvedilol (COREG) 12.5 MG tablet  minoxidil (LONITEN) 2.5 MG tablet  pantoprazole (PROTONIX) 40 MG EC tablet  sildenafil (REVATIO) 20 MG tablet  RenoVit  OA form scanned into media

## 2021-06-07 ENCOUNTER — PREP FOR SURGERY (OUTPATIENT)
Dept: OTHER | Facility: HOSPITAL | Age: 61
End: 2021-06-07

## 2021-06-07 DIAGNOSIS — Z12.11 ENCOUNTER FOR SCREENING FOR MALIGNANT NEOPLASM OF COLON: Primary | ICD-10-CM

## 2021-06-30 PROBLEM — Z12.11 ENCOUNTER FOR SCREENING FOR MALIGNANT NEOPLASM OF COLON: Status: ACTIVE | Noted: 2021-06-30

## 2021-06-30 NOTE — TELEPHONE ENCOUNTER
spoke with pt scheduled at [facility] n sept 28 arrive at 115 am  --monse---------jennifer

## 2021-07-09 ENCOUNTER — TELEPHONE (OUTPATIENT)
Dept: GASTROENTEROLOGY | Facility: CLINIC | Age: 61
End: 2021-07-09

## 2021-07-09 NOTE — TELEPHONE ENCOUNTER
----- Message from Catina Clifton sent at 6/30/2021 12:55 PM EDT -----  Regarding: move up procedure  Contact: [phone]  Pt is needing this to be moved up due to kidney transplant

## 2021-07-30 NOTE — DISCHARGE INSTRUCTIONS
For the next 24 hours patient needs to be with a responsible adult.    For 24 hours DO NOT drive, operate machinery, appliances, drink alcohol, make important decisions or sign legal documents.    Start with a light or bland diet and advance to regular diet as tolerated.    Follow recommendations on procedure report provided by your doctor.    Call Dr SALDIVAR for problems [phone]    Problems may include but not limited to: large amounts of bleeding, trouble breathing, repeated vomiting, severe unrelieved pain, fever or chills.         [FreeTextEntry1] : ED\par \par Just started to take Tadalafil 20mg PO \par \par Will bring back for CHRISTO/Penile shaft\par Trimix sent\par \par The submitted E/M billing level for this visit reflects the total time spent on the day of the visit including face-to-face time spent with the patient, non-face-to-face review of medical records and relevant information, documentation, and asynchronous communication with the patient after a visit via phone, email, or patient’s eHR portal after the visit.  \par \par The medical records reviewed are either scanned into the chart or reviewed with the patient using a patient’s electronic medical records portal for patients with records not available to [facility] via electronic transmission platforms from other institutions and labs. \par \par Time spend counseling and performing coordination of care was also included in determining the appropriate EM billing level.\par \par I have reviewed and verified information regarding the chief complaint and history recorded by the ancillary staff and/or the patient. I have independently reviewed and interpreted tests performed by other physicians and facilities as necessary. \par \par I have discussed with the patient differential diagnosis, reason for auxiliary tests if ordered, risks, benefits, alternatives, and complications of each form of therapy were discussed. \par \par \par I saw and evaluated the patient with nurse practitioner Gamal Mcelroy. \par I have confirmed the chief complaint, past medical, surgical, and social history.\par I have examined the patient. \par I have reviewed the note and interpreted auxiliary tests results. \par I have formulated the assessment and plan and discussed my recommendations of care to the nurse practitioner.\par I agree with the findings and the plan of care as documented by this  note which I edited as necessary.\par

## 2021-09-28 ENCOUNTER — HOSPITAL ENCOUNTER (OUTPATIENT)
Facility: HOSPITAL | Age: 61
Setting detail: HOSPITAL OUTPATIENT SURGERY
Discharge: HOME | End: 2021-09-28
Attending: INTERNAL MEDICINE | Admitting: INTERNAL MEDICINE

## 2021-09-28 ENCOUNTER — ANESTHESIA EVENT (OUTPATIENT)
Dept: GASTROENTEROLOGY | Facility: HOSPITAL | Age: 61
End: 2021-09-28

## 2021-09-28 ENCOUNTER — ANESTHESIA (OUTPATIENT)
Dept: GASTROENTEROLOGY | Facility: HOSPITAL | Age: 61
End: 2021-09-28

## 2021-09-28 VITALS
HEART RATE: 66 BPM | SYSTOLIC BLOOD PRESSURE: 152 MMHG | RESPIRATION RATE: 20 BRPM | BODY MASS INDEX: 22.1 KG/M2 | OXYGEN SATURATION: 99 % | DIASTOLIC BLOOD PRESSURE: 87 MMHG | HEIGHT: 70 IN | TEMPERATURE: 98.1 F | WEIGHT: 154.4 LBS

## 2021-09-28 DIAGNOSIS — Z12.11 ENCOUNTER FOR SCREENING FOR MALIGNANT NEOPLASM OF COLON: ICD-10-CM

## 2021-09-28 PROCEDURE — S0260 H&P FOR SURGERY: HCPCS | Performed by: INTERNAL MEDICINE

## 2021-09-28 PROCEDURE — 88305 TISSUE EXAM BY PATHOLOGIST: CPT | Performed by: INTERNAL MEDICINE

## 2021-09-28 PROCEDURE — 25010000002 PROPOFOL 10 MG/ML EMULSION: Performed by: NURSE ANESTHETIST, CERTIFIED REGISTERED

## 2021-09-28 PROCEDURE — 45380 COLONOSCOPY AND BIOPSY: CPT | Performed by: INTERNAL MEDICINE

## 2021-09-28 RX ORDER — LIDOCAINE HYDROCHLORIDE 20 MG/ML
INJECTION, SOLUTION INFILTRATION; PERINEURAL AS NEEDED
Status: DISCONTINUED | OUTPATIENT
Start: 2021-09-28 | End: 2021-09-28 | Stop reason: SURG

## 2021-09-28 RX ORDER — SODIUM CHLORIDE, SODIUM LACTATE, POTASSIUM CHLORIDE, CALCIUM CHLORIDE 600; 310; 30; 20 MG/100ML; MG/100ML; MG/100ML; MG/100ML
30 INJECTION, SOLUTION INTRAVENOUS CONTINUOUS PRN
Status: DISCONTINUED | OUTPATIENT
Start: 2021-09-28 | End: 2021-09-28

## 2021-09-28 RX ORDER — PROPOFOL 10 MG/ML
VIAL (ML) INTRAVENOUS AS NEEDED
Status: DISCONTINUED | OUTPATIENT
Start: 2021-09-28 | End: 2021-09-28 | Stop reason: SURG

## 2021-09-28 RX ORDER — SODIUM CHLORIDE, SODIUM LACTATE, POTASSIUM CHLORIDE, CALCIUM CHLORIDE 600; 310; 30; 20 MG/100ML; MG/100ML; MG/100ML; MG/100ML
INJECTION, SOLUTION INTRAVENOUS CONTINUOUS PRN
Status: DISCONTINUED | OUTPATIENT
Start: 2021-09-28 | End: 2021-09-28

## 2021-09-28 RX ORDER — SODIUM CHLORIDE 9 MG/ML
30 INJECTION, SOLUTION INTRAVENOUS CONTINUOUS PRN
Status: DISCONTINUED | OUTPATIENT
Start: 2021-09-28 | End: 2021-09-28 | Stop reason: HOSPADM

## 2021-09-28 RX ORDER — PROPOFOL 10 MG/ML
VIAL (ML) INTRAVENOUS CONTINUOUS PRN
Status: DISCONTINUED | OUTPATIENT
Start: 2021-09-28 | End: 2021-09-28 | Stop reason: SURG

## 2021-09-28 RX ORDER — SODIUM CHLORIDE 9 MG/ML
INJECTION, SOLUTION INTRAVENOUS CONTINUOUS PRN
Status: DISCONTINUED | OUTPATIENT
Start: 2021-09-28 | End: 2021-09-28 | Stop reason: SURG

## 2021-09-28 RX ADMIN — SODIUM CHLORIDE: 9 INJECTION, SOLUTION INTRAVENOUS at 13:34

## 2021-09-28 RX ADMIN — SODIUM CHLORIDE 30 ML/HR: 9 INJECTION, SOLUTION INTRAVENOUS at 13:47

## 2021-09-28 RX ADMIN — Medication 300 MCG/KG/MIN: at 14:00

## 2021-09-28 RX ADMIN — PROPOFOL 70 MG: 10 INJECTION, EMULSION INTRAVENOUS at 14:00

## 2021-09-28 RX ADMIN — LIDOCAINE HYDROCHLORIDE 60 MG: 20 INJECTION, SOLUTION INFILTRATION; PERINEURAL at 14:00

## 2021-09-28 NOTE — ANESTHESIA POSTPROCEDURE EVALUATION
"Patient: Harry Ortega    Procedure Summary     Date: 09/28/21 Room / Location:  [facility]    Anesthesia Start: 1356 Anesthesia Stop: 1418    Procedure: COLONOSCOPY to cecum and TI with cold polypectomy (N/A ) Diagnosis:       Encounter for screening for malignant neoplasm of colon      (Encounter for screening for malignant neoplasm of colon [Z12.11])    Surgeons: Doni Terrazas MD Provider: Cody Zelaya MD    Anesthesia Type: MAC ASA Status: 3          Anesthesia Type: MAC    Vitals  Vitals Value Taken Time   /87 09/28/21 1437   Temp     Pulse 66 09/28/21 1437   Resp 20 09/28/21 1437   SpO2 99 % 09/28/21 1437           Post Anesthesia Care and Evaluation    Patient location during evaluation: PACU  Patient participation: complete - patient participated  Level of consciousness: awake  Pain score: 0  Pain management: adequate  Airway patency: patent  Anesthetic complications: No anesthetic complications  PONV Status: none  Cardiovascular status: acceptable  Respiratory status: acceptable  Hydration status: acceptable    Comments: /87 (BP Location: Left leg, Patient Position: Lying)   Pulse 66   Temp 36.7 °C (98.1 °F) (Oral)   Resp 20   Ht 177.8 cm (70\")   Wt 70 kg (154 lb 6.4 oz)   SpO2 99%   BMI 22.15 kg/m²       "

## 2021-09-28 NOTE — ANESTHESIA PREPROCEDURE EVALUATION
Anesthesia Evaluation     Patient summary reviewed and Nursing notes reviewed                Airway   Mallampati: I  TM distance: >3 FB  Neck ROM: full  No difficulty expected  Dental - normal exam     Pulmonary - normal exam   (+) a smoker Former,   Cardiovascular - normal exam    (+) hypertension,       Neuro/Psych- negative ROS  GI/Hepatic/Renal/Endo    (+)   renal disease ESRD and dialysis,     Musculoskeletal     Abdominal  - normal exam    Bowel sounds: normal.   Substance History - negative use     OB/GYN negative ob/gyn ROS         Other   arthritis,                    Anesthesia Plan    ASA 3     MAC       Anesthetic plan, all risks, benefits, and alternatives have been provided, discussed and informed consent has been obtained with: patient.

## 2021-09-29 LAB
LAB AP CASE REPORT: NORMAL
PATH REPORT.FINAL DX SPEC: NORMAL
PATH REPORT.GROSS SPEC: NORMAL

## 2021-10-01 ENCOUNTER — TELEPHONE (OUTPATIENT)
Dept: GASTROENTEROLOGY | Facility: CLINIC | Age: 61
End: 2021-10-01

## 2021-10-01 NOTE — TELEPHONE ENCOUNTER
----- Message from Doni Terrazas MD sent at 9/30/2021  5:10 PM EDT -----  Tubular adenoma colon polypColonoscopy recall 5 years

## 2021-12-16 NOTE — TELEPHONE ENCOUNTER
----- Message from Amirah Craig sent at 4/25/2019  8:07 AM EDT -----  Regarding: Hernia  I scheduled this pt for Thurs 5/2/19 for hernia because pt states he gets off work at 11:30a so pt asked to wait until this day.   Patient arrived via wheelchair for Thoracentesis.  Accompaned by daughter  Thoracentesis done per MD  1.4 liters of yellow fluid removed.  Post procedure CXR done.  MD reviewed CXR, OK to discharge.  AVS reviewed with daughter and family  Patient discharged via wheelchair with daughter.  Specimens to lab per Tech

## 2022-02-11 ENCOUNTER — TRANSCRIBE ORDERS (OUTPATIENT)
Dept: ADMINISTRATIVE | Facility: HOSPITAL | Age: 62
End: 2022-02-11

## 2022-02-15 ENCOUNTER — LAB (OUTPATIENT)
Dept: LAB | Facility: HOSPITAL | Age: 62
End: 2022-02-15

## 2022-02-15 ENCOUNTER — TRANSCRIBE ORDERS (OUTPATIENT)
Dept: ADMINISTRATIVE | Facility: HOSPITAL | Age: 62
End: 2022-02-15

## 2022-02-15 DIAGNOSIS — R39.9 GENITOURINARY SYMPTOMS: ICD-10-CM

## 2022-02-15 DIAGNOSIS — Z94.0 KIDNEY REPLACED BY TRANSPLANT: Primary | ICD-10-CM

## 2022-02-15 DIAGNOSIS — Z94.0 KIDNEY REPLACED BY TRANSPLANT: ICD-10-CM

## 2022-02-15 DIAGNOSIS — R79.89 ELEVATED LIVER FUNCTION TESTS: ICD-10-CM

## 2022-02-15 DIAGNOSIS — D64.9 ANEMIA, UNSPECIFIED TYPE: ICD-10-CM

## 2022-02-15 LAB
ALBUMIN SERPL-MCNC: 4.3 G/DL (ref 3.5–5.2)
ANION GAP SERPL CALCULATED.3IONS-SCNC: 12 MMOL/L (ref 5–15)
BASOPHILS # BLD AUTO: 0.02 10*3/MM3 (ref 0–0.2)
BASOPHILS NFR BLD AUTO: 0.7 % (ref 0–1.5)
BILIRUB UR QL STRIP: NEGATIVE
BUN SERPL-MCNC: 11 MG/DL (ref 8–23)
BUN/CREAT SERPL: 8.8 (ref 7–25)
CALCIUM SPEC-SCNC: 9.4 MG/DL (ref 8.6–10.5)
CHLORIDE SERPL-SCNC: 100 MMOL/L (ref 98–107)
CLARITY UR: CLEAR
CO2 SERPL-SCNC: 26 MMOL/L (ref 22–29)
COLOR UR: YELLOW
CREAT SERPL-MCNC: 1.25 MG/DL (ref 0.76–1.27)
CREAT UR-MCNC: 101.9 MG/DL
DEPRECATED RDW RBC AUTO: 58.1 FL (ref 37–54)
EOSINOPHIL # BLD AUTO: 0.05 10*3/MM3 (ref 0–0.4)
EOSINOPHIL NFR BLD AUTO: 1.8 % (ref 0.3–6.2)
ERYTHROCYTE [DISTWIDTH] IN BLOOD BY AUTOMATED COUNT: 15.5 % (ref 12.3–15.4)
GFR SERPL CREATININE-BSD FRML MDRD: 71 ML/MIN/1.73
GLUCOSE SERPL-MCNC: 162 MG/DL (ref 65–99)
GLUCOSE UR STRIP-MCNC: NEGATIVE MG/DL
HCT VFR BLD AUTO: 41 % (ref 37.5–51)
HGB BLD-MCNC: 13.8 G/DL (ref 13–17.7)
HGB UR QL STRIP.AUTO: NEGATIVE
IMM GRANULOCYTES # BLD AUTO: 0.01 10*3/MM3 (ref 0–0.05)
IMM GRANULOCYTES NFR BLD AUTO: 0.4 % (ref 0–0.5)
KETONES UR QL STRIP: NEGATIVE
LEUKOCYTE ESTERASE UR QL STRIP.AUTO: NEGATIVE
LYMPHOCYTES # BLD AUTO: 0.17 10*3/MM3 (ref 0.7–3.1)
LYMPHOCYTES NFR BLD AUTO: 6.2 % (ref 19.6–45.3)
MAGNESIUM SERPL-MCNC: 1.3 MG/DL (ref 1.6–2.4)
MCH RBC QN AUTO: 33.9 PG (ref 26.6–33)
MCHC RBC AUTO-ENTMCNC: 33.7 G/DL (ref 31.5–35.7)
MCV RBC AUTO: 100.7 FL (ref 79–97)
MONOCYTES # BLD AUTO: 0.21 10*3/MM3 (ref 0.1–0.9)
MONOCYTES NFR BLD AUTO: 7.6 % (ref 5–12)
NEUTROPHILS NFR BLD AUTO: 2.29 10*3/MM3 (ref 1.7–7)
NEUTROPHILS NFR BLD AUTO: 83.3 % (ref 42.7–76)
NITRITE UR QL STRIP: NEGATIVE
NRBC BLD AUTO-RTO: 0 /100 WBC (ref 0–0.2)
PH UR STRIP.AUTO: 7.5 [PH] (ref 5–8)
PHOSPHATE SERPL-MCNC: 2.8 MG/DL (ref 2.5–4.5)
PLATELET # BLD AUTO: 126 10*3/MM3 (ref 140–450)
PMV BLD AUTO: 10.6 FL (ref 6–12)
POTASSIUM SERPL-SCNC: 3.1 MMOL/L (ref 3.5–5.2)
PROT ?TM UR-MCNC: 10.6 MG/DL
PROT UR QL STRIP: NEGATIVE
PROT/CREAT UR: 104 MG/G CREA (ref 0–200)
RBC # BLD AUTO: 4.07 10*6/MM3 (ref 4.14–5.8)
SODIUM SERPL-SCNC: 138 MMOL/L (ref 136–145)
SP GR UR STRIP: 1.01 (ref 1–1.03)
UROBILINOGEN UR QL STRIP: NORMAL
WBC NRBC COR # BLD: 2.75 10*3/MM3 (ref 3.4–10.8)

## 2022-02-15 PROCEDURE — 85025 COMPLETE CBC W/AUTO DIFF WBC: CPT

## 2022-02-15 PROCEDURE — 81003 URINALYSIS AUTO W/O SCOPE: CPT

## 2022-02-15 PROCEDURE — 84156 ASSAY OF PROTEIN URINE: CPT

## 2022-02-15 PROCEDURE — 80069 RENAL FUNCTION PANEL: CPT

## 2022-02-15 PROCEDURE — 83735 ASSAY OF MAGNESIUM: CPT

## 2022-02-15 PROCEDURE — 80197 ASSAY OF TACROLIMUS: CPT

## 2022-02-15 PROCEDURE — 87086 URINE CULTURE/COLONY COUNT: CPT

## 2022-02-15 PROCEDURE — 36415 COLL VENOUS BLD VENIPUNCTURE: CPT

## 2022-02-15 PROCEDURE — 82570 ASSAY OF URINE CREATININE: CPT

## 2022-02-16 LAB
BACTERIA SPEC AEROBE CULT: NO GROWTH
TACROLIMUS BLD-MCNC: NORMAL NG/ML

## 2022-02-24 ENCOUNTER — LAB (OUTPATIENT)
Dept: LAB | Facility: HOSPITAL | Age: 62
End: 2022-02-24

## 2022-02-24 DIAGNOSIS — R39.9 GENITOURINARY SYMPTOMS: ICD-10-CM

## 2022-02-24 DIAGNOSIS — R79.89 ELEVATED LIVER FUNCTION TESTS: ICD-10-CM

## 2022-02-24 DIAGNOSIS — Z94.0 KIDNEY REPLACED BY TRANSPLANT: ICD-10-CM

## 2022-02-24 DIAGNOSIS — D64.9 ANEMIA, UNSPECIFIED TYPE: ICD-10-CM

## 2022-02-24 LAB
ALBUMIN SERPL-MCNC: 4.4 G/DL (ref 3.5–5.2)
ANION GAP SERPL CALCULATED.3IONS-SCNC: 11 MMOL/L (ref 5–15)
BASOPHILS # BLD AUTO: 0.01 10*3/MM3 (ref 0–0.2)
BASOPHILS NFR BLD AUTO: 0.4 % (ref 0–1.5)
BILIRUB UR QL STRIP: NEGATIVE
BUN SERPL-MCNC: 11 MG/DL (ref 8–23)
BUN/CREAT SERPL: 11.8 (ref 7–25)
CALCIUM SPEC-SCNC: 9.8 MG/DL (ref 8.6–10.5)
CHLORIDE SERPL-SCNC: 103 MMOL/L (ref 98–107)
CLARITY UR: ABNORMAL
CO2 SERPL-SCNC: 26 MMOL/L (ref 22–29)
COLOR UR: YELLOW
CREAT SERPL-MCNC: 0.93 MG/DL (ref 0.76–1.27)
CREAT UR-MCNC: 64 MG/DL
DEPRECATED RDW RBC AUTO: 57.6 FL (ref 37–54)
EOSINOPHIL # BLD AUTO: 0.07 10*3/MM3 (ref 0–0.4)
EOSINOPHIL NFR BLD AUTO: 2.6 % (ref 0.3–6.2)
ERYTHROCYTE [DISTWIDTH] IN BLOOD BY AUTOMATED COUNT: 15.3 % (ref 12.3–15.4)
GFR SERPL CREATININE-BSD FRML MDRD: 100 ML/MIN/1.73
GLUCOSE SERPL-MCNC: 149 MG/DL (ref 65–99)
GLUCOSE UR STRIP-MCNC: NEGATIVE MG/DL
HCT VFR BLD AUTO: 43.6 % (ref 37.5–51)
HGB BLD-MCNC: 14.5 G/DL (ref 13–17.7)
HGB UR QL STRIP.AUTO: NEGATIVE
IMM GRANULOCYTES # BLD AUTO: 0 10*3/MM3 (ref 0–0.05)
IMM GRANULOCYTES NFR BLD AUTO: 0 % (ref 0–0.5)
KETONES UR QL STRIP: NEGATIVE
LEUKOCYTE ESTERASE UR QL STRIP.AUTO: NEGATIVE
LYMPHOCYTES # BLD AUTO: 0.18 10*3/MM3 (ref 0.7–3.1)
LYMPHOCYTES NFR BLD AUTO: 6.6 % (ref 19.6–45.3)
MAGNESIUM SERPL-MCNC: 1.7 MG/DL (ref 1.6–2.4)
MCH RBC QN AUTO: 33.3 PG (ref 26.6–33)
MCHC RBC AUTO-ENTMCNC: 33.3 G/DL (ref 31.5–35.7)
MCV RBC AUTO: 100.2 FL (ref 79–97)
MONOCYTES # BLD AUTO: 0.17 10*3/MM3 (ref 0.1–0.9)
MONOCYTES NFR BLD AUTO: 6.3 % (ref 5–12)
NEUTROPHILS NFR BLD AUTO: 2.29 10*3/MM3 (ref 1.7–7)
NEUTROPHILS NFR BLD AUTO: 84.1 % (ref 42.7–76)
NITRITE UR QL STRIP: NEGATIVE
NRBC BLD AUTO-RTO: 0 /100 WBC (ref 0–0.2)
PH UR STRIP.AUTO: 7.5 [PH] (ref 5–8)
PHOSPHATE SERPL-MCNC: 3 MG/DL (ref 2.5–4.5)
PLATELET # BLD AUTO: 155 10*3/MM3 (ref 140–450)
PMV BLD AUTO: 10.4 FL (ref 6–12)
POTASSIUM SERPL-SCNC: 3.7 MMOL/L (ref 3.5–5.2)
PROT ?TM UR-MCNC: 9.9 MG/DL
PROT UR QL STRIP: NEGATIVE
PROT/CREAT UR: 154.7 MG/G CREA (ref 0–200)
RBC # BLD AUTO: 4.35 10*6/MM3 (ref 4.14–5.8)
SODIUM SERPL-SCNC: 140 MMOL/L (ref 136–145)
SP GR UR STRIP: 1.01 (ref 1–1.03)
UROBILINOGEN UR QL STRIP: ABNORMAL
WBC NRBC COR # BLD: 2.72 10*3/MM3 (ref 3.4–10.8)

## 2022-02-24 PROCEDURE — 80069 RENAL FUNCTION PANEL: CPT

## 2022-02-24 PROCEDURE — 81003 URINALYSIS AUTO W/O SCOPE: CPT

## 2022-02-24 PROCEDURE — 87086 URINE CULTURE/COLONY COUNT: CPT

## 2022-02-24 PROCEDURE — 82570 ASSAY OF URINE CREATININE: CPT

## 2022-02-24 PROCEDURE — 84156 ASSAY OF PROTEIN URINE: CPT

## 2022-02-24 PROCEDURE — 85025 COMPLETE CBC W/AUTO DIFF WBC: CPT

## 2022-02-24 PROCEDURE — 80197 ASSAY OF TACROLIMUS: CPT

## 2022-02-24 PROCEDURE — 83735 ASSAY OF MAGNESIUM: CPT

## 2022-02-24 PROCEDURE — 36415 COLL VENOUS BLD VENIPUNCTURE: CPT

## 2022-02-25 LAB
BACTERIA SPEC AEROBE CULT: NO GROWTH
TACROLIMUS BLD-MCNC: NORMAL NG/ML

## 2022-03-02 ENCOUNTER — LAB (OUTPATIENT)
Dept: LAB | Facility: HOSPITAL | Age: 62
End: 2022-03-02

## 2022-03-02 DIAGNOSIS — R79.89 ELEVATED LIVER FUNCTION TESTS: ICD-10-CM

## 2022-03-02 DIAGNOSIS — Z94.0 KIDNEY REPLACED BY TRANSPLANT: ICD-10-CM

## 2022-03-02 DIAGNOSIS — R39.9 GENITOURINARY SYMPTOMS: ICD-10-CM

## 2022-03-02 DIAGNOSIS — D64.9 ANEMIA, UNSPECIFIED TYPE: ICD-10-CM

## 2022-03-02 LAB
ALBUMIN SERPL-MCNC: 4.2 G/DL (ref 3.5–5.2)
ANION GAP SERPL CALCULATED.3IONS-SCNC: 10 MMOL/L (ref 5–15)
BASOPHILS # BLD AUTO: 0.01 10*3/MM3 (ref 0–0.2)
BASOPHILS NFR BLD AUTO: 0.5 % (ref 0–1.5)
BILIRUB UR QL STRIP: NEGATIVE
BUN SERPL-MCNC: 10 MG/DL (ref 8–23)
BUN/CREAT SERPL: 9.5 (ref 7–25)
CALCIUM SPEC-SCNC: 9.2 MG/DL (ref 8.6–10.5)
CHLORIDE SERPL-SCNC: 104 MMOL/L (ref 98–107)
CLARITY UR: CLEAR
CO2 SERPL-SCNC: 27 MMOL/L (ref 22–29)
COLOR UR: YELLOW
CREAT SERPL-MCNC: 1.05 MG/DL (ref 0.76–1.27)
CREAT UR-MCNC: 112.4 MG/DL
DEPRECATED RDW RBC AUTO: 57.3 FL (ref 37–54)
EGFRCR SERPLBLD CKD-EPI 2021: 80.8 ML/MIN/1.73
EOSINOPHIL # BLD AUTO: 0.05 10*3/MM3 (ref 0–0.4)
EOSINOPHIL NFR BLD AUTO: 2.4 % (ref 0.3–6.2)
ERYTHROCYTE [DISTWIDTH] IN BLOOD BY AUTOMATED COUNT: 15.2 % (ref 12.3–15.4)
GLUCOSE SERPL-MCNC: 88 MG/DL (ref 65–99)
GLUCOSE UR STRIP-MCNC: NEGATIVE MG/DL
HCT VFR BLD AUTO: 40.2 % (ref 37.5–51)
HGB BLD-MCNC: 13.5 G/DL (ref 13–17.7)
HGB UR QL STRIP.AUTO: NEGATIVE
IMM GRANULOCYTES # BLD AUTO: 0 10*3/MM3 (ref 0–0.05)
IMM GRANULOCYTES NFR BLD AUTO: 0 % (ref 0–0.5)
KETONES UR QL STRIP: NEGATIVE
LEUKOCYTE ESTERASE UR QL STRIP.AUTO: NEGATIVE
LYMPHOCYTES # BLD AUTO: 0.15 10*3/MM3 (ref 0.7–3.1)
LYMPHOCYTES NFR BLD AUTO: 7.3 % (ref 19.6–45.3)
MAGNESIUM SERPL-MCNC: 1.7 MG/DL (ref 1.6–2.4)
MCH RBC QN AUTO: 34.1 PG (ref 26.6–33)
MCHC RBC AUTO-ENTMCNC: 33.6 G/DL (ref 31.5–35.7)
MCV RBC AUTO: 101.5 FL (ref 79–97)
MONOCYTES # BLD AUTO: 0.14 10*3/MM3 (ref 0.1–0.9)
MONOCYTES NFR BLD AUTO: 6.8 % (ref 5–12)
NEUTROPHILS NFR BLD AUTO: 1.71 10*3/MM3 (ref 1.7–7)
NEUTROPHILS NFR BLD AUTO: 83 % (ref 42.7–76)
NITRITE UR QL STRIP: NEGATIVE
NRBC BLD AUTO-RTO: 0 /100 WBC (ref 0–0.2)
PH UR STRIP.AUTO: 7.5 [PH] (ref 5–8)
PHOSPHATE SERPL-MCNC: 2.4 MG/DL (ref 2.5–4.5)
PLATELET # BLD AUTO: 147 10*3/MM3 (ref 140–450)
PMV BLD AUTO: 10.6 FL (ref 6–12)
POTASSIUM SERPL-SCNC: 3.8 MMOL/L (ref 3.5–5.2)
PROT ?TM UR-MCNC: 14.3 MG/DL
PROT UR QL STRIP: NEGATIVE
PROT/CREAT UR: 127.2 MG/G CREA (ref 0–200)
RBC # BLD AUTO: 3.96 10*6/MM3 (ref 4.14–5.8)
SODIUM SERPL-SCNC: 141 MMOL/L (ref 136–145)
SP GR UR STRIP: 1.02 (ref 1–1.03)
UROBILINOGEN UR QL STRIP: NORMAL
WBC NRBC COR # BLD: 2.06 10*3/MM3 (ref 3.4–10.8)

## 2022-03-02 PROCEDURE — 81003 URINALYSIS AUTO W/O SCOPE: CPT

## 2022-03-02 PROCEDURE — 36415 COLL VENOUS BLD VENIPUNCTURE: CPT

## 2022-03-02 PROCEDURE — 84156 ASSAY OF PROTEIN URINE: CPT

## 2022-03-02 PROCEDURE — 85025 COMPLETE CBC W/AUTO DIFF WBC: CPT

## 2022-03-02 PROCEDURE — 80197 ASSAY OF TACROLIMUS: CPT

## 2022-03-02 PROCEDURE — 82570 ASSAY OF URINE CREATININE: CPT

## 2022-03-02 PROCEDURE — 87086 URINE CULTURE/COLONY COUNT: CPT

## 2022-03-02 PROCEDURE — 83735 ASSAY OF MAGNESIUM: CPT

## 2022-03-02 PROCEDURE — 80069 RENAL FUNCTION PANEL: CPT

## 2022-03-04 LAB — TACROLIMUS BLD-MCNC: NORMAL NG/ML

## 2022-03-15 ENCOUNTER — OFFICE VISIT (OUTPATIENT)
Dept: INTERNAL MEDICINE | Age: 62
End: 2022-03-15

## 2022-03-15 VITALS
BODY MASS INDEX: 21.47 KG/M2 | HEIGHT: 70 IN | TEMPERATURE: 97.5 F | DIASTOLIC BLOOD PRESSURE: 74 MMHG | SYSTOLIC BLOOD PRESSURE: 136 MMHG | WEIGHT: 150 LBS | OXYGEN SATURATION: 99 % | HEART RATE: 69 BPM

## 2022-03-15 DIAGNOSIS — R73.01 IMPAIRED FASTING GLUCOSE: Primary | ICD-10-CM

## 2022-03-15 PROCEDURE — 99213 OFFICE O/P EST LOW 20 MIN: CPT | Performed by: NURSE PRACTITIONER

## 2022-03-15 RX ORDER — VELPATASVIR AND SOFOSBUVIR 100; 400 MG/1; MG/1
1 TABLET, FILM COATED ORAL DAILY
COMMUNITY
Start: 2022-01-11 | End: 2022-08-16

## 2022-03-15 RX ORDER — ENTECAVIR 0.5 MG/1
0.5 TABLET, FILM COATED ORAL DAILY
COMMUNITY
Start: 2022-02-18 | End: 2023-02-16 | Stop reason: ALTCHOICE

## 2022-03-15 RX ORDER — PSEUDOEPHEDRINE HCL 30 MG
100 TABLET ORAL 2 TIMES DAILY PRN
COMMUNITY
Start: 2021-12-20 | End: 2022-08-16

## 2022-03-15 RX ORDER — MYCOPHENOLATE MOFETIL 250 MG/1
1000 CAPSULE ORAL 2 TIMES DAILY
COMMUNITY
Start: 2022-01-27

## 2022-03-15 RX ORDER — NIFEDIPINE 90 MG/1
90 TABLET, EXTENDED RELEASE ORAL 2 TIMES DAILY
COMMUNITY
Start: 2022-01-07 | End: 2023-02-16 | Stop reason: SDUPTHER

## 2022-03-15 RX ORDER — ACETAMINOPHEN 325 MG/1
975 TABLET ORAL AS NEEDED
COMMUNITY
Start: 2021-12-20

## 2022-03-15 RX ORDER — TACROLIMUS 1 MG/1
3 CAPSULE ORAL DAILY
COMMUNITY
Start: 2021-12-30 | End: 2023-02-16 | Stop reason: SDUPTHER

## 2022-03-15 RX ORDER — MAGNESIUM OXIDE 400 MG/1
800 TABLET ORAL 2 TIMES DAILY
COMMUNITY
Start: 2022-01-14 | End: 2023-02-16 | Stop reason: ALTCHOICE

## 2022-03-15 RX ORDER — SULFAMETHOXAZOLE AND TRIMETHOPRIM 400; 80 MG/1; MG/1
1 TABLET ORAL 3 TIMES WEEKLY
COMMUNITY
Start: 2021-12-20 | End: 2022-08-16

## 2022-03-15 RX ORDER — VALGANCICLOVIR 450 MG/1
900 TABLET, FILM COATED ORAL DAILY
COMMUNITY
Start: 2022-01-07 | End: 2022-08-16

## 2022-03-15 NOTE — PROGRESS NOTES
"    I N T E R N A L  M E D I C I N E  BESSIE ALCANTAR, ALONZO      ENCOUNTER DATE:  03/15/2022    Harry Ortega / 61 y.o. / male      CHIEF COMPLAINT / REASON FOR OFFICE VISIT     Kidney Transplant (S/P 12/17/2021 [facility]), Labs Only (A1c), and Hyperglycemia      ASSESSMENT & PLAN     1. Impaired fasting glucose  -Limit simple sweets, carbohydrates, exercise as tolerated  - Hemoglobin A1c  - TSH+Free T4    Orders Placed This Encounter   Procedures   • Hemoglobin A1c   • TSH+Free T4     No orders of the defined types were placed in this encounter.      SUMMARY/DISCUSSION  • Follow-up as scheduled in June, earlier if needed depending on labs today      Next Appointment with me: 6/9/2022    Return for Next scheduled follow up.      VITAL SIGNS     Visit Vitals  /82 (Cuff Size: Adult)   Pulse 69   Temp 97.5 °F (36.4 °C) (Temporal)   Ht 177.8 cm (70\")   Wt 68 kg (150 lb)   SpO2 99%   BMI 21.52 kg/m²     Wt Readings from Last 3 Encounters:   03/15/22 68 kg (150 lb)   09/28/21 70 kg (154 lb 6.4 oz)   06/03/21 71.7 kg (158 lb)     Body mass index is 21.52 kg/m².      MEDICATIONS AT THE TIME OF OFFICE VISIT     Current Outpatient Medications on File Prior to Visit   Medication Sig   • acetaminophen (TYLENOL) 325 MG tablet Take 975 mg by mouth As Needed.   • docusate sodium 100 MG capsule Take 100 mg by mouth 2 (Two) Times a Day As Needed.   • entecavir (BARACLUDE) 0.5 MG tablet Take 0.5 mg by mouth Daily.   • magnesium oxide (MAG-OX) 400 MG tablet Take 800 mg by mouth 2 (Two) Times a Day.   • mycophenolate (CELLCEPT) 250 MG capsule Take 1,000 mg by mouth 2 (Two) Times a Day.   • NIFEdipine XL (PROCARDIA XL) 90 MG 24 hr tablet Take 90 mg by mouth 2 (Two) Times a Day.   • Sofosbuvir-Velpatasvir 400-100 MG tablet Take 1 tablet by mouth Daily.   • sulfamethoxazole-trimethoprim (BACTRIM,SEPTRA) 400-80 MG tablet Take 1 tablet by mouth 3 (Three) Times a Week.   • valGANciclovir (VALCYTE) 450 MG tablet Take 900 mg by " mouth Daily.   • carvedilol (COREG) 12.5 MG tablet Take 6.25 mg by mouth 2 (Two) Times a Day. as directed   • sildenafil (REVATIO) 20 MG tablet Take 20 mg by mouth Daily As Needed.   • tacrolimus (PROGRAF) 1 MG capsule 3 capsules Daily.   • [DISCONTINUED] Ferric Citrate (Auryxia) 1  MG(Fe) tablet Take 420 mg by mouth 3 (Three) Times a Day.   • [DISCONTINUED] minoxidil (LONITEN) 2.5 MG tablet Take 5 mg by mouth 2 (Two) Times a Day.   • [DISCONTINUED] pantoprazole (PROTONIX) 40 MG EC tablet Take 1 tablet by mouth Daily.     No current facility-administered medications on file prior to visit.         HISTORY OF PRESENT ILLNESS     Patient presents post kidney transplant December 2021.  Fasting glucose 135 on March 10.  Was advised by transplant team to get his hemoglobin A1c checked for potential prediabetes ranges.  Denies any neuropathy.  Denies any polydipsia or polyuria.    REVIEW OF SYSTEMS     Constitutional neg except per HPI   Resp neg  CV neg  Neuro neg     PHYSICAL EXAMINATION     Physical Exam  Constitutional  No distress  Cardiovascular Rate  normal . Rhythm: regular . Heart sounds:  normal  Pulmonary/Chest  Effort normal. Breath sounds:  normal  Psychiatric  Alert. Judgment and thought content normal. Mood normal     REVIEWED DATA     Labs:   Lab Results   Component Value Date    GLUCOSE 88 03/02/2022    BUN 10 03/02/2022    CREATININE 1.05 03/02/2022    EGFRIFNONA 50 12/20/2021    EGFRIFAFRI 100 02/24/2022    BCR 9.5 03/02/2022    K 3.8 03/02/2022    CO2 27.0 03/02/2022    CALCIUM 9.2 03/02/2022    PROTENTOTREF 6.6 09/13/2018    ALBUMIN 4.3 03/10/2022    LABIL2 2.1 09/13/2018    AST 39 03/10/2022    ALT 35 03/10/2022         Imaging:           Medical Tests:             Summary of old records / correspondence / consultant report:           Request outside records:           *Examiner was wearing medical surgical mask, face shield and exam gloves during the entire duration of the visit. Patient was  masked the entire time.   Minimum social distance of 6 ft maintained entire visit except if physical contact was necessary as documented.     Dictated utilizing Dragon dictation

## 2022-03-16 LAB
HBA1C MFR BLD: 6.5 % (ref 4.8–5.6)
T4 FREE SERPL-MCNC: 1.42 NG/DL (ref 0.82–1.77)
TSH SERPL DL<=0.005 MIU/L-ACNC: 0.91 UIU/ML (ref 0.45–4.5)

## 2022-03-22 ENCOUNTER — LAB (OUTPATIENT)
Dept: LAB | Facility: HOSPITAL | Age: 62
End: 2022-03-22

## 2022-03-22 DIAGNOSIS — R39.9 GENITOURINARY SYMPTOMS: ICD-10-CM

## 2022-03-22 DIAGNOSIS — D64.9 ANEMIA, UNSPECIFIED TYPE: ICD-10-CM

## 2022-03-22 DIAGNOSIS — R79.89 ELEVATED LIVER FUNCTION TESTS: ICD-10-CM

## 2022-03-22 DIAGNOSIS — Z94.0 KIDNEY REPLACED BY TRANSPLANT: ICD-10-CM

## 2022-03-22 LAB
ALBUMIN SERPL-MCNC: 4.7 G/DL (ref 3.5–5.2)
ANION GAP SERPL CALCULATED.3IONS-SCNC: 11 MMOL/L (ref 5–15)
BASOPHILS # BLD AUTO: 0.01 10*3/MM3 (ref 0–0.2)
BASOPHILS NFR BLD AUTO: 0.3 % (ref 0–1.5)
BILIRUB UR QL STRIP: NEGATIVE
BUN SERPL-MCNC: 18 MG/DL (ref 8–23)
BUN/CREAT SERPL: 15.9 (ref 7–25)
CALCIUM SPEC-SCNC: 9.5 MG/DL (ref 8.6–10.5)
CHLORIDE SERPL-SCNC: 102 MMOL/L (ref 98–107)
CLARITY UR: CLEAR
CO2 SERPL-SCNC: 25 MMOL/L (ref 22–29)
COLOR UR: YELLOW
CREAT SERPL-MCNC: 1.13 MG/DL (ref 0.76–1.27)
CREAT UR-MCNC: 74 MG/DL
DEPRECATED RDW RBC AUTO: 56.8 FL (ref 37–54)
EGFRCR SERPLBLD CKD-EPI 2021: 73.9 ML/MIN/1.73
EOSINOPHIL # BLD AUTO: 0.03 10*3/MM3 (ref 0–0.4)
EOSINOPHIL NFR BLD AUTO: 0.8 % (ref 0.3–6.2)
ERYTHROCYTE [DISTWIDTH] IN BLOOD BY AUTOMATED COUNT: 15.1 % (ref 12.3–15.4)
GLUCOSE SERPL-MCNC: 196 MG/DL (ref 65–99)
GLUCOSE UR STRIP-MCNC: NEGATIVE MG/DL
HCT VFR BLD AUTO: 39.9 % (ref 37.5–51)
HGB BLD-MCNC: 13 G/DL (ref 13–17.7)
HGB UR QL STRIP.AUTO: NEGATIVE
IMM GRANULOCYTES # BLD AUTO: 0 10*3/MM3 (ref 0–0.05)
IMM GRANULOCYTES NFR BLD AUTO: 0 % (ref 0–0.5)
KETONES UR QL STRIP: NEGATIVE
LEUKOCYTE ESTERASE UR QL STRIP.AUTO: NEGATIVE
LYMPHOCYTES # BLD AUTO: 0.24 10*3/MM3 (ref 0.7–3.1)
LYMPHOCYTES NFR BLD AUTO: 6.6 % (ref 19.6–45.3)
MAGNESIUM SERPL-MCNC: 1.3 MG/DL (ref 1.6–2.4)
MCH RBC QN AUTO: 33.1 PG (ref 26.6–33)
MCHC RBC AUTO-ENTMCNC: 32.6 G/DL (ref 31.5–35.7)
MCV RBC AUTO: 101.5 FL (ref 79–97)
MONOCYTES # BLD AUTO: 0.21 10*3/MM3 (ref 0.1–0.9)
MONOCYTES NFR BLD AUTO: 5.8 % (ref 5–12)
NEUTROPHILS NFR BLD AUTO: 3.14 10*3/MM3 (ref 1.7–7)
NEUTROPHILS NFR BLD AUTO: 86.5 % (ref 42.7–76)
NITRITE UR QL STRIP: NEGATIVE
NRBC BLD AUTO-RTO: 0 /100 WBC (ref 0–0.2)
PH UR STRIP.AUTO: 6.5 [PH] (ref 5–8)
PHOSPHATE SERPL-MCNC: 2.5 MG/DL (ref 2.5–4.5)
PLATELET # BLD AUTO: 150 10*3/MM3 (ref 140–450)
PMV BLD AUTO: 10.6 FL (ref 6–12)
POTASSIUM SERPL-SCNC: 3.9 MMOL/L (ref 3.5–5.2)
PROT ?TM UR-MCNC: 12.8 MG/DL
PROT UR QL STRIP: NEGATIVE
PROT/CREAT UR: 173 MG/G CREA (ref 0–200)
RBC # BLD AUTO: 3.93 10*6/MM3 (ref 4.14–5.8)
SODIUM SERPL-SCNC: 138 MMOL/L (ref 136–145)
SP GR UR STRIP: 1.01 (ref 1–1.03)
UROBILINOGEN UR QL STRIP: NORMAL
WBC NRBC COR # BLD: 3.63 10*3/MM3 (ref 3.4–10.8)

## 2022-03-22 PROCEDURE — 83735 ASSAY OF MAGNESIUM: CPT

## 2022-03-22 PROCEDURE — 36415 COLL VENOUS BLD VENIPUNCTURE: CPT

## 2022-03-22 PROCEDURE — 80197 ASSAY OF TACROLIMUS: CPT

## 2022-03-22 PROCEDURE — 87086 URINE CULTURE/COLONY COUNT: CPT

## 2022-03-22 PROCEDURE — 85025 COMPLETE CBC W/AUTO DIFF WBC: CPT

## 2022-03-22 PROCEDURE — 82570 ASSAY OF URINE CREATININE: CPT

## 2022-03-22 PROCEDURE — 80069 RENAL FUNCTION PANEL: CPT

## 2022-03-22 PROCEDURE — 84156 ASSAY OF PROTEIN URINE: CPT

## 2022-03-22 PROCEDURE — 81003 URINALYSIS AUTO W/O SCOPE: CPT

## 2022-03-23 LAB
BACTERIA SPEC AEROBE CULT: NO GROWTH
TACROLIMUS BLD-MCNC: NORMAL NG/ML

## 2022-08-16 ENCOUNTER — OFFICE VISIT (OUTPATIENT)
Dept: INTERNAL MEDICINE | Age: 62
End: 2022-08-16

## 2022-08-16 VITALS
DIASTOLIC BLOOD PRESSURE: 82 MMHG | OXYGEN SATURATION: 100 % | HEART RATE: 62 BPM | TEMPERATURE: 97.3 F | RESPIRATION RATE: 16 BRPM | BODY MASS INDEX: 21.19 KG/M2 | WEIGHT: 148 LBS | SYSTOLIC BLOOD PRESSURE: 130 MMHG | HEIGHT: 70 IN

## 2022-08-16 DIAGNOSIS — Z00.00 MEDICARE ANNUAL WELLNESS VISIT, SUBSEQUENT: Primary | ICD-10-CM

## 2022-08-16 DIAGNOSIS — Z12.5 SCREENING PSA (PROSTATE SPECIFIC ANTIGEN): ICD-10-CM

## 2022-08-16 DIAGNOSIS — I10 PRIMARY HYPERTENSION: ICD-10-CM

## 2022-08-16 DIAGNOSIS — R73.03 PREDIABETES: ICD-10-CM

## 2022-08-16 DIAGNOSIS — R93.3 ABNORMAL FINDINGS ON DIAGNOSTIC IMAGING OF OTHER PARTS OF DIGESTIVE TRACT: ICD-10-CM

## 2022-08-16 PROCEDURE — 1170F FXNL STATUS ASSESSED: CPT | Performed by: NURSE PRACTITIONER

## 2022-08-16 PROCEDURE — G0439 PPPS, SUBSEQ VISIT: HCPCS | Performed by: NURSE PRACTITIONER

## 2022-08-16 PROCEDURE — 1159F MED LIST DOCD IN RCRD: CPT | Performed by: NURSE PRACTITIONER

## 2022-08-16 RX ORDER — LATANOPROST 50 UG/ML
SOLUTION/ DROPS OPHTHALMIC
COMMUNITY
Start: 2022-08-13 | End: 2023-02-16 | Stop reason: ALTCHOICE

## 2022-08-16 RX ORDER — BRIMONIDINE TARTRATE 0.15 %
DROPS OPHTHALMIC (EYE)
COMMUNITY
Start: 2022-08-13

## 2022-08-16 NOTE — ASSESSMENT & PLAN NOTE
Controlled with BP at 130/80 mmHg today. He will continue to follow up with transplant team for monitoring.

## 2022-08-16 NOTE — PROGRESS NOTES
61    I N T E R N A L  M E D I C I N E  BESSIE ALCANTAR APRCHARLIE      ENCOUNTER DATE:  08/16/2022    Harry Ortega / 61 y.o. / male        MEDICARE ANNUAL WELLNESS VISIT       Chief Complaint: Medicare Wellness-subsequent       Patient's general assessment of his health since a year ago:     - Compared to one year ago, he feels his physical health is slightly better.    - Compared to one year ago, he feels his mental health is slightly better.      HPI for other active medical problems:     Harry Ortega is a 61-year-old male who presents today for subsequent annual Medicare wellness.     The patient currently follows with his transplant provider every 2 months with lab work performed every 2 weeks. He will be 1 year out from renal transplant on 12/17/2022, and at that point, will follow up every 6 months.     His blood pressure today is controlled at 130/80 mmHg. Per patient's account, the transplant team's goal is to maintain blood pressure in the 140s to 150s systolic and 80s diastolic. He is on Coreg 6.25 mg 2 times daily and Procardia XL 2 times daily, and maintains a low-sodium diet. He denies lower extremity edema, chest pain, dyspnea, or palpitations.     He was on dialysis for 4-5 years and was following with Dr. Almazan, nephrologist at that time. The patient no longer sees nephrology. He has been within prediabetic range since his renal transplant. He was taken off diabetic medications 90 days after the transplant. He reports blood glucose between 125 mg/dL and 130 mg/dL fasting, and 112 mg/dL and 115 mg/dL after meals since. The patient continues to monitor his glucose levels 3 times a week, and endorses no urinary frequency.     The patient completed CT chest in 2017 with evidence of pulmonary nodule, which was suspected to be likely inflammatory due to pneumonia at that point. He is a former smoker who quit over 20 years ago. Follow up chest x-ray was negative. The patient no longer sees pulmonology.  "    Healthcare maintenance reviewed. He completed a colonoscopy in 2021. Pathology returned with tubular adenoma polyp with recommendation for repeat in 5 years, due in 2026. He is unsure of last PSA and does not see urology. Last eye exam was completed in the past year. He is being treated for glaucoma and recently received new glasses. He is on ocular drops. He is up-to-date on COVID-19 , hepatitis B, and pneumonia vaccinations. Due for shingles and tetanus.    The patient is still working; employment is flexible. He walks every morning and night at home, and 20 miles a day at work.     * The required components of Health Risk Assessment (HRA) that were completed by the patient and/or my staff are contained within this note and in the scanned documents titled \"Health Risk Assessment\" within the media section of the patient's chart in [facility].         HISTORY       Recent Hospitalizations:    Recent hospitalization?: no     If YES, location, date, and diagnoses:     · Location:   · Date:   · Principle Discharge Dx:   · Secondary Dx:       Patient Care Team:    Patient Care Team:  Venkatesh Rdz APRN as PCP - General (Internal Medicine)  Yesenia Baez MD as Consulting Physician (Nephrology)      Allergies:  Patient has no known allergies.    Medications:  Current Outpatient Medications on File Prior to Visit   Medication Sig Dispense Refill   • acetaminophen (TYLENOL) 325 MG tablet Take 975 mg by mouth As Needed.     • carvedilol (COREG) 12.5 MG tablet Take 6.25 mg by mouth 2 (Two) Times a Day. as directed     • entecavir (BARACLUDE) 0.5 MG tablet Take 0.5 mg by mouth Daily.     • magnesium oxide (MAG-OX) 400 MG tablet Take 800 mg by mouth 2 (Two) Times a Day.     • mycophenolate (CELLCEPT) 250 MG capsule Take 1,000 mg by mouth 2 (Two) Times a Day.     • NIFEdipine XL (PROCARDIA XL) 90 MG 24 hr tablet Take 90 mg by mouth 2 (Two) Times a Day.     • sildenafil (REVATIO) 20 MG tablet Take 20 mg by mouth Daily As " Needed.     • tacrolimus (PROGRAF) 1 MG capsule 3 capsules Daily.     • brimonidine (ALPHAGAN) 0.15 % ophthalmic solution INSTILL 1 DROP INTO EACH EYE TWICE DAILY     • latanoprost (XALATAN) 0.005 % ophthalmic solution INSTILL 1 DROP INTO EACH EYE AT BEDTIME     • [DISCONTINUED] docusate sodium 100 MG capsule Take 100 mg by mouth 2 (Two) Times a Day As Needed.     • [DISCONTINUED] Sofosbuvir-Velpatasvir 400-100 MG tablet Take 1 tablet by mouth Daily.     • [DISCONTINUED] sulfamethoxazole-trimethoprim (BACTRIM,SEPTRA) 400-80 MG tablet Take 1 tablet by mouth 3 (Three) Times a Week.     • [DISCONTINUED] valGANciclovir (VALCYTE) 450 MG tablet Take 900 mg by mouth Daily.       No current facility-administered medications on file prior to visit.        PFSH:     The following portions of the patient's history were reviewed and updated as appropriate: Allergies / Current Medications / Past Medical History / Surgical History / Social History / Family History    Problem List:  Patient Active Problem List   Diagnosis   • Hypertensive crisis   • Hypertension   • CKD (chronic kidney disease) requiring chronic dialysis (HCC)   • Pulmonary nodule   • Anemia in CKD (chronic kidney disease)   • Slow transit constipation   • Iron deficiency anemia   • Non-recurrent unilateral inguinal hernia without obstruction or gangrene   • Encounter for screening for malignant neoplasm of colon       Past Medical History:  Past Medical History:   Diagnosis Date   • Arthritis    • CKD (chronic kidney disease) requiring chronic dialysis (HCC)     ON KIDNEY TRANSPLANT LIST   • Dialysis patient ([facility])     MON,WED AND FRIDAY/48 OZ OF FLUID RESTRICTION DAY   • Glaucoma    • History of gastric ulcer    • Hypertension    • Inguinal hernia     LEFT       Past Surgical History:  Past Surgical History:   Procedure Laterality Date   • ARTERIOVENOUS FISTULA/SHUNT SURGERY W/ HEMODIALYSIS CATHETER INSERTION N/A 07/06/2017    Procedure: ARTERIOVENOUS FISTULA  "LEFT ARM AND PALINDROME PLACEMENT;  Surgeon: Kar Morales MD;  Location: [facility] MAIN OR;  Service:    • COLONOSCOPY N/A 10/12/2017    Procedure: COLONOSCOPY TO CECUM AND TERMINAL ILEUM;  Surgeon: Doni Terrazas MD;  Location: [facility] ENDOSCOPY;  Service:    • COLONOSCOPY N/A 09/28/2021    Procedure: COLONOSCOPY to cecum and TI with cold polypectomy;  Surgeon: Doni Terrazas MD;  Location: [facility] ENDOSCOPY;  Service: Gastroenterology;  Laterality: N/A;  PRE - screening  POST - polyp, normal TI, internal hemorrhoids   • ENDOSCOPY N/A 10/12/2017    Procedure: ESOPHAGOGASTRODUODENOSCOPY WITH BIOPSIES;  Surgeon: Doni Terrazas MD;  Location: [facility] ENDOSCOPY;  Service:    • INGUINAL HERNIA REPAIR Left 10/29/2019    Procedure: left INGUINAL HERNIA REPAIR LAPAROSCOPIC WITH DAVINCI ROBOT;  Surgeon: Konstantin Espinoza MD;  Location: [facility] MAIN OR;  Service: DaVinci   • TOTAL KNEE ARTHROPLASTY Left 08/28/2014    Dr. Alf Garrett   • TRANSPLANTATION RENAL Right        Social History:  Social History     Socioeconomic History   • Marital status:    • Number of children: 5   Tobacco Use   • Smoking status: Former Smoker     Packs/day: 0.25   • Smokeless tobacco: Never Used   • Tobacco comment: QUIT 20 YR AGO   Vaping Use   • Vaping Use: Never used   Substance and Sexual Activity   • Alcohol use: Yes     Comment: BEER OCCASIONALLY   • Drug use: No   • Sexual activity: Defer       Family History:  Family History   Problem Relation Age of Onset   • Hypertension Mother    • Diabetes Mother    • Hypertension Father    • Diabetes Son    • Malig Hyperthermia Neg Hx          PATIENT ASSESSMENT     Vitals:  /82 (BP Location: Right arm, Patient Position: Sitting, Cuff Size: Adult)   Pulse 62   Temp 97.3 °F (36.3 °C) (Temporal)   Resp 16   Ht 177.8 cm (70\")   Wt 67.1 kg (148 lb)   SpO2 100%   BMI 21.24 kg/m²   BP Readings from Last 3 Encounters:   08/16/22 130/82   03/15/22 136/74   09/28/21 152/87 "     Wt Readings from Last 3 Encounters:   08/16/22 67.1 kg (148 lb)   03/15/22 68 kg (150 lb)   09/28/21 70 kg (154 lb 6.4 oz)      Body mass index is 21.24 kg/m².    There were no vitals filed for this visit.      Review of Systems:    Review of Systems  Constitutional negative except per HPI   Respiratory negative  CV negative    Physical Exam:    Physical Exam  Constitutional  No distress  Cardiovascular Rate  normal . Rhythm: regular . Heart sounds:  normal  Pulmonary/Chest  Effort normal. Breath sounds:  normal  Psychiatric  Alert. Judgment and thought content normal. Mood normal    Reviewed Data:    Labs:   Lab Results   Component Value Date     03/22/2022    K 3.9 03/22/2022    CALCIUM 9.5 03/22/2022    AST 31 06/22/2022    ALT 27 06/22/2022    BUN 18 03/22/2022    CREATININE 1.13 03/22/2022    CREATININE 1.05 03/02/2022    CREATININE 0.93 02/24/2022    EGFRIFNONA 50 12/20/2021    EGFRIFAFRI 100 02/24/2022       Lab Results   Component Value Date     (H) 12/20/2021     (H) 12/19/2021     (H) 12/19/2021    HGBA1C 6.5 (H) 03/15/2022    HGBA1C 6.4 (H) 02/18/2022    HGBA1C 6.2 (H) 02/07/2022       Lab Results   Component Value Date     06/22/2022     03/10/2022    LDL 96 08/17/2021    HDL 80 06/22/2022    TRIG 133 06/22/2022    CHOLHDLRATIO 2.98 09/13/2018       Lab Results   Component Value Date    TSH 0.911 03/15/2022    FREET4 1.42 03/15/2022          Lab Results   Component Value Date    WBC 3.0 (L) 08/04/2022    HGB 14.7 08/04/2022    HGB 14.4 07/21/2022    HGB 14.9 06/30/2022     08/04/2022                   Lab Results   Component Value Date    PSA 0.571 07/24/2017       Imaging:          Medical Tests:          Screening for Glaucoma:  Previous screening for glaucoma?: Yes      Hearing Loss Screen:  Finger Rub Hearing Test (right ear): passed  Finger Rub Hearing Test (left ear): passed      Urinary Incontinence Screen:  Episodes of urinary incontinence? :  No      Depression Screen:  PHQ-2/PHQ-9 Depression Screening 8/16/2022   Retired PHQ-9 Total Score -   Retired Total Score -   Little Interest or Pleasure in Doing Things 0-->not at all   Feeling Down, Depressed or Hopeless 0-->not at all   PHQ-9: Brief Depression Severity Measure Score 0        PHQ-2: 0 (Not depressed)    PHQ-9: 0 (Negative screening for depression)       FUNCTIONAL, FALL RISK, & COGNITIVE SCREENING (Components below):    DATA:    Functional & Cognitive Status 8/16/2022   Do you have difficulty preparing food and eating? No   Do you have difficulty bathing yourself, getting dressed or grooming yourself? No   Do you have difficulty using the toilet? No   Do you have difficulty moving around from place to place? No   Do you have trouble with steps or getting out of a bed or a chair? No   Current Diet Well Balanced Diet   Dental Exam Up to date   Eye Exam Up to date   Exercise (times per week) 7 times per week   Current Exercises Include Walking   Do you need help using the phone?  No   Are you deaf or do you have serious difficulty hearing?  No   Do you need help with transportation? No   Do you need help shopping? No   Do you need help preparing meals?  No   Do you need help with housework?  No   Do you need help with laundry? No   Do you need help taking your medications? No   Do you need help managing money? No   Do you ever drive or ride in a car without wearing a seat belt? No   Have you felt unusual stress, anger or loneliness in the last month? No   Who do you live with? Spouse   If you need help, do you have trouble finding someone available to you? No         A) Assessment of Functional Ability:  (Assessment of ability to perform ADL's (showering/bathing, using toilet, dressing, feeding self, moving self around) and IADL's (use telephone, shop, prepare food, housekeep, do laundry, transport independently, take medications independently, and handle finances)    Degree of functional  impairment: NONE (based on assessment noted above)      B) Assessment of Fall Risk:  Fall Risk Assessment was completed, and patient is at low risk for falls.       Need for further evaluation of gait, strength, and balance? : No    Timed Up and Go (TUG):   (>= 12 seconds indicates high risk for falling)    Observable abnormalities included: Normal gait pattern       C. Assessment of Cognitive Function:    Mini-Cog Test:     1) Registration (3 objects): Yes   2) Number of objects recalled: 3   3) Clock Draw: Passed? : Yes       Further evaluation required? : No        COUNSELING       A. Identification of Health Risk Factors:    Risk factors include: cardiovascular risk factors      B. Age-Appropriate Screening Schedule:  (Refer to the list below for future screening recommendations based on patient's age, sex and/or medical conditions. Orders for these recommended tests are listed in the plan section. The patient has been provided with a written plan)    Health Maintenance Topics  Health Maintenance   Topic Date Due   • TDAP/TD VACCINES (1 - Tdap) Never done   • ZOSTER VACCINE (1 of 2) Never done   • INFLUENZA VACCINE  10/01/2022       Health Maintenance Topics Due or Over-Due  Health Maintenance Due   Topic Date Due   • TDAP/TD VACCINES (1 - Tdap) Never done   • ZOSTER VACCINE (1 of 2) Never done   • ANNUAL WELLNESS VISIT  Never done         C. Advanced Care Planning:    Advance Care Planning   ACP discussion was held with the patient during this visit. Patient does not have an advance directive, declines further assistance.       D. Patient Self-Management and Personalized Health Advice:    He has been provided with personalized counseling/information (including brochures/handouts) about:     -- designing advance directives      He has been recommended for the following preventative services which has been performed today, will be ordered today or ordered/performed on upcoming follow-up visit:     -- NUTRITION  counseling provided, EXERCISE counseling provided, EYE exam for glaucoma screening recommended, CARDIOVASCULAR disease risk reduction counseling performed, FALL RISK assessment / plan of care completed, URINARY incontinence assessment done, PROSTATE CANCER screening (discussed and PSA ordered +/- NIKI performed), **COLORECTAL cancer screening (colonoscopy/Cologuard discussed or ordered), vaccination for Tdap / Td administered (or recommended), vaccination for SHINGRIX administered  (or recommended)      E. Miscellaneous Items:    -Aspirin use counseling: Does not need ASA (and currently is not on it)    -Discussed BMI with him. The BMI is in the acceptable range    -Reviewed use of high risk medication in the elderly: YES    -Reviewed for potential of harmful drug interactions in the elderly: YES        WRAP UP       Assessment & Plan:  1) MEDICARE ANNUAL WELLNESS VISIT    2) OTHER MEDICAL CONDITIONS ADDRESSED TODAY:            Problem List Items Addressed This Visit        Cardiac and Vasculature    Hypertension    Current Assessment & Plan     Controlled with BP at 130/80 mmHg today. He will continue to follow up with transplant team for monitoring.         Relevant Medications    carvedilol (COREG) 12.5 MG tablet    NIFEdipine XL (PROCARDIA XL) 90 MG 24 hr tablet      Other Visit Diagnoses     Medicare annual wellness visit, subsequent    -  Primary    Prediabetes        We will consider Trajenta if his blood glucose remains elevated or if he becomes diabetic. Check blood glucose and hemoglobin A1c.     Relevant Orders    Lipid Panel With / Chol / HDL Ratio    Hemoglobin A1c    Screening PSA (prostate specific antigen)        Check PSA. Lab ordered.     Relevant Orders    PSA Screen    Abnormal findings on diagnostic imaging of other parts of digestive tract         Relevant Orders    Lipid Panel With / Chol / HDL Ratio                  Orders Placed This Encounter   Procedures   • PSA Screen   • Lipid Panel With  / Chol / HDL Ratio   • Hemoglobin A1c       Discussion / Summary:  Status post renal transplant  Patient doing well post transplant. He will continue to follow up with the transplant team as indicated. Lipid panel and hemoglobin A1c ordered for monitoring.  Diabetic donor.  Up-to-date colonoscopy, order for PSA for screening purposes.  Follow-up in 6 months for chronic medical, 1 year Medicare wellness    Medications as of TODAY:              Current Outpatient Medications   Medication Sig Dispense Refill   • acetaminophen (TYLENOL) 325 MG tablet Take 975 mg by mouth As Needed.     • carvedilol (COREG) 12.5 MG tablet Take 6.25 mg by mouth 2 (Two) Times a Day. as directed     • entecavir (BARACLUDE) 0.5 MG tablet Take 0.5 mg by mouth Daily.     • magnesium oxide (MAG-OX) 400 MG tablet Take 800 mg by mouth 2 (Two) Times a Day.     • mycophenolate (CELLCEPT) 250 MG capsule Take 1,000 mg by mouth 2 (Two) Times a Day.     • NIFEdipine XL (PROCARDIA XL) 90 MG 24 hr tablet Take 90 mg by mouth 2 (Two) Times a Day.     • sildenafil (REVATIO) 20 MG tablet Take 20 mg by mouth Daily As Needed.     • tacrolimus (PROGRAF) 1 MG capsule 3 capsules Daily.     • brimonidine (ALPHAGAN) 0.15 % ophthalmic solution INSTILL 1 DROP INTO EACH EYE TWICE DAILY     • latanoprost (XALATAN) 0.005 % ophthalmic solution INSTILL 1 DROP INTO EACH EYE AT BEDTIME       No current facility-administered medications for this visit.         FOLLOW-UP:            Return in about 6 months (around 2/16/2023) for Next scheduled follow up; 1 year wellness, cancel 09/01.                 Future Appointments   Date Time Provider Department Center   2/16/2023 12:30 PM Venkatesh Rdz APRN [facility]   10/19/2023  2:15 PM Venkatesh Rdz APRN [facility]           After Visit Summary (AVS) including the Personalized Prevention  Plan Services (PPPS) was either printed and given to the patient at check-out today and/or sent to [facility] for review.        *Examiner was wearing medical surgical mask, face shield and exam gloves during the entire duration of the visit. Patient was masked the entire time.   Minimum social distance of 6 ft maintained entire visit except if physical contact was necessary as documented.      **Dragon Disclaimer:   Much of this encounter note is an electronic transcription/translation of spoken language to printed text. The electronic translation of spoken language may permit erroneous, or at times, nonsensical words or phrases to be inadvertently transcribed. Although I have reviewed the note for such errors, some may still exist.       Transcribed from ambient dictation for ALONZO Shrestha by Elliott Kelley.  08/16/22   16:47 EDT    Patient verbalized consent to the visit recording.  I have personally performed the services described in this document as transcribed by the above individual, and it is both accurate and complete.  ALONZO Shrestha  8/16/2022  17:07 EDT

## 2022-08-17 ENCOUNTER — TELEPHONE (OUTPATIENT)
Dept: INTERNAL MEDICINE | Age: 62
End: 2022-08-17

## 2022-08-17 LAB
CHOLEST SERPL-MCNC: 202 MG/DL (ref 100–199)
CHOLEST/HDLC SERPL: 3.4 RATIO (ref 0–5)
HBA1C MFR BLD: 6.4 % (ref 4.8–5.6)
HDLC SERPL-MCNC: 59 MG/DL
LDLC SERPL CALC-MCNC: 126 MG/DL (ref 0–99)
PSA SERPL-MCNC: 1.2 NG/ML (ref 0–4)
TRIGL SERPL-MCNC: 94 MG/DL (ref 0–149)
VLDLC SERPL CALC-MCNC: 17 MG/DL (ref 5–40)

## 2022-08-17 NOTE — TELEPHONE ENCOUNTER
LVM that I am sending [employer] message or he can return call. Whichever works for him!    OKAY FOR HUB TO READ, THANK YOU    ----- Message from ALONZO Shrestha sent at 8/17/2022  1:46 PM EDT -----  Blood sugar hovers in prediabetes, at 6.4.  He is continue to practice healthy diet and exercise.    Mild improvement in cholesterol from 1 month prior, however your cardiovascular risk score is still around 13% in the next 10 years of having a cardiovascular event.  I would recommend continuing healthy diet and exercise at this time.  We can consider a CT cardiac calcium score which would look at the plaque burden within the vessels of the heart to see if a statin or cholesterol-lowering medication would really be warranted.  We will continue to monitor at this time.      Prostate marker did have a mild increase, I would recommend rechecking this at your next office visit in February.

## 2022-12-21 ENCOUNTER — TRANSCRIBE ORDERS (OUTPATIENT)
Dept: ADMINISTRATIVE | Facility: HOSPITAL | Age: 62
End: 2022-12-21

## 2022-12-21 DIAGNOSIS — R39.9 GENITOURINARY SYMPTOMS: Primary | ICD-10-CM

## 2022-12-21 DIAGNOSIS — Z79.899 ENCOUNTER FOR LONG-TERM (CURRENT) USE OF OTHER MEDICATIONS: ICD-10-CM

## 2022-12-21 DIAGNOSIS — R79.89 HYPOURICEMIA: ICD-10-CM

## 2022-12-21 DIAGNOSIS — D64.9 ANEMIA, UNSPECIFIED TYPE: ICD-10-CM

## 2022-12-21 DIAGNOSIS — Z51.81 ENCOUNTER FOR THERAPEUTIC DRUG MONITORING: Primary | ICD-10-CM

## 2022-12-21 DIAGNOSIS — Z94.0 KIDNEY REPLACED BY TRANSPLANT: ICD-10-CM

## 2022-12-21 DIAGNOSIS — R39.9 GENITOURINARY SYMPTOMS: ICD-10-CM

## 2022-12-21 DIAGNOSIS — Z94.0 KIDNEY REPLACED BY TRANSPLANT: Primary | ICD-10-CM

## 2023-02-16 ENCOUNTER — OFFICE VISIT (OUTPATIENT)
Dept: INTERNAL MEDICINE | Age: 63
End: 2023-02-16
Payer: MEDICARE

## 2023-02-16 VITALS
HEIGHT: 70 IN | OXYGEN SATURATION: 98 % | TEMPERATURE: 97.1 F | DIASTOLIC BLOOD PRESSURE: 82 MMHG | HEART RATE: 72 BPM | WEIGHT: 154 LBS | SYSTOLIC BLOOD PRESSURE: 144 MMHG | BODY MASS INDEX: 22.05 KG/M2

## 2023-02-16 DIAGNOSIS — I10 ESSENTIAL HYPERTENSION: Primary | ICD-10-CM

## 2023-02-16 DIAGNOSIS — R73.03 PREDIABETES: ICD-10-CM

## 2023-02-16 DIAGNOSIS — N52.9 ERECTILE DYSFUNCTION, UNSPECIFIED ERECTILE DYSFUNCTION TYPE: ICD-10-CM

## 2023-02-16 PROBLEM — D68.8 OTHER SPECIFIED COAGULATION DEFECTS: Status: ACTIVE | Noted: 2017-07-14

## 2023-02-16 PROBLEM — K59.01 SLOW TRANSIT CONSTIPATION: Status: ACTIVE | Noted: 2017-09-19

## 2023-02-16 PROBLEM — K43.9 HERNIA OF ABDOMINAL WALL: Status: ACTIVE | Noted: 2023-02-16

## 2023-02-16 PROBLEM — Z94.0 KIDNEY TRANSPLANT RECIPIENT: Status: ACTIVE | Noted: 2021-12-20

## 2023-02-16 PROBLEM — N18.9 ANEMIA IN CHRONIC KIDNEY DISEASE: Status: ACTIVE | Noted: 2017-08-01

## 2023-02-16 PROBLEM — R94.39 ABNORMAL NUCLEAR STRESS TEST: Status: ACTIVE | Noted: 2021-11-08

## 2023-02-16 PROBLEM — R51.9 HEADACHE, UNSPECIFIED: Status: ACTIVE | Noted: 2017-10-17

## 2023-02-16 PROBLEM — Z79.899 IMMUNOSUPPRESSIVE MANAGEMENT ENCOUNTER FOLLOWING KIDNEY TRANSPLANT: Status: ACTIVE | Noted: 2021-12-20

## 2023-02-16 PROBLEM — Z94.0 IMMUNOSUPPRESSIVE MANAGEMENT ENCOUNTER FOLLOWING KIDNEY TRANSPLANT: Status: ACTIVE | Noted: 2021-12-20

## 2023-02-16 PROBLEM — D50.9 IRON DEFICIENCY ANEMIA: Status: ACTIVE | Noted: 2017-07-13

## 2023-02-16 PROBLEM — D63.1 ANEMIA IN CHRONIC KIDNEY DISEASE: Status: ACTIVE | Noted: 2017-08-01

## 2023-02-16 PROBLEM — T78.2XXA ANAPHYLACTIC SHOCK, UNSPECIFIED, INITIAL ENCOUNTER: Status: ACTIVE | Noted: 2020-08-13

## 2023-02-16 PROBLEM — Z01.818 PRE-TRANSPLANT EVALUATION FOR KIDNEY TRANSPLANT: Status: ACTIVE | Noted: 2017-10-11

## 2023-02-16 PROBLEM — R06.02 SHORTNESS OF BREATH: Status: ACTIVE | Noted: 2019-01-03

## 2023-02-16 PROBLEM — E87.6 HYPOKALEMIA: Status: ACTIVE | Noted: 2017-07-12

## 2023-02-16 PROBLEM — E46 UNSPECIFIED PROTEIN-CALORIE MALNUTRITION: Status: ACTIVE | Noted: 2017-07-18

## 2023-02-16 PROBLEM — R76.11 NONSPECIFIC REACTION TO TUBERCULIN SKIN TEST WITHOUT ACTIVE TUBERCULOSIS: Status: ACTIVE | Noted: 2017-07-17

## 2023-02-16 PROBLEM — K29.71 GASTRITIS, UNSPECIFIED, WITH BLEEDING: Status: ACTIVE | Noted: 2017-07-31

## 2023-02-16 PROBLEM — T78.40XA ALLERGY, UNSPECIFIED, INITIAL ENCOUNTER: Status: ACTIVE | Noted: 2020-08-13

## 2023-02-16 PROCEDURE — 90677 PCV20 VACCINE IM: CPT | Performed by: NURSE PRACTITIONER

## 2023-02-16 PROCEDURE — G0009 ADMIN PNEUMOCOCCAL VACCINE: HCPCS | Performed by: NURSE PRACTITIONER

## 2023-02-16 PROCEDURE — 99214 OFFICE O/P EST MOD 30 MIN: CPT | Performed by: NURSE PRACTITIONER

## 2023-02-16 RX ORDER — LORATADINE 10 MG/1
10 TABLET ORAL DAILY PRN
COMMUNITY

## 2023-02-16 RX ORDER — DORZOLAMIDE HYDROCHLORIDE AND TIMOLOL MALEATE 20; 5 MG/ML; MG/ML
SOLUTION/ DROPS OPHTHALMIC
COMMUNITY
Start: 2023-02-07

## 2023-02-16 RX ORDER — MULTIPLE VITAMINS W/ MINERALS TAB 9MG-400MCG
1 TAB ORAL DAILY
COMMUNITY

## 2023-02-16 RX ORDER — SILDENAFIL 25 MG/1
25 TABLET, FILM COATED ORAL DAILY PRN
COMMUNITY
Start: 2023-01-21 | End: 2023-02-16 | Stop reason: SDUPTHER

## 2023-02-16 RX ORDER — CARVEDILOL 6.25 MG/1
6.25 TABLET ORAL 2 TIMES DAILY WITH MEALS
COMMUNITY
Start: 2023-02-06 | End: 2023-02-16 | Stop reason: SDUPTHER

## 2023-02-16 RX ORDER — CARVEDILOL 6.25 MG/1
6.25 TABLET ORAL 2 TIMES DAILY WITH MEALS
Qty: 180 TABLET | Refills: 3 | Status: SHIPPED | OUTPATIENT
Start: 2023-02-16

## 2023-02-16 RX ORDER — SILDENAFIL 25 MG/1
25 TABLET, FILM COATED ORAL DAILY PRN
Qty: 30 TABLET | Refills: 3 | Status: SHIPPED | OUTPATIENT
Start: 2023-02-16 | End: 2023-02-16 | Stop reason: SDUPTHER

## 2023-02-16 RX ORDER — SILDENAFIL 25 MG/1
25 TABLET, FILM COATED ORAL DAILY PRN
Qty: 30 TABLET | Refills: 3
Start: 2023-02-16

## 2023-02-16 RX ORDER — NIFEDIPINE 90 MG/1
90 TABLET, EXTENDED RELEASE ORAL 2 TIMES DAILY
Qty: 180 TABLET | Refills: 3 | Status: SHIPPED | OUTPATIENT
Start: 2023-02-16

## 2023-02-16 RX ORDER — MAGNESIUM OXIDE 400 MG/1
800 TABLET ORAL 3 TIMES DAILY
COMMUNITY
Start: 2022-11-02

## 2023-02-16 NOTE — ASSESSMENT & PLAN NOTE
Decrease/eliminate overall caloric intake. Reduce carbohydrates and sweets in diet.  Continue to improve dietary habits with lean proteins, fresh vegetables, fruits, and nuts. Improve aerobic exercise: walking/biking/swimming daily as tolerated, recommend 30 minutes/day at least 5 days/week.

## 2023-02-16 NOTE — PROGRESS NOTES
"    I N T E R N A L  M E D I C I N E  BESSIE ALCANTAR, APRN      ENCOUNTER DATE:  02/16/2023    Harry SCHOFIELD Jordan / 62 y.o. / male      CHIEF COMPLAINT / REASON FOR OFFICE VISIT     Hypertension, Prediabetes, and Erectile Dysfunction      ASSESSMENT & PLAN     Problem List Items Addressed This Visit        Cardiac and Vasculature    Essential hypertension - Primary    Relevant Medications    carvedilol (COREG) 6.25 MG tablet    NIFEdipine XL (PROCARDIA XL) 90 MG 24 hr tablet       Endocrine and Metabolic    Prediabetes    Current Assessment & Plan     Decrease/eliminate overall caloric intake. Reduce carbohydrates and sweets in diet.  Continue to improve dietary habits with lean proteins, fresh vegetables, fruits, and nuts. Improve aerobic exercise: walking/biking/swimming daily as tolerated, recommend 30 minutes/day at least 5 days/week.            Genitourinary and Reproductive     Erectile dysfunction    Relevant Medications    sildenafil (VIAGRA) 25 MG tablet     Orders Placed This Encounter   Procedures   • Pneumococcal Conjugate Vaccine 20-Valent (PCV20)     New Medications Ordered This Visit   Medications   • carvedilol (COREG) 6.25 MG tablet     Sig: Take 1 tablet by mouth 2 (Two) Times a Day With Meals.     Dispense:  180 tablet     Refill:  3   • NIFEdipine XL (PROCARDIA XL) 90 MG 24 hr tablet     Sig: Take 1 tablet by mouth 2 (Two) Times a Day.     Dispense:  180 tablet     Refill:  3   • sildenafil (VIAGRA) 25 MG tablet     Sig: Take 1 tablet by mouth Daily As Needed for Erectile Dysfunction.     Dispense:  30 tablet     Refill:  3       SUMMARY/DISCUSSION  • Follow-up as scheduled in October, earlier if needed.     Next Appointment with me: Visit date not found    Return for Next scheduled follow up.      VITAL SIGNS     Visit Vitals  /82 (Cuff Size: Adult)   Pulse 72   Temp 97.1 °F (36.2 °C) (Temporal)   Ht 177.8 cm (70\")   Wt 69.9 kg (154 lb)   SpO2 98%   BMI 22.10 kg/m²       Wt Readings from Last 3 " Encounters:   02/16/23 69.9 kg (154 lb)   08/16/22 67.1 kg (148 lb)   03/15/22 68 kg (150 lb)     Body mass index is 22.1 kg/m².      MEDICATIONS AT THE TIME OF OFFICE VISIT     Current Outpatient Medications on File Prior to Visit   Medication Sig   • acetaminophen (TYLENOL) 325 MG tablet Take 975 mg by mouth As Needed.   • brimonidine (ALPHAGAN) 0.15 % ophthalmic solution INSTILL 1 DROP INTO EACH EYE TWICE DAILY   • dorzolamide-timolol (COSOPT) 22.3-6.8 MG/ML ophthalmic solution    • loratadine (CLARITIN) 10 MG tablet Take 10 mg by mouth Daily As Needed.   • magnesium oxide (MAG-OX) 400 MG tablet Take 800 mg by mouth 3 (Three) Times a Day.   • multivitamin with minerals tablet tablet Take 1 tablet by mouth Daily.   • mycophenolate (CELLCEPT) 250 MG capsule Take 1,000 mg by mouth 2 (Two) Times a Day.   • Tacrolimus ER 1 MG tablet sustained-release 24 hour Take 6 mg by mouth Daily.   • [DISCONTINUED] carvedilol (COREG) 12.5 MG tablet Take 6.25 mg by mouth 2 (Two) Times a Day. as directed   • [DISCONTINUED] carvedilol (COREG) 6.25 MG tablet 6.25 mg 2 (Two) Times a Day With Meals.   • [DISCONTINUED] latanoprost (XALATAN) 0.005 % ophthalmic solution INSTILL 1 DROP INTO EACH EYE AT BEDTIME   • [DISCONTINUED] magnesium oxide (MAG-OX) 400 MG tablet Take 800 mg by mouth 2 (Two) Times a Day.   • [DISCONTINUED] NIFEdipine XL (PROCARDIA XL) 90 MG 24 hr tablet Take 90 mg by mouth 2 (Two) Times a Day.   • [DISCONTINUED] sildenafil (REVATIO) 20 MG tablet Take 20 mg by mouth Daily As Needed.   • [DISCONTINUED] sildenafil (VIAGRA) 25 MG tablet Take 25 mg by mouth Daily As Needed.   • [DISCONTINUED] entecavir (BARACLUDE) 0.5 MG tablet Take 0.5 mg by mouth Daily.   • [DISCONTINUED] tacrolimus (PROGRAF) 1 MG capsule 3 capsules Daily.     No current facility-administered medications on file prior to visit.          HISTORY OF PRESENT ILLNESS     He will be 1 year out from renal transplant on 12/17/2022.      His blood pressure today  is controlled at 130/80 mmHg. Per patient's account, the transplant team's goal is to maintain blood pressure in the 140s to 150s systolic and 80s diastolic. He is on Coreg 6.25 mg 2 times daily and Procardia XL 2 times daily, and maintains a low-sodium diet. He denies lower extremity edema, chest pain, dyspnea, or palpitations.      Prediabetes after renal transplant, last HA1c 6.2.     Viagra as needed for erectile dysfunction, no side effects with medication.     REVIEW OF SYSTEMS     Constitutional neg except per HPI   Resp neg  CV neg   erectile dysfunction    PHYSICAL EXAMINATION     Physical Exam  Constitutional  No distress  Cardiovascular Rate  normal . Rhythm: regular . Heart sounds:  normal  Pulmonary/Chest  Effort normal. Breath sounds:  normal  Psychiatric  Alert. Judgment and thought content normal. Mood normal     REVIEWED DATA     Labs:   Lab Results   Component Value Date    GLUCOSE 196 (H) 03/22/2022    BUN 18 03/22/2022    CREATININE 1.13 03/22/2022    EGFR 73.9 03/22/2022    BCR 15.9 03/22/2022    K 3.9 03/22/2022    CO2 25.0 03/22/2022    CALCIUM 9.5 03/22/2022    PROTENTOTREF 6.6 09/13/2018    ALBUMIN 4.5 02/15/2023    LABIL2 2.1 09/13/2018    AST 25 02/15/2023    ALT 23 02/15/2023     Lab Results   Component Value Date    TSH 0.911 03/15/2022     Lab Results   Component Value Date    CHLPL 202 (H) 08/16/2022    TRIG 94 08/16/2022    HDL 59 08/16/2022     (H) 08/16/2022     Lab Results   Component Value Date    HGBA1C 6.3 (H) 02/15/2023     Lab Results   Component Value Date    WBC 3.0 (L) 02/15/2023    HGB 14.7 02/15/2023    HCT 43.0 02/15/2023    MCV 94.7 02/15/2023     02/15/2023     Lab Results   Component Value Date    PSA 1.2 08/16/2022    PSA 0.571 07/24/2017     Brief Urine Lab Results  (Last result in the past 365 days)      Color   Clarity   Blood   Leuk Est   Nitrite   Protein   CREAT   Urine HCG        05/26/22 1414 Yellow   Clear   Negative   Negative   Negative                    Imaging:           Medical Tests:           Summary of old records / correspondence / consultant report:           Request outside records:             *Examiner was wearing medical surgical mask.     **Dragon dictation used for documentation.

## 2023-08-02 DIAGNOSIS — N52.9 ERECTILE DYSFUNCTION, UNSPECIFIED ERECTILE DYSFUNCTION TYPE: ICD-10-CM

## 2023-08-03 RX ORDER — SILDENAFIL 25 MG/1
25 TABLET, FILM COATED ORAL DAILY PRN
Qty: 30 TABLET | Refills: 3
Start: 2023-08-03

## 2023-10-19 ENCOUNTER — OFFICE VISIT (OUTPATIENT)
Dept: INTERNAL MEDICINE | Age: 63
End: 2023-10-19
Payer: MEDICARE

## 2023-10-19 VITALS
HEART RATE: 83 BPM | WEIGHT: 155.4 LBS | HEIGHT: 70 IN | SYSTOLIC BLOOD PRESSURE: 168 MMHG | DIASTOLIC BLOOD PRESSURE: 96 MMHG | OXYGEN SATURATION: 99 % | TEMPERATURE: 98.1 F | BODY MASS INDEX: 22.25 KG/M2

## 2023-10-19 DIAGNOSIS — Z00.00 MEDICARE ANNUAL WELLNESS VISIT, SUBSEQUENT: Primary | ICD-10-CM

## 2023-10-19 DIAGNOSIS — E11.22 TYPE 2 DIABETES MELLITUS WITH CHRONIC KIDNEY DISEASE, WITHOUT LONG-TERM CURRENT USE OF INSULIN, UNSPECIFIED CKD STAGE: ICD-10-CM

## 2023-10-19 DIAGNOSIS — I10 ESSENTIAL HYPERTENSION: ICD-10-CM

## 2023-10-19 DIAGNOSIS — E78.5 HYPERLIPIDEMIA, UNSPECIFIED HYPERLIPIDEMIA TYPE: ICD-10-CM

## 2023-10-19 DIAGNOSIS — Z12.5 SCREENING PSA (PROSTATE SPECIFIC ANTIGEN): ICD-10-CM

## 2023-10-19 DIAGNOSIS — N52.9 ERECTILE DYSFUNCTION, UNSPECIFIED ERECTILE DYSFUNCTION TYPE: ICD-10-CM

## 2023-10-19 RX ORDER — LOSARTAN POTASSIUM 50 MG/1
50 TABLET ORAL DAILY
Qty: 30 TABLET | Refills: 0 | Status: SHIPPED | OUTPATIENT
Start: 2023-10-19

## 2023-10-19 RX ORDER — LOSARTAN POTASSIUM 50 MG/1
1 TABLET ORAL DAILY
COMMUNITY
Start: 2023-06-19 | End: 2023-10-19

## 2023-10-19 RX ORDER — SILDENAFIL 25 MG/1
25 TABLET, FILM COATED ORAL DAILY PRN
Qty: 30 TABLET | Refills: 1 | Status: SHIPPED | OUTPATIENT
Start: 2023-10-19

## 2023-10-19 NOTE — PROGRESS NOTES
"    I N T E R N A L  M E D I C I CHARLIE E  ALONZO VASQUEZ      ENCOUNTER DATE:  10/19/2023    Harry Ortega / 63 y.o. / male    MEDICARE ANNUAL WELLNESS VISIT       Chief Complaint: Medicare Wellness-subsequent, Hypertension, Hyperlipidemia, Erectile Dysfunction, and Diabetes       Patient's general assessment of his health since a year ago:     - Compared to one year ago, he feels his physical health is about the same without significant change.    - Compared to one year ago, he feels his mental health is about the same without significant change.      HPI for other active medical problems:     Colonoscopy recall in 2026.    Type 2 diabetes now on Jardiance 10 mg daily along with proteinuria, 1 year out from renal transplant December 2022.  Last hemoglobin A1c of 6.8.    Blood pressure elevated today, transplant team's goal is to maintain blood pressure in the 140 and 150 systolic and 80s diastolic.  On Coreg 6.25 mg 2 times daily and Procardia XL 2 times daily.  He maintains a low-sodium diet.  No lower extremity edema, chest pain, dyspnea or palpitations.  The transplant team did restart him on his losartan 50 mg but he was unaware and needs this medication filled today with elevated readings today.    Viagra as needed for erectile dysfunction but no side effects of medication.    * The required components of Health Risk Assessment (HRA) that were completed by the patient and/or my staff are contained within this note and in the scanned documents titled \"Health Risk Assessment\" within the media section of the patient's chart in [employer].         HISTORY       Recent Hospitalizations:    Recent hospitalization?: No     If YES, location, date, and diagnoses:     Location:   Date:   Principle Discharge Dx:   Secondary Dx:       Patient Care Team:    Patient Care Team:  Venkatesh Rdz APRN as PCP - General (Internal Medicine)  Yesenia Baez MD as Consulting Physician (Nephrology)      Allergies:  Patient has no " known allergies.    Medications:  Current Outpatient Medications on File Prior to Visit   Medication Sig Dispense Refill    acetaminophen (TYLENOL) 325 MG tablet Take 3 tablets by mouth As Needed.      brimonidine (ALPHAGAN) 0.15 % ophthalmic solution INSTILL 1 DROP INTO EACH EYE TWICE DAILY      carvedilol (COREG) 6.25 MG tablet Take 1 tablet by mouth 2 (Two) Times a Day With Meals. 180 tablet 3    dorzolamide-timolol (COSOPT) 22.3-6.8 MG/ML ophthalmic solution       empagliflozin (JARDIANCE) 10 MG tablet tablet Take 1 tablet by mouth Daily.      loratadine (CLARITIN) 10 MG tablet Take 1 tablet by mouth Daily As Needed.      magnesium oxide (MAG-OX) 400 MG tablet Take 2 tablets by mouth 3 (Three) Times a Day.      multivitamin with minerals tablet tablet Take 1 tablet by mouth Daily.      mycophenolate (CELLCEPT) 250 MG capsule Take 4 capsules by mouth 2 (Two) Times a Day.      NIFEdipine XL (PROCARDIA XL) 90 MG 24 hr tablet Take 1 tablet by mouth 2 (Two) Times a Day. 180 tablet 3    Tacrolimus ER 1 MG tablet sustained-release 24 hour Take 6 tablets by mouth Daily.      [DISCONTINUED] losartan (COZAAR) 50 MG tablet Take 1 tablet by mouth Daily.      [DISCONTINUED] sildenafil (VIAGRA) 25 MG tablet Take 1 tablet by mouth Daily As Needed for Erectile Dysfunction. 30 tablet 3     No current facility-administered medications on file prior to visit.        PFSH:     The following portions of the patient's history were reviewed and updated as appropriate: Allergies / Current Medications / Past Medical History / Surgical History / Social History / Family History    Problem List:  Patient Active Problem List   Diagnosis    Hypertensive crisis    Essential hypertension    ESRD (end stage renal disease) on dialysis    Pulmonary nodule    Anemia in chronic kidney disease    Slow transit constipation    Iron deficiency anemia    Non-recurrent unilateral inguinal hernia without obstruction or gangrene    Pre-transplant  evaluation for kidney transplant    Abnormal nuclear stress test    Allergy, unspecified, initial encounter    Anaphylactic shock, unspecified, initial encounter    Gastritis, unspecified, with bleeding    Headache, unspecified    Hernia of abdominal wall    Hypokalemia    Immunosuppressive management encounter following kidney transplant    Kidney transplant recipient    Nonspecific reaction to tuberculin skin test without active tuberculosis    Other specified coagulation defects    Shortness of breath    Unspecified protein-calorie malnutrition    Erectile dysfunction    Prediabetes       Past Medical History:  Past Medical History:   Diagnosis Date    Arthritis     CKD (chronic kidney disease) requiring chronic dialysis     ON KIDNEY TRANSPLANT LIST    Dialysis patient     MON,WED AND FRIDAY/48 OZ OF FLUID RESTRICTION DAY    Glaucoma     History of gastric ulcer     Hypertension     Inguinal hernia     LEFT       Past Surgical History:  Past Surgical History:   Procedure Laterality Date    ARTERIOVENOUS FISTULA/SHUNT SURGERY W/ HEMODIALYSIS CATHETER INSERTION N/A 07/06/2017    Procedure: ARTERIOVENOUS FISTULA LEFT ARM AND PALINDROME PLACEMENT;  Surgeon: Kar Morales MD;  Location: [facility] MAIN OR;  Service:     COLONOSCOPY N/A 10/12/2017    Procedure: COLONOSCOPY TO CECUM AND TERMINAL ILEUM;  Surgeon: Doni Terrazas MD;  Location: [facility] ENDOSCOPY;  Service:     COLONOSCOPY N/A 09/28/2021    Procedure: COLONOSCOPY to cecum and TI with cold polypectomy;  Surgeon: Doni Terrazas MD;  Location: [facility] ENDOSCOPY;  Service: Gastroenterology;  Laterality: N/A;  PRE - screening  POST - polyp, normal TI, internal hemorrhoids    ENDOSCOPY N/A 10/12/2017    Procedure: ESOPHAGOGASTRODUODENOSCOPY WITH BIOPSIES;  Surgeon: Doni Terrazas MD;  Location: [facility] ENDOSCOPY;  Service:     INGUINAL HERNIA REPAIR Left 10/29/2019    Procedure: left INGUINAL HERNIA REPAIR LAPAROSCOPIC WITH DAVINCI ROBOT;  Surgeon:  "Konstantin Espinoza MD;  Location: [facility] OR;  Service: DaVinci    TOTAL KNEE ARTHROPLASTY Left 2014    Dr. Alf Garrett    TRANSPLANTATION RENAL Right        Social History:  Social History     Socioeconomic History    Marital status:     Number of children: 5   Tobacco Use    Smoking status: Former     Packs/day: 0.25     Years: 2.00     Additional pack years: 0.00     Total pack years: 0.50     Types: Cigarettes     Quit date:      Years since quittin.8    Smokeless tobacco: Never    Tobacco comments:     QUIT 20 YR AGO   Vaping Use    Vaping Use: Never used   Substance and Sexual Activity    Alcohol use: Yes     Comment: BEER OCCASIONALLY    Drug use: No    Sexual activity: Defer       Family History:  Family History   Problem Relation Age of Onset    Hypertension Mother     Diabetes Mother     Hypertension Father     Diabetes Son     Malig Hyperthermia Neg Hx          PATIENT ASSESSMENT     Vitals:  /96 (BP Location: Right arm, Patient Position: Sitting, Cuff Size: Adult)   Pulse 83   Temp 98.1 °F (36.7 °C) (Temporal)   Ht 177.8 cm (70\")   Wt 70.5 kg (155 lb 6.4 oz)   SpO2 99%   BMI 22.30 kg/m²   BP Readings from Last 3 Encounters:   10/19/23 168/96   23 144/82   22 130/82     Wt Readings from Last 3 Encounters:   10/19/23 70.5 kg (155 lb 6.4 oz)   23 69.9 kg (154 lb)   22 67.1 kg (148 lb)      Body mass index is 22.3 kg/m².    Pain Score    10/19/23 1422   PainSc: 0-No pain         Review of Systems:    Review of Systems  Constitutional neg except per HPI   Resp neg  CV neg     Physical Exam:    Physical Exam  Constitutional  No distress  Cardiovascular Rate  normal . Rhythm: regular . Heart sounds:  normal  Pulmonary/Chest  Effort normal. Breath sounds:  normal  Psychiatric  Alert. Judgment and thought content normal. Mood normal      Reviewed Data:    Labs:   Lab Results   Component Value Date     2022    K 3.9 2022    CALCIUM " 9.5 03/22/2022    AST 20 10/09/2023    ALT 24 10/09/2023    BUN 18 03/22/2022    CREATININE 1.13 03/22/2022    CREATININE 1.05 03/02/2022    CREATININE 0.93 02/24/2022    EGFRIFNONA 50 12/20/2021    EGFRIFAFRI 100 02/24/2022       Lab Results   Component Value Date     (H) 12/20/2021     (H) 12/19/2021     (H) 12/19/2021    HGBA1C 6.8 (H) 08/03/2023    HGBA1C 6.6 (H) 05/31/2023    HGBA1C 6.3 (H) 02/15/2023       Lab Results   Component Value Date     (H) 08/16/2022     06/22/2022     03/10/2022    HDL 59 08/16/2022    TRIG 94 08/16/2022    CHOLHDLRATIO 3.4 08/16/2022       Lab Results   Component Value Date    TSH 0.911 03/15/2022    FREET4 1.42 03/15/2022          Lab Results   Component Value Date    WBC 3.4 (L) 10/09/2023    HGB 15.5 10/09/2023    HGB 15.1 08/03/2023    HGB 14.9 05/31/2023     10/09/2023                   Lab Results   Component Value Date    PSA 1.2 08/16/2022    PSA 1.73 11/04/2021    PSA 1.21 10/20/2020       Imaging:          Medical Tests:          Screening for Glaucoma:  Previous screening for glaucoma?: Yes      Hearing Loss Screen:  Finger Rub Hearing Test (right ear): passed  Finger Rub Hearing Test (left ear): passed      Urinary Incontinence Screen:  Episodes of urinary incontinence? : No      Depression Screen:      10/19/2023     2:20 PM   PHQ-2/PHQ-9 Depression Screening   Little Interest or Pleasure in Doing Things 0-->not at all   Feeling Down, Depressed or Hopeless 0-->not at all   PHQ-9: Brief Depression Severity Measure Score 0        PHQ-2: 0 (Not depressed)    PHQ-9: 0 (Negative screening for depression)       FUNCTIONAL, FALL RISK, & COGNITIVE SCREENING (Components below):    DATA:        10/19/2023     2:20 PM   Functional & Cognitive Status   Do you have difficulty preparing food and eating? No   Do you have difficulty bathing yourself, getting dressed or grooming yourself? No   Do you have difficulty using the toilet? No    Do you have difficulty moving around from place to place? No   Do you have trouble with steps or getting out of a bed or a chair? No   Current Diet Well Balanced Diet   Dental Exam Up to date   Eye Exam Up to date   Exercise (times per week) 3 times per week   Current Exercises Include Other;Walking   Do you need help using the phone?  No   Are you deaf or do you have serious difficulty hearing?  No   Do you need help to go to places out of walking distance? No   Do you need help shopping? No   Do you need help preparing meals?  No   Do you need help with housework?  No   Do you need help with laundry? No   Do you need help taking your medications? No   Do you need help managing money? No   Do you ever drive or ride in a car without wearing a seat belt? No   Have you felt unusual stress, anger or loneliness in the last month? No   Who do you live with? Spouse   If you need help, do you have trouble finding someone available to you? No   Have you been bothered in the last four weeks by sexual problems? No   Do you have difficulty concentrating, remembering or making decisions? No         A) Assessment of Functional Ability:  (Assessment of ability to perform ADL's (showering/bathing, using toilet, dressing, feeding self, moving self around) and IADL's (use telephone, shop, prepare food, housekeep, do laundry, transport independently, take medications independently, and handle finances)    Degree of functional impairment: NONE (based on assessment noted above)      B) Assessment of Fall Risk:  Fall Risk Assessment was completed, and patient is at low risk for falls.       Need for further evaluation of gait, strength, and balance? : No    Timed Up and Go (TUG):   (>= 12 seconds indicates high risk for falling)    Observable abnormalities included: Normal gait pattern       C. Assessment of Cognitive Function:    Mini-Cog Test:     1) Registration (3 objects): Yes   2) Number of objects recalled: 3   3) Clock Draw:  Passed? : Yes       Further evaluation required? : No        COUNSELING       A. Identification of Health Risk Factors:    Risk factors include: cardiovascular risk factors      B. Age-Appropriate Screening Schedule:  (Refer to the list below for future screening recommendations based on patient's age, sex and/or medical conditions. Orders for these recommended tests are listed in the plan section. The patient has been provided with a written plan)    Health Maintenance Topics  Health Maintenance   Topic Date Due    TDAP/TD VACCINES (1 - Tdap) Never done    ZOSTER VACCINE (1 of 2) Never done    DIABETIC FOOT EXAM  Never done    DIABETIC EYE EXAM  Never done    COVID-19 Vaccine (6 - 2023-24 season) 09/01/2023    HEMOGLOBIN A1C  02/03/2024    LIPID PANEL  08/03/2024    URINE MICROALBUMIN  10/09/2024    ANNUAL WELLNESS VISIT  10/19/2024    COLORECTAL CANCER SCREENING  09/28/2026    HEPATITIS C SCREENING  Completed    Hepatitis B  Completed    Pneumococcal Vaccine 0-64  Completed    INFLUENZA VACCINE  Completed       Health Maintenance Topics Due or Over-Due  Health Maintenance Due   Topic Date Due    TDAP/TD VACCINES (1 - Tdap) Never done    ZOSTER VACCINE (1 of 2) Never done    DIABETIC FOOT EXAM  Never done    DIABETIC EYE EXAM  Never done    COVID-19 Vaccine (6 - 2023-24 season) 09/01/2023         C. Advanced Care Planning:    Advance Care Planning   ACP discussion was held with the patient during this visit. Patient does not have an advance directive, declines further assistance.       D. Patient Self-Management and Personalized Health Advice:    He has been provided with personalized counseling/information (including brochures/handouts) about:     -- optimizing diet/nutrition plans, improving exercise / conditioning, and reducing risk for cardiovascular disease (heart, stroke, vascular)      He has been recommended for the following preventative services which has been performed today, will be ordered today or  ordered/performed on upcoming follow-up visit:     -- NUTRITION counseling provided, EXERCISE counseling provided, EYE exam for glaucoma screening recommended, CARDIOVASCULAR disease risk reduction counseling performed, FALL RISK assessment / plan of care completed, URINARY incontinence assessment done, PROSTATE CANCER screening (discussed and PSA ordered +/- NIKI performed), **COLORECTAL cancer screening (colonoscopy/Cologuard discussed or ordered), COVID-19 vaccination (and/or booster) recommendations provided      E. Miscellaneous Items:    -Aspirin use counseling: Does not need ASA (and currently is not on it)    -Discussed BMI with him. The BMI is in the acceptable range    -Reviewed use of high risk medication in the elderly: YES    -Reviewed for potential of harmful drug interactions in the elderly: YES        WRAP UP       Assessment & Plan:  1) MEDICARE ANNUAL WELLNESS VISIT    2) OTHER MEDICAL CONDITIONS ADDRESSED TODAY:    Problem List Items Addressed This Visit          Cardiac and Vasculature    Essential hypertension    Relevant Medications    carvedilol (COREG) 6.25 MG tablet    NIFEdipine XL (PROCARDIA XL) 90 MG 24 hr tablet    losartan (Cozaar) 50 MG tablet       Genitourinary and Reproductive     Erectile dysfunction    Relevant Medications    sildenafil (VIAGRA) 25 MG tablet     Other Visit Diagnoses       Medicare annual wellness visit, subsequent    -  Primary    Type 2 diabetes mellitus with chronic kidney disease, without long-term current use of insulin, unspecified CKD stage        Relevant Medications    empagliflozin (JARDIANCE) 10 MG tablet tablet    Other Relevant Orders    Hemoglobin A1c    Hyperlipidemia, unspecified hyperlipidemia type        Relevant Orders    Lipid Panel With / Chol / HDL Ratio    Screening PSA (prostate specific antigen)        Relevant Orders    PSA Screen                      Orders Placed This Encounter   Procedures    Hemoglobin A1c    Lipid Panel With / Chol /  HDL Ratio    PSA Screen       Discussion / Summary:  We will restart patient's losartan 50 mg daily with elevated bp today  Transplant team would like this to take care of of his type 2 diabetes along with hyperlipidemia.  We will monitor today.  Up-to-date on colonoscopy.  We will follow-up in 3 months for labs including PSA for prostate cancer screening, hemoglobin A1c and lipid panel.  Follow-up in 6 months for chronic medical, 1 year Medicare wellness    Medications as of TODAY:              Current Outpatient Medications   Medication Sig Dispense Refill    acetaminophen (TYLENOL) 325 MG tablet Take 3 tablets by mouth As Needed.      brimonidine (ALPHAGAN) 0.15 % ophthalmic solution INSTILL 1 DROP INTO EACH EYE TWICE DAILY      carvedilol (COREG) 6.25 MG tablet Take 1 tablet by mouth 2 (Two) Times a Day With Meals. 180 tablet 3    dorzolamide-timolol (COSOPT) 22.3-6.8 MG/ML ophthalmic solution       empagliflozin (JARDIANCE) 10 MG tablet tablet Take 1 tablet by mouth Daily.      loratadine (CLARITIN) 10 MG tablet Take 1 tablet by mouth Daily As Needed.      magnesium oxide (MAG-OX) 400 MG tablet Take 2 tablets by mouth 3 (Three) Times a Day.      multivitamin with minerals tablet tablet Take 1 tablet by mouth Daily.      mycophenolate (CELLCEPT) 250 MG capsule Take 4 capsules by mouth 2 (Two) Times a Day.      NIFEdipine XL (PROCARDIA XL) 90 MG 24 hr tablet Take 1 tablet by mouth 2 (Two) Times a Day. 180 tablet 3    sildenafil (VIAGRA) 25 MG tablet Take 1 tablet by mouth Daily As Needed for Erectile Dysfunction. 30 tablet 1    Tacrolimus ER 1 MG tablet sustained-release 24 hour Take 6 tablets by mouth Daily.      losartan (Cozaar) 50 MG tablet Take 1 tablet by mouth Daily. 30 tablet 0     No current facility-administered medications for this visit.     FOLLOW-UP:            Return for nov lab appointment; 6 month chronic medical; 1 year wellness .                 Future Appointments   Date Time Provider  Department Center   11/20/2023  4:00 PM [facility]  [facility]   4/19/2024  3:45 PM Venkatesh Rdz APRN [facility]  [facility]     After Visit Summary (AVS) including the Personalized Prevention  Plan Services (PPPS) was either printed and given to the patient at check-out today and/or sent to [facility] for review.     *Dragon dictation used for documentation.

## 2023-11-20 DIAGNOSIS — E11.22 TYPE 2 DIABETES MELLITUS WITH CHRONIC KIDNEY DISEASE, WITHOUT LONG-TERM CURRENT USE OF INSULIN, UNSPECIFIED CKD STAGE: ICD-10-CM

## 2023-11-20 DIAGNOSIS — Z12.5 SCREENING PSA (PROSTATE SPECIFIC ANTIGEN): ICD-10-CM

## 2023-11-20 DIAGNOSIS — E78.5 HYPERLIPIDEMIA, UNSPECIFIED HYPERLIPIDEMIA TYPE: ICD-10-CM

## 2023-11-21 LAB
CHOLEST SERPL-MCNC: 275 MG/DL (ref 0–200)
CHOLEST/HDLC SERPL: 3.62 {RATIO}
HBA1C MFR BLD: 6.4 % (ref 4.8–5.6)
HDLC SERPL-MCNC: 76 MG/DL (ref 40–60)
LDLC SERPL CALC-MCNC: 179 MG/DL (ref 0–100)
PSA SERPL-MCNC: 1.11 NG/ML (ref 0–4)
TRIGL SERPL-MCNC: 115 MG/DL (ref 0–150)
VLDLC SERPL CALC-MCNC: 20 MG/DL (ref 5–40)

## 2024-01-10 DIAGNOSIS — N52.9 ERECTILE DYSFUNCTION, UNSPECIFIED ERECTILE DYSFUNCTION TYPE: ICD-10-CM

## 2024-01-11 RX ORDER — SILDENAFIL 25 MG/1
25 TABLET, FILM COATED ORAL DAILY PRN
Qty: 30 TABLET | Refills: 1 | Status: SHIPPED | OUTPATIENT
Start: 2024-01-11

## 2024-02-11 DIAGNOSIS — I10 ESSENTIAL HYPERTENSION: ICD-10-CM

## 2024-02-12 RX ORDER — LOSARTAN POTASSIUM 50 MG/1
50 TABLET ORAL DAILY
Qty: 90 TABLET | Refills: 1 | Status: SHIPPED | OUTPATIENT
Start: 2024-02-12

## 2024-03-20 DIAGNOSIS — N52.9 ERECTILE DYSFUNCTION, UNSPECIFIED ERECTILE DYSFUNCTION TYPE: ICD-10-CM

## 2024-03-21 RX ORDER — SILDENAFIL 25 MG/1
25 TABLET, FILM COATED ORAL DAILY PRN
Qty: 30 TABLET | Refills: 0 | Status: SHIPPED | OUTPATIENT
Start: 2024-03-21

## 2024-03-25 RX ORDER — CARVEDILOL 6.25 MG/1
6.25 TABLET ORAL 2 TIMES DAILY WITH MEALS
Qty: 180 TABLET | Refills: 0 | Status: SHIPPED | OUTPATIENT
Start: 2024-03-25

## 2024-03-27 RX ORDER — NIFEDIPINE 90 MG/1
90 TABLET, EXTENDED RELEASE ORAL 2 TIMES DAILY
Qty: 180 TABLET | Refills: 1 | Status: SHIPPED | OUTPATIENT
Start: 2024-03-27 | End: 2024-03-28 | Stop reason: SDUPTHER

## 2024-03-28 RX ORDER — NIFEDIPINE 90 MG/1
90 TABLET, EXTENDED RELEASE ORAL 2 TIMES DAILY
Qty: 180 TABLET | Refills: 1 | Status: SHIPPED | OUTPATIENT
Start: 2024-03-28

## 2024-04-19 ENCOUNTER — OFFICE VISIT (OUTPATIENT)
Dept: INTERNAL MEDICINE | Age: 64
End: 2024-04-19
Payer: MEDICARE

## 2024-04-19 VITALS
SYSTOLIC BLOOD PRESSURE: 138 MMHG | OXYGEN SATURATION: 99 % | HEART RATE: 75 BPM | TEMPERATURE: 98.3 F | WEIGHT: 151.4 LBS | HEIGHT: 70 IN | DIASTOLIC BLOOD PRESSURE: 82 MMHG | BODY MASS INDEX: 21.67 KG/M2

## 2024-04-19 DIAGNOSIS — E78.5 HYPERLIPIDEMIA, UNSPECIFIED HYPERLIPIDEMIA TYPE: ICD-10-CM

## 2024-04-19 DIAGNOSIS — E11.9 TYPE 2 DIABETES MELLITUS WITHOUT COMPLICATION, WITHOUT LONG-TERM CURRENT USE OF INSULIN: ICD-10-CM

## 2024-04-19 DIAGNOSIS — I10 ESSENTIAL HYPERTENSION: Primary | ICD-10-CM

## 2024-04-19 NOTE — PROGRESS NOTES
"    I N T E R N A L  M E D I C I N E  BESSIE ALCANTAR, APRCHARLIE      ENCOUNTER DATE:  04/19/2024    Harry Ortega / 63 y.o. / male      CHIEF COMPLAINT / REASON FOR OFFICE VISIT     Hypertension, Diabetes, and Hyperlipidemia      ASSESSMENT & PLAN     Problem List Items Addressed This Visit       Essential hypertension - Primary    Relevant Medications    losartan (COZAAR) 50 MG tablet    carvedilol (COREG) 6.25 MG tablet    NIFEdipine XL (PROCARDIA XL) 90 MG 24 hr tablet     Other Visit Diagnoses       Hyperlipidemia, unspecified hyperlipidemia type        Relevant Orders    Lipid Panel With / Chol / HDL Ratio    Type 2 diabetes mellitus without complication, without long-term current use of insulin        Relevant Orders    Hemoglobin A1c          Orders Placed This Encounter   Procedures    Lipid Panel With / Chol / HDL Ratio    Hemoglobin A1c     No orders of the defined types were placed in this encounter.      SUMMARY/DISCUSSION  He will come back for labs fasting to get his blood sugar and cholesterol checked, motivated to avoid medication for cholesterol.  Will continue Jardiance for type 2 diabetes.  Blood pressure stable recent CMP stable with ophthalmology  Pending referral to rheumatology for elevated rheumatoid factor through ophthalmology      Next Appointment with me: Visit date not found    Return in about 6 months (around 10/19/2024) for Medicare Wellness.      VITAL SIGNS     Visit Vitals  /82 (BP Location: Right arm, Patient Position: Sitting, Cuff Size: Adult)   Pulse 75   Temp 98.3 °F (36.8 °C) (Temporal)   Ht 177.8 cm (70\")   Wt 68.7 kg (151 lb 6.4 oz)   SpO2 99%   BMI 21.72 kg/m²       Wt Readings from Last 3 Encounters:   04/19/24 68.7 kg (151 lb 6.4 oz)   10/19/23 70.5 kg (155 lb 6.4 oz)   02/16/23 69.9 kg (154 lb)     Body mass index is 21.72 kg/m².  MEDICATIONS AT THE TIME OF OFFICE VISIT     Current Outpatient Medications on File Prior to Visit   Medication Sig    acetaminophen (TYLENOL) " 325 MG tablet Take 3 tablets by mouth As Needed.    carvedilol (COREG) 6.25 MG tablet TAKE 1 TABLET BY MOUTH TWICE DAILY WITH MEALS    empagliflozin (JARDIANCE) 10 MG tablet tablet Take 1 tablet by mouth Daily.    loratadine (CLARITIN) 10 MG tablet Take 1 tablet by mouth Daily As Needed.    losartan (COZAAR) 50 MG tablet Take 1 tablet by mouth once daily    magnesium oxide (MAG-OX) 400 MG tablet Take 2 tablets by mouth 3 (Three) Times a Day.    multivitamin with minerals tablet tablet Take 1 tablet by mouth Daily.    mycophenolate (CELLCEPT) 250 MG capsule Take 4 capsules by mouth 2 (Two) Times a Day.    NIFEdipine XL (PROCARDIA XL) 90 MG 24 hr tablet Take 1 tablet by mouth 2 (Two) Times a Day.    sildenafil (VIAGRA) 25 MG tablet TAKE 1 TABLET BY MOUTH ONCE DAILY AS NEEDED FOR ERECTILE DYSFUNCTION    Tacrolimus ER 1 MG tablet sustained-release 24 hour Take 6 tablets by mouth Daily.    [DISCONTINUED] brimonidine (ALPHAGAN) 0.15 % ophthalmic solution INSTILL 1 DROP INTO EACH EYE TWICE DAILY (Patient not taking: Reported on 4/19/2024)    [DISCONTINUED] COVID-19 mRNA Vac-Nickie,[employer], (Comirnaty) 30 MCG/0.3ML suspension Inject  into the appropriate muscle as directed by prescriber. (Patient not taking: Reported on 4/19/2024)    [DISCONTINUED] dorzolamide-timolol (COSOPT) 22.3-6.8 MG/ML ophthalmic solution  (Patient not taking: Reported on 4/19/2024)     No current facility-administered medications on file prior to visit.      HISTORY OF PRESENT ILLNESS     Colonoscopy recall in 2026.     Type 2 diabetes now on Jardiance 10 mg daily along with proteinuria, renal transplant December 2022.  Last hemoglobin A1c of 6.4 November 2023.    Last cholesterol significantly increased with last LDL up to 179, HDL 76.  At that time he went to proceed with diet and exercise changes rather than cholesterol medication.     Blood pressure elevated today, transplant team's goal is to maintain blood pressure in the 140 and 150 systolic and  80s diastolic.  On Coreg 6.25 mg 2 times daily, losartan 50 mg daily, and Procardia XL 2 times daily.  He maintains a low-sodium diet.  No lower extremity edema, chest pain, dyspnea or palpitations.       Viagra as needed for erectile dysfunction but no side effects of medication.    PSA 1.110 November 2023.    REVIEW OF SYSTEMS     Constitutional neg except per HPI   Resp neg  CV neg     PHYSICAL EXAMINATION     Physical Exam  Constitutional  No distress  Cardiovascular Rate  normal . Rhythm: regular . Heart sounds:  normal  Pulmonary/Chest  Effort normal. Breath sounds:  normal  Psychiatric  Alert. Judgment and thought content normal. Mood normal      REVIEWED DATA     Labs:   Lab Results   Component Value Date    GLUCOSE 196 (H) 03/22/2022    BUN 18 03/22/2022    CREATININE 1.13 03/22/2022    EGFR 73.9 03/22/2022    BCR 15.9 03/22/2022    K 3.9 03/22/2022    CO2 25.0 03/22/2022    CALCIUM 9.5 03/22/2022    PROTENTOTREF 6.6 09/13/2018    ALBUMIN 4.6 12/15/2023    BILITOT 1.0 12/15/2023    AST 21 12/15/2023    ALT 17 12/15/2023     Lab Results   Component Value Date    WBC 3.4 (L) 12/15/2023    HGB 15.6 12/15/2023    HCT 46.5 12/15/2023    MCV 94.4 12/15/2023     12/15/2023     Lab Results   Component Value Date    CHLPL 275 (H) 11/21/2023    TRIG 115 11/21/2023    HDL 76 (H) 11/21/2023     (H) 11/21/2023     Lab Results   Component Value Date    HGBA1C 6.40 (H) 11/21/2023     Lab Results   Component Value Date    TSH 0.911 03/15/2022     Imaging:           Medical Tests:           Summary of old records / correspondence / consultant report:           Request outside records:             **Dragon dictation used for documentation.

## 2024-04-20 DIAGNOSIS — N52.9 ERECTILE DYSFUNCTION, UNSPECIFIED ERECTILE DYSFUNCTION TYPE: ICD-10-CM

## 2024-04-21 RX ORDER — SILDENAFIL 25 MG/1
25 TABLET, FILM COATED ORAL DAILY PRN
Qty: 30 TABLET | Refills: 0 | Status: SHIPPED | OUTPATIENT
Start: 2024-04-21

## 2024-05-11 DIAGNOSIS — N52.9 ERECTILE DYSFUNCTION, UNSPECIFIED ERECTILE DYSFUNCTION TYPE: ICD-10-CM

## 2024-05-12 RX ORDER — SILDENAFIL 25 MG/1
25 TABLET, FILM COATED ORAL DAILY PRN
Qty: 30 TABLET | Refills: 0 | Status: SHIPPED | OUTPATIENT
Start: 2024-05-12

## 2024-05-16 RX ORDER — MAGNESIUM OXIDE 400 MG/1
800 TABLET ORAL 3 TIMES DAILY
OUTPATIENT
Start: 2024-05-16

## 2024-06-18 DIAGNOSIS — N52.9 ERECTILE DYSFUNCTION, UNSPECIFIED ERECTILE DYSFUNCTION TYPE: ICD-10-CM

## 2024-06-18 RX ORDER — SILDENAFIL 25 MG/1
25 TABLET, FILM COATED ORAL DAILY PRN
Qty: 30 TABLET | Refills: 0 | Status: SHIPPED | OUTPATIENT
Start: 2024-06-18

## 2024-06-19 RX ORDER — CARVEDILOL 6.25 MG/1
6.25 TABLET ORAL 2 TIMES DAILY WITH MEALS
Qty: 180 TABLET | Refills: 0 | Status: SHIPPED | OUTPATIENT
Start: 2024-06-19

## 2024-07-11 DIAGNOSIS — N52.9 ERECTILE DYSFUNCTION, UNSPECIFIED ERECTILE DYSFUNCTION TYPE: ICD-10-CM

## 2024-07-11 RX ORDER — SILDENAFIL 25 MG/1
25 TABLET, FILM COATED ORAL DAILY PRN
Qty: 30 TABLET | Refills: 0 | Status: SHIPPED | OUTPATIENT
Start: 2024-07-11

## 2024-08-21 ENCOUNTER — TELEPHONE (OUTPATIENT)
Dept: INTERNAL MEDICINE | Age: 64
End: 2024-08-21
Payer: MEDICARE

## 2024-08-22 ENCOUNTER — APPOINTMENT (OUTPATIENT)
Dept: CT IMAGING | Facility: HOSPITAL | Age: 64
End: 2024-08-22
Payer: MEDICARE

## 2024-08-22 ENCOUNTER — HOSPITAL ENCOUNTER (OUTPATIENT)
Facility: HOSPITAL | Age: 64
Setting detail: OBSERVATION
Discharge: HOME | End: 2024-08-24
Attending: EMERGENCY MEDICINE | Admitting: INTERNAL MEDICINE
Payer: MEDICARE

## 2024-08-22 DIAGNOSIS — I10 UNCONTROLLED HYPERTENSION: Primary | ICD-10-CM

## 2024-08-22 DIAGNOSIS — N17.9 AKI (ACUTE KIDNEY INJURY): ICD-10-CM

## 2024-08-22 PROBLEM — D72.819 LEUKOPENIA: Status: ACTIVE | Noted: 2024-08-22

## 2024-08-22 LAB
ALBUMIN SERPL-MCNC: 4.2 G/DL (ref 3.5–5.2)
ANION GAP SERPL CALCULATED.3IONS-SCNC: 8 MMOL/L (ref 5–15)
BUN SERPL-MCNC: 18 MG/DL (ref 8–23)
BUN/CREAT SERPL: 12 (ref 7–25)
CALCIUM SPEC-SCNC: 9 MG/DL (ref 8.6–10.5)
CHLORIDE SERPL-SCNC: 104 MMOL/L (ref 98–107)
CO2 SERPL-SCNC: 26 MMOL/L (ref 22–29)
CREAT SERPL-MCNC: 1.5 MG/DL (ref 0.76–1.27)
DEPRECATED RDW RBC AUTO: 48.3 FL (ref 37–54)
EGFRCR SERPLBLD CKD-EPI 2021: 52 ML/MIN/1.73
ERYTHROCYTE [DISTWIDTH] IN BLOOD BY AUTOMATED COUNT: 13.8 % (ref 12.3–15.4)
GLUCOSE SERPL-MCNC: 126 MG/DL (ref 65–99)
HCT VFR BLD AUTO: 40.8 % (ref 37.5–51)
HGB BLD-MCNC: 13.7 G/DL (ref 13–17.7)
HOLD SPECIMEN: NORMAL
HOLD SPECIMEN: NORMAL
MCH RBC QN AUTO: 32.5 PG (ref 26.6–33)
MCHC RBC AUTO-ENTMCNC: 33.6 G/DL (ref 31.5–35.7)
MCV RBC AUTO: 96.7 FL (ref 79–97)
PHOSPHATE SERPL-MCNC: 3 MG/DL (ref 2.5–4.5)
PLATELET # BLD AUTO: 163 10*3/MM3 (ref 140–450)
PMV BLD AUTO: 10.3 FL (ref 6–12)
POTASSIUM SERPL-SCNC: 3.9 MMOL/L (ref 3.5–5.2)
RBC # BLD AUTO: 4.22 10*6/MM3 (ref 4.14–5.8)
SODIUM SERPL-SCNC: 138 MMOL/L (ref 136–145)
WBC NRBC COR # BLD AUTO: 2.52 10*3/MM3 (ref 3.4–10.8)
WHOLE BLOOD HOLD COAG: NORMAL
WHOLE BLOOD HOLD SPECIMEN: NORMAL

## 2024-08-22 PROCEDURE — G0378 HOSPITAL OBSERVATION PER HR: HCPCS

## 2024-08-22 PROCEDURE — 96375 TX/PRO/DX INJ NEW DRUG ADDON: CPT

## 2024-08-22 PROCEDURE — 25810000003 SODIUM CHLORIDE 0.9 % SOLUTION: Performed by: EMERGENCY MEDICINE

## 2024-08-22 PROCEDURE — 25810000003 SODIUM CHLORIDE 0.9 % SOLUTION

## 2024-08-22 PROCEDURE — 99285 EMERGENCY DEPT VISIT HI MDM: CPT

## 2024-08-22 PROCEDURE — 25010000002 NICARDIPINE 2.5 MG/ML SOLUTION

## 2024-08-22 PROCEDURE — 80069 RENAL FUNCTION PANEL: CPT | Performed by: EMERGENCY MEDICINE

## 2024-08-22 PROCEDURE — 96365 THER/PROPH/DIAG IV INF INIT: CPT

## 2024-08-22 PROCEDURE — 93010 ELECTROCARDIOGRAM REPORT: CPT | Performed by: INTERNAL MEDICINE

## 2024-08-22 PROCEDURE — 25010000002 PROCHLORPERAZINE 10 MG/2ML SOLUTION: Performed by: EMERGENCY MEDICINE

## 2024-08-22 PROCEDURE — 85027 COMPLETE CBC AUTOMATED: CPT

## 2024-08-22 PROCEDURE — 36415 COLL VENOUS BLD VENIPUNCTURE: CPT

## 2024-08-22 PROCEDURE — 93005 ELECTROCARDIOGRAM TRACING: CPT

## 2024-08-22 PROCEDURE — 25010000002 DIPHENHYDRAMINE PER 50 MG: Performed by: EMERGENCY MEDICINE

## 2024-08-22 PROCEDURE — 25010000002 HYDRALAZINE PER 20 MG: Performed by: STUDENT IN AN ORGANIZED HEALTH CARE EDUCATION/TRAINING PROGRAM

## 2024-08-22 PROCEDURE — 70450 CT HEAD/BRAIN W/O DYE: CPT

## 2024-08-22 RX ORDER — NITROGLYCERIN 0.4 MG/1
0.4 TABLET SUBLINGUAL
Status: DISCONTINUED | OUTPATIENT
Start: 2024-08-22 | End: 2024-08-24 | Stop reason: HOSPADM

## 2024-08-22 RX ORDER — POLYETHYLENE GLYCOL 3350 17 G/17G
17 POWDER, FOR SOLUTION ORAL DAILY PRN
Status: DISCONTINUED | OUTPATIENT
Start: 2024-08-22 | End: 2024-08-24 | Stop reason: HOSPADM

## 2024-08-22 RX ORDER — BISACODYL 10 MG
10 SUPPOSITORY, RECTAL RECTAL DAILY PRN
Status: DISCONTINUED | OUTPATIENT
Start: 2024-08-22 | End: 2024-08-24 | Stop reason: HOSPADM

## 2024-08-22 RX ORDER — PROCHLORPERAZINE EDISYLATE 5 MG/ML
10 INJECTION INTRAMUSCULAR; INTRAVENOUS ONCE
Status: COMPLETED | OUTPATIENT
Start: 2024-08-22 | End: 2024-08-22

## 2024-08-22 RX ORDER — ACETAMINOPHEN 325 MG/1
650 TABLET ORAL EVERY 4 HOURS PRN
Status: DISCONTINUED | OUTPATIENT
Start: 2024-08-22 | End: 2024-08-24 | Stop reason: HOSPADM

## 2024-08-22 RX ORDER — ONDANSETRON 4 MG/1
4 TABLET, ORALLY DISINTEGRATING ORAL EVERY 6 HOURS PRN
Status: DISCONTINUED | OUTPATIENT
Start: 2024-08-22 | End: 2024-08-24 | Stop reason: HOSPADM

## 2024-08-22 RX ORDER — HYDRALAZINE HYDROCHLORIDE 20 MG/ML
10 INJECTION INTRAMUSCULAR; INTRAVENOUS ONCE
Status: COMPLETED | OUTPATIENT
Start: 2024-08-22 | End: 2024-08-22

## 2024-08-22 RX ORDER — ONDANSETRON 2 MG/ML
4 INJECTION INTRAMUSCULAR; INTRAVENOUS EVERY 6 HOURS PRN
Status: DISCONTINUED | OUTPATIENT
Start: 2024-08-22 | End: 2024-08-24 | Stop reason: HOSPADM

## 2024-08-22 RX ORDER — AMOXICILLIN 250 MG
2 CAPSULE ORAL 2 TIMES DAILY PRN
Status: DISCONTINUED | OUTPATIENT
Start: 2024-08-22 | End: 2024-08-24 | Stop reason: HOSPADM

## 2024-08-22 RX ORDER — BISACODYL 5 MG/1
5 TABLET, DELAYED RELEASE ORAL DAILY PRN
Status: DISCONTINUED | OUTPATIENT
Start: 2024-08-22 | End: 2024-08-24 | Stop reason: HOSPADM

## 2024-08-22 RX ORDER — DIPHENHYDRAMINE HYDROCHLORIDE 50 MG/ML
25 INJECTION INTRAMUSCULAR; INTRAVENOUS ONCE
Status: COMPLETED | OUTPATIENT
Start: 2024-08-22 | End: 2024-08-22

## 2024-08-22 RX ADMIN — PROCHLORPERAZINE EDISYLATE 10 MG: 5 INJECTION INTRAMUSCULAR; INTRAVENOUS at 14:35

## 2024-08-22 RX ADMIN — DIPHENHYDRAMINE HYDROCHLORIDE 25 MG: 50 INJECTION, SOLUTION INTRAMUSCULAR; INTRAVENOUS at 14:34

## 2024-08-22 RX ADMIN — SODIUM CHLORIDE 500 ML: 9 INJECTION, SOLUTION INTRAVENOUS at 14:31

## 2024-08-22 RX ADMIN — SODIUM CHLORIDE 5 MG/HR: 9 INJECTION, SOLUTION INTRAVENOUS at 22:43

## 2024-08-22 RX ADMIN — HYDRALAZINE HYDROCHLORIDE 10 MG: 20 INJECTION, SOLUTION INTRAMUSCULAR; INTRAVENOUS at 19:30

## 2024-08-22 NOTE — ED NOTES
Pt is a kidney transplant pt from 2021.  He has had htn 188/99 and HA since 2100 last night.  He is on bp medication

## 2024-08-22 NOTE — ED NOTES
Nursing report ED to floor  Harry Ortega  63 y.o.  male    HPI :  HPI (Adult)  Stated Reason for Visit: HA  History Obtained From: patient    Chief Complaint  Chief Complaint   Patient presents with    Hypertension       Admitting doctor:   Alf Brooke MD    Admitting diagnosis:   The primary encounter diagnosis was Uncontrolled hypertension. A diagnosis of JASSON (acute kidney injury) was also pertinent to this visit.    Code status:   Current Code Status       Date Active Code Status Order ID Comments User Context       Prior            Allergies:   Patient has no known allergies.    Isolation:   No active isolations    Intake and Output    Intake/Output Summary (Last 24 hours) at 8/22/2024 1931  Last data filed at 8/22/2024 1536  Gross per 24 hour   Intake 500 ml   Output --   Net 500 ml       Weight:       08/22/24  1324   Weight: 68 kg (150 lb)       Most recent vitals:   Vitals:    08/22/24 1753 08/22/24 1757 08/22/24 1832 08/22/24 1925   BP:  (S) (!) 228/119 (!) 216/114 (!) 191/110   Patient Position:       Pulse: 50 58 56    Resp:       Temp:       TempSrc:       SpO2: 97% 99% 99%    Weight:       Height:           Active LDAs/IV Access:   Lines, Drains & Airways       Active LDAs       Name Placement date Placement time Site Days    Peripheral IV 08/22/24 1430 Right Antecubital 08/22/24  1430  Antecubital  less than 1                    Labs (abnormal labs have a star):   Labs Reviewed   RENAL FUNCTION PANEL - Abnormal; Notable for the following components:       Result Value    Glucose 126 (*)     Creatinine 1.50 (*)     eGFR 52.0 (*)     All other components within normal limits    Narrative:     GFR Normal >60  Chronic Kidney Disease <60  Kidney Failure <15     RAINBOW DRAW    Narrative:     The following orders were created for panel order [place] Draw.  Procedure                               Abnormality         Status                     ---------                               -----------          ------                     Green Top (Gel)[[id number]]                                  Final result               Lavender Top[[id number]]                                     Final result               Gold Top - SST[[id number]]                                   Final result               Light Blue Top[[id number]]                                   Final result                 Please view results for these tests on the individual orders.   GREEN TOP   LAVENDER TOP   GOLD TOP - SST   LIGHT BLUE TOP       EKG:   No orders to display       Meds given in ED:   Medications   sodium chloride 0.9 % bolus 500 mL (0 mL Intravenous Stopped 24 1536)   prochlorperazine (COMPAZINE) injection 10 mg (10 mg Intravenous Given 24 1435)   diphenhydrAMINE (BENADRYL) injection 25 mg (25 mg Intravenous Given 24 1434)   hydrALAZINE (APRESOLINE) injection 10 mg (10 mg Intravenous Given 24 1930)       Imaging results:  No radiology results for the last day    Ambulatory status:   - ambulatory    Social issues:   Social History     Socioeconomic History    Marital status:     Number of children: 5   Tobacco Use    Smoking status: Former     Current packs/day: 0.00     Average packs/day: 0.3 packs/day for 2.0 years (0.5 ttl pk-yrs)     Types: Cigarettes     Start date:      Quit date:      Years since quittin.6    Smokeless tobacco: Never    Tobacco comments:     QUIT 20 YR AGO   Vaping Use    Vaping status: Never Used   Substance and Sexual Activity    Alcohol use: Yes     Comment: BEER OCCASIONALLY    Drug use: No    Sexual activity: Defer       Peripheral Neurovascular  Peripheral Neurovascular (Adult)  Peripheral Neurovascular WDL: WDL    Neuro Cognitive  Neuro Cognitive (Adult)  Cognitive/Neuro/Behavioral WDL: WDL, orientation  Orientation: oriented x 4    Learning  Learning Assessment (Adult)  Learning Readiness and Ability: no barriers identified    Respiratory  Respiratory (Adult)  Airway  WDL: WDL  Respiratory WDL  Respiratory WDL: WDL    Abdominal Pain       Pain Assessments  Pain (Adult)  (0-10) Pain Rating: Rest: 8  (0-10) Pain Rating: Activity: 9    NIH Stroke Scale       Leyla Mills RN  08/22/24 19:31 EDT

## 2024-08-23 ENCOUNTER — APPOINTMENT (OUTPATIENT)
Dept: CARDIOLOGY | Facility: HOSPITAL | Age: 64
End: 2024-08-23
Payer: MEDICARE

## 2024-08-23 LAB
ANION GAP SERPL CALCULATED.3IONS-SCNC: 16.4 MMOL/L (ref 5–15)
B-OH-BUTYR SERPL-SCNC: 1.53 MMOL/L (ref 0.02–0.27)
BACTERIA UR QL AUTO: NORMAL /HPF
BH CV VAS RENAL HILUM RIGHT EDV: 11.7 CM/S
BH CV VAS RENAL HILUM RIGHT PSV: 33.8 CM/S
BH CV XLRA MEAS DIST REN A EDV RIGHT: 20.3 CM/S
BH CV XLRA MEAS DIST REN A PSV RIGHT: 74.7 CM/S
BH CV XLRA MEAS KID L RIGHT: 14 CM
BH CV XLRA MEAS KID W RIGHT: 5.66 CM
BH CV XLRA MEAS MID REN A EDV RIGHT: 29 CM/S
BH CV XLRA MEAS MID REN A PSV RIGHT: 109 CM/S
BH CV XLRA MEAS PROX REN A EDV RIGHT: 61.9 CM/S
BH CV XLRA MEAS PROX REN A PSV RIGHT: 265 CM/S
BH CV XLRA MEAS RAR RIGHT: 1.48
BH CV XLRA MEAS RENAL A ORG EDV RIGHT: 58.1 CM/S
BH CV XLRA MEAS RENAL A ORG PSV RIGHT: 305 CM/S
BILIRUB UR QL STRIP: NEGATIVE
BUN SERPL-MCNC: 15 MG/DL (ref 8–23)
BUN/CREAT SERPL: 12.2 (ref 7–25)
CALCIUM SPEC-SCNC: 9.4 MG/DL (ref 8.6–10.5)
CHLORIDE SERPL-SCNC: 102 MMOL/L (ref 98–107)
CLARITY UR: CLEAR
CO2 SERPL-SCNC: 18.6 MMOL/L (ref 22–29)
COLOR UR: YELLOW
CREAT SERPL-MCNC: 1.23 MG/DL (ref 0.76–1.27)
DEPRECATED RDW RBC AUTO: 49.4 FL (ref 37–54)
EGFRCR SERPLBLD CKD-EPI 2021: 66 ML/MIN/1.73
ERYTHROCYTE [DISTWIDTH] IN BLOOD BY AUTOMATED COUNT: 13.9 % (ref 12.3–15.4)
GLUCOSE BLDC GLUCOMTR-MCNC: 126 MG/DL (ref 70–130)
GLUCOSE BLDC GLUCOMTR-MCNC: 132 MG/DL (ref 70–130)
GLUCOSE BLDC GLUCOMTR-MCNC: 218 MG/DL (ref 70–130)
GLUCOSE SERPL-MCNC: 121 MG/DL (ref 65–99)
GLUCOSE UR STRIP-MCNC: ABNORMAL MG/DL
HCT VFR BLD AUTO: 44.9 % (ref 37.5–51)
HGB BLD-MCNC: 15.3 G/DL (ref 13–17.7)
HGB UR QL STRIP.AUTO: NEGATIVE
HYALINE CASTS UR QL AUTO: NORMAL /LPF
KETONES UR QL STRIP: ABNORMAL
LEUKOCYTE ESTERASE UR QL STRIP.AUTO: NEGATIVE
MAGNESIUM SERPL-MCNC: 1.7 MG/DL (ref 1.6–2.4)
MCH RBC QN AUTO: 32.7 PG (ref 26.6–33)
MCHC RBC AUTO-ENTMCNC: 34.1 G/DL (ref 31.5–35.7)
MCV RBC AUTO: 95.9 FL (ref 79–97)
NITRITE UR QL STRIP: NEGATIVE
PH UR STRIP.AUTO: 6.5 [PH] (ref 5–8)
PLATELET # BLD AUTO: 155 10*3/MM3 (ref 140–450)
PMV BLD AUTO: 10.2 FL (ref 6–12)
POTASSIUM SERPL-SCNC: 3.2 MMOL/L (ref 3.5–5.2)
PROT UR QL STRIP: ABNORMAL
QT INTERVAL: 423 MS
QTC INTERVAL: 430 MS
RBC # BLD AUTO: 4.68 10*6/MM3 (ref 4.14–5.8)
RBC # UR STRIP: NORMAL /HPF
REF LAB TEST METHOD: NORMAL
RIGHT RENAL UPPER PARENCHYMA MAX: 30.4 CM/S
RIGHT RENAL UPPER PARENCHYMA MIN: 9.52 CM/S
RIGHT RENAL UPPER PARENCHYMA RI: 0.69
SODIUM SERPL-SCNC: 137 MMOL/L (ref 136–145)
SP GR UR STRIP: 1.02 (ref 1–1.03)
SQUAMOUS #/AREA URNS HPF: NORMAL /HPF
UROBILINOGEN UR QL STRIP: ABNORMAL
WBC # UR STRIP: NORMAL /HPF
WBC NRBC COR # BLD AUTO: 4.73 10*3/MM3 (ref 3.4–10.8)

## 2024-08-23 PROCEDURE — 25010000002 NICARDIPINE 2.5 MG/ML SOLUTION

## 2024-08-23 PROCEDURE — 85027 COMPLETE CBC AUTOMATED: CPT

## 2024-08-23 PROCEDURE — 96366 THER/PROPH/DIAG IV INF ADDON: CPT

## 2024-08-23 PROCEDURE — 93976 VASCULAR STUDY: CPT

## 2024-08-23 PROCEDURE — 63710000001 INSULIN LISPRO (HUMAN) PER 5 UNITS: Performed by: NURSE PRACTITIONER

## 2024-08-23 PROCEDURE — 81001 URINALYSIS AUTO W/SCOPE: CPT

## 2024-08-23 PROCEDURE — 93975 VASCULAR STUDY: CPT

## 2024-08-23 PROCEDURE — 25810000003 SODIUM CHLORIDE 0.9 % SOLUTION

## 2024-08-23 PROCEDURE — G0378 HOSPITAL OBSERVATION PER HR: HCPCS

## 2024-08-23 PROCEDURE — 82948 REAGENT STRIP/BLOOD GLUCOSE: CPT

## 2024-08-23 PROCEDURE — 83735 ASSAY OF MAGNESIUM: CPT | Performed by: INTERNAL MEDICINE

## 2024-08-23 PROCEDURE — 93976 VASCULAR STUDY: CPT | Performed by: STUDENT IN AN ORGANIZED HEALTH CARE EDUCATION/TRAINING PROGRAM

## 2024-08-23 PROCEDURE — 63710000001 INSULIN GLARGINE PER 5 UNITS: Performed by: NURSE PRACTITIONER

## 2024-08-23 PROCEDURE — 80048 BASIC METABOLIC PNL TOTAL CA: CPT

## 2024-08-23 PROCEDURE — 82010 KETONE BODYS QUAN: CPT | Performed by: INTERNAL MEDICINE

## 2024-08-23 PROCEDURE — 63710000001 MYCOPHENOLATE MOFETIL PER 250 MG: Performed by: NURSE PRACTITIONER

## 2024-08-23 RX ORDER — CETIRIZINE HYDROCHLORIDE 10 MG/1
10 TABLET ORAL DAILY
Status: DISCONTINUED | OUTPATIENT
Start: 2024-08-23 | End: 2024-08-24 | Stop reason: HOSPADM

## 2024-08-23 RX ORDER — MYCOPHENOLATE MOFETIL 250 MG/1
1000 CAPSULE ORAL 2 TIMES DAILY
Status: DISCONTINUED | OUTPATIENT
Start: 2024-08-23 | End: 2024-08-24 | Stop reason: HOSPADM

## 2024-08-23 RX ORDER — POTASSIUM CHLORIDE 750 MG/1
40 TABLET, FILM COATED, EXTENDED RELEASE ORAL ONCE
Status: COMPLETED | OUTPATIENT
Start: 2024-08-23 | End: 2024-08-23

## 2024-08-23 RX ORDER — LOSARTAN POTASSIUM 100 MG/1
100 TABLET ORAL
Status: DISCONTINUED | OUTPATIENT
Start: 2024-08-23 | End: 2024-08-24 | Stop reason: HOSPADM

## 2024-08-23 RX ORDER — INSULIN LISPRO 100 [IU]/ML
2-7 INJECTION, SOLUTION INTRAVENOUS; SUBCUTANEOUS
Status: DISCONTINUED | OUTPATIENT
Start: 2024-08-23 | End: 2024-08-24 | Stop reason: HOSPADM

## 2024-08-23 RX ORDER — IBUPROFEN 600 MG/1
1 TABLET ORAL
Status: DISCONTINUED | OUTPATIENT
Start: 2024-08-23 | End: 2024-08-24 | Stop reason: HOSPADM

## 2024-08-23 RX ORDER — NICOTINE POLACRILEX 4 MG
15 LOZENGE BUCCAL
Status: DISCONTINUED | OUTPATIENT
Start: 2024-08-23 | End: 2024-08-24 | Stop reason: HOSPADM

## 2024-08-23 RX ORDER — DEXTROSE MONOHYDRATE 25 G/50ML
25 INJECTION, SOLUTION INTRAVENOUS
Status: DISCONTINUED | OUTPATIENT
Start: 2024-08-23 | End: 2024-08-24 | Stop reason: HOSPADM

## 2024-08-23 RX ORDER — CARVEDILOL 12.5 MG/1
12.5 TABLET ORAL 2 TIMES DAILY WITH MEALS
Status: DISCONTINUED | OUTPATIENT
Start: 2024-08-23 | End: 2024-08-24 | Stop reason: HOSPADM

## 2024-08-23 RX ADMIN — MYCOPHENOLATE MOFETIL 1000 MG: 250 CAPSULE ORAL at 21:10

## 2024-08-23 RX ADMIN — POTASSIUM CHLORIDE 40 MEQ: 750 TABLET, EXTENDED RELEASE ORAL at 08:38

## 2024-08-23 RX ADMIN — CETIRIZINE HYDROCHLORIDE 10 MG: 10 TABLET ORAL at 13:13

## 2024-08-23 RX ADMIN — LOSARTAN POTASSIUM 100 MG: 100 TABLET, FILM COATED ORAL at 09:32

## 2024-08-23 RX ADMIN — CARVEDILOL 12.5 MG: 12.5 TABLET, FILM COATED ORAL at 08:38

## 2024-08-23 RX ADMIN — SODIUM CHLORIDE 5 MG/HR: 9 INJECTION, SOLUTION INTRAVENOUS at 04:08

## 2024-08-23 RX ADMIN — INSULIN LISPRO 3 UNITS: 100 INJECTION, SOLUTION INTRAVENOUS; SUBCUTANEOUS at 21:10

## 2024-08-23 RX ADMIN — INSULIN GLARGINE 10 UNITS: 100 INJECTION, SOLUTION SUBCUTANEOUS at 13:15

## 2024-08-23 RX ADMIN — CARVEDILOL 12.5 MG: 12.5 TABLET, FILM COATED ORAL at 17:08

## 2024-08-23 RX ADMIN — NIFEDIPINE 90 MG: 60 TABLET, FILM COATED, EXTENDED RELEASE ORAL at 09:32

## 2024-08-23 RX ADMIN — ACETAMINOPHEN 325MG 650 MG: 325 TABLET ORAL at 17:03

## 2024-08-23 NOTE — PLAN OF CARE
Goal Outcome Evaluation:  Plan of Care Reviewed With: patient        Progress: improving  Pt was admitted from the ER, alert & oriented x4 with high blood pressure , Cardene drip in progress, tolerating well. Nephrology consult has been called, I will continue to monitor.

## 2024-08-23 NOTE — ED PROVIDER NOTES
EMERGENCY DEPARTMENT ENCOUNTER    Room Number:  10/10  PCP: Venkatesh Rzd, JETT, APRN  History obtained from: Patient      HPI:  Chief Complaint: Headache  A complete HPI/ROS/PMH/PSH/SH/FH are unobtainable due to:   Context: Harry Ortega is a 63 y.o. male who presents to the ED c/o headache.  Also elevated blood pressure for the last several days.  No numbness or weakness.  No fever.  No chest pain or shortness of breath.            PAST MEDICAL HISTORY  Active Ambulatory Problems     Diagnosis Date Noted    Hypertensive crisis 07/03/2017    Essential hypertension 07/12/2017    ESRD (end stage renal disease) on dialysis 07/17/2017    Pulmonary nodule 08/17/2017    Anemia in chronic kidney disease 08/01/2017    Slow transit constipation 09/19/2017    Iron deficiency anemia 07/13/2017    Non-recurrent unilateral inguinal hernia without obstruction or gangrene 10/09/2019    Pre-transplant evaluation for kidney transplant 10/11/2017    Abnormal nuclear stress test 11/08/2021    Allergy, unspecified, initial encounter 08/13/2020    Anaphylactic shock, unspecified, initial encounter 08/13/2020    Gastritis, unspecified, with bleeding 07/31/2017    Headache, unspecified 10/17/2017    Hernia of abdominal wall 02/16/2023    Hypokalemia 07/12/2017    Immunosuppressive management encounter following kidney transplant 12/20/2021    Kidney transplant recipient 12/20/2021    Nonspecific reaction to tuberculin skin test without active tuberculosis 07/17/2017    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    Shortness of breath 01/03/2019    Unspecified protein-calorie malnutrition 07/18/2017    Erectile dysfunction 02/16/2023    Prediabetes 02/16/2023    Uncontrolled hypertension 08/22/2024     Resolved Ambulatory Problems     Diagnosis Date Noted    Pneumonia 07/17/2017     Past Medical History:   Diagnosis Date    Arthritis     CKD (chronic kidney disease) requiring chronic dialysis     Dialysis patient     Glaucoma      History of gastric ulcer     Hypertension     Inguinal hernia          PAST SURGICAL HISTORY  Past Surgical History:   Procedure Laterality Date    ARTERIOVENOUS FISTULA/SHUNT SURGERY W/ HEMODIALYSIS CATHETER INSERTION N/A 2017    Procedure: ARTERIOVENOUS FISTULA LEFT ARM AND PALINDROME PLACEMENT;  Surgeon: Kar Morales MD;  Location:  [facility];  Service:     COLONOSCOPY N/A 10/12/2017    Procedure: COLONOSCOPY TO CECUM AND TERMINAL ILEUM;  Surgeon: Doni Terrazas MD;  Location:  [facility];  Service:     COLONOSCOPY N/A 2021    Procedure: COLONOSCOPY to cecum and TI with cold polypectomy;  Surgeon: Doni Terrazas MD;  Location:  [facility];  Service: Gastroenterology;  Laterality: N/A;  PRE - screening  POST - polyp, normal TI, internal hemorrhoids    ENDOSCOPY N/A 10/12/2017    Procedure: ESOPHAGOGASTRODUODENOSCOPY WITH BIOPSIES;  Surgeon: Doni Terrazas MD;  Location:  [facility];  Service:     INGUINAL HERNIA REPAIR Left 10/29/2019    Procedure: left INGUINAL HERNIA REPAIR LAPAROSCOPIC WITH DAVINCI ROBOT;  Surgeon: Konstantin Espinoza MD;  Location[facility];  Service: DaVinci    TOTAL KNEE ARTHROPLASTY Left 2014    Dr. Alf Garrett    TRANSPLANTATION RENAL Right          FAMILY HISTORY  Family History   Problem Relation Age of Onset    Hypertension Mother     Diabetes Mother     Hypertension Father     Diabetes Son     Malig Hyperthermia Neg Hx          SOCIAL HISTORY  Social History     Socioeconomic History    Marital status:     Number of children: 5   Tobacco Use    Smoking status: Former     Current packs/day: 0.00     Average packs/day: 0.3 packs/day for 2.0 years (0.5 ttl pk-yrs)     Types: Cigarettes     Start date:      Quit date:      Years since quittin.6    Smokeless tobacco: Never    Tobacco comments:     QUIT 20 YR AGO   Vaping Use    Vaping status: Never Used   Substance and Sexual Activity    Alcohol use: Yes      Comment: BEER OCCASIONALLY    Drug use: No    Sexual activity: Defer         ALLERGIES  Patient has no known allergies.        REVIEW OF SYSTEMS    As per HPI      PHYSICAL EXAM  ED Triage Vitals   Temp Heart Rate Resp BP SpO2   08/22/24 1323 08/22/24 1323 08/22/24 1323 08/22/24 1324 08/22/24 1323   97.5 °F (36.4 °C) 64 16 (!) 170/103 98 %      Temp src Heart Rate Source Patient Position BP Location FiO2 (%)   08/22/24 1323 08/22/24 1323 08/22/24 1324 -- --   Tympanic Monitor Sitting         Physical Exam  Constitutional:       General: He is not in acute distress.  HENT:      Head: Normocephalic and atraumatic.   Cardiovascular:      Rate and Rhythm: Normal rate and regular rhythm.   Pulmonary:      Effort: No respiratory distress.   Abdominal:      General: There is no distension.      Tenderness: There is no abdominal tenderness.   Musculoskeletal:         General: No swelling or deformity.      Comments: Fistula to left upper extremity with palpable thrill   Skin:     General: Skin is warm and dry.   Neurological:      General: No focal deficit present.      Mental Status: He is alert. Mental status is at baseline.           Vital signs and nursing notes reviewed.          LAB RESULTS  Recent Results (from the past 24 hour(s))   Green Top (Gel)    Collection Time: 08/22/24  1:32 PM   Result Value Ref Range    Extra Tube Hold for add-ons.    Lavender Top    Collection Time: 08/22/24  1:32 PM   Result Value Ref Range    Extra Tube hold for add-on    Gold Top - SST    Collection Time: 08/22/24  1:32 PM   Result Value Ref Range    Extra Tube Hold for add-ons.    Light Blue Top    Collection Time: 08/22/24  1:32 PM   Result Value Ref Range    Extra Tube Hold for add-ons.    Renal Function Panel    Collection Time: 08/22/24  1:32 PM    Specimen: Blood   Result Value Ref Range    Glucose 126 (H) 65 - 99 mg/dL    BUN 18 8 - 23 mg/dL    Creatinine 1.50 (H) 0.76 - 1.27 mg/dL    Sodium 138 136 - 145 mmol/L    Potassium 3.9  3.5 - 5.2 mmol/L    Chloride 104 98 - 107 mmol/L    CO2 26.0 22.0 - 29.0 mmol/L    Calcium 9.0 8.6 - 10.5 mg/dL    Albumin 4.2 3.5 - 5.2 g/dL    Phosphorus 3.0 2.5 - 4.5 mg/dL    Anion Gap 8.0 5.0 - 15.0 mmol/L    BUN/Creatinine Ratio 12.0 7.0 - 25.0    eGFR 52.0 (L) >60.0 mL/min/1.73       Ordered the above labs and reviewed the results.        RADIOLOGY  CT Head Without Contrast    Result Date: 8/22/2024  CT HEAD WO CONTRAST-  HISTORY:  acute severe headache, htn  COMPARISON: None  FINDINGS: The brain and ventricles are symmetrical. There is no evidence of intracranial hemorrhage. Mild to moderate vascular calcification is noted. Decreased attenuation consistent with an area of infarction, age-indeterminate but likely remote is appreciated about the right cerebellar hemisphere superolaterally. This measures approximately 2.4 x 0.8 x 1.9 cm in size. Clinical correlation is recommended. A subacute infarct cannot be excluded. Should a prior study be made available for comparison, I would be more than happy to review the studies and to generate a supplemental report. No obvious acute infarction is identified. Further evaluation could be performed with MRI examination of the brain as indicated.  The above information was called to and discussed with Dr. Arias.      Radiation dose reduction techniques were utilized, including automated exposure modulation based on body size.  This report was finalized on 8/22/2024 4:06 PM by Dr. Bin Prado M.D on Workstation: [id number]       Ordered the above noted radiological studies. Reviewed by me in PACS.                MEDICATIONS GIVEN IN ER  Medications   nitroglycerin (NITROSTAT) SL tablet 0.4 mg (has no administration in time range)   sennosides-docusate (PERICOLACE) 8.6-50 MG per tablet 2 tablet (has no administration in time range)     And   polyethylene glycol (MIRALAX) packet 17 g (has no administration in time range)     And   bisacodyl (DULCOLAX) EC tablet 5 mg  (has no administration in time range)     And   bisacodyl (DULCOLAX) suppository 10 mg (has no administration in time range)   ondansetron ODT (ZOFRAN-ODT) disintegrating tablet 4 mg (has no administration in time range)     Or   ondansetron (ZOFRAN) injection 4 mg (has no administration in time range)   acetaminophen (TYLENOL) tablet 650 mg (has no administration in time range)   sodium chloride 0.9 % bolus 500 mL (0 mL Intravenous Stopped 8/22/24 1536)   prochlorperazine (COMPAZINE) injection 10 mg (10 mg Intravenous Given 8/22/24 1435)   diphenhydrAMINE (BENADRYL) injection 25 mg (25 mg Intravenous Given 8/22/24 1434)   hydrALAZINE (APRESOLINE) injection 10 mg (10 mg Intravenous Given 8/22/24 1930)               MEDICAL DECISION MAKING, PROGRESS, and CONSULTS    MDM: Patient presented emergency department with elevated blood pressure, unclear etiology.  Otherwise well-appearing, vitals otherwise stable.  Labs significant for acute kidney injury, imaging demonstrating no evidence of acute intracranial process. Does appear to have chronic to subacute hypodensity which patient reports has been investigated before.  Discussed with inpatient team, will admit for blood pressure control and monitoring of kidney function.    All labs have been independently reviewed by me.  All radiology studies have been reviewed by me and I have also reviewed the radiology report.   EKG's independently viewed and interpreted by me.  Discussion below represents my analysis of pertinent findings related to patient's condition, differential diagnosis, treatment plan and final disposition.      Additional sources:  - Discussed/ obtained information from independent historians:     - External (non-ED) record review:     - Chronic or social conditions impacting care:     - Shared decision making:        Orders placed during this visit:  Orders Placed This Encounter   Procedures    CT Head Without Contrast    [place] Draw    Renal Function  Panel    Basic Metabolic Panel    CBC (No Diff)    CBC (No Diff)    Urinalysis With Microscopic If Indicated (No Culture) - Urine, Clean Catch    Diet: Cardiac; Healthy Heart (2-3 Na+); Fluid Consistency: Thin (IDDSI 0)    Vital Signs    Maintain IV Access    Telemetry - Place Orders & Notify Provider of Results When Patient Experiences Acute Chest Pain, Dysrhythmia or Respiratory Distress    May Be Off Telemetry for Tests    Daily Weights    Oral Care    Place Sequential Compression Device    Maintain Sequential Compression Device    Continuous Pulse Oximetry    Up with assistance    Strict Intake & Output    Code Status and Medical Interventions: CPR (Attempt to Resuscitate); Full    [facility] (on-call MD unless specified) Details    Inpatient Nephrology Consult    Incentive Spirometry    Oxygen Therapy- Nasal Cannula; Titrate 1-6 LPM Per SpO2; 90 - 95%    ECG 12 Lead Other; hypertensive crisis    Initiate Observation Status    Green Top (Gel)    Lavender Top    Gold Top - SST    Light Blue Top         Additional orders considered but not ordered:  Considered renal ultrasound however will defer to inpatient service.        Differential diagnosis includes but is not limited to:    Hypertensive urgency, hypertension, acute kidney injury, dehydration, headache      Independent interpretation of labs, radiology studies, and discussions with consultants:  ED Course as of 08/22/24 2016   Thu Aug 22, 2024   1419 Glucose(!): 126 [DC]   1419 BUN: 18 [DC]   1426 First Look: Patient presents today for elevated blood pressures.  States he is normally very well-controlled after his kidney transplant 3 years ago.  Had a headache since last night which is new for him, and very uncomfortable for him.  Has not had any nausea, vomiting, diarrhea, fevers, chills, cough, chest pain, short of breath.  Other than headache he feels fine.  Compliant with his medications.  Was concerned with a headache and the elevated blood pressures.   Patient's exam is benign, plan for renal function panel, will give him IV fluids and a migraine cocktail and reevaluate.  Plan for CT head from new onset headache and severe hypertension.  Will monitor BP, will hold off on treatment for now until we get better symptom control, may improve with headache improvement. [DC]   1542 CT head interpreted myself:  No hemorrhage or midline shift [FS]      ED Course User Index  [DC] Urbano Patel MD  [FS] Jorge Arias MD           DIAGNOSIS  Final diagnoses:   Uncontrolled hypertension   JASSON (acute kidney injury)         DISPOSITION  Admitted to telemetry        Latest Documented Vital Signs:  As of 20:16 EDT  BP- (!) 191/110 HR- 56 Temp- 97.5 °F (36.4 °C) (Tympanic) O2 sat- 99%              --    Please note that portions of this were completed with a voice recognition program.       Note Disclaimer: At [facility], we believe that sharing information builds trust and better relationships. You are receiving this note because you are receiving care at [facility] or recently visited. It is possible you will see health information before a provider has talked with you about it. This kind of information can be easy to misunderstand. To help you fully understand what it means for your health, we urge you to discuss this note with your provider.             Jorge Arias MD  08/22/24 2018

## 2024-08-23 NOTE — PLAN OF CARE
Problem: Adult Inpatient Plan of Care  Goal: Plan of Care Review  Outcome: Ongoing, Progressing  Goal: Patient-Specific Goal (Individualized)  Outcome: Ongoing, Progressing  Goal: Absence of Hospital-Acquired Illness or Injury  Outcome: Ongoing, Progressing  Intervention: Identify and Manage Fall Risk  Recent Flowsheet Documentation  Taken 8/23/2024 1400 by Shakira Massey RN  Safety Promotion/Fall Prevention: activity supervised  Taken 8/23/2024 1000 by Shakira Massey RN  Safety Promotion/Fall Prevention: activity supervised  Taken 8/23/2024 0840 by Shakira Massey RN  Safety Promotion/Fall Prevention: activity supervised  Intervention: Prevent Skin Injury  Recent Flowsheet Documentation  Taken 8/23/2024 1400 by Shakira Massey RN  Body Position: position changed independently  Taken 8/23/2024 1000 by Shakira Massey RN  Body Position: position changed independently  Taken 8/23/2024 0840 by Shakira Massey RN  Body Position: position changed independently  Intervention: Prevent and Manage VTE (Venous Thromboembolism) Risk  Recent Flowsheet Documentation  Taken 8/23/2024 0840 by Shakira Massey RN  VTE Prevention/Management:   bilateral   sequential compression devices on  Range of Motion: active ROM (range of motion) encouraged  Intervention: Prevent Infection  Recent Flowsheet Documentation  Taken 8/23/2024 1400 by Shakira Massey RN  Infection Prevention:   hand hygiene promoted   single patient room provided  Taken 8/23/2024 1000 by Shakira Massey RN  Infection Prevention:   hand hygiene promoted   environmental surveillance performed   single patient room provided   rest/sleep promoted  Taken 8/23/2024 0840 by Shakira Massey RN  Infection Prevention:   cohorting utilized   environmental surveillance performed   hand hygiene promoted   rest/sleep promoted  Goal: Optimal Comfort and Wellbeing  Outcome: Ongoing, Progressing  Intervention: Provide Person-Centered Care  Recent Flowsheet Documentation  Taken 8/23/2024 0840 by  Shakira Massey, RN  Trust Relationship/Rapport: care explained  Goal: Readiness for Transition of Care  Outcome: Ongoing, Progressing     Problem: Hypertension Comorbidity  Goal: Blood Pressure in Desired Range  Outcome: Ongoing, Progressing   Goal Outcome Evaluation:

## 2024-08-23 NOTE — H&P
Patient Name:  Harry Ortega  YOB: 1960  MRN:  [id number]  Admit Date:  8/22/2024  Patient Care Team:  Venkatesh Rdz DNP, APRN as PCP - General (Internal Medicine)  Yesenia Baez MD as Consulting Physician (Nephrology)      Subjective   History Present Illness     Chief Complaint   Patient presents with    Hypertension       History of Present Illness  Mr. Ortega is a 63-year-old male with a past medical history of arthritis, hypertension, kidney transplant, and iron deficiency anemia and is to [facility] ED today with complaints of hypertension and headache for 2 days.  Patient reports that he developed a headache while at work yesterday, came home and checked his blood pressure, and reported that his blood pressure was 190/100.  He reports he went to work today got off around 11 AM, still had a headache, and rechecked his blood pressure and it was still the same 191/100 and decided to come to the emergency room.  He denies any dizziness, shortness of breath, chest pain, nausea, vomiting, or difficulty urinating.  He reports that he has had an episode of diarrhea.  Patient reports that he used to be on dialysis but since his kidney transplant 3 years ago he has not needed dialysis.  He reports that his headache has gotten better since he has been in the emergency room and received medication for his blood pressure.    Review of Systems   Respiratory:  Negative for shortness of breath.    Cardiovascular:  Negative for chest pain.   Gastrointestinal:  Positive for diarrhea. Negative for nausea and vomiting.   Genitourinary:  Negative for difficulty urinating.   Neurological:  Positive for headaches. Negative for dizziness.        Personal History     Past Medical History:   Diagnosis Date    Arthritis     CKD (chronic kidney disease) requiring chronic dialysis     ON KIDNEY TRANSPLANT LIST    Dialysis patient     MON,WED AND FRIDAY/48 OZ OF FLUID RESTRICTION DAY     Glaucoma     History of gastric ulcer     Hypertension     Inguinal hernia     LEFT     Past Surgical History:   Procedure Laterality Date    ARTERIOVENOUS FISTULA/SHUNT SURGERY W/ HEMODIALYSIS CATHETER INSERTION N/A 2017    Procedure: ARTERIOVENOUS FISTULA LEFT ARM AND PALINDROME PLACEMENT;  Surgeon: Kar Morales MD;  Location:  [facility];  Service:     COLONOSCOPY N/A 10/12/2017    Procedure: COLONOSCOPY TO CECUM AND TERMINAL ILEUM;  Surgeon: Doni Terrazas MD;  Location:  [facility];  Service:     COLONOSCOPY N/A 2021    Procedure: COLONOSCOPY to cecum and TI with cold polypectomy;  Surgeon: Doni Terrazas MD;  Location:  [facility];  Service: Gastroenterology;  Laterality: N/A;  PRE - screening  POST - polyp, normal TI, internal hemorrhoids    ENDOSCOPY N/A 10/12/2017    Procedure: ESOPHAGOGASTRODUODENOSCOPY WITH BIOPSIES;  Surgeon: Doni Terrazas MD;  Location:  [facility];  Service:     INGUINAL HERNIA REPAIR Left 10/29/2019    Procedure: left INGUINAL HERNIA REPAIR LAPAROSCOPIC WITH DAVINCI ROBOT;  Surgeon: Konstantin Espinoza MD;  Location: [facility] OR;  Service: DaVinci    TOTAL KNEE ARTHROPLASTY Left 2014    Dr. Alf Garrett    TRANSPLANTATION RENAL Right      Family History   Problem Relation Age of Onset    Hypertension Mother     Diabetes Mother     Hypertension Father     Diabetes Son     Malig Hyperthermia Neg Hx      Social History     Tobacco Use    Smoking status: Former     Current packs/day: 0.00     Average packs/day: 0.3 packs/day for 2.0 years (0.5 ttl pk-yrs)     Types: Cigarettes     Start date:      Quit date:      Years since quittin.6    Smokeless tobacco: Never    Tobacco comments:     QUIT 20 YR AGO   Vaping Use    Vaping status: Never Used   Substance Use Topics    Alcohol use: Yes     Comment: BEER OCCASIONALLY    Drug use: No     No current facility-administered medications on file prior to encounter.     Current  Outpatient Medications on File Prior to Encounter   Medication Sig Dispense Refill    acetaminophen (TYLENOL) 325 MG tablet Take 3 tablets by mouth As Needed.      carvedilol (COREG) 6.25 MG tablet TAKE 1 TABLET BY MOUTH TWICE DAILY WITH MEALS 180 tablet 0    empagliflozin (JARDIANCE) 10 MG tablet tablet Take 1 tablet by mouth Daily.      loratadine (CLARITIN) 10 MG tablet Take 1 tablet by mouth Daily As Needed.      losartan (COZAAR) 50 MG tablet Take 1 tablet by mouth once daily 90 tablet 1    magnesium oxide (MAG-OX) 400 MG tablet Take 2 tablets by mouth 3 (Three) Times a Day.      multivitamin with minerals tablet tablet Take 1 tablet by mouth Daily.      mycophenolate (CELLCEPT) 250 MG capsule Take 4 capsules by mouth 2 (Two) Times a Day.      NIFEdipine XL (PROCARDIA XL) 90 MG 24 hr tablet Take 1 tablet by mouth 2 (Two) Times a Day. 180 tablet 1    sildenafil (VIAGRA) 25 MG tablet TAKE 1 TABLET BY MOUTH ONCE DAILY AS NEEDED FOR ERECTILE DYSFUNCTION 30 tablet 0    Tacrolimus ER 1 MG tablet sustained-release 24 hour Take 6 tablets by mouth Daily.       No Known Allergies    Objective    Objective     Vital Signs  Temp:  [97.5 °F (36.4 °C)] 97.5 °F (36.4 °C)  Heart Rate:  [50-65] 65  Resp:  [16] 16  BP: (170-228)/(101-119) 195/104  SpO2:  [96 %-99 %] 96 %  on   ;   Device (Oxygen Therapy): room air  Body mass index is 21.52 kg/m².    Physical Exam  Vitals and nursing note reviewed.   Constitutional:       General: He is not in acute distress.     Appearance: He is not ill-appearing.   Cardiovascular:      Rate and Rhythm: Normal rate and regular rhythm.      Pulses: Normal pulses.      Heart sounds: Normal heart sounds.   Pulmonary:      Effort: Pulmonary effort is normal. No respiratory distress.      Breath sounds: Normal breath sounds.   Abdominal:      General: Bowel sounds are normal. There is no distension.      Palpations: Abdomen is soft.      Tenderness: There is no abdominal tenderness.    Musculoskeletal:      Right lower leg: No edema.      Left lower leg: No edema.   Skin:     General: Skin is warm and dry.   Neurological:      Mental Status: He is alert and oriented to person, place, and time.         Results Review:  I reviewed the patient's new clinical results.  I reviewed the patient's new imaging results.  Discussed with ED provider.    Lab Results (last 24 hours)       Procedure Component Value Units Date/Time    Renal Function Panel [[id number]]  (Abnormal) Collected: 08/22/24 1332    Specimen: Blood Updated: 08/22/24 1414     Glucose 126 mg/dL      BUN 18 mg/dL      Creatinine 1.50 mg/dL      Sodium 138 mmol/L      Potassium 3.9 mmol/L      Comment: Slight hemolysis detected by analyzer. Result may be falsely elevated.        Chloride 104 mmol/L      CO2 26.0 mmol/L      Calcium 9.0 mg/dL      Albumin 4.2 g/dL      Phosphorus 3.0 mg/dL      Anion Gap 8.0 mmol/L      BUN/Creatinine Ratio 12.0     eGFR 52.0 mL/min/1.73     Narrative:      GFR Normal >60  Chronic Kidney Disease <60  Kidney Failure <15      CBC (No Diff) [[id number]]  (Abnormal) Collected: 08/22/24 1332    Specimen: Blood Updated: 08/22/24 2041     WBC 2.52 10*3/mm3      RBC 4.22 10*6/mm3      Hemoglobin 13.7 g/dL      Hematocrit 40.8 %      MCV 96.7 fL      MCH 32.5 pg      MCHC 33.6 g/dL      RDW 13.8 %      RDW-SD 48.3 fl      MPV 10.3 fL      Platelets 163 10*3/mm3             Imaging Results (Last 24 Hours)       Procedure Component Value Units Date/Time    CT Head Without Contrast [[id number]] Collected: 08/22/24 1548     Updated: 08/22/24 1610    Narrative:      CT HEAD WO CONTRAST-     HISTORY:  acute severe headache, htn     COMPARISON: None     FINDINGS: The brain and ventricles are symmetrical. There is no evidence  of intracranial hemorrhage. Mild to moderate vascular calcification is  noted. Decreased attenuation consistent with an area of infarction,  age-indeterminate but likely remote is appreciated about the  right  cerebellar hemisphere superolaterally. This measures approximately 2.4 x  0.8 x 1.9 cm in size. Clinical correlation is recommended. A subacute  infarct cannot be excluded. Should a prior study be made available for  comparison, I would be more than happy to review the studies and to  generate a supplemental report. No obvious acute infarction is  identified. Further evaluation could be performed with MRI examination  of the brain as indicated.     The above information was called to and discussed with Dr. Arias.                 Radiation dose reduction techniques were utilized, including automated  exposure modulation based on body size.     This report was finalized on 8/22/2024 4:06 PM by Dr. Bin Prado M.D  on Workstation: [id number]                 ECG 12 Lead Other; hypertensive crisis    (Results Pending)     Assessment/Plan   Assessment & Plan   Active Hospital Problems    Diagnosis  POA    **Hypertensive crisis [I16.9]  Yes    Acute kidney injury [N17.9]  Yes    Leukopenia [D72.819]  Yes    Kidney transplant recipient [Z94.0]  Not Applicable      Resolved Hospital Problems   No resolved problems to display.     Mr. Ortega is a 63-year-old male with a past medical history of arthritis, hypertension, kidney transplant, and iron deficiency anemia and is to [facility] ED today with complaints of hypertension and headache for 2 days.     Hypertensive crisis  -Current blood pressure 195/104   -Received hydralazine 10 mg IV in the ED   -Patient also received Benadryl 25 mg IV and Compazine 10 mg IV in the ED for headache.  Patient does report improvement in headache.   -Will check EKG  -Will try Cardene drip to keep systolic blood pressure between 170 and 180  -Telemetry monitoring  -Strict I&O  -Daily weights  -Will continue to monitor blood pressure      Acute kidney injury  Kidney transplant recipient  -Creatinine noted to be 1.5 with a GFR 52  -Received a 500 cc bolus of normal  saline in the ED  -Will consult nephrology in the setting of acute kidney injury and kidney transplant  -BMP in the morning  -Avoid nephrotoxic medication      Leukopenia  -White blood cell count 2.52, appears baseline from June 2024  -Will check CBC in the morning    SCDs for DVT prophylaxis.  Full code.  Discussed with patient.      ALONZO Fung  [facility]  08/22/24  21:22 EDT

## 2024-08-23 NOTE — PROGRESS NOTES
Name: Harry Ortega ADMIT: 2024   : 1960  PCP: Venkatesh Rdz DNP, ALONZO    MRN: [id number] LOS: 0 days   AGE/SEX: 63 y.o. male  ROOM: [facility]     Subjective   Subjective   He is laying in bed, reports he fells much better, his headache has resolved, and is very excited about being able to sleep.  He is stable and off Cardene drip with acceptable blood pressure at this time.     Objective   Objective   Vital Signs  Temp:  [97.5 °F (36.4 °C)-97.9 °F (36.6 °C)] 97.9 °F (36.6 °C)  Heart Rate:  [] 74  Resp:  [16-18] 18  BP: (116-228)/() 126/75  SpO2:  [94 %-100 %] 97 %  on   ;   Device (Oxygen Therapy): room air  Body mass index is 21.13 kg/m².  Physical Exam  Vitals and nursing note reviewed.   Constitutional:       Appearance: Normal appearance.   HENT:      Head: Atraumatic.      Mouth/Throat:      Mouth: Mucous membranes are dry.   Eyes:      Conjunctiva/sclera: Conjunctivae normal.   Cardiovascular:      Rate and Rhythm: Normal rate and regular rhythm.      Pulses: Normal pulses.           Radial pulses are 2+ on the right side and 2+ on the left side.      Heart sounds: Normal heart sounds.      Comments: Left arm av fistula.  Pulmonary:      Breath sounds: Normal breath sounds.   Abdominal:      General: Bowel sounds are normal.      Palpations: Abdomen is soft.   Musculoskeletal:         General: Normal range of motion.   Skin:     General: Skin is warm and dry.      Capillary Refill: Capillary refill takes less than 2 seconds.   Neurological:      General: No focal deficit present.      Mental Status: He is alert and oriented to person, place, and time. Mental status is at baseline.   Psychiatric:         Mood and Affect: Mood normal.       Results Review     I reviewed the patient's new clinical results.  Results from last 7 days   Lab Units 24  0844 24  1332   WBC 10*3/mm3 4.73 2.52*   HEMOGLOBIN g/dL 15.3 13.7   PLATELETS 10*3/mm3 155 163     Results from last 7 days  "  Lab Units 08/23/24  0314 08/22/24  1332   SODIUM mmol/L 137 138   POTASSIUM mmol/L 3.2* 3.9   CHLORIDE mmol/L 102 104   CO2 mmol/L 18.6* 26.0   BUN mg/dL 15 18   CREATININE mg/dL 1.23 1.50*   GLUCOSE mg/dL 121* 126*   EGFR mL/min/1.73 66.0 52.0*     Results from last 7 days   Lab Units 08/22/24  1332   ALBUMIN g/dL 4.2     Results from last 7 days   Lab Units 08/23/24  0314 08/22/24  1332   CALCIUM mg/dL 9.4 9.0   ALBUMIN g/dL  --  4.2   MAGNESIUM mg/dL 1.7  --    PHOSPHORUS mg/dL  --  3.0       No results found for: \"HGBA1C\", \"POCGLU\"    No radiology results for the last day    I have personally reviewed all medications:  Scheduled Medications  carvedilol, 12.5 mg, Oral, BID With Meals  losartan, 100 mg, Oral, Q24H  mycophenolate, 1,000 mg, Oral, BID  NIFEdipine XL, 90 mg, Oral, Q24H  PATIENT SUPPLIED MEDICATION, 8 mg, Oral, Daily    Infusions  niCARdipine, 5-15 mg/hr, Last Rate: Stopped (08/23/24 0935)    Diet  NPO Diet NPO Type: Strict NPO    I have personally reviewed:  [x]  Laboratory   [x]  Microbiology   [x]  Radiology   [x]  EKG/Telemetry  [x]  Cardiology/Vascular   []  Pathology    []  Records       Assessment/Plan     Active Hospital Problems    Diagnosis  POA    **Hypertensive crisis [I16.9]  Yes    Acute kidney injury [N17.9]  Yes    Leukopenia [D72.819]  Yes    Kidney transplant recipient [Z94.0]  Not Applicable      Resolved Hospital Problems   No resolved problems to display.       63 y.o. male admitted with Hypertensive crisis.  Hypertensive crisis  Poorly controlled hypertension at baseline  SBP >200 at time of admission   He was initially initiated on Cardene drip at time of admission, that has since been weaned off.  Neurology is following managing secondary to status post kidney transplant.  Home medication regimen adjustments made- losartan, hydralazine, Procardia and Coreg have been increased-need to monitor effectiveness.  Blood pressure is currently acceptable  He reports all symptoms have " resolved.   Continue supportive care  Continue telemetry monitoring  Appreciate nephrology's recommendation and assistance with this patient.    Acute kidney injury at time of admission with creatinine of 1.50, that has improved to 1.23  Beta hydroxybutyrate-1.531  Monitor I's and O's  Daily weights  Avoid nephrotoxins, hypovolemia, hypotension    Hypokalemia-3.2 renal following and adjusting fluids and electrolytes   Monitor I's and O's  Daily weights  Avoid nephrotoxins, hypovolemia, hypotension  Monitor BMP     Kidney transplant recipient  Transplant in 20 4:21 years of HD.  Continue immunosuppressants/antirejection medications.    Type 2 diabetes mellitus  Chronic   Close in acceptable range  Hold home Jardiance  SSI ordered   Lantus 10 units     Leukopenia  Resolved        SCDs for DVT prophylaxis.  Full code.  Discussed with patient, care team on multidisciplinary rounds, and Dr Lee  .  Anticipate discharge home tomorrow. Possibly later today if cleared by renal   Expected discharge date/ time has not been documented.      ALONZO Dueñas  [facility]  08/23/24  12:24 EDT

## 2024-08-23 NOTE — PAYOR COMM NOTE
"Harry Ortega (63 y.o. Male)     PLEASE SEE ATTACHED FOR OBSERVATION AUTH    REF #   PT ID      [id number]         PLEASE CALL HERMAN @ [phone] OR [phone]    THANK YOU   HERMAN FERNANDEZ RN/ DEPT  [facility]       Date of Birth   1960    Social Security Number       Address   [address] [place] [address]    Home Phone   [phone]    MRN   [id number]       [facility]    Marital Status                               Admission Date   8/22/24    Admission Type   Emergency    Admitting Provider   Alf Brooke MD    Attending Provider   Wesley Lee MD    Department, Room/Bed   [address], S514/1       Discharge Date       Discharge Disposition       Discharge Destination                                 Attending Provider: Wesley Lee MD    Allergies: No Known Allergies    Isolation: None   Infection: None   Code Status: CPR    Ht: 177.8 cm (70\")   Wt: 66.8 kg (147 lb 4.3 oz)    Admission Cmt: None   Principal Problem: Hypertensive crisis [I16.9]                   Active Insurance as of 8/22/2024       Primary Coverage       Payor Plan Insurance Group Employer/Plan Group    WELLCARE [facility] MEDICARE REPLACEMENT WELLCARE MED ADV PPO        Payor Plan Address Payor Plan Phone Number Payor Plan Fax Number Effective Dates    [address] [address]   11/1/2023 - None Entered    [facility] [id number]         Subscriber Name Subscriber Birth Date Member ID       HARRY ORTEGA 1960 [id number]                     Emergency Contacts        (Rel.) Home Phone Work Phone Mobile Phone    JordanRaiza (Spouse) -- -- [phone]    Melinda Ortegata (Mother) [phone] -- --    JordanErnie (Son) -- -- [phone]              [place]: NPI [id number]  Tax ID [id number]     History & Physical        Francheska Huggins APRN at 08/22/24 2011       Attestation signed by Alf Brooke MD at 08/22/24 2227 " (Updated)    Addendum: I have reviewed the history and plan as obtained by ALONZO Scott and performed my own independent history. I have personally examined the patient and conducted greater than 50% of medical decision making. My exam confirms her physical findings and I agree with the plan as listed above, with the addition of the following:     This is a 63-year-old male with history of kidney transplant and hypertension who presented to the emergency room with complaints of headache and significantly elevated blood pressure.  BP in the emergency room was found to be as high as 228/119.  He was treated for his headache which is improved but blood pressure did not come down.  He states his blood pressure typically runs 140-150/80.  He reports compliance with his outpatient antihypertensive regimen.  He is followed by the transplant team at [facility].  He denies any other sense of ill health.  Workup in the emergency room showed a slightly elevated creatinine from his baseline.  He was given 10 mg of IV hydralazine with little improvement of his blood pressure and he was started on a Cardene drip.  On exam he is in no acute distress.  Awake and orient x 3.  Looks his stated age.  Heart is regular and lungs are clear.  Abdominal exam is unremarkable and he has no significant peripheral edema.  For now we will keep the Cardene drip in place with goal systolic blood pressure of 170-180 overnight tonight.  Will consult nephrology.  Additional plans based on his clinical course.    Alf Brooke MD  [facility]  08/22/24  22:24 EDT                        Patient Name:  Harry Ortega  YOB: 1960  MRN:  [id number]  Admit Date:  8/22/2024  Patient Care Team:  Venkatesh Rdz, JETT, APRN as PCP - General (Internal Medicine)  Yesenia Baez MD as Consulting Physician (Nephrology)      Subjective  History Present Illness     Chief Complaint    Patient presents with    Hypertension       History of Present Illness  Mr. Ortega is a 63-year-old male with a past medical history of arthritis, hypertension, kidney transplant, and iron deficiency anemia and is to [facility] ED today with complaints of hypertension and headache for 2 days.  Patient reports that he developed a headache while at work yesterday, came home and checked his blood pressure, and reported that his blood pressure was 190/100.  He reports he went to work today got off around 11 AM, still had a headache, and rechecked his blood pressure and it was still the same 191/100 and decided to come to the emergency room.  He denies any dizziness, shortness of breath, chest pain, nausea, vomiting, or difficulty urinating.  He reports that he has had an episode of diarrhea.  Patient reports that he used to be on dialysis but since his kidney transplant 3 years ago he has not needed dialysis.  He reports that his headache has gotten better since he has been in the emergency room and received medication for his blood pressure.    Review of Systems   Respiratory:  Negative for shortness of breath.    Cardiovascular:  Negative for chest pain.   Gastrointestinal:  Positive for diarrhea. Negative for nausea and vomiting.   Genitourinary:  Negative for difficulty urinating.   Neurological:  Positive for headaches. Negative for dizziness.        Personal History     Past Medical History:   Diagnosis Date    Arthritis     CKD (chronic kidney disease) requiring chronic dialysis     ON KIDNEY TRANSPLANT LIST    Dialysis patient     MON,WED AND FRIDAY/48 OZ OF FLUID RESTRICTION DAY    Glaucoma     History of gastric ulcer     Hypertension     Inguinal hernia     LEFT     Past Surgical History:   Procedure Laterality Date    ARTERIOVENOUS FISTULA/SHUNT SURGERY W/ HEMODIALYSIS CATHETER INSERTION N/A 07/06/2017    Procedure: ARTERIOVENOUS FISTULA LEFT ARM AND PALINDROME PLACEMENT;  Surgeon: Kar  Germán Morales MD;  Location:  [facility];  Service:     COLONOSCOPY N/A 10/12/2017    Procedure: COLONOSCOPY TO CECUM AND TERMINAL ILEUM;  Surgeon: Doni Terrazas MD;  Location:  [facility];  Service:     COLONOSCOPY N/A 2021    Procedure: COLONOSCOPY to cecum and TI with cold polypectomy;  Surgeon: Doni Terrazas MD;  Location:  [facility];  Service: Gastroenterology;  Laterality: N/A;  PRE - screening  POST - polyp, normal TI, internal hemorrhoids    ENDOSCOPY N/A 10/12/2017    Procedure: ESOPHAGOGASTRODUODENOSCOPY WITH BIOPSIES;  Surgeon: Doni Terrazas MD;  Location:  [facility];  Service:     INGUINAL HERNIA REPAIR Left 10/29/2019    Procedure: left INGUINAL HERNIA REPAIR LAPAROSCOPIC WITH DAVINCI ROBOT;  Surgeon: Konstantin Espinoza MD;  Location:  [facility];  Service: DaVinci    TOTAL KNEE ARTHROPLASTY Left 2014    Dr. Alf Garrett    TRANSPLANTATION RENAL Right      Family History   Problem Relation Age of Onset    Hypertension Mother     Diabetes Mother     Hypertension Father     Diabetes Son     Malig Hyperthermia Neg Hx      Social History     Tobacco Use    Smoking status: Former     Current packs/day: 0.00     Average packs/day: 0.3 packs/day for 2.0 years (0.5 ttl pk-yrs)     Types: Cigarettes     Start date:      Quit date:      Years since quittin.6    Smokeless tobacco: Never    Tobacco comments:     QUIT 20 YR AGO   Vaping Use    Vaping status: Never Used   Substance Use Topics    Alcohol use: Yes     Comment: BEER OCCASIONALLY    Drug use: No     No current facility-administered medications on file prior to encounter.     Current Outpatient Medications on File Prior to Encounter   Medication Sig Dispense Refill    acetaminophen (TYLENOL) 325 MG tablet Take 3 tablets by mouth As Needed.      carvedilol (COREG) 6.25 MG tablet TAKE 1 TABLET BY MOUTH TWICE DAILY WITH MEALS 180 tablet 0    empagliflozin (JARDIANCE) 10 MG tablet tablet Take 1 tablet  by mouth Daily.      loratadine (CLARITIN) 10 MG tablet Take 1 tablet by mouth Daily As Needed.      losartan (COZAAR) 50 MG tablet Take 1 tablet by mouth once daily 90 tablet 1    magnesium oxide (MAG-OX) 400 MG tablet Take 2 tablets by mouth 3 (Three) Times a Day.      multivitamin with minerals tablet tablet Take 1 tablet by mouth Daily.      mycophenolate (CELLCEPT) 250 MG capsule Take 4 capsules by mouth 2 (Two) Times a Day.      NIFEdipine XL (PROCARDIA XL) 90 MG 24 hr tablet Take 1 tablet by mouth 2 (Two) Times a Day. 180 tablet 1    sildenafil (VIAGRA) 25 MG tablet TAKE 1 TABLET BY MOUTH ONCE DAILY AS NEEDED FOR ERECTILE DYSFUNCTION 30 tablet 0    Tacrolimus ER 1 MG tablet sustained-release 24 hour Take 6 tablets by mouth Daily.       No Known Allergies    Objective   Objective     Vital Signs  Temp:  [97.5 °F (36.4 °C)] 97.5 °F (36.4 °C)  Heart Rate:  [50-65] 65  Resp:  [16] 16  BP: (170-228)/(101-119) 195/104  SpO2:  [96 %-99 %] 96 %  on   ;   Device (Oxygen Therapy): room air  Body mass index is 21.52 kg/m².    Physical Exam  Vitals and nursing note reviewed.   Constitutional:       General: He is not in acute distress.     Appearance: He is not ill-appearing.   Cardiovascular:      Rate and Rhythm: Normal rate and regular rhythm.      Pulses: Normal pulses.      Heart sounds: Normal heart sounds.   Pulmonary:      Effort: Pulmonary effort is normal. No respiratory distress.      Breath sounds: Normal breath sounds.   Abdominal:      General: Bowel sounds are normal. There is no distension.      Palpations: Abdomen is soft.      Tenderness: There is no abdominal tenderness.   Musculoskeletal:      Right lower leg: No edema.      Left lower leg: No edema.   Skin:     General: Skin is warm and dry.   Neurological:      Mental Status: He is alert and oriented to person, place, and time.         Results Review:  I reviewed the patient's new clinical results.  I reviewed the patient's new imaging  results.  Discussed with ED provider.    Lab Results (last 24 hours)       Procedure Component Value Units Date/Time    Renal Function Panel [[id number]]  (Abnormal) Collected: 08/22/24 1332    Specimen: Blood Updated: 08/22/24 1414     Glucose 126 mg/dL      BUN 18 mg/dL      Creatinine 1.50 mg/dL      Sodium 138 mmol/L      Potassium 3.9 mmol/L      Comment: Slight hemolysis detected by analyzer. Result may be falsely elevated.        Chloride 104 mmol/L      CO2 26.0 mmol/L      Calcium 9.0 mg/dL      Albumin 4.2 g/dL      Phosphorus 3.0 mg/dL      Anion Gap 8.0 mmol/L      BUN/Creatinine Ratio 12.0     eGFR 52.0 mL/min/1.73     Narrative:      GFR Normal >60  Chronic Kidney Disease <60  Kidney Failure <15      CBC (No Diff) [[id number]]  (Abnormal) Collected: 08/22/24 1332    Specimen: Blood Updated: 08/22/24 2041     WBC 2.52 10*3/mm3      RBC 4.22 10*6/mm3      Hemoglobin 13.7 g/dL      Hematocrit 40.8 %      MCV 96.7 fL      MCH 32.5 pg      MCHC 33.6 g/dL      RDW 13.8 %      RDW-SD 48.3 fl      MPV 10.3 fL      Platelets 163 10*3/mm3             Imaging Results (Last 24 Hours)       Procedure Component Value Units Date/Time    CT Head Without Contrast [[id number]] Collected: 08/22/24 1548     Updated: 08/22/24 1610    Narrative:      CT HEAD WO CONTRAST-     HISTORY:  acute severe headache, htn     COMPARISON: None     FINDINGS: The brain and ventricles are symmetrical. There is no evidence  of intracranial hemorrhage. Mild to moderate vascular calcification is  noted. Decreased attenuation consistent with an area of infarction,  age-indeterminate but likely remote is appreciated about the right  cerebellar hemisphere superolaterally. This measures approximately 2.4 x  0.8 x 1.9 cm in size. Clinical correlation is recommended. A subacute  infarct cannot be excluded. Should a prior study be made available for  comparison, I would be more than happy to review the studies and to  generate a supplemental report.  No obvious acute infarction is  identified. Further evaluation could be performed with MRI examination  of the brain as indicated.     The above information was called to and discussed with Dr. Arias.                 Radiation dose reduction techniques were utilized, including automated  exposure modulation based on body size.     This report was finalized on 8/22/2024 4:06 PM by Dr. Bin Prado M.D  on Workstation: [id number]                 ECG 12 Lead Other; hypertensive crisis    (Results Pending)     Assessment/Plan   Assessment & Plan  Active Hospital Problems    Diagnosis  POA    **Hypertensive crisis [I16.9]  Yes    Acute kidney injury [N17.9]  Yes    Leukopenia [D72.819]  Yes    Kidney transplant recipient [Z94.0]  Not Applicable      Resolved Hospital Problems   No resolved problems to display.     Mr. Ortega is a 63-year-old male with a past medical history of arthritis, hypertension, kidney transplant, and iron deficiency anemia and is to [facility] ED today with complaints of hypertension and headache for 2 days.     Hypertensive crisis  -Current blood pressure 195/104   -Received hydralazine 10 mg IV in the ED   -Patient also received Benadryl 25 mg IV and Compazine 10 mg IV in the ED for headache.  Patient does report improvement in headache.   -Will check EKG  -Will try Cardene drip to keep systolic blood pressure between 170 and 180  -Telemetry monitoring  -Strict I&O  -Daily weights  -Will continue to monitor blood pressure      Acute kidney injury  Kidney transplant recipient  -Creatinine noted to be 1.5 with a GFR 52  -Received a 500 cc bolus of normal saline in the ED  -Will consult nephrology in the setting of acute kidney injury and kidney transplant  -BMP in the morning  -Avoid nephrotoxic medication      Leukopenia  -White blood cell count 2.52, appears baseline from June 2024  -Will check CBC in the morning    SCDs for DVT prophylaxis.  Full code.  Discussed with  patient.      Francheska ALONZO Rolle  [facility]  08/22/24  21:22 EDT       Electronically signed by Alf Brooke MD at 08/22/24 2227          Emergency Department Notes        Leyla Mills RN at 08/22/24 2034          Mamie RAMIREZ to call back    Electronically signed by Leyla Mills RN at 08/22/24 2035       Jorge Arias MD at 08/22/24 2016           EMERGENCY DEPARTMENT ENCOUNTER    Room Number:  10/10  PCP: Venkatesh Rdz, JETT, ALONZO  History obtained from: Patient      HPI:  Chief Complaint: Headache  A complete HPI/ROS/PMH/PSH/SH/FH are unobtainable due to:   Context: Harry Ortega is a 63 y.o. male who presents to the ED c/o headache.  Also elevated blood pressure for the last several days.  No numbness or weakness.  No fever.  No chest pain or shortness of breath.            PAST MEDICAL HISTORY  Active Ambulatory Problems     Diagnosis Date Noted    Hypertensive crisis 07/03/2017    Essential hypertension 07/12/2017    ESRD (end stage renal disease) on dialysis 07/17/2017    Pulmonary nodule 08/17/2017    Anemia in chronic kidney disease 08/01/2017    Slow transit constipation 09/19/2017    Iron deficiency anemia 07/13/2017    Non-recurrent unilateral inguinal hernia without obstruction or gangrene 10/09/2019    Pre-transplant evaluation for kidney transplant 10/11/2017    Abnormal nuclear stress test 11/08/2021    Allergy, unspecified, initial encounter 08/13/2020    Anaphylactic shock, unspecified, initial encounter 08/13/2020    Gastritis, unspecified, with bleeding 07/31/2017    Headache, unspecified 10/17/2017    Hernia of abdominal wall 02/16/2023    Hypokalemia 07/12/2017    Immunosuppressive management encounter following kidney transplant 12/20/2021    Kidney transplant recipient 12/20/2021    Nonspecific reaction to tuberculin skin test without active tuberculosis 07/17/2017    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    Shortness of breath 01/03/2019     Unspecified protein-calorie malnutrition 07/18/2017    Erectile dysfunction 02/16/2023    Prediabetes 02/16/2023    Uncontrolled hypertension 08/22/2024     Resolved Ambulatory Problems     Diagnosis Date Noted    Pneumonia 07/17/2017     Past Medical History:   Diagnosis Date    Arthritis     CKD (chronic kidney disease) requiring chronic dialysis     Dialysis patient     Glaucoma     History of gastric ulcer     Hypertension     Inguinal hernia          PAST SURGICAL HISTORY  Past Surgical History:   Procedure Laterality Date    ARTERIOVENOUS FISTULA/SHUNT SURGERY W/ HEMODIALYSIS CATHETER INSERTION N/A 07/06/2017    Procedure: ARTERIOVENOUS FISTULA LEFT ARM AND PALINDROME PLACEMENT;  Surgeon: Kar Morales MD;  Location: [facility] MAIN OR;  Service:     COLONOSCOPY N/A 10/12/2017    Procedure: COLONOSCOPY TO CECUM AND TERMINAL ILEUM;  Surgeon: Doni Terrazas MD;  [facility];  Service:     COLONOSCOPY N/A 09/28/2021    Procedure: COLONOSCOPY to cecum and TI with cold polypectomy;  Surgeon: Doni Terrazas MD;  [facility];  Service: Gastroenterology;  Laterality: N/A;  PRE - screening  POST - polyp, normal TI, internal hemorrhoids    ENDOSCOPY N/A 10/12/2017    Procedure: ESOPHAGOGASTRODUODENOSCOPY WITH BIOPSIES;  Surgeon: Doni Terrazas MD;  Location: [facility] ENDOSCOPY;  Service:     INGUINAL HERNIA REPAIR Left 10/29/2019    Procedure: left INGUINAL HERNIA REPAIR LAPAROSCOPIC WITH DAVINCI ROBOT;  Surgeon: Konstantin Espinoza MD;  Location: [facility] MAIN OR;  Service: DaVinci    TOTAL KNEE ARTHROPLASTY Left 08/28/2014    Dr. Alf Garrett    TRANSPLANTATION RENAL Right          FAMILY HISTORY  Family History   Problem Relation Age of Onset    Hypertension Mother     Diabetes Mother     Hypertension Father     Diabetes Son     Malig Hyperthermia Neg Hx          SOCIAL HISTORY  Social History     Socioeconomic History    Marital status:     Number of children: 5   Tobacco  Use    Smoking status: Former     Current packs/day: 0.00     Average packs/day: 0.3 packs/day for 2.0 years (0.5 ttl pk-yrs)     Types: Cigarettes     Start date:      Quit date:      Years since quittin.6    Smokeless tobacco: Never    Tobacco comments:     QUIT 20 YR AGO   Vaping Use    Vaping status: Never Used   Substance and Sexual Activity    Alcohol use: Yes     Comment: BEER OCCASIONALLY    Drug use: No    Sexual activity: Defer         ALLERGIES  Patient has no known allergies.        REVIEW OF SYSTEMS    As per HPI      PHYSICAL EXAM  ED Triage Vitals   Temp Heart Rate Resp BP SpO2   24 1323 24 1323 24 1323 24 1324 24 1323   97.5 °F (36.4 °C) 64 16 (!) 170/103 98 %      Temp src Heart Rate Source Patient Position BP Location FiO2 (%)   24 1323 24 1323 24 1324 -- --   Tympanic Monitor Sitting         Physical Exam  Constitutional:       General: He is not in acute distress.  HENT:      Head: Normocephalic and atraumatic.   Cardiovascular:      Rate and Rhythm: Normal rate and regular rhythm.   Pulmonary:      Effort: No respiratory distress.   Abdominal:      General: There is no distension.      Tenderness: There is no abdominal tenderness.   Musculoskeletal:         General: No swelling or deformity.      Comments: Fistula to left upper extremity with palpable thrill   Skin:     General: Skin is warm and dry.   Neurological:      General: No focal deficit present.      Mental Status: He is alert. Mental status is at baseline.           Vital signs and nursing notes reviewed.          LAB RESULTS  Recent Results (from the past 24 hour(s))   Green Top (Gel)    Collection Time: 24  1:32 PM   Result Value Ref Range    Extra Tube Hold for add-ons.    Lavender Top    Collection Time: 24  1:32 PM   Result Value Ref Range    Extra Tube hold for add-on    Gold Top - SST    Collection Time: 24  1:32 PM   Result Value Ref Range    Extra  Tube Hold for add-ons.    Light Blue Top    Collection Time: 08/22/24  1:32 PM   Result Value Ref Range    Extra Tube Hold for add-ons.    Renal Function Panel    Collection Time: 08/22/24  1:32 PM    Specimen: Blood   Result Value Ref Range    Glucose 126 (H) 65 - 99 mg/dL    BUN 18 8 - 23 mg/dL    Creatinine 1.50 (H) 0.76 - 1.27 mg/dL    Sodium 138 136 - 145 mmol/L    Potassium 3.9 3.5 - 5.2 mmol/L    Chloride 104 98 - 107 mmol/L    CO2 26.0 22.0 - 29.0 mmol/L    Calcium 9.0 8.6 - 10.5 mg/dL    Albumin 4.2 3.5 - 5.2 g/dL    Phosphorus 3.0 2.5 - 4.5 mg/dL    Anion Gap 8.0 5.0 - 15.0 mmol/L    BUN/Creatinine Ratio 12.0 7.0 - 25.0    eGFR 52.0 (L) >60.0 mL/min/1.73       Ordered the above labs and reviewed the results.        RADIOLOGY  CT Head Without Contrast    Result Date: 8/22/2024  CT HEAD WO CONTRAST-  HISTORY:  acute severe headache, htn  COMPARISON: None  FINDINGS: The brain and ventricles are symmetrical. There is no evidence of intracranial hemorrhage. Mild to moderate vascular calcification is noted. Decreased attenuation consistent with an area of infarction, age-indeterminate but likely remote is appreciated about the right cerebellar hemisphere superolaterally. This measures approximately 2.4 x 0.8 x 1.9 cm in size. Clinical correlation is recommended. A subacute infarct cannot be excluded. Should a prior study be made available for comparison, I would be more than happy to review the studies and to generate a supplemental report. No obvious acute infarction is identified. Further evaluation could be performed with MRI examination of the brain as indicated.  The above information was called to and discussed with Dr. Arias.      Radiation dose reduction techniques were utilized, including automated exposure modulation based on body size.  This report was finalized on 8/22/2024 4:06 PM by Dr. Bin Prado M.D on Workstation: [id number]       Ordered the above noted radiological studies. Reviewed by  me in PACS.                MEDICATIONS GIVEN IN ER  Medications   nitroglycerin (NITROSTAT) SL tablet 0.4 mg (has no administration in time range)   sennosides-docusate (PERICOLACE) 8.6-50 MG per tablet 2 tablet (has no administration in time range)     And   polyethylene glycol (MIRALAX) packet 17 g (has no administration in time range)     And   bisacodyl (DULCOLAX) EC tablet 5 mg (has no administration in time range)     And   bisacodyl (DULCOLAX) suppository 10 mg (has no administration in time range)   ondansetron ODT (ZOFRAN-ODT) disintegrating tablet 4 mg (has no administration in time range)     Or   ondansetron (ZOFRAN) injection 4 mg (has no administration in time range)   acetaminophen (TYLENOL) tablet 650 mg (has no administration in time range)   sodium chloride 0.9 % bolus 500 mL (0 mL Intravenous Stopped 8/22/24 1536)   prochlorperazine (COMPAZINE) injection 10 mg (10 mg Intravenous Given 8/22/24 1435)   diphenhydrAMINE (BENADRYL) injection 25 mg (25 mg Intravenous Given 8/22/24 1434)   hydrALAZINE (APRESOLINE) injection 10 mg (10 mg Intravenous Given 8/22/24 1930)               MEDICAL DECISION MAKING, PROGRESS, and CONSULTS    MDM: Patient presented emergency department with elevated blood pressure, unclear etiology.  Otherwise well-appearing, vitals otherwise stable.  Labs significant for acute kidney injury, imaging demonstrating no evidence of acute intracranial process. Does appear to have chronic to subacute hypodensity which patient reports has been investigated before.  Discussed with inpatient team, will admit for blood pressure control and monitoring of kidney function.    All labs have been independently reviewed by me.  All radiology studies have been reviewed by me and I have also reviewed the radiology report.   EKG's independently viewed and interpreted by me.  Discussion below represents my analysis of pertinent findings related to patient's condition, differential diagnosis,  treatment plan and final disposition.      Additional sources:  - Discussed/ obtained information from independent historians:     - External (non-ED) record review:     - Chronic or social conditions impacting care:     - Shared decision making:        Orders placed during this visit:  Orders Placed This Encounter   Procedures    CT Head Without Contrast    [place] Draw    Renal Function Panel    Basic Metabolic Panel    CBC (No Diff)    CBC (No Diff)    Urinalysis With Microscopic If Indicated (No Culture) - Urine, Clean Catch    Diet: Cardiac; Healthy Heart (2-3 Na+); Fluid Consistency: Thin (IDDSI 0)    Vital Signs    Maintain IV Access    Telemetry - Place Orders & Notify Provider of Results When Patient Experiences Acute Chest Pain, Dysrhythmia or Respiratory Distress    May Be Off Telemetry for Tests    Daily Weights    Oral Care    Place Sequential Compression Device    Maintain Sequential Compression Device    Continuous Pulse Oximetry    Up with assistance    Strict Intake & Output    Code Status and Medical Interventions: CPR (Attempt to Resuscitate); Full    LHA (on-call MD unless specified) Details    Inpatient Nephrology Consult    Incentive Spirometry    Oxygen Therapy- Nasal Cannula; Titrate 1-6 LPM Per SpO2; 90 - 95%    ECG 12 Lead Other; hypertensive crisis    Initiate Observation Status    Green Top (Gel)    Lavender Top    Gold Top - SST    Light Blue Top         Additional orders considered but not ordered:  Considered renal ultrasound however will defer to inpatient service.        Differential diagnosis includes but is not limited to:    Hypertensive urgency, hypertension, acute kidney injury, dehydration, headache      Independent interpretation of labs, radiology studies, and discussions with consultants:  ED Course as of 08/22/24 2016   Thu Aug 22, 2024   1419 Glucose(!): 126 [DC]   1419 BUN: 18 [DC]   1426 First Look: Patient presents today for elevated blood pressures.  States he is  normally very well-controlled after his kidney transplant 3 years ago.  Had a headache since last night which is new for him, and very uncomfortable for him.  Has not had any nausea, vomiting, diarrhea, fevers, chills, cough, chest pain, short of breath.  Other than headache he feels fine.  Compliant with his medications.  Was concerned with a headache and the elevated blood pressures.  Patient's exam is benign, plan for renal function panel, will give him IV fluids and a migraine cocktail and reevaluate.  Plan for CT head from new onset headache and severe hypertension.  Will monitor BP, will hold off on treatment for now until we get better symptom control, may improve with headache improvement. [DC]   1543 CT head interpreted myself:  No hemorrhage or midline shift [FS]      ED Course User Index  [DC] Urbano Patel MD  [FS] Jorge Arias MD           DIAGNOSIS  Final diagnoses:   Uncontrolled hypertension   JASSON (acute kidney injury)         DISPOSITION  Admitted to telemetry        Latest Documented Vital Signs:  As of 20:16 EDT  BP- (!) 191/110 HR- 56 Temp- 97.5 °F (36.4 °C) (Tympanic) O2 sat- 99%              --    Please note that portions of this were completed with a voice recognition program.       Note Disclaimer: At [facility], we believe that sharing information builds trust and better relationships. You are receiving this note because you are receiving care at [facility] or recently visited. It is possible you will see health information before a provider has talked with you about it. This kind of information can be easy to misunderstand. To help you fully understand what it means for your health, we urge you to discuss this note with your provider.             Jorge Arias MD  08/22/24 2018      Electronically signed by Jorge Arais MD at 08/22/24 2018       Leyla Mills RN at 08/22/24 1931          Nursing report ED to floor  Harry CHANDU Jordna  63 y.o.  male    HPI :  HPI  (Adult)  Stated Reason for Visit: HA  History Obtained From: patient    Chief Complaint  Chief Complaint   Patient presents with    Hypertension       Admitting doctor:   Alf Brooke MD    Admitting diagnosis:   The primary encounter diagnosis was Uncontrolled hypertension. A diagnosis of JASSON (acute kidney injury) was also pertinent to this visit.    Code status:   Current Code Status       Date Active Code Status Order ID Comments User Context       Prior            Allergies:   Patient has no known allergies.    Isolation:   No active isolations    Intake and Output    Intake/Output Summary (Last 24 hours) at 8/22/2024 1931  Last data filed at 8/22/2024 1536  Gross per 24 hour   Intake 500 ml   Output --   Net 500 ml       Weight:       08/22/24  1324   Weight: 68 kg (150 lb)       Most recent vitals:   Vitals:    08/22/24 1753 08/22/24 1757 08/22/24 1832 08/22/24 1925   BP:  (S) (!) 228/119 (!) 216/114 (!) 191/110   Patient Position:       Pulse: 50 58 56    Resp:       Temp:       TempSrc:       SpO2: 97% 99% 99%    Weight:       Height:           Active LDAs/IV Access:   Lines, Drains & Airways       Active LDAs       Name Placement date Placement time Site Days    Peripheral IV 08/22/24 1430 Right Antecubital 08/22/24  1430  Antecubital  less than 1                    Labs (abnormal labs have a star):   Labs Reviewed   RENAL FUNCTION PANEL - Abnormal; Notable for the following components:       Result Value    Glucose 126 (*)     Creatinine 1.50 (*)     eGFR 52.0 (*)     All other components within normal limits    Narrative:     GFR Normal >60  Chronic Kidney Disease <60  Kidney Failure <15     RAINBOW DRAW    Narrative:     The following orders were created for panel order [place] Draw.  Procedure                               Abnormality         Status                     ---------                               -----------         ------                     Green Top (Gel)[[id number]]                                   Final result               Lavender Top[[id number]]                                     Final result               Gold Top - SST[[id number]]                                   Final result               Light Blue Top[[id number]]                                   Final result                 Please view results for these tests on the individual orders.   GREEN TOP   LAVENDER TOP   GOLD TOP - SST   LIGHT BLUE TOP       EKG:   No orders to display       Meds given in ED:   Medications   sodium chloride 0.9 % bolus 500 mL (0 mL Intravenous Stopped 24 1536)   prochlorperazine (COMPAZINE) injection 10 mg (10 mg Intravenous Given 24 1435)   diphenhydrAMINE (BENADRYL) injection 25 mg (25 mg Intravenous Given 24 1434)   hydrALAZINE (APRESOLINE) injection 10 mg (10 mg Intravenous Given 24 1930)       Imaging results:  No radiology results for the last day    Ambulatory status:   - ambulatory    Social issues:   Social History     Socioeconomic History    Marital status:     Number of children: 5   Tobacco Use    Smoking status: Former     Current packs/day: 0.00     Average packs/day: 0.3 packs/day for 2.0 years (0.5 ttl pk-yrs)     Types: Cigarettes     Start date:      Quit date:      Years since quittin.6    Smokeless tobacco: Never    Tobacco comments:     QUIT 20 YR AGO   Vaping Use    Vaping status: Never Used   Substance and Sexual Activity    Alcohol use: Yes     Comment: BEER OCCASIONALLY    Drug use: No    Sexual activity: Defer       Peripheral Neurovascular  Peripheral Neurovascular (Adult)  Peripheral Neurovascular WDL: WDL    Neuro Cognitive  Neuro Cognitive (Adult)  Cognitive/Neuro/Behavioral WDL: WDL, orientation  Orientation: oriented x 4    Learning  Learning Assessment (Adult)  Learning Readiness and Ability: no barriers identified    Respiratory  Respiratory (Adult)  Airway WDL: WDL  Respiratory WDL  Respiratory WDL: WDL    Abdominal Pain        Pain Assessments  Pain (Adult)  (0-10) Pain Rating: Rest: 8  (0-10) Pain Rating: Activity: 9    NIH Stroke Scale       Leyla Mills RN  24 19:31 EDT     Electronically signed by Leyla Mills RN at 24       Alexandra Butler, PCT at 24 1745          [facility] paged by , to call back and speak to Juana    Electronically signed by Alexandra Butler PCT at 24 1745       Degonda, Janet, RN at 24 1322          Pt is a kidney transplant pt from .  He has had htn 188/99 and HA since  last night.  He is on bp medication    Electronically signed by Degonda, Janet, RN at 24 1323          Physician Progress Notes (last 24 hours)        Cora Preston APRN at 24 1224              Name: Harry Ortega ADMIT: 2024   : 1960  PCP: Venkatesh Rdz DNP, APRN    MRN: [id number] LOS: 0 days   AGE/SEX: 63 y.o. male  ROOM: [facility]     Subjective   Subjective   He is laying in bed, reports he fells much better, his headache has resolved, and is very excited about being able to sleep.  He is stable and off Cardene drip with acceptable blood pressure at this time.    Objective   Objective   Vital Signs  Temp:  [97.5 °F (36.4 °C)-97.9 °F (36.6 °C)] 97.9 °F (36.6 °C)  Heart Rate:  [] 74  Resp:  [16-18] 18  BP: (116-228)/() 126/75  SpO2:  [94 %-100 %] 97 %  on   ;   Device (Oxygen Therapy): room air  Body mass index is 21.13 kg/m².  Physical Exam  Vitals and nursing note reviewed.   Constitutional:       Appearance: Normal appearance.   HENT:      Head: Atraumatic.      Mouth/Throat:      Mouth: Mucous membranes are dry.   Eyes:      Conjunctiva/sclera: Conjunctivae normal.   Cardiovascular:      Rate and Rhythm: Normal rate and regular rhythm.      Pulses: Normal pulses.           Radial pulses are 2+ on the right side and 2+ on the left side.      Heart sounds: Normal heart sounds.      Comments: Left arm av  "fistula.  Pulmonary:      Breath sounds: Normal breath sounds.   Abdominal:      General: Bowel sounds are normal.      Palpations: Abdomen is soft.   Musculoskeletal:         General: Normal range of motion.   Skin:     General: Skin is warm and dry.      Capillary Refill: Capillary refill takes less than 2 seconds.   Neurological:      General: No focal deficit present.      Mental Status: He is alert and oriented to person, place, and time. Mental status is at baseline.   Psychiatric:         Mood and Affect: Mood normal.       Results Review     I reviewed the patient's new clinical results.  Results from last 7 days   Lab Units 08/23/24  0844 08/22/24  1332   WBC 10*3/mm3 4.73 2.52*   HEMOGLOBIN g/dL 15.3 13.7   PLATELETS 10*3/mm3 155 163     Results from last 7 days   Lab Units 08/23/24  0314 08/22/24  1332   SODIUM mmol/L 137 138   POTASSIUM mmol/L 3.2* 3.9   CHLORIDE mmol/L 102 104   CO2 mmol/L 18.6* 26.0   BUN mg/dL 15 18   CREATININE mg/dL 1.23 1.50*   GLUCOSE mg/dL 121* 126*   EGFR mL/min/1.73 66.0 52.0*     Results from last 7 days   Lab Units 08/22/24  1332   ALBUMIN g/dL 4.2     Results from last 7 days   Lab Units 08/23/24  0314 08/22/24  1332   CALCIUM mg/dL 9.4 9.0   ALBUMIN g/dL  --  4.2   MAGNESIUM mg/dL 1.7  --    PHOSPHORUS mg/dL  --  3.0       No results found for: \"HGBA1C\", \"POCGLU\"    No radiology results for the last day    I have personally reviewed all medications:  Scheduled Medications  carvedilol, 12.5 mg, Oral, BID With Meals  losartan, 100 mg, Oral, Q24H  mycophenolate, 1,000 mg, Oral, BID  NIFEdipine XL, 90 mg, Oral, Q24H  PATIENT SUPPLIED MEDICATION, 8 mg, Oral, Daily    Infusions  niCARdipine, 5-15 mg/hr, Last Rate: Stopped (08/23/24 0935)    Diet  NPO Diet NPO Type: Strict NPO    I have personally reviewed:  [x]  Laboratory   [x]  Microbiology   [x]  Radiology   [x]  EKG/Telemetry  [x]  Cardiology/Vascular   []  Pathology    []  Records      Assessment/Plan     Active Hospital " Problems    Diagnosis  POA    **Hypertensive crisis [I16.9]  Yes    Acute kidney injury [N17.9]  Yes    Leukopenia [D72.819]  Yes    Kidney transplant recipient [Z94.0]  Not Applicable      Resolved Hospital Problems   No resolved problems to display.       63 y.o. male admitted with Hypertensive crisis.  Hypertensive crisis  Poorly controlled hypertension at baseline  SBP >200 at time of admission   He was initially initiated on Cardene drip at time of admission, that has since been weaned off.  Neurology is following managing secondary to status post kidney transplant.  Home medication regimen adjustments made- losartan, hydralazine, Procardia and Coreg have been increased-need to monitor effectiveness.  Blood pressure is currently acceptable  He reports all symptoms have resolved.   Continue supportive care  Continue telemetry monitoring  Appreciate nephrology's recommendation and assistance with this patient.    Acute kidney injury at time of admission with creatinine of 1.50, that has improved to 1.23  Beta hydroxybutyrate-1.531  Monitor I's and O's  Daily weights  Avoid nephrotoxins, hypovolemia, hypotension    Hypokalemia-3.2 renal following and adjusting fluids and electrolytes   Monitor I's and O's  Daily weights  Avoid nephrotoxins, hypovolemia, hypotension  Monitor BMP     Kidney transplant recipient  Transplant in 20 4:21 years of HD.  Continue immunosuppressants/antirejection medications.    Type 2 diabetes mellitus  Chronic   Close in acceptable range  Hold home Jardiance  SSI ordered   Lantus 10 units     Leukopenia  Resolved        SCDs for DVT prophylaxis.  Full code.  Discussed with patient, care team on multidisciplinary rounds, and Dr Lee  .  Anticipate discharge home tomorrow. Possibly later today if cleared by renal   Expected discharge date/ time has not been documented.      ALONZO Dueñas  [facility]  08/23/24  12:24 EDT      Electronically signed by Mohan  ALONZO Eaton at 24 1245          Consult Notes (last 24 hours)        Shemar Hook MD at 24 0756            [facility] Consult Note      Patient Name: Harry Ortega  : 1960  MRN: [id number]  Primary Care Physician:  Venkatesh Rdz, DNP, APRN  Referring Physician: Alf Cruz*  Date of admission: 2024    Subjective     Reason for Consult:  JASSON on CKD2; DDKT    HPI:   Harry Ortega is a 63 y.o. male with  donor kidney transplant  to treat ESRD (was on hemodialysis for 4 years before his transplant) attributed to hypertension, presented yesterday to hospital due to severe headache, which started on the evening of , and high blood pressure.  /119 on arrival.  Cardene drip started, with improvement in blood pressure and resolution of headache.  Full PMH outlined below; pertinent is longstanding hypertension, immunosuppression (Envarsus 8 mg daily and CellCept 1 g twice daily), and DM2.  Home blood pressure medicines: carvedilol 6.25 mg twice daily, Procardia 90 mg daily, and losartan 75 mg daily.  Typical blood pressure at home is 150/80, he states.  Cardene drip infusing now at 5 mg/h.    Has had no gap in blood pressure medications  Follows a low-salt diet at home  Did not have any vision changes or mental status changes when blood pressure was high; primary complaint was headache  Feels better now.  No headache.  No chest pain, shortness of breath, orthopnea, or leg swelling  Fairly stable weight over the last 1 year, losing just 2-3 pounds after starting Jardiance late last year.  Reports good blood sugar control    Review of Systems:   14 point review of systems is otherwise negative except for mentioned above on HPI    Personal History     Past Medical History:   Diagnosis Date    Arthritis     CKD (chronic kidney disease) requiring chronic dialysis     ON KIDNEY TRANSPLANT LIST    Dialysis patient      MON,WED AND FRIDAY/48 OZ OF FLUID RESTRICTION DAY    Glaucoma     History of gastric ulcer     Hypertension     Inguinal hernia     LEFT       Past Surgical History:   Procedure Laterality Date    ARTERIOVENOUS FISTULA/SHUNT SURGERY W/ HEMODIALYSIS CATHETER INSERTION N/A 07/06/2017    Procedure: ARTERIOVENOUS FISTULA LEFT ARM AND PALINDROME PLACEMENT;  Surgeon: Kar Morales MD;  Location:  [facility];  Service:     COLONOSCOPY N/A 10/12/2017    Procedure: COLONOSCOPY TO CECUM AND TERMINAL ILEUM;  Surgeon: Doni Terrazas MD;  Location:  [facility];  Service:     COLONOSCOPY N/A 09/28/2021    Procedure: COLONOSCOPY to cecum and TI with cold polypectomy;  Surgeon: Doni Terrazas MD;  Location:  [facility];  Service: Gastroenterology;  Laterality: N/A;  PRE - screening  POST - polyp, normal TI, internal hemorrhoids    ENDOSCOPY N/A 10/12/2017    Procedure: ESOPHAGOGASTRODUODENOSCOPY WITH BIOPSIES;  Surgeon: Doni Terrazas MD;  [facility];  Service:     INGUINAL HERNIA REPAIR Left 10/29/2019    Procedure: left INGUINAL HERNIA REPAIR LAPAROSCOPIC WITH DAVINCI ROBOT;  Surgeon: Konstantin Espinoza MD;  Location: [facility] MA[facility];  Service: DaVinci    TOTAL KNEE ARTHROPLASTY Left 08/28/2014    Dr. Alf Garrett    TRANSPLANTATION RENAL Right        Family History: family history includes Diabetes in his mother and son; Hypertension in his father and mother.    Social History:  reports that he quit smoking about 21 years ago. His smoking use included cigarettes. He started smoking about 23 years ago. He has a 0.5 pack-year smoking history. He has never used smokeless tobacco. He reports current alcohol use. He reports that he does not use drugs.    Home Medications:  Prior to Admission medications    Medication Sig Start Date End Date Taking? Authorizing Provider   acetaminophen (TYLENOL) 325 MG tablet Take 3 tablets by mouth As Needed. 12/20/21  Yes Provider, MD No    carvedilol (COREG) 6.25 MG tablet TAKE 1 TABLET BY MOUTH TWICE DAILY WITH MEALS 6/19/24  Yes Venkatesh Rdz DNP, APRN   empagliflozin (JARDIANCE) 10 MG tablet tablet Take 1 tablet by mouth Daily. 9/22/23  Yes No Bernard MD   loratadine (CLARITIN) 10 MG tablet Take 1 tablet by mouth Daily As Needed.   Yes No Bernard MD   losartan (COZAAR) 50 MG tablet Take 1 tablet by mouth once daily 2/12/24  Yes Venkatesh Rdz DNP, APRN   magnesium oxide (MAG-OX) 400 MG tablet Take 2 tablets by mouth 3 (Three) Times a Day. 11/2/22  Yes No Bernard MD   multivitamin with minerals tablet tablet Take 1 tablet by mouth Daily.   Yes No Bernard MD   mycophenolate (CELLCEPT) 250 MG capsule Take 4 capsules by mouth 2 (Two) Times a Day. 1/27/22  Yes No Bernard MD   NIFEdipine XL (PROCARDIA XL) 90 MG 24 hr tablet Take 1 tablet by mouth 2 (Two) Times a Day. 3/28/24  Yes Venkatesh Rdz DNP, APRN   sildenafil (VIAGRA) 25 MG tablet TAKE 1 TABLET BY MOUTH ONCE DAILY AS NEEDED FOR ERECTILE DYSFUNCTION 7/11/24  Yes Venkatesh Rdz DNP, APRN   Tacrolimus ER 1 MG tablet sustained-release 24 hour Take 8 tablets by mouth Daily. 11/25/22  Yes No Bernard MD       Allergies:  No Known Allergies    Objective     Vitals:   Temp:  [97.5 °F (36.4 °C)-97.7 °F (36.5 °C)] 97.7 °F (36.5 °C)  Heart Rate:  [] 92  Resp:  [16-18] 18  BP: (136-228)/() 148/76    Intake/Output Summary (Last 24 hours) at 8/23/2024 0756  Last data filed at 8/22/2024 1536  Gross per 24 hour   Intake 500 ml   Output --   Net 500 ml       Physical Exam:   Constitutional: Awake, alert, NAD, thin  HEENT: Sclera anicteric, no conjunctival injection  Neck: Supple, no carotid bruit, trachea at midline, no JVD  Respiratory: Diminished in bases bilaterally, nonlabored on RA  Cardiovascular: RR, tachycardic, 2/6M, no rub   Vascular: Left arm AV fistula patent  Gastrointestinal: BS +, soft, NT, ND, renal allograft palpable  right iliac fossa; no bruit heard  : No palpable bladder  Musculoskeletal: No edema, no clubbing or cyanosis  Psychiatric: Appropriate affect, cooperative, oriented  Neurologic: No asterixis, moving all extremities, normal speech   Skin: Warm and dry       Scheduled Meds:        IV Meds:   niCARdipine, 5-15 mg/hr, Last Rate: 5 mg/hr (08/23/24 0609)        Results Reviewed:   I have personally reviewed the results from the time of this admission to 8/23/2024 07:56 EDT     Lab Results   Component Value Date    GLUCOSE 121 (H) 08/23/2024    CALCIUM 9.4 08/23/2024     08/23/2024    K 3.2 (L) 08/23/2024    CO2 18.6 (L) 08/23/2024     08/23/2024    BUN 15 08/23/2024    CREATININE 1.23 08/23/2024    EGFRIFAFRI 100 02/24/2022    EGFRIFNONA 50 12/20/2021    BCR 12.2 08/23/2024    ANIONGAP 16.4 (H) 08/23/2024      Lab Results   Component Value Date    MG 2.0 06/12/2024    PHOS 3.0 08/22/2024    ALBUMIN 4.2 08/22/2024           Assessment / Plan       Hypertensive crisis    Kidney transplant recipient    Acute kidney injury    Leukopenia      ASSESSMENT:  1.  JASSON on CKD 2, resolving, likely prerenal from vasospasm from hypertensive crisis.  Looks euvolemic.  Low potassium and likely NAGMA.  Urinalysis with milligrams per deciliter protein and ketones  2.  Hypertension with crisis (severe headache), improved on Cardene drip  3.  Immunosuppression with Envarsus 8 mg daily and CellCept 1000 mg twice daily  4.  DM2 on Jardiance; likely NAGMA by urine studies and significant ketones in urine.  Does he have DKA?  5.  Leukopenia        PLAN:  1.  Check beta hydroxybutyrate  2.  Restart home medications, but will use higher dose of both carvedilol (12.5 mg twice daily) and losartan (100 mg daily).  Wean Cardene drip as able  3.  Renal artery Doppler to rule out transplant renal artery stenosis  4.  Replace potassium    Thank you for involving us in the care of Harry Ortega.  Please feel free to call with any  questions.    Shemar Hook MD  08/23/24  07:56 EDT    [facility]  [phone]      Please note that portions of this note were completed with a voice recognition program.    Electronically signed by Shemar Hook MD at 08/23/24 0815

## 2024-08-23 NOTE — CONSULTS
[facility] Consult Note      Patient Name: Harry Ortega  : 1960  MRN: [id number]  Primary Care Physician:  Venkatesh Rdz, DNP, APRN  Referring Physician: Alf Cruz*  Date of admission: 2024    Subjective     Reason for Consult:  JASSON on CKD2; DDKT    HPI:   Harry Ortega is a 63 y.o. male with  donor kidney transplant  to treat ESRD (was on hemodialysis for 4 years before his transplant) attributed to hypertension, presented yesterday to hospital due to severe headache, which started on the evening of , and high blood pressure.  /119 on arrival.  Cardene drip started, with improvement in blood pressure and resolution of headache.  Full PMH outlined below; pertinent is longstanding hypertension, immunosuppression (Envarsus 8 mg daily and CellCept 1 g twice daily), and DM2.  Home blood pressure medicines: carvedilol 6.25 mg twice daily, Procardia 90 mg daily, and losartan 75 mg daily.  Typical blood pressure at home is 150/80, he states.  Cardene drip infusing now at 5 mg/h.    Has had no gap in blood pressure medications  Follows a low-salt diet at home  Did not have any vision changes or mental status changes when blood pressure was high; primary complaint was headache  Feels better now.  No headache.  No chest pain, shortness of breath, orthopnea, or leg swelling  Fairly stable weight over the last 1 year, losing just 2-3 pounds after starting Jardiance late last year.  Reports good blood sugar control    Review of Systems:   14 point review of systems is otherwise negative except for mentioned above on HPI    Personal History     Past Medical History:   Diagnosis Date    Arthritis     CKD (chronic kidney disease) requiring chronic dialysis     ON KIDNEY TRANSPLANT LIST    Dialysis patient     MON,WED AND FRIDAY/48 OZ OF FLUID RESTRICTION DAY    Glaucoma     History of gastric ulcer     Hypertension     Inguinal hernia     LEFT        Past Surgical History:   Procedure Laterality Date    ARTERIOVENOUS FISTULA/SHUNT SURGERY W/ HEMODIALYSIS CATHETER INSERTION N/A 07/06/2017    Procedure: ARTERIOVENOUS FISTULA LEFT ARM AND PALINDROME PLACEMENT;  Surgeon: Kar Morales MD;  Location:  [facility];  Service:     COLONOSCOPY N/A 10/12/2017    Procedure: COLONOSCOPY TO CECUM AND TERMINAL ILEUM;  Surgeon: Doni Terrazas MD;  Location:  [facility];  Service:     COLONOSCOPY N/A 09/28/2021    Procedure: COLONOSCOPY to cecum and TI with cold polypectomy;  Surgeon: Doni Terrazas MD;  Location: [facility];  Service: Gastroenterology;  Laterality: N/A;  PRE - screening  POST - polyp, normal TI, internal hemorrhoids    ENDOSCOPY N/A 10/12/2017    Procedure: ESOPHAGOGASTRODUODENOSCOPY WITH BIOPSIES;  Surgeon: Doni Terrazas MD;  Location: [facility];  Service:     INGUINAL HERNIA REPAIR Left 10/29/2019    Procedure: left INGUINAL HERNIA REPAIR LAPAROSCOPIC WITH DAVINCI ROBOT;  Surgeon: Konstantin Espinoza MD;  Location: [facility] MAIN OR;  Service: DaVinci    TOTAL KNEE ARTHROPLASTY Left 08/28/2014    Dr. Alf Garrett    TRANSPLANTATION RENAL Right        Family History: family history includes Diabetes in his mother and son; Hypertension in his father and mother.    Social History:  reports that he quit smoking about 21 years ago. His smoking use included cigarettes. He started smoking about 23 years ago. He has a 0.5 pack-year smoking history. He has never used smokeless tobacco. He reports current alcohol use. He reports that he does not use drugs.    Home Medications:  Prior to Admission medications    Medication Sig Start Date End Date Taking? Authorizing Provider   acetaminophen (TYLENOL) 325 MG tablet Take 3 tablets by mouth As Needed. 12/20/21  Yes Provider, MD No   carvedilol (COREG) 6.25 MG tablet TAKE 1 TABLET BY MOUTH TWICE DAILY WITH MEALS 6/19/24  Yes Venkatesh Rdz, JETT, APRN   empagliflozin (JARDIANCE)  10 MG tablet tablet Take 1 tablet by mouth Daily. 9/22/23  Yes No Bernard MD   loratadine (CLARITIN) 10 MG tablet Take 1 tablet by mouth Daily As Needed.   Yes No Bernard MD   losartan (COZAAR) 50 MG tablet Take 1 tablet by mouth once daily 2/12/24  Yes Venkatesh Rdz DNP, APRN   magnesium oxide (MAG-OX) 400 MG tablet Take 2 tablets by mouth 3 (Three) Times a Day. 11/2/22  Yes No Bernard MD   multivitamin with minerals tablet tablet Take 1 tablet by mouth Daily.   Yes No Bernard MD   mycophenolate (CELLCEPT) 250 MG capsule Take 4 capsules by mouth 2 (Two) Times a Day. 1/27/22  Yes No Bernard MD   NIFEdipine XL (PROCARDIA XL) 90 MG 24 hr tablet Take 1 tablet by mouth 2 (Two) Times a Day. 3/28/24  Yes Venkatesh Rdz DNP, APRN   sildenafil (VIAGRA) 25 MG tablet TAKE 1 TABLET BY MOUTH ONCE DAILY AS NEEDED FOR ERECTILE DYSFUNCTION 7/11/24  Yes Venkatesh Rdz DNP, APRN   Tacrolimus ER 1 MG tablet sustained-release 24 hour Take 8 tablets by mouth Daily. 11/25/22  Yes No Bernard MD       Allergies:  No Known Allergies    Objective     Vitals:   Temp:  [97.5 °F (36.4 °C)-97.7 °F (36.5 °C)] 97.7 °F (36.5 °C)  Heart Rate:  [] 92  Resp:  [16-18] 18  BP: (136-228)/() 148/76    Intake/Output Summary (Last 24 hours) at 8/23/2024 0756  Last data filed at 8/22/2024 1536  Gross per 24 hour   Intake 500 ml   Output --   Net 500 ml       Physical Exam:   Constitutional: Awake, alert, NAD, thin  HEENT: Sclera anicteric, no conjunctival injection  Neck: Supple, no carotid bruit, trachea at midline, no JVD  Respiratory: Diminished in bases bilaterally, nonlabored on RA  Cardiovascular: RR, tachycardic, 2/6M, no rub   Vascular: Left arm AV fistula patent  Gastrointestinal: BS +, soft, NT, ND, renal allograft palpable right iliac fossa; no bruit heard  : No palpable bladder  Musculoskeletal: No edema, no clubbing or cyanosis  Psychiatric: Appropriate affect,  cooperative, oriented  Neurologic: No asterixis, moving all extremities, normal speech   Skin: Warm and dry       Scheduled Meds:        IV Meds:   niCARdipine, 5-15 mg/hr, Last Rate: 5 mg/hr (08/23/24 0609)        Results Reviewed:   I have personally reviewed the results from the time of this admission to 8/23/2024 07:56 EDT     Lab Results   Component Value Date    GLUCOSE 121 (H) 08/23/2024    CALCIUM 9.4 08/23/2024     08/23/2024    K 3.2 (L) 08/23/2024    CO2 18.6 (L) 08/23/2024     08/23/2024    BUN 15 08/23/2024    CREATININE 1.23 08/23/2024    EGFRIFAFRI 100 02/24/2022    EGFRIFNONA 50 12/20/2021    BCR 12.2 08/23/2024    ANIONGAP 16.4 (H) 08/23/2024      Lab Results   Component Value Date    MG 2.0 06/12/2024    PHOS 3.0 08/22/2024    ALBUMIN 4.2 08/22/2024           Assessment / Plan       Hypertensive crisis    Kidney transplant recipient    Acute kidney injury    Leukopenia      ASSESSMENT:  1.  JASSON on CKD 2, resolving, likely prerenal from vasospasm from hypertensive crisis.  Looks euvolemic.  Low potassium and likely NAGMA.  Urinalysis with milligrams per deciliter protein and ketones  2.  Hypertension with crisis (severe headache), improved on Cardene drip  3.  Immunosuppression with Envarsus 8 mg daily and CellCept 1000 mg twice daily  4.  DM2 on Jardiance; likely NAGMA by urine studies and significant ketones in urine.  Does he have DKA?  5.  Leukopenia        PLAN:  1.  Check beta hydroxybutyrate  2.  Restart home medications, but will use higher dose of both carvedilol (12.5 mg twice daily) and losartan (100 mg daily).  Wean Cardene drip as able  3.  Renal artery Doppler to rule out transplant renal artery stenosis  4.  Replace potassium    Thank you for involving us in the care of Harry Ortega.  Please feel free to call with any questions.    Shemar Hook MD  08/23/24  07:56 EDT    [facility]  [phone]      Please note that portions of this  note were completed with a voice recognition program.

## 2024-08-24 ENCOUNTER — READMISSION MANAGEMENT (OUTPATIENT)
Dept: CALL CENTER | Facility: HOSPITAL | Age: 64
End: 2024-08-24
Payer: MEDICARE

## 2024-08-24 VITALS
RESPIRATION RATE: 16 BRPM | WEIGHT: 147.27 LBS | HEIGHT: 70 IN | OXYGEN SATURATION: 97 % | SYSTOLIC BLOOD PRESSURE: 135 MMHG | HEART RATE: 75 BPM | TEMPERATURE: 97.5 F | DIASTOLIC BLOOD PRESSURE: 95 MMHG | BODY MASS INDEX: 21.08 KG/M2

## 2024-08-24 LAB
ALBUMIN SERPL-MCNC: 3.9 G/DL (ref 3.5–5.2)
ANION GAP SERPL CALCULATED.3IONS-SCNC: 12.9 MMOL/L (ref 5–15)
BUN SERPL-MCNC: 13 MG/DL (ref 8–23)
BUN/CREAT SERPL: 13 (ref 7–25)
CALCIUM SPEC-SCNC: 9 MG/DL (ref 8.6–10.5)
CHLORIDE SERPL-SCNC: 101 MMOL/L (ref 98–107)
CO2 SERPL-SCNC: 22.1 MMOL/L (ref 22–29)
CREAT SERPL-MCNC: 1 MG/DL (ref 0.76–1.27)
DEPRECATED RDW RBC AUTO: 46.7 FL (ref 37–54)
EGFRCR SERPLBLD CKD-EPI 2021: 84.6 ML/MIN/1.73
ERYTHROCYTE [DISTWIDTH] IN BLOOD BY AUTOMATED COUNT: 13.7 % (ref 12.3–15.4)
GLUCOSE BLDC GLUCOMTR-MCNC: 119 MG/DL (ref 70–130)
GLUCOSE BLDC GLUCOMTR-MCNC: 167 MG/DL (ref 70–130)
GLUCOSE SERPL-MCNC: 126 MG/DL (ref 65–99)
HCT VFR BLD AUTO: 45.2 % (ref 37.5–51)
HGB BLD-MCNC: 15.7 G/DL (ref 13–17.7)
MCH RBC QN AUTO: 32.6 PG (ref 26.6–33)
MCHC RBC AUTO-ENTMCNC: 34.7 G/DL (ref 31.5–35.7)
MCV RBC AUTO: 93.8 FL (ref 79–97)
PHOSPHATE SERPL-MCNC: 2.4 MG/DL (ref 2.5–4.5)
PLATELET # BLD AUTO: 164 10*3/MM3 (ref 140–450)
PMV BLD AUTO: 10.3 FL (ref 6–12)
POTASSIUM SERPL-SCNC: 3.5 MMOL/L (ref 3.5–5.2)
RBC # BLD AUTO: 4.82 10*6/MM3 (ref 4.14–5.8)
SODIUM SERPL-SCNC: 136 MMOL/L (ref 136–145)
WBC NRBC COR # BLD AUTO: 2.65 10*3/MM3 (ref 3.4–10.8)

## 2024-08-24 PROCEDURE — 82948 REAGENT STRIP/BLOOD GLUCOSE: CPT

## 2024-08-24 PROCEDURE — 63710000001 INSULIN LISPRO (HUMAN) PER 5 UNITS: Performed by: NURSE PRACTITIONER

## 2024-08-24 PROCEDURE — 63710000001 MYCOPHENOLATE MOFETIL PER 250 MG: Performed by: NURSE PRACTITIONER

## 2024-08-24 PROCEDURE — 80069 RENAL FUNCTION PANEL: CPT | Performed by: INTERNAL MEDICINE

## 2024-08-24 PROCEDURE — 85027 COMPLETE CBC AUTOMATED: CPT | Performed by: NURSE PRACTITIONER

## 2024-08-24 PROCEDURE — G0378 HOSPITAL OBSERVATION PER HR: HCPCS

## 2024-08-24 RX ORDER — LOSARTAN POTASSIUM 100 MG/1
100 TABLET ORAL
Qty: 30 TABLET | Refills: 0 | Status: SHIPPED | OUTPATIENT
Start: 2024-08-25 | End: 2024-09-24

## 2024-08-24 RX ORDER — CARVEDILOL 12.5 MG/1
12.5 TABLET ORAL 2 TIMES DAILY WITH MEALS
Qty: 60 TABLET | Refills: 0 | Status: SHIPPED | OUTPATIENT
Start: 2024-08-24 | End: 2024-09-23

## 2024-08-24 RX ADMIN — LOSARTAN POTASSIUM 100 MG: 100 TABLET, FILM COATED ORAL at 08:25

## 2024-08-24 RX ADMIN — MYCOPHENOLATE MOFETIL 1000 MG: 250 CAPSULE ORAL at 08:25

## 2024-08-24 RX ADMIN — NIFEDIPINE 90 MG: 60 TABLET, FILM COATED, EXTENDED RELEASE ORAL at 08:25

## 2024-08-24 RX ADMIN — CETIRIZINE HYDROCHLORIDE 10 MG: 10 TABLET ORAL at 08:26

## 2024-08-24 RX ADMIN — CARVEDILOL 12.5 MG: 12.5 TABLET, FILM COATED ORAL at 08:26

## 2024-08-24 RX ADMIN — INSULIN LISPRO 2 UNITS: 100 INJECTION, SOLUTION INTRAVENOUS; SUBCUTANEOUS at 12:54

## 2024-08-24 NOTE — CASE MANAGEMENT/SOCIAL WORK

## 2024-08-24 NOTE — PLAN OF CARE
Problem: Adult Inpatient Plan of Care  Goal: Plan of Care Review  Outcome: Met  Goal: Patient-Specific Goal (Individualized)  Outcome: Met  Goal: Absence of Hospital-Acquired Illness or Injury  Outcome: Met  Intervention: Identify and Manage Fall Risk  Recent Flowsheet Documentation  Taken 8/24/2024 1000 by Eugenia Avila RN  Safety Promotion/Fall Prevention:   activity supervised   assistive device/personal items within reach   clutter free environment maintained   fall prevention program maintained   nonskid shoes/slippers when out of bed   room organization consistent   safety round/check completed  Taken 8/24/2024 0800 by Eugenia Avila RN  Safety Promotion/Fall Prevention: activity supervised  Intervention: Prevent Skin Injury  Recent Flowsheet Documentation  Taken 8/24/2024 1000 by Eugenia Avila RN  Body Position: position changed independently  Taken 8/24/2024 0800 by Eugenia Avila RN  Body Position: position changed independently  Intervention: Prevent and Manage VTE (Venous Thromboembolism) Risk  Recent Flowsheet Documentation  Taken 8/24/2024 1000 by Eugenia Avila RN  Activity Management:   activity encouraged   up ad chris  Taken 8/24/2024 0800 by Eugenia Avila RN  Activity Management: up ad chris  VTE Prevention/Management:   bilateral   sequential compression devices off   patient refused intervention  Range of Motion: active ROM (range of motion) encouraged  Intervention: Prevent Infection  Recent Flowsheet Documentation  Taken 8/24/2024 1000 by Eugenia Avila RN  Infection Prevention:   hand hygiene promoted   rest/sleep promoted   single patient room provided  Taken 8/24/2024 0800 by Eugenia Avila RN  Infection Prevention: environmental surveillance performed  Goal: Optimal Comfort and Wellbeing  Outcome: Met  Intervention: Provide Person-Centered Care  Recent Flowsheet Documentation  Taken 8/24/2024 0800 by Eugenia Avila RN  Trust Relationship/Rapport: care explained  Goal: Readiness for Transition of  Care  Outcome: Met     Problem: Hypertension Comorbidity  Goal: Blood Pressure in Desired Range  Outcome: Met  Intervention: Maintain Blood Pressure Management  Recent Flowsheet Documentation  Taken 8/24/2024 1000 by Eugenia Avila RN  Medication Review/Management: medications reviewed

## 2024-08-24 NOTE — OUTREACH NOTE
Prep Survey      Flowsheet Row Responses   [facility] [place] patient discharged from? [place]   Is LACE score < 7 ? Yes   Eligibility [facility]   Date of Admission 08/23/24   Date of Discharge 08/24/24   Discharge diagnosis Hypertensive crisis   Does the patient have one of the following disease processes/diagnoses(primary or secondary)? Other   Prep survey completed? Yes            Myriam BURROWS - Registered Nurse

## 2024-08-24 NOTE — DISCHARGE SUMMARY
Patient Name: Harry Ortega  : 1960  MRN: [id number]    Date of Admission: 2024  Date of Discharge:  2024  Primary Care Physician: Venkatesh Rdz, JETT, APRN      Chief Complaint:   Hypertension      Discharge Diagnoses     Active Hospital Problems    Diagnosis  POA    **Hypertensive crisis [I16.9]  Yes    Acute kidney injury [N17.9]  Yes    Leukopenia [D72.819]  Yes    Kidney transplant recipient [Z94.0]  Not Applicable      Resolved Hospital Problems   No resolved problems to display.        Hospital Course     Mr. Ortega is a 63 y.o. male with a history of hypertension, kidney transplant recipient, diabetes type 2 presenting with hypertensive emergency with JASSON.  Creatinine improved back to baseline with fluids.  Renal is following.  Patient's blood pressure has improved with increasing his losartan to 100 mg as Coreg 25 mg.  Metabolic acidosis resolved on day of discharge.  Patient has a chronic leukopenia that is at baseline.    At the time of discharge patient was told to take all medications as prescribed, keep all follow-up appointments, and call their doctor or return to the hospital with any worsening or concerning symptoms.    Day of Discharge     Subjective:  Patient sitting up in chair.  No nausea or vomiting.  Ate breakfast okay about to eat lunch.  Discussed plan for discharge later today.  Blood pressures have been at goal.        Physical Exam:  Temp:  [97.5 °F (36.4 °C)-98.2 °F (36.8 °C)] 97.5 °F (36.4 °C)  Heart Rate:  [75-89] 75  Resp:  [16] 16  BP: (130-165)/(74-98) 135/95  Body mass index is 21.13 kg/m².  Physical Exam  Vitals and nursing note reviewed.   Constitutional:       General: He is not in acute distress.  Cardiovascular:      Rate and Rhythm: Normal rate and regular rhythm.   Pulmonary:      Effort: Pulmonary effort is normal. No respiratory distress.   Abdominal:      General: Abdomen is flat. There is no distension.      Tenderness: There is no abdominal  tenderness.   Musculoskeletal:         General: No swelling or deformity.   Skin:     General: Skin is warm and dry.   Neurological:      General: No focal deficit present.      Mental Status: He is alert. Mental status is at baseline.         Consultants     Consult Orders (all) (From admission, onward)       Start     Ordered    08/23/24 0739  Inpatient Case Management  Consult  Once        Provider:  (Not yet assigned)    08/23/24 0738    08/22/24 1959  Inpatient Nephrology Consult  Once        Specialty:  Nephrology  Provider:  Shemar Hook MD    08/22/24 1959 08/22/24 1900  [facility] (on-call MD unless specified) Details  Once        Specialty:  Hospitalist  Provider:  (Not yet assigned)    08/22/24 1859                  Procedures     Imaging Results (All)       Procedure Component Value Units Date/Time    CT Head Without Contrast [[id number]] Collected: 08/22/24 1548     Updated: 08/22/24 1610    Narrative:      CT HEAD WO CONTRAST-     HISTORY:  acute severe headache, htn     COMPARISON: None     FINDINGS: The brain and ventricles are symmetrical. There is no evidence  of intracranial hemorrhage. Mild to moderate vascular calcification is  noted. Decreased attenuation consistent with an area of infarction,  age-indeterminate but likely remote is appreciated about the right  cerebellar hemisphere superolaterally. This measures approximately 2.4 x  0.8 x 1.9 cm in size. Clinical correlation is recommended. A subacute  infarct cannot be excluded. Should a prior study be made available for  comparison, I would be more than happy to review the studies and to  generate a supplemental report. No obvious acute infarction is  identified. Further evaluation could be performed with MRI examination  of the brain as indicated.     The above information was called to and discussed with Dr. Arias.                 Radiation dose reduction techniques were utilized, including automated  exposure  "modulation based on body size.     This report was finalized on 8/22/2024 4:06 PM by Dr. Bin Prado M.D  on Workstation: [id number]               Pertinent Labs     Results from last 7 days   Lab Units 08/24/24  0525 08/23/24  0844 08/22/24  1332   WBC 10*3/mm3 2.65* 4.73 2.52*   HEMOGLOBIN g/dL 15.7 15.3 13.7   PLATELETS 10*3/mm3 164 155 163     Results from last 7 days   Lab Units 08/24/24  0525 08/23/24  0314 08/22/24  1332   SODIUM mmol/L 136 137 138   POTASSIUM mmol/L 3.5 3.2* 3.9   CHLORIDE mmol/L 101 102 104   CO2 mmol/L 22.1 18.6* 26.0   BUN mg/dL 13 15 18   CREATININE mg/dL 1.00 1.23 1.50*   GLUCOSE mg/dL 126* 121* 126*   Estimated Creatinine Clearance: 71.4 mL/min (by C-G formula based on SCr of 1 mg/dL).  Results from last 7 days   Lab Units 08/24/24  0525 08/22/24  1332   ALBUMIN g/dL 3.9 4.2     Results from last 7 days   Lab Units 08/24/24  0525 08/23/24  0314 08/22/24  1332   CALCIUM mg/dL 9.0 9.4 9.0   ALBUMIN g/dL 3.9  --  4.2   MAGNESIUM mg/dL  --  1.7  --    PHOSPHORUS mg/dL 2.4*  --  3.0               Invalid input(s): \"LDLCALC\"        Test Results Pending at Discharge       Discharge Details        Discharge Medications        Changes to Medications        Instructions Start Date   carvedilol 12.5 MG tablet  Commonly known as: COREG  What changed:   medication strength  how much to take   12.5 mg, Oral, 2 Times Daily With Meals      losartan 100 MG tablet  Commonly known as: COZAAR  What changed:   medication strength  how much to take  when to take this   100 mg, Oral, Every 24 Hours Scheduled   Start Date: August 25, 2024            Continue These Medications        Instructions Start Date   acetaminophen 325 MG tablet  Commonly known as: TYLENOL   975 mg, Oral, As Needed      empagliflozin 10 MG tablet tablet  Commonly known as: JARDIANCE   10 mg, Oral, Daily      loratadine 10 MG tablet  Commonly known as: CLARITIN   10 mg, Oral, Daily PRN      magnesium oxide 400 MG tablet  Commonly " known as: MAG-OX   800 mg, Oral, 3 Times Daily      multivitamin with minerals tablet tablet   1 tablet, Oral, Daily      mycophenolate 250 MG capsule  Commonly known as: CELLCEPT   1,000 mg, Oral, 2 Times Daily      NIFEdipine XL 90 MG 24 hr tablet  Commonly known as: PROCARDIA XL   90 mg, Oral, 2 Times Daily      sildenafil 25 MG tablet  Commonly known as: VIAGRA   25 mg, Oral, Daily PRN      Tacrolimus ER 1 MG tablet sustained-release 24 hour   8 mg, Oral, Daily               No Known Allergies      Discharge Disposition:  Home or Self Care    Discharge Diet:  Diet Order   Procedures    Diet: Cardiac; Healthy Heart (2-3 Na+); Fluid Consistency: Thin (IDDSI 0)       Discharge Activity:   As tolerated    CODE STATUS:    Code Status and Medical Interventions: CPR (Attempt to Resuscitate); Full   Ordered at: 08/22/24 2000     Level Of Support Discussed With:    Patient     Code Status (Patient has no pulse and is not breathing):    CPR (Attempt to Resuscitate)     Medical Interventions (Patient has pulse or is breathing):    Full       Future Appointments   Date Time Provider Department Center   10/17/2024  1:10 PM [facility]   10/24/2024 12:45 PM Venkatesh Rdz DNP, [facility]      Follow-up Information       Venkatesh Rdz DNP, APRN .    Specialty: Internal Medicine  Contact information:  [address] [place], [place] [id number] [phone]                             Time Spent on Discharge:  Greater than 30 minutes      Wesley Lee MD  [facility]  08/24/24  15:07 EDT

## 2024-08-26 ENCOUNTER — TRANSITIONAL CARE MANAGEMENT TELEPHONE ENCOUNTER (OUTPATIENT)
Dept: CALL CENTER | Facility: HOSPITAL | Age: 64
End: 2024-08-26
Payer: MEDICARE

## 2024-08-26 NOTE — OUTREACH NOTE
Call Center TCM Note      Flowsheet Row Responses   [facility] patient discharged from? [place]   Does the patient have one of the following disease processes/diagnoses(primary or secondary)? Other   TCM attempt successful? Yes   Call start time 1446   Call end time 1452   Discharge diagnosis Hypertensive crisis   Person spoke with today (if not patient) and relationship Patient   Meds reviewed with patient/caregiver? Yes   Is the patient having any side effects they believe may be caused by any medication additions or changes? No   Does the patient have all medications ordered at discharge? Yes   Is the patient taking all medications as directed (includes completed medication regime)? Yes   Comments Assisted patient in scheduling a hospital f/u appt with PCP. No appts available with [facility] Shrestha but per preference guide ok to see another PCP in group. Appt made for 9/5/24 at 1:45 PM with ZHOU Akers.   Does the patient have an appointment with their PCP within 7-14 days of discharge? No   Nursing Interventions Assisted patient with making appointment per protocol   Has home health visited the patient within 72 hours of discharge? N/A   Psychosocial issues? No   Comments Patient states he is doing much better. He has returned to work and his b/p has normalized. He states the new medication dosing has helped. Patient states he checks his b/p several times a week.   Did the patient receive a copy of their discharge instructions? Yes   Nursing interventions Reviewed instructions with patient   What is the patient's perception of their health status since discharge? Returned to baseline/stable   Is the patient/caregiver able to teach back signs and symptoms related to disease process for when to call PCP? Yes   Is the patient/caregiver able to teach back signs and symptoms related to disease process for when to call 911? Yes   Is the patient/caregiver able to teach back the hierarchy of who to  call/visit for symptoms/problems? PCP, Specialist, Home health nurse, Urgent Care, ED, 911 Yes   If the patient is a current smoker, are they able to teach back resources for cessation? Not a smoker   TCM call completed? Yes   Wrap up additional comments Assisted patient in scheduling a hospital f/u appt with PCP. No appts available with [facility] Shrestha but per preference guide ok to see another PCP in group. Appt made for 9/5/24 at 1:45 PM with ZHOU Akers.   Call end time 1452   Would this patient benefit from a Referral to Amb Social Work? No   Is the patient interested in additional calls from an ambulatory ? No            Sarah Case, RN    8/26/2024, 14:52 EDT

## 2024-08-28 DIAGNOSIS — N52.9 ERECTILE DYSFUNCTION, UNSPECIFIED ERECTILE DYSFUNCTION TYPE: ICD-10-CM

## 2024-08-28 RX ORDER — SILDENAFIL 25 MG/1
25 TABLET, FILM COATED ORAL DAILY PRN
Qty: 30 TABLET | Refills: 0 | Status: SHIPPED | OUTPATIENT
Start: 2024-08-28

## 2024-08-28 RX ORDER — NIFEDIPINE 90 MG/1
90 TABLET, EXTENDED RELEASE ORAL 2 TIMES DAILY
Qty: 180 TABLET | Refills: 1 | Status: SHIPPED | OUTPATIENT
Start: 2024-08-28

## 2024-08-28 NOTE — TELEPHONE ENCOUNTER
Caller: Jordan Harry CHANDU    Relationship: Self    Best call back number: [phone]     Requested Prescriptions:   Requested Prescriptions     Pending Prescriptions Disp Refills    NIFEdipine XL (PROCARDIA XL) 90 MG 24 hr tablet 180 tablet 1     Sig: Take 1 tablet by mouth 2 (Two) Times a Day.        sildenafil (VIAGRA) 25 MG tablet       Pharmacy where request should be sent: [address], [place] - [address] [facility] [phone] [facility] [phone]      Last office visit with prescribing clinician: 4/19/2024   Last telemedicine visit with prescribing clinician: Visit date not found   Next office visit with prescribing clinician: 10/24/2024     Additional details provided by patient: PATIENT IS HAS BEEN OUT OF MEDICATION FOR A WEEK .    Does the patient have less than a 3 day supply:  [x] Yes  [] No    Would you like a call back once the refill request has been completed: [x] Yes [] No    If the office needs to give you a call back, can they leave a voicemail: [x] Yes [] No    Catina Heaton Rep   08/28/24 10:46 EDT

## 2024-09-05 ENCOUNTER — OFFICE VISIT (OUTPATIENT)
Dept: INTERNAL MEDICINE | Age: 64
End: 2024-09-05
Payer: MEDICARE

## 2024-09-05 VITALS
HEIGHT: 70 IN | TEMPERATURE: 97.5 F | SYSTOLIC BLOOD PRESSURE: 144 MMHG | HEART RATE: 76 BPM | BODY MASS INDEX: 21.19 KG/M2 | OXYGEN SATURATION: 97 % | RESPIRATION RATE: 18 BRPM | WEIGHT: 148 LBS | DIASTOLIC BLOOD PRESSURE: 90 MMHG

## 2024-09-05 DIAGNOSIS — I10 ESSENTIAL HYPERTENSION: Primary | ICD-10-CM

## 2024-09-05 DIAGNOSIS — N17.9 ACUTE KIDNEY INJURY: ICD-10-CM

## 2024-09-05 PROCEDURE — 1126F AMNT PAIN NOTED NONE PRSNT: CPT | Performed by: PHYSICIAN ASSISTANT

## 2024-09-05 PROCEDURE — 99495 TRANSJ CARE MGMT MOD F2F 14D: CPT | Performed by: PHYSICIAN ASSISTANT

## 2024-09-05 PROCEDURE — 3077F SYST BP >= 140 MM HG: CPT | Performed by: PHYSICIAN ASSISTANT

## 2024-09-05 PROCEDURE — 3080F DIAST BP >= 90 MM HG: CPT | Performed by: PHYSICIAN ASSISTANT

## 2024-09-05 PROCEDURE — 1111F DSCHRG MED/CURRENT MED MERGE: CPT | Performed by: PHYSICIAN ASSISTANT

## 2024-09-05 NOTE — PROGRESS NOTES
"    I N T E R N A L  M E D I C I N E    DAVE ZARCO PA-C      ENCOUNTER DATE:  09/05/2024    Harry Ortega / 63 y.o. / male      CC:   (Transitional Care Follow Up Visit)  Transitional Care Management (8/22/2024 - 8/24/2024) and Hypertension (Pt having pressure heahaches, tylenol medication,  and he takes losartan, carvedilol, and nifedipine.)        Within 48 business hours after discharge our office contacted him via telephone to coordinate his care and needs.      I reviewed and discussed the details of that call along with the discharge summary, hospital problems, inpatient lab results, inpatient diagnostic studies, and consultation reports with the patient.         8/24/2024     3:48 PM   Date of TCM Phone Call   [facility]   Date of Admission 8/23/2024   Date of Discharge 8/24/2024       Risk for Readmission (LACE) Score: 4 (8/24/2024  6:00 AM)          VITALS    Vitals:    09/05/24 1330   BP: 144/90   BP Location: Right arm   Patient Position: Sitting   Cuff Size: Adult   Pulse: 76   Resp: 18   Temp: 97.5 °F (36.4 °C)   TempSrc: Temporal   SpO2: 97%   Weight: 67.1 kg (148 lb)   Height: 177.8 cm (70\")       BP Readings from Last 3 Encounters:   09/05/24 144/90   08/24/24 135/95   04/19/24 138/82     Wt Readings from Last 3 Encounters:   09/05/24 67.1 kg (148 lb)   08/23/24 66.8 kg (147 lb 4.3 oz)   04/19/24 68.7 kg (151 lb 6.4 oz)      Body mass index is 21.24 kg/m².    HPI:     Date of admission/discharge: As noted above in CC  Hospital: [facility]   Principle Dx: Hypertensive Emergency.  Secondary Dx: Acute Kidney Injury  History prior to hospitalization: Hypertension, DM2, Kidney transplant recipient, chronic leukopenia.  Evaluation/Treatment: Presented to [facility] ER with headache that began the night before and joined by elevated blood pressure readings. Home blood pressure reading was 188/100. BP in the emergency room was found to be as high as 228/119. He was treated " for his headache which did improve but blood pressure did not come down. CT of head without contrast demonstrated mild to moderate vascular calcification, decreased attenuation to the right cerebellar hemisphere measuring 2.4 x 0.8 x 1.9 cm in size, without obvious acute infarction.  Duplex renal artery study completed 8/23/2024 demonstrated patent right renal vein and artery. He was started on Cardene drip for hypertension.  Course: Discharged home hemodynamically stable.  Creatinine improved to baseline with fluids, blood pressure improved with increased losartan to 100 mg, Coreg to 12.5 mg twice daily, and Nifedipine maintained at 90mg daily.  Metabolic acidosis resolved on the day of discharge.  [facility] and [facility] team of [facility].    He additionally admits to being without his Nifedipine dosing two days prior to his ED visit, due to shortage at [facility].     Post-Discharge twice daily home BP readings have averaged 127/75. In office ofreading of 144/90, though he reports 128/74 at home 20 minutes prior to arrival to office.     He denies any further headaches following discharge from hospital.     Patient Care Team:  Venkatesh Rdz DNP, APRN as PCP - General (Internal Medicine)  Yesenia Baez MD as Consulting Physician (Nephrology)  ____________________________________________________________________    ASSESSMENT & PLAN:    1. Essential hypertension    2. Acute kidney injury      No orders of the defined types were placed in this encounter.      Summary/Discussion:  Hypertension: Losartan to 100 mg, Coreg to 12.5 mg twice daily, and Nifedipine 90mg.   Kidney Transplant Recipient: Followed by [facility] and by Kidney transplant specialist in [place], [place].       Return for Follow-up with Venkatesh Rdz DNP, APRN on 10/24/2024.  .    ____________________________________________________________________    REVIEW OF SYSTEMS    Review of Systems   Constitutional: Negative.    HENT: Negative.     Cardiovascular: Negative.    Gastrointestinal: Negative.    Endocrine: Negative.    Genitourinary: Negative.    Musculoskeletal: Negative.    Skin: Negative.    Neurological: Negative.    Psychiatric/Behavioral: Negative.     All other systems reviewed and are negative.        PHYSICAL EXAMINATION    Physical Exam  Vitals and nursing note reviewed.   Constitutional:       General: He is not in acute distress.     Appearance: Normal appearance. He is normal weight. He is not ill-appearing.   Cardiovascular:      Rate and Rhythm: Normal rate and regular rhythm.      Pulses: Normal pulses.      Heart sounds: Murmur (Systolic) heard.      No friction rub. No gallop.   Pulmonary:      Effort: Pulmonary effort is normal. No respiratory distress.      Breath sounds: Normal breath sounds. No stridor. No wheezing or rhonchi.   Skin:     General: Skin is warm and dry.   Neurological:      General: No focal deficit present.      Mental Status: He is alert and oriented to person, place, and time.           REVIEWED DATA:    Labs:   Lab Results   Component Value Date     08/24/2024    K 3.5 08/24/2024    CALCIUM 9.0 08/24/2024    AST 21 06/12/2024    ALT 20 06/12/2024    BUN 13 08/24/2024    CREATININE 1.00 08/24/2024    CREATININE 1.23 08/23/2024    CREATININE 1.50 (H) 08/22/2024    EGFRIFNONA 50 12/20/2021    EGFRIFAFRI 100 02/24/2022       Lab Results   Component Value Date    WBC 2.65 (L) 08/24/2024    HGB 15.7 08/24/2024    HGB 15.3 08/23/2024    HGB 13.7 08/22/2024     08/24/2024       Lab Results   Component Value Date    PROTEIN See below: (A) 07/24/2017    GLUCOSEU >=1000 mg/dL (3+) (A) 08/23/2024    BLOODU Negative 08/23/2024    NITRITEU Negative 08/23/2024    LEUKOCYTESUR Negative 08/23/2024           Imaging:   Duplex Renal Artery - Right  CAR    Result Date: 8/23/2024  Narrative:   Transplanted kidney in the right lower quadrant measuring 14 cm in greatest diameter.  The right renal vein and artery are patent.     CT Head Without Contrast    Result Date: 8/22/2024  Narrative: CT HEAD WO CONTRAST-  HISTORY:  acute severe headache, htn  COMPARISON: None  FINDINGS: The brain and ventricles are symmetrical. There is no evidence of intracranial hemorrhage. Mild to moderate vascular calcification is noted. Decreased attenuation consistent with an area of infarction, age-indeterminate but likely remote is appreciated about the right cerebellar hemisphere superolaterally. This measures approximately 2.4 x 0.8 x 1.9 cm in size. Clinical correlation is recommended. A subacute infarct cannot be excluded. Should a prior study be made available for comparison, I would be more than happy to review the studies and to generate a supplemental report. No obvious acute infarction is identified. Further evaluation could be performed with MRI examination of the brain as indicated.  The above information was called to and discussed with Dr. Arias.      Radiation dose reduction techniques were utilized, including automated exposure modulation based on body size.  This report was finalized on 8/22/2024 4:06 PM by Dr. Bin Prado M.D on Workstation: [id number]                    Medical Tests:                  Summary of old records / correspondence / consultant report:   H&P, DC summary re: issues addressed on HPI, Consultation notes: Hospitalist and Nephrology, and ED encounter note re: issues addressed on HPI              Request outside records:         MEDICATIONS   Current Outpatient Medications   Medication Sig Dispense Refill    acetaminophen (TYLENOL) 325 MG tablet Take 3 tablets by mouth As Needed.      carvedilol (COREG) 12.5 MG tablet Take 1 tablet by mouth 2 (Two) Times a Day With Meals for 30 days. 60 tablet 0    empagliflozin (JARDIANCE) 10 MG tablet  tablet Take 1 tablet by mouth Daily.      loratadine (CLARITIN) 10 MG tablet Take 1 tablet by mouth Daily As Needed.      losartan (COZAAR) 100 MG tablet Take 1 tablet by mouth Daily for 30 days. 30 tablet 0    magnesium oxide (MAG-OX) 400 MG tablet Take 2 tablets by mouth 3 (Three) Times a Day.      multivitamin with minerals tablet tablet Take 1 tablet by mouth Daily.      mycophenolate (CELLCEPT) 250 MG capsule Take 4 capsules by mouth 2 (Two) Times a Day.      NIFEdipine XL (PROCARDIA XL) 90 MG 24 hr tablet Take 1 tablet by mouth 2 (Two) Times a Day. 180 tablet 1    sildenafil (VIAGRA) 25 MG tablet Take 1 tablet by mouth Daily As Needed for Erectile Dysfunction. 30 tablet 0    Tacrolimus ER 1 MG tablet sustained-release 24 hour Take 8 tablets by mouth Daily.       No current facility-administered medications for this visit.       Current outpatient and discharge medications have been reconciled for the patient.  Reviewed by: Jonathan Barker PA-C

## 2024-09-12 RX ORDER — CARVEDILOL 6.25 MG/1
6.25 TABLET ORAL 2 TIMES DAILY WITH MEALS
Qty: 180 TABLET | Refills: 0 | OUTPATIENT
Start: 2024-09-12

## 2024-09-17 RX ORDER — LOSARTAN POTASSIUM 100 MG/1
100 TABLET ORAL
Qty: 30 TABLET | Refills: 0 | Status: SHIPPED | OUTPATIENT
Start: 2024-09-17 | End: 2024-10-17

## 2024-09-17 RX ORDER — CARVEDILOL 12.5 MG/1
12.5 TABLET ORAL 2 TIMES DAILY WITH MEALS
Qty: 60 TABLET | Refills: 0 | Status: SHIPPED | OUTPATIENT
Start: 2024-09-17 | End: 2024-10-17

## 2024-10-10 DIAGNOSIS — N52.9 ERECTILE DYSFUNCTION, UNSPECIFIED ERECTILE DYSFUNCTION TYPE: ICD-10-CM

## 2024-10-10 RX ORDER — SILDENAFIL 25 MG/1
25 TABLET, FILM COATED ORAL DAILY PRN
Qty: 30 TABLET | Refills: 0 | Status: SHIPPED | OUTPATIENT
Start: 2024-10-10

## 2024-10-17 DIAGNOSIS — E11.9 TYPE 2 DIABETES MELLITUS WITHOUT COMPLICATION, WITHOUT LONG-TERM CURRENT USE OF INSULIN: ICD-10-CM

## 2024-10-17 DIAGNOSIS — E78.5 HYPERLIPIDEMIA, UNSPECIFIED HYPERLIPIDEMIA TYPE: ICD-10-CM

## 2024-10-17 DIAGNOSIS — Z12.5 SCREENING PSA (PROSTATE SPECIFIC ANTIGEN): Primary | ICD-10-CM

## 2024-10-18 LAB
ALBUMIN SERPL-MCNC: 4.4 G/DL (ref 3.5–5.2)
ALBUMIN/GLOB SERPL: 1.8 G/DL
ALP SERPL-CCNC: 76 U/L (ref 39–117)
ALT SERPL-CCNC: 15 U/L (ref 1–41)
APPEARANCE UR: CLEAR
AST SERPL-CCNC: 19 U/L (ref 1–40)
BACTERIA #/AREA URNS HPF: NORMAL /HPF
BASOPHILS # BLD AUTO: 0 10*3/MM3 (ref 0–0.2)
BASOPHILS NFR BLD AUTO: 0 % (ref 0–1.5)
BILIRUB SERPL-MCNC: 0.5 MG/DL (ref 0–1.2)
BILIRUB UR QL STRIP: NEGATIVE
BUN SERPL-MCNC: 20 MG/DL (ref 8–23)
BUN/CREAT SERPL: 18.7 (ref 7–25)
CALCIUM SERPL-MCNC: 9.1 MG/DL (ref 8.6–10.5)
CASTS URNS MICRO: NORMAL
CHLORIDE SERPL-SCNC: 108 MMOL/L (ref 98–107)
CHOLEST SERPL-MCNC: 199 MG/DL (ref 0–200)
CHOLEST/HDLC SERPL: 2.97 {RATIO}
CO2 SERPL-SCNC: 24.8 MMOL/L (ref 22–29)
COLOR UR: YELLOW
CREAT SERPL-MCNC: 1.07 MG/DL (ref 0.76–1.27)
EGFRCR SERPLBLD CKD-EPI 2021: 77.5 ML/MIN/1.73
EOSINOPHIL # BLD AUTO: 0.12 10*3/MM3 (ref 0–0.4)
EOSINOPHIL NFR BLD AUTO: 5.7 % (ref 0.3–6.2)
EPI CELLS #/AREA URNS HPF: NORMAL /HPF
ERYTHROCYTE [DISTWIDTH] IN BLOOD BY AUTOMATED COUNT: 13.7 % (ref 12.3–15.4)
GLOBULIN SER CALC-MCNC: 2.4 GM/DL
GLUCOSE SERPL-MCNC: 131 MG/DL (ref 65–99)
GLUCOSE UR QL STRIP: ABNORMAL
HBA1C MFR BLD: 5.9 % (ref 4.8–5.6)
HCT VFR BLD AUTO: 39.2 % (ref 37.5–51)
HDLC SERPL-MCNC: 67 MG/DL (ref 40–60)
HGB BLD-MCNC: 13.4 G/DL (ref 13–17.7)
HGB UR QL STRIP: NEGATIVE
IMM GRANULOCYTES # BLD AUTO: 0 10*3/MM3 (ref 0–0.05)
IMM GRANULOCYTES NFR BLD AUTO: 0 % (ref 0–0.5)
KETONES UR QL STRIP: NEGATIVE
LDLC SERPL CALC-MCNC: 111 MG/DL (ref 0–100)
LEUKOCYTE ESTERASE UR QL STRIP: NEGATIVE
LYMPHOCYTES # BLD AUTO: 0.56 10*3/MM3 (ref 0.7–3.1)
LYMPHOCYTES NFR BLD AUTO: 26.8 % (ref 19.6–45.3)
MCH RBC QN AUTO: 31.5 PG (ref 26.6–33)
MCHC RBC AUTO-ENTMCNC: 34.2 G/DL (ref 31.5–35.7)
MCV RBC AUTO: 92.2 FL (ref 79–97)
MONOCYTES # BLD AUTO: 0.3 10*3/MM3 (ref 0.1–0.9)
MONOCYTES NFR BLD AUTO: 14.4 % (ref 5–12)
NEUTROPHILS # BLD AUTO: 1.11 10*3/MM3 (ref 1.7–7)
NEUTROPHILS NFR BLD AUTO: 53.1 % (ref 42.7–76)
NITRITE UR QL STRIP: NEGATIVE
NRBC BLD AUTO-RTO: 0 /100 WBC (ref 0–0.2)
PH UR STRIP: 7 [PH] (ref 5–8)
PLATELET # BLD AUTO: 202 10*3/MM3 (ref 140–450)
POTASSIUM SERPL-SCNC: 4.4 MMOL/L (ref 3.5–5.2)
PROT SERPL-MCNC: 6.8 G/DL (ref 6–8.5)
PROT UR QL STRIP: ABNORMAL
PSA SERPL-MCNC: 1.13 NG/ML (ref 0–4)
RBC # BLD AUTO: 4.25 10*6/MM3 (ref 4.14–5.8)
RBC #/AREA URNS HPF: NORMAL /HPF
SODIUM SERPL-SCNC: 142 MMOL/L (ref 136–145)
SP GR UR STRIP: 1.02 (ref 1–1.03)
T4 FREE SERPL-MCNC: 1.36 NG/DL (ref 0.92–1.68)
TRIGL SERPL-MCNC: 118 MG/DL (ref 0–150)
TSH SERPL DL<=0.005 MIU/L-ACNC: 0.67 UIU/ML (ref 0.27–4.2)
UROBILINOGEN UR STRIP-MCNC: ABNORMAL MG/DL
VLDLC SERPL CALC-MCNC: 21 MG/DL (ref 5–40)
WBC # BLD AUTO: 2.09 10*3/MM3 (ref 3.4–10.8)
WBC #/AREA URNS HPF: NORMAL /HPF

## 2024-10-21 RX ORDER — LOSARTAN POTASSIUM 100 MG/1
100 TABLET ORAL
Qty: 30 TABLET | Refills: 0 | Status: SHIPPED | OUTPATIENT
Start: 2024-10-21 | End: 2024-10-24 | Stop reason: SDUPTHER

## 2024-10-23 NOTE — ANESTHESIA POSTPROCEDURE EVALUATION
"Patient: Harry Ortega    Procedure Summary     Date Anesthesia Start Anesthesia Stop Room / Location    10/12/17 1400 1428  [facility] /  [facility]       Procedure Diagnosis Surgeon Provider    ESOPHAGOGASTRODUODENOSCOPY WITH BIOPSIES (N/A Esophagus); COLONOSCOPY TO CECUM AND TERMINAL ILEUM (N/A ) Slow transit constipation; Other iron deficiency anemia  (Slow transit constipation [K59.01]; Other iron deficiency anemia [D50.8]) MD Cody Blake MD          Anesthesia Type: MAC  Last vitals  BP   147/71 (10/12/17 1428)    Temp   36.9 °C (98.4 °F) (10/12/17 1428)    Pulse   69 (10/12/17 1428)   Resp   16 (10/12/17 1428)    SpO2   100 % (10/12/17 1428)      Post Anesthesia Care and Evaluation    Patient location during evaluation: PACU  Patient participation: complete - patient participated  Level of consciousness: awake  Pain score: 0  Pain management: adequate  Airway patency: patent  Anesthetic complications: No anesthetic complications  PONV Status: none  Cardiovascular status: acceptable  Respiratory status: acceptable  Hydration status: acceptable    Comments: /71 (BP Location: Left arm, Patient Position: Lying)  Pulse 69  Temp 36.9 °C (98.4 °F) (Oral)   Resp 16  Ht 70\" (177.8 cm)  Wt 146 lb 6 oz (66.4 kg)  SpO2 100%  BMI 21 kg/m2      "
PRINCIPAL DISCHARGE DIAGNOSIS  Diagnosis: Sepsis due to skin infection  Assessment and Plan of Treatment: Patient left AMA, antibiotics will be ent to pharmacy on file   No answer frompatient and son via phone number in chart

## 2024-10-24 ENCOUNTER — OFFICE VISIT (OUTPATIENT)
Dept: INTERNAL MEDICINE | Age: 64
End: 2024-10-24
Payer: MEDICARE

## 2024-10-24 VITALS
TEMPERATURE: 98.3 F | HEART RATE: 69 BPM | SYSTOLIC BLOOD PRESSURE: 144 MMHG | BODY MASS INDEX: 21.47 KG/M2 | OXYGEN SATURATION: 99 % | WEIGHT: 150 LBS | DIASTOLIC BLOOD PRESSURE: 86 MMHG | HEIGHT: 70 IN

## 2024-10-24 DIAGNOSIS — E78.5 HYPERLIPIDEMIA, UNSPECIFIED HYPERLIPIDEMIA TYPE: ICD-10-CM

## 2024-10-24 DIAGNOSIS — R73.03 PREDIABETES: ICD-10-CM

## 2024-10-24 DIAGNOSIS — I10 ESSENTIAL HYPERTENSION: ICD-10-CM

## 2024-10-24 DIAGNOSIS — Z00.00 MEDICARE ANNUAL WELLNESS VISIT, SUBSEQUENT: Primary | ICD-10-CM

## 2024-10-24 PROCEDURE — 3077F SYST BP >= 140 MM HG: CPT | Performed by: NURSE PRACTITIONER

## 2024-10-24 PROCEDURE — 3079F DIAST BP 80-89 MM HG: CPT | Performed by: NURSE PRACTITIONER

## 2024-10-24 PROCEDURE — 1170F FXNL STATUS ASSESSED: CPT | Performed by: NURSE PRACTITIONER

## 2024-10-24 PROCEDURE — 3044F HG A1C LEVEL LT 7.0%: CPT | Performed by: NURSE PRACTITIONER

## 2024-10-24 PROCEDURE — G0439 PPPS, SUBSEQ VISIT: HCPCS | Performed by: NURSE PRACTITIONER

## 2024-10-24 PROCEDURE — 1126F AMNT PAIN NOTED NONE PRSNT: CPT | Performed by: NURSE PRACTITIONER

## 2024-10-24 RX ORDER — CARVEDILOL 12.5 MG/1
12.5 TABLET ORAL 2 TIMES DAILY WITH MEALS
Qty: 180 TABLET | Refills: 1 | Status: SHIPPED | OUTPATIENT
Start: 2024-10-24

## 2024-10-24 RX ORDER — LOSARTAN POTASSIUM 100 MG/1
100 TABLET ORAL
Qty: 90 TABLET | Refills: 1 | Status: SHIPPED | OUTPATIENT
Start: 2024-10-24

## 2024-10-24 NOTE — PROGRESS NOTES
S K Y L E R    F R Y E ,   D N P ,  A P R N                  I  N  T  E  R  N  A  L    M  E  D  I  C  I  N  E         ENCOUNTER DATE:  10/24/2024    Harry Ortega / 64 y.o. / male    MEDICARE ANNUAL WELLNESS VISIT       CHIEF COMPLAINT / REASON FOR OFFICE VISIT     Medicare Wellness-subsequent, Hypertension, Hyperlipidemia, and Diabetes    Patient's general assessment of his health since a year ago:     - Compared to one year ago, he feels his physical health is:   STABLE/SAME    - Compared to one year ago, he feels his mental health is:  STABLE/SAME    Recent Hospitalization (within past 365 days) (NO unless indicated)  Yes at [facility] in August for HTN; JASSON    Patient Care Team:    Patient Care Team:  Venkatesh Rdz, JETT, APRN as PCP - General (Internal Medicine)  Yesenia Baez MD as Consulting Physician (Nephrology)    Allergies:  Patient has no known allergies.    Medications:  Current Outpatient Medications on File Prior to Visit   Medication Sig Dispense Refill    acetaminophen (TYLENOL) 325 MG tablet Take 3 tablets by mouth As Needed.      empagliflozin (JARDIANCE) 10 MG tablet tablet Take 1 tablet by mouth Daily.      loratadine (CLARITIN) 10 MG tablet Take 1 tablet by mouth Daily As Needed.      magnesium oxide (MAG-OX) 400 MG tablet Take 2 tablets by mouth 3 (Three) Times a Day.      multivitamin with minerals tablet tablet Take 1 tablet by mouth Daily.      mycophenolate (CELLCEPT) 250 MG capsule Take 4 capsules by mouth 2 (Two) Times a Day.      NIFEdipine XL (PROCARDIA XL) 90 MG 24 hr tablet Take 1 tablet by mouth 2 (Two) Times a Day. 180 tablet 1    sildenafil (VIAGRA) 25 MG tablet TAKE 1 TABLET BY MOUTH ONCE DAILY AS NEEDED FOR ERECTILE DYSFUNCTION 30 tablet 0    Tacrolimus ER 1 MG tablet sustained-release 24 hour Take 8 tablets by mouth Daily.      [DISCONTINUED] losartan (COZAAR) 100 MG tablet Take 1 tablet by mouth once daily for 30 days 30 tablet 0     [DISCONTINUED] carvedilol (COREG) 12.5 MG tablet Take 1 tablet by mouth 2 (Two) Times a Day With Meals for 30 days. 60 tablet 0     No current facility-administered medications on file prior to visit.        No opioid medication identified on active medication list. I have reviewed chart for other potential  high risk medication/s and harmful drug interactions in the elderly.      No opioid listed on medication list       HPI FOR OTHER ACTIVE MEDICAL PROBLEMS:    Patient was seen for hospital follow-up for acute kidney injury and hypertension on September 5, 2024.  CT of the head without contrast demonstrated mild to moderate vascular calcification and a decreased attenuation in the right cerebral hemisphere without obvious acute infarction.  Duplex renal artery study showed patent right renal vein and artery.  Losartan was increased to 100 mg, Coreg to 12.5 mg twice daily nifedipine at 90 mg daily.  Metabolic acidosis resolved date of discharge.  He is continuing regular follow-up with [facility] and in kidney transplant team of [facility].  He does admit to being without his nifedipine 2 days prior to his ER visit.  Creatinine is returned to 1.07 and GFR 77.5.    Patient was seen by [facility] kidney transplant center on June 12, 2024 by Dr. Renteria for right  renal transplant.  Creatinine at baseline at that time.    Colonoscopy recall late September 2026.     Type 2 diabetes now on Jardiance 10 mg daily. Last hemoglobin A1c of 5.9.     Hyperlipidemia with improved LDL at 111.  HDL good at 67 and triglycerides normal at 118.    Viagra as needed for erectile dysfunction but no side effects of medication.     PSA 1.130 November 2023.    Former smoker quit in 2003.  Chest x-ray 2021 unremarkable.  CT abdomen with no AAA    HISTORY     PFSH:     The following portions of the patient's history were reviewed and updated as appropriate: Allergies / Current  Medications / Past Medical History / Surgical History / Social History / Family History    Problem List:  Patient Active Problem List   Diagnosis    Hypertensive crisis    Essential hypertension    ESRD (end stage renal disease) on dialysis    Pulmonary nodule    Anemia in chronic kidney disease    Slow transit constipation    Iron deficiency anemia    Non-recurrent unilateral inguinal hernia without obstruction or gangrene    Pre-transplant evaluation for kidney transplant    Abnormal nuclear stress test    Allergy, unspecified, initial encounter    Anaphylactic shock, unspecified, initial encounter    Gastritis, unspecified, with bleeding    Headache, unspecified    Hernia of abdominal wall    Hypokalemia    Immunosuppressive management encounter following kidney transplant    Kidney transplant recipient    Nonspecific reaction to tuberculin skin test without active tuberculosis    Other specified coagulation defects    Shortness of breath    Unspecified protein-calorie malnutrition    Erectile dysfunction    Prediabetes    Uncontrolled hypertension    Acute kidney injury    Leukopenia       Past Medical History:  Past Medical History:   Diagnosis Date    Arthritis     CKD (chronic kidney disease) requiring chronic dialysis     ON KIDNEY TRANSPLANT LIST    Dialysis patient     MON,WED AND FRIDAY/48 OZ OF FLUID RESTRICTION DAY    Glaucoma     History of gastric ulcer     Hypertension     Inguinal hernia     LEFT       Past Surgical History:  Past Surgical History:   Procedure Laterality Date    ARTERIOVENOUS FISTULA/SHUNT SURGERY W/ HEMODIALYSIS CATHETER INSERTION N/A 07/06/2017    Procedure: ARTERIOVENOUS FISTULA LEFT ARM AND PALINDROME PLACEMENT;  Surgeon: Kar Morales MD;  Location: [facility] MAIN OR;  Service:     COLONOSCOPY N/A 10/12/2017    Procedure: COLONOSCOPY TO CECUM AND TERMINAL ILEUM;  Surgeon: Doni Terrazas MD;  Location: [facility] ENDOSCOPY;  Service:     COLONOSCOPY N/A 09/28/2021     "Procedure: COLONOSCOPY to cecum and TI with cold polypectomy;  Surgeon: Doni Terrazas MD;  Location: [facility] ENDOSCOPY;  Service: Gastroenterology;  Laterality: N/A;  PRE - screening  POST - polyp, normal TI, internal hemorrhoids    ENDOSCOPY N/A 10/12/2017    Procedure: ESOPHAGOGASTRODUODENOSCOPY WITH BIOPSIES;  Surgeon: Doni Terrazas MD;  Location: [facility] ENDOSCOPY;  Service:     INGUINAL HERNIA REPAIR Left 10/29/2019    Procedure: left INGUINAL HERNIA REPAIR LAPAROSCOPIC WITH DAVINCI ROBOT;  Surgeon: Konstantin Espinoza MD;  Location: [facility] MAIN OR;  Service: DaVinci    TOTAL KNEE ARTHROPLASTY Left 2014    Dr. Alf Garrett    TRANSPLANTATION RENAL Right        Social History:  Social History     Socioeconomic History    Marital status:     Number of children: 5   Tobacco Use    Smoking status: Former     Current packs/day: 0.00     Average packs/day: 0.3 packs/day for 2.0 years (0.5 ttl pk-yrs)     Types: Cigarettes     Start date:      Quit date:      Years since quittin.8    Smokeless tobacco: Never    Tobacco comments:     QUIT 20 YR AGO   Vaping Use    Vaping status: Never Used   Substance and Sexual Activity    Alcohol use: Yes     Comment: BEER OCCASIONALLY    Drug use: No    Sexual activity: Yes     Partners: Female       Family History:  Family History   Problem Relation Age of Onset    Hypertension Mother     Diabetes Mother     Hypertension Father     Diabetes Son     Malig Hyperthermia Neg Hx          PATIENT ASSESSMENT     Vitals:  Vitals:    10/24/24 1249   BP: 144/86   BP Location: Left arm   Patient Position: Sitting   Cuff Size: Adult   Pulse: 69   Temp: 98.3 °F (36.8 °C)   TempSrc: Temporal   SpO2: 99%   Weight: 68 kg (150 lb)   Height: 177.8 cm (70\")       BP Readings from Last 3 Encounters:   10/24/24 144/86   24 144/90   24 135/95     Wt Readings from Last 3 Encounters:   10/24/24 68 kg (150 lb)   24 67.1 kg (148 lb)   24 66.8 kg (147 lb " 4.3 oz)      Body mass index is 21.52 kg/m².     Review of Systems:           Physical Exam:    Physical Exam      Reviewed Data:    Labs:   Lab Results   Component Value Date     10/17/2024    K 4.4 10/17/2024    CALCIUM 9.1 10/17/2024    AST 19 10/17/2024    ALT 15 10/17/2024    BUN 20 10/17/2024    CREATININE 1.07 10/17/2024    CREATININE 1.00 08/24/2024    CREATININE 1.23 08/23/2024    EGFRIFNONA 50 12/20/2021    EGFRIFAFRI 100 02/24/2022     Lab Results   Component Value Date     (H) 12/20/2021     (H) 12/19/2021     (H) 12/19/2021    HGBA1C 5.90 (H) 10/17/2024    HGBA1C 6.3 (H) 06/12/2024    HGBA1C 6.40 (H) 11/21/2023     Lab Results   Component Value Date     (H) 10/17/2024     (H) 11/21/2023     (H) 08/16/2022    HDL 67 (H) 10/17/2024    TRIG 118 10/17/2024    CHOLHDLRATIO 2.97 10/17/2024     Lab Results   Component Value Date    TSH 0.674 10/17/2024    FREET4 1.36 10/17/2024     Lab Results   Component Value Date    WBC 2.09 (L) 10/17/2024    HGB 13.4 10/17/2024    HGB 15.7 08/24/2024    HGB 15.3 08/23/2024     10/17/2024     Lab Results   Component Value Date    PSA 1.130 10/17/2024    PSA 1.110 11/21/2023    PSA 1.2 08/16/2022       Imaging:                Medical Tests:                Summary of old records / correspondence / consultant report:               Request outside records:             SCREENING ASSESSMENT      Screening for Glaucoma:  Previous screening for glaucoma?: Yes    Hearing Loss Screen:  Finger Rub Hearing Test (right ear): Passed  Finger Rub Hearing Test (left ear): Passed    Urinary Incontinence Screen:  Episodes of urinary incontinence? :   NO    Depression Screen:    PHQ-2 Depression Screening  Little interest or pleasure in doing things? Not at all   Feeling down, depressed, or hopeless? Not at all   PHQ-2 Total Score 0     PHQ-9:  (0=not at all, 1=several days, 2=more than half, 3=nearly every day)    Past 2 weeks, how often  bothered by:      (0 (PROVIDER)) 1. Little interest or pleasure      (0 (PROVIDER)) 2. Feeling down, depressed or hopeless    (0 (PROVIDER)) 3. Trouble falling asleep, staying asleep, or sleeping too much    (0 (PROVIDER)) 4. Feeling tired or having little energy    (0 (PROVIDER)) 5. Poor appetite or overeating               (0 (PROVIDER)) 6. Feeling bad about self, feel like failure    (0 (PROVIDER)) 7. Trouble concentrating    (0 (PROVIDER)) 8. Moving or speaking slowly; fidgety or restless    (0 (PROVIDER)) 9. Thoughts you would better off dead or hurt self      Score: 0       PHQ-9 Score  Provisional Diagnosis          5-9    Minimal Symptoms        10-14  Minor Depression, Dysthymia, Major Depression (Mild)         15-19  Major Depression (Moderate)           >20  Major Depression (Severe)    PHQ-2: 0 (Not depressed)    PHQ-9: 0 (Negative screening for depression)     FUNCTIONAL, FALL RISK, & COGNITIVE SCREENING     A) Functional and cognitive status based on patient responses:        10/24/2024    12:48 PM   Functional & Cognitive Status   Do you have difficulty preparing food and eating? No   Do you have difficulty bathing yourself, getting dressed or grooming yourself? No   Do you have difficulty using the toilet? No   Do you have difficulty moving around from place to place? No   Do you have trouble with steps or getting out of a bed or a chair? No   Current Diet Well Balanced Diet   Dental Exam Up to date   Eye Exam Up to date   Exercise (times per week) 7 times per week   Current Exercises Include Walking;Other   Do you need help using the phone?  No   Are you deaf or do you have serious difficulty hearing?  No   Do you need help to go to places out of walking distance? No   Do you need help shopping? No   Do you need help preparing meals?  No   Do you need help with housework?  No   Do you need help with laundry? No   Do you need help taking your medications? No   Do you need help managing money? No   Do  you ever drive or ride in a car without wearing a seat belt? No   Have you felt unusual stress, anger or loneliness in the last month? No   Who do you live with? Spouse   If you need help, do you have trouble finding someone available to you? No   Have you been bothered in the last four weeks by sexual problems? No   Do you have difficulty concentrating, remembering or making decisions? No       B) Assessment of Fall Risk:    Fall Risk Assessment was completed, and patient is at LOW risk for falls.    Need for further evaluation of gait, strength, and balance? : NO    Timed Up and Go (TUG): 6 seconds   (>= 12 seconds indicates high risk for falling)    Observable abnormalities included:   Normal balance and gait pattern    C. Assessment of Cognitive Function:    Mini-Cog Test:     1) Registration (3 objects): YES   2) Clock Draw: Passed? : Yes   3) Number of objects recalled: 3 (MA)     FURTHER EVALUATION REQUIRED?:   No    **OVERALL ASSESSMENT OF FUNCTIONAL ABILITY**  (Assessment of ability to perform ADL's (showering/bathing, using toilet, dressing, feeding self, moving self around) and IADL's (use telephone, shop, prepare food, housekeep, do laundry, transport independently, take medications independently, and handle finances)    DEGREE OF FUNCTIONAL IMPAIRMENT:   NONE (based on assessment noted above)    ABILITY TO LIVE INDEPENDENTLY:   Capable of living independently     COUNSELING     A. Identification of Health Risk Factors:    Risk factors include:   cardiovascular risk factors    B. Age-Appropriate Screening Schedule:  (Refer to the list below for future screening recommendations based on patient's age, sex and/or medical conditions. Orders for these recommended tests are listed in the plan section. The patient has been provided with a written plan)    Health Maintenance Topics  Health Maintenance   Topic Date Due    DIABETIC EYE EXAM  Never done    TDAP/TD VACCINES (1 - Tdap) Never done    DIABETIC FOOT  EXAM  Never done    ZOSTER VACCINE (1 of 2) 10/24/2024 (Originally 10/8/1979)    COVID-19 Vaccine (8 - 2023-24 season) 12/09/2024    HEMOGLOBIN A1C  04/17/2025    URINE MICROALBUMIN  06/12/2025    LIPID PANEL  10/17/2025    ANNUAL WELLNESS VISIT  10/24/2025    COLORECTAL CANCER SCREENING  09/28/2026    HEPATITIS C SCREENING  Completed    Hepatitis B  Completed    Pneumococcal Vaccine 0-64  Completed    INFLUENZA VACCINE  Completed       Health Maintenance Topics Due or Over-Due  Health Maintenance Due   Topic Date Due    DIABETIC EYE EXAM  Never done    TDAP/TD VACCINES (1 - Tdap) Never done    DIABETIC FOOT EXAM  Never done         C. Advanced Care Planning:    Advance Care Planning   ACP discussion was held with the patient during this visit. Patient does not have an advance directive, declines further assistance.       D. Patient Self-Management and Personalized Health Advice:    He has been provided with PERSONALIZED COUNSELING/INFORMATION (AVS educational information) about:     optimizing diet/nutrition plans  improving exercise / conditioning  reducing risk for cardiovascular disease (heart, stroke, vascular)      He has been recommended for the following PREVENTATIVE SERVICES which has been performed today, will be ordered today or ordered/performed on upcoming follow-up visit:     LIFESTYLE PREVENTATIVE MEASURES  NUTRITION counseling provided  EXERCISE counseling provided    CARDIOVASCULAR SCREENING  Lipid panel     CANCER SCREENING  COLORECTAL cancer: Colonoscopy/Cologuard discussed   PROSTATE CANCER: (discussed and PSA will be performed annually)    MISC SCREENING  DIABETES screening performed (current/reviewed labs/lab ordered)    VACCINATION/IMMUNIZATION  Tdap / Td administered/recommended/discussed       E. Miscellaneous Items: [id number]    Aspirin use counseling: Does not need ASA (and currently is not on it)    Discussed BMI with him. The BMI is in the acceptable range    Reviewed use of high risk  medication in the elderly: YES    Reviewed for potential of harmful drug interactions in the elderly: YES      WRAP UP     ASSESSMENT & PLAN:    1) MEDICARE ANNUAL WELLNESS VISIT    2) OTHER MEDICAL CONDITIONS ADDRESSED TODAY:            Problem List Items Addressed This Visit       Essential hypertension    Overview     Losartan 100 mg daily.  Carvedilol 12.5 mg twice daily.  Nifedipine 90 mg once daily.         Relevant Medications    NIFEdipine XL (PROCARDIA XL) 90 MG 24 hr tablet    carvedilol (COREG) 12.5 MG tablet    losartan (COZAAR) 100 MG tablet    Prediabetes     Other Visit Diagnoses       Medicare annual wellness visit, subsequent    -  Primary    Hyperlipidemia, unspecified hyperlipidemia type                      No orders of the defined types were placed in this encounter.       Discussion / Summary:  Will obtain Tdap at local pharmacy.  Has scheduled eye exam.  Continue current medication regimen for hyperglycemia along with hypertension.  Continue specialty management.  Blood pressure improving.  Return in 6 months for chronic medical and labs 1 week prior.      Medications as of TODAY:              Current Outpatient Medications   Medication Sig Dispense Refill    acetaminophen (TYLENOL) 325 MG tablet Take 3 tablets by mouth As Needed.      carvedilol (COREG) 12.5 MG tablet Take 1 tablet by mouth 2 (Two) Times a Day With Meals. 180 tablet 1    empagliflozin (JARDIANCE) 10 MG tablet tablet Take 1 tablet by mouth Daily.      loratadine (CLARITIN) 10 MG tablet Take 1 tablet by mouth Daily As Needed.      losartan (COZAAR) 100 MG tablet Take 1 tablet by mouth Daily. 90 tablet 1    magnesium oxide (MAG-OX) 400 MG tablet Take 2 tablets by mouth 3 (Three) Times a Day.      multivitamin with minerals tablet tablet Take 1 tablet by mouth Daily.      mycophenolate (CELLCEPT) 250 MG capsule Take 4 capsules by mouth 2 (Two) Times a Day.      NIFEdipine XL (PROCARDIA XL) 90 MG 24 hr tablet Take 1 tablet by  mouth 2 (Two) Times a Day. 180 tablet 1    sildenafil (VIAGRA) 25 MG tablet TAKE 1 TABLET BY MOUTH ONCE DAILY AS NEEDED FOR ERECTILE DYSFUNCTION 30 tablet 0    Tacrolimus ER 1 MG tablet sustained-release 24 hour Take 8 tablets by mouth Daily.       No current facility-administered medications for this visit.         FOLLOW-UP:            Return in about 6 months (around 4/24/2025) for Next scheduled follow up WITH LABS ONE WEEK PRIOR .              Future Appointments   Date Time Provider Department Center   4/17/2025  1:00 PM [facility]   4/24/2025  2:00 PM Venkatesh Rdz, JETT, APRN [facility]         After Visit Summary (AVS) including the Personalized Prevention  Plan Services (PPPS) was either printed and given to the patient at check-out today and/or sent to [facility] for review.

## 2024-11-08 DIAGNOSIS — N52.9 ERECTILE DYSFUNCTION, UNSPECIFIED ERECTILE DYSFUNCTION TYPE: ICD-10-CM

## 2024-11-10 RX ORDER — SILDENAFIL 25 MG/1
25 TABLET, FILM COATED ORAL DAILY PRN
Qty: 30 TABLET | Refills: 5 | Status: SHIPPED | OUTPATIENT
Start: 2024-11-10

## 2024-12-19 RX ORDER — NIFEDIPINE 90 MG/1
90 TABLET, EXTENDED RELEASE ORAL 2 TIMES DAILY
Qty: 180 TABLET | Refills: 1 | Status: SHIPPED | OUTPATIENT
Start: 2024-12-19

## 2025-04-15 DIAGNOSIS — I10 ESSENTIAL HYPERTENSION: ICD-10-CM

## 2025-04-15 RX ORDER — CARVEDILOL 12.5 MG/1
12.5 TABLET ORAL 2 TIMES DAILY WITH MEALS
Qty: 180 TABLET | Refills: 0 | Status: SHIPPED | OUTPATIENT
Start: 2025-04-15

## 2025-04-16 DIAGNOSIS — R73.03 PREDIABETES: ICD-10-CM

## 2025-04-16 DIAGNOSIS — E78.5 HYPERLIPIDEMIA, UNSPECIFIED HYPERLIPIDEMIA TYPE: ICD-10-CM

## 2025-04-16 DIAGNOSIS — I10 ESSENTIAL HYPERTENSION: ICD-10-CM

## 2025-04-17 LAB
ALBUMIN SERPL-MCNC: 4.2 G/DL (ref 3.5–5.2)
ALBUMIN/GLOB SERPL: 1.7 G/DL
ALP SERPL-CCNC: 62 U/L (ref 39–117)
ALT SERPL-CCNC: 19 U/L (ref 1–41)
AST SERPL-CCNC: 27 U/L (ref 1–40)
BILIRUB SERPL-MCNC: 1.1 MG/DL (ref 0–1.2)
BUN SERPL-MCNC: 18 MG/DL (ref 8–23)
BUN/CREAT SERPL: 19.8 (ref 7–25)
CALCIUM SERPL-MCNC: 9.3 MG/DL (ref 8.6–10.5)
CHLORIDE SERPL-SCNC: 104 MMOL/L (ref 98–107)
CHOLEST SERPL-MCNC: 239 MG/DL (ref 0–200)
CHOLEST/HDLC SERPL: 3.46 {RATIO}
CO2 SERPL-SCNC: 26.2 MMOL/L (ref 22–29)
CREAT SERPL-MCNC: 0.91 MG/DL (ref 0.76–1.27)
EGFRCR SERPLBLD CKD-EPI 2021: 94.1 ML/MIN/1.73
GLOBULIN SER CALC-MCNC: 2.5 GM/DL
GLUCOSE SERPL-MCNC: 121 MG/DL (ref 65–99)
HBA1C MFR BLD: 6.8 % (ref 4.8–5.6)
HDLC SERPL-MCNC: 69 MG/DL (ref 40–60)
LDLC SERPL CALC-MCNC: 153 MG/DL (ref 0–100)
POTASSIUM SERPL-SCNC: 4.1 MMOL/L (ref 3.5–5.2)
PROT SERPL-MCNC: 6.7 G/DL (ref 6–8.5)
SODIUM SERPL-SCNC: 140 MMOL/L (ref 136–145)
TRIGL SERPL-MCNC: 99 MG/DL (ref 0–150)
VLDLC SERPL CALC-MCNC: 17 MG/DL (ref 5–40)

## 2025-04-24 ENCOUNTER — OFFICE VISIT (OUTPATIENT)
Dept: INTERNAL MEDICINE | Age: 65
End: 2025-04-24
Payer: COMMERCIAL

## 2025-04-24 VITALS
WEIGHT: 152 LBS | HEIGHT: 70 IN | OXYGEN SATURATION: 97 % | TEMPERATURE: 98.7 F | DIASTOLIC BLOOD PRESSURE: 72 MMHG | RESPIRATION RATE: 14 BRPM | HEART RATE: 68 BPM | SYSTOLIC BLOOD PRESSURE: 134 MMHG | BODY MASS INDEX: 21.76 KG/M2

## 2025-04-24 DIAGNOSIS — Z94.0 HISTORY OF KIDNEY TRANSPLANT: Primary | ICD-10-CM

## 2025-04-24 DIAGNOSIS — I10 ESSENTIAL HYPERTENSION: ICD-10-CM

## 2025-04-24 DIAGNOSIS — E11.9 TYPE 2 DIABETES MELLITUS WITHOUT COMPLICATION, WITHOUT LONG-TERM CURRENT USE OF INSULIN: ICD-10-CM

## 2025-04-24 DIAGNOSIS — E78.5 HYPERLIPIDEMIA, UNSPECIFIED HYPERLIPIDEMIA TYPE: ICD-10-CM

## 2025-04-24 PROBLEM — E63.9 DEFICIENCY OF MACRONUTRIENTS: Status: ACTIVE | Noted: 2017-07-18

## 2025-04-24 PROBLEM — Z48.298 TRANSPLANT FOLLOW-UP: Status: ACTIVE | Noted: 2021-12-20

## 2025-04-24 PROBLEM — D63.1 ANEMIA OF CHRONIC RENAL FAILURE: Status: ACTIVE | Noted: 2025-02-25

## 2025-04-24 PROBLEM — N18.9 ANEMIA OF CHRONIC RENAL FAILURE: Status: ACTIVE | Noted: 2025-02-25

## 2025-04-24 PROBLEM — K29.01 ACUTE HEMORRHAGIC GASTRITIS: Status: ACTIVE | Noted: 2017-07-31

## 2025-04-24 RX ORDER — METFORMIN HYDROCHLORIDE 500 MG/1
TABLET, EXTENDED RELEASE ORAL
Qty: 60 TABLET | Refills: 3 | Status: SHIPPED | OUTPATIENT
Start: 2025-04-24

## 2025-04-24 NOTE — PROGRESS NOTES
I N T E R N A L  M E D I C I N E  BESSIE ALCANTAR, APRN      ENCOUNTER DATE:  04/24/2025    Harry Ortega / 64 y.o. / male      CHIEF COMPLAINT / REASON FOR OFFICE VISIT     Hypertension (6 month fup bp) and Med Management      ASSESSMENT & PLAN     Problem List Items Addressed This Visit       Essential hypertension    Overview   Losartan 100 mg daily.  Carvedilol 12.5 mg twice daily.  Nifedipine 90 mg once daily.         Relevant Medications    losartan (COZAAR) 100 MG tablet    NIFEdipine XL (PROCARDIA XL) 90 MG 24 hr tablet    carvedilol (COREG) 12.5 MG tablet     Other Visit Diagnoses         History of kidney transplant    -  Primary    Relevant Orders    Ambulatory Referral to Nephrology      Type 2 diabetes mellitus without complication, without long-term current use of insulin        Relevant Medications    metFORMIN ER (GLUCOPHAGE-XR) 500 MG 24 hr tablet      Hyperlipidemia, unspecified hyperlipidemia type              Orders Placed This Encounter   Procedures    Ambulatory Referral to Nephrology     New Medications Ordered This Visit   Medications    metFORMIN ER (GLUCOPHAGE-XR) 500 MG 24 hr tablet     Sig: Take 1 tablet twice daily with meals.     Dispense:  60 tablet     Refill:  3       SUMMARY/DISCUSSION  Refer to nephrology for routine monitoring with history of kidney transplant they will likely need to take over his CellCept and tacrolimus  Come fasting at next laboratory evaluation, may need to consider statin.  Would recommend rosuvastatin with his current med list due to interactions with atorvastatin.  He has stopped Jardiance due to weight loss, will start metformin starting at daily at breakfast and if tolerable increasing to twice daily with meals.    Next Appointment with me: Visit date not found    Return in about 3 months (around 7/24/2025) for Next scheduled follow up; WITH LABS ONE WEEK PRIOR .      VITAL SIGNS     Visit Vitals  /72   Pulse 68   Temp 98.7 °F (37.1 °C) (Temporal)  "  Resp 14   Ht 177.8 cm (70\")   Wt 68.9 kg (152 lb)   SpO2 97%   BMI 21.81 kg/m²       Wt Readings from Last 3 Encounters:   04/24/25 68.9 kg (152 lb)   10/24/24 68 kg (150 lb)   09/05/24 67.1 kg (148 lb)     Body mass index is 21.81 kg/m².      MEDICATIONS AT THE TIME OF OFFICE VISIT     Current Outpatient Medications on File Prior to Visit   Medication Sig    acetaminophen (TYLENOL) 325 MG tablet Take 3 tablets by mouth As Needed.    carvedilol (COREG) 12.5 MG tablet TAKE 1 TABLET BY MOUTH TWICE DAILY WITH MEALS    loratadine (CLARITIN) 10 MG tablet Take 1 tablet by mouth Daily As Needed.    losartan (COZAAR) 100 MG tablet Take 1 tablet by mouth Daily.    magnesium oxide (MAG-OX) 400 MG tablet Take 2 tablets by mouth 3 (Three) Times a Day.    multivitamin with minerals tablet tablet Take 1 tablet by mouth Daily.    mycophenolate (CELLCEPT) 250 MG capsule Take 4 capsules by mouth 2 (Two) Times a Day.    NIFEdipine XL (PROCARDIA XL) 90 MG 24 hr tablet Take 1 tablet by mouth 2 (Two) Times a Day.    sildenafil (VIAGRA) 25 MG tablet TAKE 1 TABLET BY MOUTH ONCE DAILY AS NEEDED FOR ERECTILE DYSFUNCTION    Tacrolimus ER 1 MG tablet sustained-release 24 hour Take 8 tablets by mouth Daily.    [DISCONTINUED] empagliflozin (JARDIANCE) 10 MG tablet tablet Take 1 tablet by mouth Daily. (Patient not taking: Reported on 4/24/2025)     No current facility-administered medications on file prior to visit.          HISTORY OF PRESENT ILLNESS     Patient has now been 3 years since kidney transplant.  As result kidney transplant center has discharged him from their care.  They have given a year supply of CellCept along with tacrolimus.  He needs to establish care here with a local nephrologist.    Blood pressure has remained stable.  Kidney transplant specialist took him off Jardiance 10 mg due to weight loss.  As a result hemoglobin A1c has increased to 6.8.    Hyperlipidemia but he was nonfasting at last laboratory evaluation with " LDL of 153 and HDL good at 69.    REVIEW OF SYSTEMS     Constitutional neg except per HPI   Resp neg  CV neg     PHYSICAL EXAMINATION     Physical Exam  Constitutional  No distress  Cardiovascular Rate  normal . Rhythm: regular . Heart sounds:  normal  Pulmonary/Chest  Effort normal. Breath sounds:  normal  Psychiatric  Alert. Judgment and thought content normal. Mood normal      REVIEWED DATA     Labs:   Lab Results   Component Value Date    GLUCOSE 121 (H) 04/17/2025    BUN 18 04/17/2025    CREATININE 0.91 04/17/2025    EGFR 94.1 04/17/2025    BCR 19.8 04/17/2025    K 4.1 04/17/2025    CO2 26.2 04/17/2025    CALCIUM 9.3 04/17/2025    ALBUMIN 4.2 04/17/2025    BILITOT 1.1 04/17/2025    AST 27 04/17/2025    ALT 19 04/17/2025     Lab Results   Component Value Date    WBC 2 (L) 12/11/2024    HGB 14.9 12/11/2024    HCT 42.6 12/11/2024    MCV 96 12/11/2024     12/11/2024     Lab Results   Component Value Date    CHLPL 239 (H) 04/17/2025    TRIG 99 04/17/2025    HDL 69 (H) 04/17/2025     (H) 04/17/2025      Lab Results   Component Value Date    TSH 0.674 10/17/2024     Brief Urine Lab Results  (Last result in the past 365 days)        Color   Clarity   Blood   Leuk Est   Nitrite   Protein   CREAT   Urine HCG        10/17/24 1347 Yellow  Comment: REFERENCE RANGE: Yellow, Straw   Clear   Negative   Negative   Negative   See below:  Comment: 100 mg/dL (2+)                 Lab Results   Component Value Date    HGBA1C 6.80 (H) 04/17/2025       Imaging:           Medical Tests:           Summary of old records / correspondence / consultant report:           Request outside records:

## 2025-06-09 ENCOUNTER — TELEPHONE (OUTPATIENT)
Dept: INTERNAL MEDICINE | Age: 65
End: 2025-06-09
Payer: COMMERCIAL

## 2025-06-09 NOTE — TELEPHONE ENCOUNTER
Hub staff attempted to follow warm transfer process and was unsuccessful     Caller: Harry Ortega    Relationship to patient: Self    Best call back number: [phone]     Patient is needing: PATIENT IS NEEDING TO SCHEDULE A LAB APPOINTMENT.

## 2025-06-21 DIAGNOSIS — N52.9 ERECTILE DYSFUNCTION, UNSPECIFIED ERECTILE DYSFUNCTION TYPE: ICD-10-CM

## 2025-06-22 RX ORDER — SILDENAFIL 25 MG/1
25 TABLET, FILM COATED ORAL DAILY PRN
Qty: 30 TABLET | Refills: 0 | Status: SHIPPED | OUTPATIENT
Start: 2025-06-22

## 2025-06-29 DIAGNOSIS — I10 ESSENTIAL HYPERTENSION: ICD-10-CM

## 2025-06-29 RX ORDER — LOSARTAN POTASSIUM 100 MG/1
100 TABLET ORAL DAILY
Qty: 90 TABLET | Refills: 0 | Status: SHIPPED | OUTPATIENT
Start: 2025-06-29

## 2025-07-01 ENCOUNTER — TELEPHONE (OUTPATIENT)
Dept: INTERNAL MEDICINE | Age: 65
End: 2025-07-01

## 2025-07-01 NOTE — TELEPHONE ENCOUNTER
Caller: Harry Ortega     Relationship: SELF    Best call back number: [phone]     What is your medical concern?     PATIENT IS NEEDING THE BLOOD WORK RESULTS THAT HE DONE ON 6/26/2025 SENT TO [facility]ANSPL[facility].  FAX NUMBER [phone]    PHONE NUMBER [phone].    NOTE:  EVERY TIME PATIENT HAS LABS  DONE IT HAS TO BE SENT TO THIS LOCATION.  DUE TO PATIENT BEING A KIDNEY TRANSPLANT PATIENT.

## 2025-07-12 DIAGNOSIS — I10 ESSENTIAL HYPERTENSION: ICD-10-CM

## 2025-07-13 RX ORDER — CARVEDILOL 12.5 MG/1
12.5 TABLET ORAL 2 TIMES DAILY WITH MEALS
Qty: 180 TABLET | Refills: 0 | Status: SHIPPED | OUTPATIENT
Start: 2025-07-13

## 2025-07-24 DIAGNOSIS — E11.9 TYPE 2 DIABETES MELLITUS WITHOUT COMPLICATION, WITHOUT LONG-TERM CURRENT USE OF INSULIN: Primary | ICD-10-CM

## 2025-07-25 LAB
ALBUMIN/CREAT UR: 583 MG/G CREAT (ref 0–29)
APPEARANCE UR: CLEAR
BACTERIA #/AREA URNS HPF: NORMAL /HPF
BILIRUB UR QL STRIP: NEGATIVE
CASTS URNS MICRO: NORMAL
COLOR UR: YELLOW
CREAT UR-MCNC: 98.6 MG/DL
EPI CELLS #/AREA URNS HPF: NORMAL /HPF
GLUCOSE UR QL STRIP: NEGATIVE
HGB UR QL STRIP: NEGATIVE
KETONES UR QL STRIP: NEGATIVE
LEUKOCYTE ESTERASE UR QL STRIP: NEGATIVE
MICROALBUMIN UR-MCNC: 574.6 UG/ML
NITRITE UR QL STRIP: NEGATIVE
PH UR STRIP: 5.5 [PH] (ref 5–8)
PROT UR QL STRIP: ABNORMAL
RBC #/AREA URNS HPF: NORMAL /HPF
SP GR UR STRIP: 1.02 (ref 1–1.03)
UROBILINOGEN UR STRIP-MCNC: ABNORMAL MG/DL
WBC #/AREA URNS HPF: NORMAL /HPF

## 2025-07-31 ENCOUNTER — OFFICE VISIT (OUTPATIENT)
Dept: INTERNAL MEDICINE | Age: 65
End: 2025-07-31
Payer: COMMERCIAL

## 2025-07-31 VITALS
OXYGEN SATURATION: 98 % | TEMPERATURE: 97.4 F | SYSTOLIC BLOOD PRESSURE: 136 MMHG | HEIGHT: 70 IN | DIASTOLIC BLOOD PRESSURE: 72 MMHG | HEART RATE: 74 BPM | BODY MASS INDEX: 21.85 KG/M2 | WEIGHT: 152.6 LBS

## 2025-07-31 DIAGNOSIS — E78.5 HYPERLIPIDEMIA, UNSPECIFIED HYPERLIPIDEMIA TYPE: ICD-10-CM

## 2025-07-31 DIAGNOSIS — I10 ESSENTIAL HYPERTENSION: ICD-10-CM

## 2025-07-31 DIAGNOSIS — E11.9 TYPE 2 DIABETES MELLITUS WITHOUT COMPLICATION, WITHOUT LONG-TERM CURRENT USE OF INSULIN: ICD-10-CM

## 2025-07-31 DIAGNOSIS — L98.8 FISTULA: Primary | ICD-10-CM

## 2025-07-31 DIAGNOSIS — D50.0 IRON DEFICIENCY ANEMIA DUE TO CHRONIC BLOOD LOSS: ICD-10-CM

## 2025-07-31 DIAGNOSIS — Z48.298 TRANSPLANT FOLLOW-UP: ICD-10-CM

## 2025-07-31 RX ORDER — SPIRONOLACTONE 25 MG/1
12.5 TABLET ORAL DAILY
COMMUNITY
Start: 2025-06-11

## 2025-07-31 NOTE — PROGRESS NOTES
I N T E R N A L  M E D I C I N E  BESSIE ALCANTAR, APRN      ENCOUNTER DATE:  07/31/2025    Harry Ortega / 64 y.o. / male      CHIEF COMPLAINT / REASON FOR OFFICE VISIT     Hypertension      ASSESSMENT & PLAN     Problem List Items Addressed This Visit       Essential hypertension    Overview   Losartan 100 mg daily.  Carvedilol 12.5 mg twice daily.  Nifedipine 90 mg once daily.         Relevant Medications    magnesium oxide (MAG-OX) 400 MG tablet    NIFEdipine XL (PROCARDIA XL) 90 MG 24 hr tablet    sildenafil (VIAGRA) 25 MG tablet    losartan (COZAAR) 100 MG tablet    carvedilol (COREG) 12.5 MG tablet    spironolactone (ALDACTONE) 25 MG tablet    Other Relevant Orders    Tacrolimus Level    Comprehensive metabolic panel    CBC & Differential    Iron deficiency anemia    Overview   Added automatically from request for surgery [id number]     Formatting of this note might be different from the original.  Overview:   Added automatically from request for surgery [id number]    Added automatically from request for surgery [id number]      Added automatically from request for surgery [id number]         Relevant Medications    mycophenolate (CELLCEPT) 250 MG capsule    magnesium oxide (MAG-OX) 400 MG tablet    Tacrolimus ER 1 MG tablet sustained-release 24 hour    Other Relevant Orders    CBC & Differential    Iron and TIBC    Ferritin    Vitamin B12    Transplant follow-up    Relevant Orders    Tacrolimus Level     Other Visit Diagnoses         Fistula    -  Primary    Relevant Orders    Ambulatory Referral to Vascular Surgery (Completed)      Type 2 diabetes mellitus without complication, without long-term current use of insulin        Relevant Orders    Hemoglobin A1c      Hyperlipidemia, unspecified hyperlipidemia type        Relevant Orders    Lipid Panel With / Chol / HDL Ratio    TSH+Free T4          Orders Placed This Encounter   Procedures    Tacrolimus Level    Comprehensive metabolic panel    Hemoglobin A1c    Iron and TIBC  "   Ferritin    Vitamin B12    Lipid Panel With / Chol / HDL Ratio    TSH+Free T4    Ambulatory Referral to Vascular Surgery    CBC & Differential     No orders of the defined types were placed in this encounter.      SUMMARY/DISCUSSION  Continue all specialty management.  Update laboratory evaluation today.  Refer back to vascular to evaluate for discomfort of the left fistula.  He has no swelling no warmth pulses are intact.    Next Appointment with me: Visit date not found    Return in about 3 months (around 10/31/2025) for Next scheduled follow up.      VITAL SIGNS     Visit Vitals  /72   Pulse 74   Temp 97.4 °F (36.3 °C)   Ht 177.8 cm (70\")   Wt 69.2 kg (152 lb 9.6 oz)   SpO2 98%   BMI 21.90 kg/m²       Wt Readings from Last 3 Encounters:   07/31/25 69.2 kg (152 lb 9.6 oz)   04/24/25 68.9 kg (152 lb)   10/24/24 68 kg (150 lb)     Body mass index is 21.9 kg/m².      MEDICATIONS AT THE TIME OF OFFICE VISIT     Current Outpatient Medications on File Prior to Visit   Medication Sig    acetaminophen (TYLENOL) 325 MG tablet Take 3 tablets by mouth As Needed.    carvedilol (COREG) 12.5 MG tablet TAKE 1 TABLET BY MOUTH TWICE DAILY WITH MEALS    loratadine (CLARITIN) 10 MG tablet Take 1 tablet by mouth Daily As Needed.    losartan (COZAAR) 100 MG tablet Take 1 tablet by mouth once daily    magnesium oxide (MAG-OX) 400 MG tablet Take 2 tablets by mouth 3 (Three) Times a Day.    metFORMIN ER (GLUCOPHAGE-XR) 500 MG 24 hr tablet Take 1 tablet twice daily with meals.    multivitamin with minerals tablet tablet Take 1 tablet by mouth Daily.    mycophenolate (CELLCEPT) 250 MG capsule Take 4 capsules by mouth 2 (Two) Times a Day.    NIFEdipine XL (PROCARDIA XL) 90 MG 24 hr tablet Take 1 tablet by mouth 2 (Two) Times a Day.    sildenafil (VIAGRA) 25 MG tablet TAKE 1 TABLET BY MOUTH ONCE DAILY AS NEEDED FOR ERECTILE DYSFUNCTION    spironolactone (ALDACTONE) 25 MG tablet Take 0.5 tablets by mouth Daily.    Tacrolimus ER 1 MG " tablet sustained-release 24 hour Take 8 tablets by mouth Daily.     No current facility-administered medications on file prior to visit.          HISTORY OF PRESENT ILLNESS     History of kidney transplant continued on CellCept along with tacrolimus.  Recent decreased level of tacrolimus, had increased dose.     Blood pressure has remained stable.  Blood sugar hemoglobin A1c 6.3.  Controlled on metformin.  Loose stools have improved with eating meals.    Hyperlipidemia but he was nonfasting at last laboratory evaluation with LDL of 121 and HDL good at609.    Started on spironolactone by kidney specialist spironolactone as he was unable to take Jardiance due to weight loss for proteinuria.  Continuing on ARB..  Creatinine stable baseline around 0.95.    States that he is having the discomfort around his fistula in the left upper extremity.  No swelling no warmth.     REVIEW OF SYSTEMS     Constitutional neg except per HPI   Resp neg  CV neg     PHYSICAL EXAMINATION     Physical Exam  Constitutional  No distress  Cardiovascular Rate  normal . Rhythm: regular . Heart sounds:  normal  Pulmonary/Chest  Effort normal. Breath sounds:  normal  Psychiatric  Alert. Judgment and thought content normal. Mood normal      REVIEWED DATA     Labs:   Lab Results   Component Value Date    GLUCOSE 118 (H) 06/26/2025    BUN 16.0 06/26/2025    CREATININE 0.95 06/26/2025    EGFR 89.4 06/26/2025    BCR 16.8 06/26/2025    K 4.2 06/26/2025    CO2 24.0 06/26/2025    CALCIUM 9.0 06/26/2025    ALBUMIN 4.1 06/26/2025    BILITOT 0.6 06/26/2025    AST 24 06/26/2025    ALT 14 06/26/2025     Lab Results   Component Value Date    WBC 2.56 (L) 06/26/2025    HGB 12.7 (L) 06/26/2025    HCT 37.3 (L) 06/26/2025    MCV 96.1 06/26/2025     06/26/2025     Lab Results   Component Value Date    CHLPL 214 (H) 06/26/2025    TRIG 191 (H) 06/26/2025    HDL 60 06/26/2025     (H) 06/26/2025      Lab Results   Component Value Date    TSH 0.729  06/26/2025     Brief Urine Lab Results  (Last result in the past 365 days)        Color   Clarity   Blood   Leuk Est   Nitrite   Protein   CREAT   Urine HCG        07/24/25 1254 Yellow  Comment: REFERENCE RANGE: Yellow, Straw   Clear   Negative   Negative   Negative   See below:  Comment: 100 mg/dL (2+)           07/24/25 1254             98.6               Lab Results   Component Value Date    HGBA1C 6.30 (H) 06/26/2025       Imaging:           Medical Tests:           Summary of old records / correspondence / consultant report:           Request outside records:

## 2025-08-01 ENCOUNTER — TELEPHONE (OUTPATIENT)
Dept: INTERNAL MEDICINE | Age: 65
End: 2025-08-01
Payer: COMMERCIAL

## 2025-08-03 ENCOUNTER — RESULTS FOLLOW-UP (OUTPATIENT)
Dept: INTERNAL MEDICINE | Age: 65
End: 2025-08-03
Payer: COMMERCIAL

## 2025-08-03 LAB
ALBUMIN SERPL-MCNC: 4.3 G/DL (ref 3.9–4.9)
ALP SERPL-CCNC: 64 IU/L (ref 44–121)
ALT SERPL-CCNC: 17 IU/L (ref 0–44)
AST SERPL-CCNC: 23 IU/L (ref 0–40)
BASOPHILS # BLD AUTO: 0 X10E3/UL (ref 0–0.2)
BASOPHILS NFR BLD AUTO: 0 %
BILIRUB SERPL-MCNC: 0.6 MG/DL (ref 0–1.2)
BUN SERPL-MCNC: 19 MG/DL (ref 8–27)
BUN/CREAT SERPL: 18 (ref 10–24)
CALCIUM SERPL-MCNC: 9.2 MG/DL (ref 8.6–10.2)
CHLORIDE SERPL-SCNC: 102 MMOL/L (ref 96–106)
CHOLEST SERPL-MCNC: 209 MG/DL (ref 100–199)
CHOLEST/HDLC SERPL: 3.5 RATIO (ref 0–5)
CO2 SERPL-SCNC: 21 MMOL/L (ref 20–29)
CREAT SERPL-MCNC: 1.03 MG/DL (ref 0.76–1.27)
EGFRCR SERPLBLD CKD-EPI 2021: 81 ML/MIN/1.73
EOSINOPHIL # BLD AUTO: 0.1 X10E3/UL (ref 0–0.4)
EOSINOPHIL NFR BLD AUTO: 2 %
ERYTHROCYTE [DISTWIDTH] IN BLOOD BY AUTOMATED COUNT: 14 % (ref 11.6–15.4)
FERRITIN SERPL-MCNC: 429 NG/ML (ref 30–400)
GLOBULIN SER CALC-MCNC: 2 G/DL (ref 1.5–4.5)
GLUCOSE SERPL-MCNC: 165 MG/DL (ref 70–99)
HBA1C MFR BLD: 6.6 % (ref 4.8–5.6)
HCT VFR BLD AUTO: 38.6 % (ref 37.5–51)
HDLC SERPL-MCNC: 59 MG/DL
HGB BLD-MCNC: 13.1 G/DL (ref 13–17.7)
IMM GRANULOCYTES # BLD AUTO: 0 X10E3/UL (ref 0–0.1)
IMM GRANULOCYTES NFR BLD AUTO: 0 %
IRON SATN MFR SERPL: 33 % (ref 15–55)
IRON SERPL-MCNC: 83 UG/DL (ref 38–169)
LDLC SERPL CALC-MCNC: 124 MG/DL (ref 0–99)
LYMPHOCYTES # BLD AUTO: 0.8 X10E3/UL (ref 0.7–3.1)
LYMPHOCYTES NFR BLD AUTO: 25 %
MCH RBC QN AUTO: 32.7 PG (ref 26.6–33)
MCHC RBC AUTO-ENTMCNC: 33.9 G/DL (ref 31.5–35.7)
MCV RBC AUTO: 96 FL (ref 79–97)
MONOCYTES # BLD AUTO: 0.3 X10E3/UL (ref 0.1–0.9)
MONOCYTES NFR BLD AUTO: 9 %
NEUTROPHILS # BLD AUTO: 1.9 X10E3/UL (ref 1.4–7)
NEUTROPHILS NFR BLD AUTO: 64 %
PLATELET # BLD AUTO: 193 X10E3/UL (ref 150–450)
POTASSIUM SERPL-SCNC: 4.6 MMOL/L (ref 3.5–5.2)
PROT SERPL-MCNC: 6.3 G/DL (ref 6–8.5)
RBC # BLD AUTO: 4.01 X10E6/UL (ref 4.14–5.8)
SODIUM SERPL-SCNC: 139 MMOL/L (ref 134–144)
T4 FREE SERPL-MCNC: 1.31 NG/DL (ref 0.82–1.77)
TACROLIMUS BLD LC/MS/MS-MCNC: 16 NG/ML (ref 5–20)
TIBC SERPL-MCNC: 250 UG/DL (ref 250–450)
TRIGL SERPL-MCNC: 145 MG/DL (ref 0–149)
TSH SERPL DL<=0.005 MIU/L-ACNC: 0.89 UIU/ML (ref 0.45–4.5)
UIBC SERPL-MCNC: 167 UG/DL (ref 111–343)
VIT B12 SERPL-MCNC: 749 PG/ML (ref 232–1245)
VLDLC SERPL CALC-MCNC: 26 MG/DL (ref 5–40)
WBC # BLD AUTO: 3 X10E3/UL (ref 3.4–10.8)

## 2025-08-05 DIAGNOSIS — Z99.2 ESRD (END STAGE RENAL DISEASE) ON DIALYSIS: Primary | ICD-10-CM

## 2025-08-05 DIAGNOSIS — N18.6 ESRD (END STAGE RENAL DISEASE) ON DIALYSIS: Primary | ICD-10-CM

## 2025-08-05 DIAGNOSIS — N52.9 ERECTILE DYSFUNCTION, UNSPECIFIED ERECTILE DYSFUNCTION TYPE: ICD-10-CM

## 2025-08-05 RX ORDER — SILDENAFIL 25 MG/1
25 TABLET, FILM COATED ORAL DAILY PRN
Qty: 30 TABLET | Refills: 0 | Status: SHIPPED | OUTPATIENT
Start: 2025-08-05

## 2025-08-08 ENCOUNTER — HOSPITAL ENCOUNTER (OUTPATIENT)
Facility: HOSPITAL | Age: 65
Discharge: HOME | End: 2025-08-08
Admitting: NURSE PRACTITIONER
Payer: COMMERCIAL

## 2025-08-08 ENCOUNTER — OFFICE VISIT (OUTPATIENT)
Age: 65
End: 2025-08-08
Payer: COMMERCIAL

## 2025-08-08 VITALS
RESPIRATION RATE: 16 BRPM | SYSTOLIC BLOOD PRESSURE: 128 MMHG | WEIGHT: 157.4 LBS | HEIGHT: 70 IN | DIASTOLIC BLOOD PRESSURE: 83 MMHG | BODY MASS INDEX: 22.54 KG/M2

## 2025-08-08 DIAGNOSIS — N18.6 ESRD (END STAGE RENAL DISEASE) ON DIALYSIS: ICD-10-CM

## 2025-08-08 DIAGNOSIS — Z94.0 KIDNEY TRANSPLANT RECIPIENT: Primary | ICD-10-CM

## 2025-08-08 DIAGNOSIS — Z99.2 ESRD (END STAGE RENAL DISEASE) ON DIALYSIS: ICD-10-CM

## 2025-08-08 LAB
BH CV VAS DIALYSIS ARTERIAL ANASTOMOSIS DIAMETER: 0.6 CM
BH CV VAS DIALYSIS ARTERIAL ANASTOMOSIS EDV: 74 CM/SEC
BH CV VAS DIALYSIS ARTERIAL ANASTOMOSIS PSV: 220 CM/SEC
BH CV VAS DIALYSIS CONDUIT DIST DEPTH: 0.5 CM
BH CV VAS DIALYSIS CONDUIT DIST DIAMETER: 0.6 CM
BH CV VAS DIALYSIS CONDUIT DIST EDV: 195 CM/SEC
BH CV VAS DIALYSIS CONDUIT DIST FLOW VOL: 1285 ML/MIN
BH CV VAS DIALYSIS CONDUIT DIST PSV: 332 CM/SEC
BH CV VAS DIALYSIS CONDUIT MID DEPTH: 0.4 CM
BH CV VAS DIALYSIS CONDUIT MID DIAMETER: 1.9 CM
BH CV VAS DIALYSIS CONDUIT MID EDV: 37 CM/SEC
BH CV VAS DIALYSIS CONDUIT MID PSV: 50 CM/SEC
BH CV VAS DIALYSIS CONDUIT MID/DIST DEPTH: 0.4 CM
BH CV VAS DIALYSIS CONDUIT MID/DIST DIAMETER: 1.6 CM
BH CV VAS DIALYSIS CONDUIT MID/DIST EDV: 23 CM/SEC
BH CV VAS DIALYSIS CONDUIT MID/DIST PSV: 34 CM/SEC
BH CV VAS DIALYSIS CONDUIT PROX DEPTH: 0.4 CM
BH CV VAS DIALYSIS CONDUIT PROX DIAMETER: 0.8 CM
BH CV VAS DIALYSIS CONDUIT PROX EDV: 141 CM/SEC
BH CV VAS DIALYSIS CONDUIT PROX FLOW VOL: 1807 ML/MIN
BH CV VAS DIALYSIS CONDUIT PROX PSV: 267 CM/SEC
BH CV VAS DIALYSIS CONDUIT PROX/MID DEPTH: 1.2 CM
BH CV VAS DIALYSIS CONDUIT PROX/MID DIAMETER: 1.2 CM
BH CV VAS DIALYSIS CONDUIT PROX/MID EDV: 106 CM/SEC
BH CV VAS DIALYSIS CONDUIT PROX/MID PSV: 190 CM/SEC
BH CV VAS DIALYSIS PRE-INFLOW BRACHIAL DIAMETER: 0.8 CM
BH CV VAS DIALYSIS PRE-INFLOW BRACHIAL EDV: 63 CM/SEC
BH CV VAS DIALYSIS PRE-INFLOW BRACHIAL FLOW VOL: 1053 ML/MIN
BH CV VAS DIALYSIS PRE-INFLOW BRACHIAL PSV: 133 CM/SEC
BH CV VAS DIALYSIS VENOUS OUTFLOW CEPHALIC ARCH DIAMETE: 0.8 CM
BH CV VAS DIALYSIS VENOUS OUTFLOW CEPHALIC ARCH EDV: 68 CM/SEC
BH CV VAS DIALYSIS VENOUS OUTFLOW CEPHALIC ARCH PSV: 119 CM/SEC
BH CV VAS DIALYSIS VENOUS OUTFLOW CEPHALIC VEIN DIAMETE: 0.8 CM
BH CV VAS DIALYSIS VENOUS OUTFLOW CEPHALIC VEIN EDV: 93 CM/SEC
BH CV VAS DIALYSIS VENOUS OUTFLOW CEPHALIC VEIN PSV: 151 CM/SEC
BH CV VAS DIALYSIS VENOUS OUTFLOW SUBCL-CEPHALIC JX DIAMETER: 0.6 CM
BH CV VAS DIALYSIS VENOUS OUTFLOW SUBCL-CEPHALIC JX EDV: 332 CM/SEC
BH CV VAS DIALYSIS VENOUS OUTFLOW SUBCL-CEPHALIC JX PSV: 579 CM/SEC
BH CV VAS DIALYSIS VENOUS OUTFLOW SUBCLAVIAN DIAMETER: 1.5 CM
BH CV VAS DIALYSIS VENOUS OUTFLOW SUBCLAVIAN EDV: 101 CM/SEC
BH CV VAS DIALYSIS VENOUS OUTFLOW SUBCLAVIAN PSV: 147 CM/SEC

## 2025-08-08 PROCEDURE — 93990 DOPPLER FLOW TESTING: CPT

## 2025-08-19 RX ORDER — NIFEDIPINE 90 MG/1
TABLET, EXTENDED RELEASE ORAL 2 TIMES DAILY
Qty: 180 TABLET | Refills: 0 | Status: SHIPPED | OUTPATIENT
Start: 2025-08-19

## (undated) DEVICE — SENSR O2 OXIMAX FNGR A/ 18IN NONSTR

## (undated) DEVICE — SUT ETHLN 2/0 PS 18IN 585H

## (undated) DEVICE — THE TORRENT IRRIGATION SCOPE CONNECTOR IS USED WITH THE TORRENT IRRIGATION TUBING TO PROVIDE IRRIGATION FLUIDS SUCH AS STERILE WATER DURING GASTROINTESTINAL ENDOSCOPIC PROCEDURES WHEN USED IN CONJUNCTION WITH AN IRRIGATION PUMP (OR ELECTROSURGICAL UNIT).: Brand: TORRENT

## (undated) DEVICE — ANTIBACTERIAL UNDYED BRAIDED (POLYGLACTIN 910), SYNTHETIC ABSORBABLE SUTURE: Brand: COATED VICRYL

## (undated) DEVICE — CANN NASL CO2 TRULINK W/O2 A/

## (undated) DEVICE — SUT MNCRYL 3/0 SH 27 IN Y416H

## (undated) DEVICE — BIOPATCH™ ANTIMICROBIAL DRESSING WITH CHLORHEXIDINE GLUCONATE IS A HYDROPHILLIC POLYURETHANE ABSORPTIVE FOAM WITH CHLORHEXIDINE GLUCONATE (CHG) WHICH INHIBITS BACTERIAL GROWTH UNDER THE DRESSING. THE DRESSING IS INTENDED TO BE USED TO ABSORB EXUDATE, COVER A WOUND CAUSED BY VASCULAR AND NONVASCULAR PERCUTANEOUS MEDICAL DEVICES DURING SURGERY, AS WELL AS REDUCE LOCAL INFECTION AND COLONIZATION OF MICROORGANISMS.: Brand: BIOPATCH

## (undated) DEVICE — GLV SURG BIOGEL M LTX PF 7 1/2

## (undated) DEVICE — BITEBLOCK OMNI BLOC

## (undated) DEVICE — FRCP BX RADJAW4 NDL 2.8 240CM LG OG BX40

## (undated) DEVICE — SOL NACL 0.9PCT 1000ML

## (undated) DEVICE — SOL NS 500ML

## (undated) DEVICE — SYR LUERLOK 5CC

## (undated) DEVICE — 3M™ STERI-STRIP™ REINFORCED ADHESIVE SKIN CLOSURES, R1547, 1/2 IN X 4 IN (12 MM X 100 MM), 6 STRIPS/ENVELOPE: Brand: 3M™ STERI-STRIP™

## (undated) DEVICE — KT CATH TAL PALINDROME SAPPHIRE 14.5F23CM

## (undated) DEVICE — VIOLET BRAIDED (POLYGLACTIN 910), SYNTHETIC ABSORBABLE SUTURE: Brand: COATED VICRYL

## (undated) DEVICE — SUT SILK 4/0 TIES 18IN A183H

## (undated) DEVICE — SUT PROLN 6/0 BV1 D/A 30IN 8709H

## (undated) DEVICE — LN SMPL CO2 SHTRM SD STREAM W/M LUER

## (undated) DEVICE — SUT PROLN 5/0 RB1 D/A 36IN 8556H

## (undated) DEVICE — 3M™ STERI-STRIP™ COMPOUND BENZOIN TINCTURE 40 BAGS/CARTON 4 CARTONS/CASE C1544: Brand: 3M™ STERI-STRIP™

## (undated) DEVICE — LOU GENERAL ROBOT: Brand: MEDLINE INDUSTRIES, INC.

## (undated) DEVICE — DRP C/ARM 41X74IN

## (undated) DEVICE — CVR PROB 96IN LF STRL

## (undated) DEVICE — SUT MNCRYL PLS ANTIB UD 4/0 PS2 18IN

## (undated) DEVICE — ADHS SKIN DERMABOND TOP ADVANCED

## (undated) DEVICE — TIP COVER ACCESSORY

## (undated) DEVICE — UNDYED BRAIDED (POLYGLACTIN 910), SYNTHETIC ABSORBABLE SUTURE: Brand: COATED VICRYL

## (undated) DEVICE — TUBING, SUCTION, 1/4" X 10', STRAIGHT: Brand: MEDLINE

## (undated) DEVICE — BANDAGE,GAUZE,BULKEE II,4.5"X4.1YD,STRL: Brand: MEDLINE

## (undated) DEVICE — OBT BLADLES ENDOWRIST DAVINCI/S 8MM

## (undated) DEVICE — ADAPT CLN BIOGUARD AIR/H2O DISP

## (undated) DEVICE — SUT SILK 3/0 TIES 18IN A184H

## (undated) DEVICE — BNDG ADHS PLSTC 1X3IN LF

## (undated) DEVICE — SPNG GZ WOVN 4X4IN 12PLY 10/BX STRL

## (undated) DEVICE — CANN O2 ETCO2 FITS ALL CONN CO2 SMPL A/ 7IN DISP LF

## (undated) DEVICE — DRSNG WND GZ PAD BORDERED 4X8IN STRL

## (undated) DEVICE — SOL ANTISTICK CAUTRY ELECTROLUBE LF

## (undated) DEVICE — SUT SILK 3/0 SH CR5 18IN C0135

## (undated) DEVICE — Device

## (undated) DEVICE — KT ORCA ORCAPOD DISP STRL

## (undated) DEVICE — SINGLE-USE BIOPSY FORCEPS: Brand: RADIAL JAW 4

## (undated) DEVICE — ENDOPATH XCEL BLADELESS TROCARS WITH STABILITY SLEEVES: Brand: ENDOPATH XCEL

## (undated) DEVICE — Device: Brand: DEFENDO AIR/WATER/SUCTION AND BIOPSY VALVE

## (undated) DEVICE — GLV SURG BIOGEL LTX PF 8 1/2

## (undated) DEVICE — PK AV FISTL/SHNT 40